# Patient Record
Sex: FEMALE | Race: WHITE | NOT HISPANIC OR LATINO | Employment: OTHER | URBAN - METROPOLITAN AREA
[De-identification: names, ages, dates, MRNs, and addresses within clinical notes are randomized per-mention and may not be internally consistent; named-entity substitution may affect disease eponyms.]

---

## 2017-04-28 DIAGNOSIS — F10.21 ALCOHOL USE DISORDER, SEVERE, IN EARLY REMISSION (H): Primary | ICD-10-CM

## 2017-04-28 DIAGNOSIS — F32.9 MDD (MAJOR DEPRESSIVE DISORDER): ICD-10-CM

## 2017-04-28 LAB
ALBUMIN SERPL-MCNC: 3.9 G/DL (ref 3.4–5)
ALP SERPL-CCNC: 130 U/L (ref 40–150)
ALT SERPL W P-5'-P-CCNC: 27 U/L (ref 0–50)
ANION GAP SERPL CALCULATED.3IONS-SCNC: 6 MMOL/L (ref 3–14)
AST SERPL W P-5'-P-CCNC: 24 U/L (ref 0–45)
BILIRUB SERPL-MCNC: 0.2 MG/DL (ref 0.2–1.3)
BUN SERPL-MCNC: 17 MG/DL (ref 7–30)
CALCIUM SERPL-MCNC: 8.8 MG/DL (ref 8.5–10.1)
CHLORIDE SERPL-SCNC: 102 MMOL/L (ref 94–109)
CO2 SERPL-SCNC: 29 MMOL/L (ref 20–32)
CREAT SERPL-MCNC: 0.78 MG/DL (ref 0.52–1.04)
GFR SERPL CREATININE-BSD FRML MDRD: 76 ML/MIN/1.7M2
GLUCOSE SERPL-MCNC: 98 MG/DL (ref 70–99)
POTASSIUM SERPL-SCNC: 4.1 MMOL/L (ref 3.4–5.3)
PROT SERPL-MCNC: 8 G/DL (ref 6.8–8.8)
SODIUM SERPL-SCNC: 137 MMOL/L (ref 133–144)

## 2017-04-28 PROCEDURE — 80053 COMPREHEN METABOLIC PANEL: CPT

## 2017-04-28 PROCEDURE — 36415 COLL VENOUS BLD VENIPUNCTURE: CPT | Performed by: FAMILY MEDICINE

## 2018-01-08 ENCOUNTER — RECORDS - HEALTHEAST (OUTPATIENT)
Dept: LAB | Facility: CLINIC | Age: 58
End: 2018-01-08

## 2018-01-13 LAB — BACTERIA SPEC CULT: ABNORMAL

## 2018-01-16 ENCOUNTER — RECORDS - HEALTHEAST (OUTPATIENT)
Dept: ADMINISTRATIVE | Facility: OTHER | Age: 58
End: 2018-01-16

## 2018-01-16 ENCOUNTER — AMBULATORY - HEALTHEAST (OUTPATIENT)
Dept: VASCULAR SURGERY | Facility: CLINIC | Age: 58
End: 2018-01-16

## 2018-01-16 ENCOUNTER — RECORDS - HEALTHEAST (OUTPATIENT)
Dept: LAB | Facility: CLINIC | Age: 58
End: 2018-01-16

## 2018-01-16 DIAGNOSIS — I73.9 CLAUDICATION (H): ICD-10-CM

## 2018-01-16 LAB
ALBUMIN SERPL-MCNC: 3.8 G/DL (ref 3.5–5)
ALP SERPL-CCNC: 109 U/L (ref 45–120)
ALT SERPL W P-5'-P-CCNC: 28 U/L (ref 0–45)
ANION GAP SERPL CALCULATED.3IONS-SCNC: 10 MMOL/L (ref 5–18)
AST SERPL W P-5'-P-CCNC: 36 U/L (ref 0–40)
BILIRUB SERPL-MCNC: 0.5 MG/DL (ref 0–1)
BUN SERPL-MCNC: 10 MG/DL (ref 8–22)
CALCIUM SERPL-MCNC: 9.1 MG/DL (ref 8.5–10.5)
CHLORIDE BLD-SCNC: 101 MMOL/L (ref 98–107)
CHOLEST SERPL-MCNC: 139 MG/DL
CO2 SERPL-SCNC: 26 MMOL/L (ref 22–31)
CREAT SERPL-MCNC: 0.75 MG/DL (ref 0.6–1.1)
FASTING STATUS PATIENT QL REPORTED: ABNORMAL
GFR SERPL CREATININE-BSD FRML MDRD: >60 ML/MIN/1.73M2
GLUCOSE BLD-MCNC: 91 MG/DL (ref 70–125)
HDLC SERPL-MCNC: 57 MG/DL
LDLC SERPL CALC-MCNC: 35 MG/DL
MAGNESIUM SERPL-MCNC: 2 MG/DL (ref 1.8–2.6)
POTASSIUM BLD-SCNC: 4.1 MMOL/L (ref 3.5–5)
PROT SERPL-MCNC: 7.5 G/DL (ref 6–8)
SODIUM SERPL-SCNC: 137 MMOL/L (ref 136–145)
TRIGL SERPL-MCNC: 237 MG/DL
TSH SERPL DL<=0.005 MIU/L-ACNC: 1.56 UIU/ML (ref 0.3–5)

## 2018-02-01 ENCOUNTER — AMBULATORY - HEALTHEAST (OUTPATIENT)
Dept: VASCULAR SURGERY | Facility: CLINIC | Age: 58
End: 2018-02-01

## 2018-02-01 DIAGNOSIS — E78.1 HYPERGLYCERIDEMIA: ICD-10-CM

## 2018-02-01 DIAGNOSIS — F31.9 BIPOLAR DISORDER (H): ICD-10-CM

## 2018-02-01 DIAGNOSIS — F10.10 ETOH ABUSE: ICD-10-CM

## 2018-02-01 DIAGNOSIS — I73.9 CLAUDICATION (H): ICD-10-CM

## 2018-02-01 DIAGNOSIS — M79.606 LEG PAIN: ICD-10-CM

## 2018-02-01 DIAGNOSIS — F17.200 SMOKER: ICD-10-CM

## 2018-02-01 DIAGNOSIS — K21.9 GERD (GASTROESOPHAGEAL REFLUX DISEASE): ICD-10-CM

## 2018-02-02 ENCOUNTER — RECORDS - HEALTHEAST (OUTPATIENT)
Dept: ADMINISTRATIVE | Facility: OTHER | Age: 58
End: 2018-02-02

## 2018-02-02 ENCOUNTER — COMMUNICATION - HEALTHEAST (OUTPATIENT)
Dept: TELEHEALTH | Facility: CLINIC | Age: 58
End: 2018-02-02

## 2018-02-02 ENCOUNTER — RECORDS - HEALTHEAST (OUTPATIENT)
Dept: VASCULAR ULTRASOUND | Facility: CLINIC | Age: 58
End: 2018-02-02

## 2018-02-02 ENCOUNTER — OFFICE VISIT - HEALTHEAST (OUTPATIENT)
Dept: VASCULAR SURGERY | Facility: CLINIC | Age: 58
End: 2018-02-02

## 2018-02-02 DIAGNOSIS — I73.9 CLAUDICATION (H): ICD-10-CM

## 2018-02-02 DIAGNOSIS — I73.9 PERIPHERAL VASCULAR DISEASE, UNSPECIFIED (H): ICD-10-CM

## 2018-02-02 DIAGNOSIS — M79.605 PAIN IN BOTH LOWER EXTREMITIES: ICD-10-CM

## 2018-02-02 DIAGNOSIS — M79.604 PAIN IN BOTH LOWER EXTREMITIES: ICD-10-CM

## 2018-02-02 DIAGNOSIS — M79.606 PAIN IN LEG, UNSPECIFIED: ICD-10-CM

## 2018-02-06 ENCOUNTER — COMMUNICATION - HEALTHEAST (OUTPATIENT)
Dept: VASCULAR SURGERY | Facility: CLINIC | Age: 58
End: 2018-02-06

## 2018-03-08 ENCOUNTER — RECORDS - HEALTHEAST (OUTPATIENT)
Dept: LAB | Facility: CLINIC | Age: 58
End: 2018-03-08

## 2018-03-08 LAB — VIT B12 SERPL-MCNC: 633 PG/ML (ref 213–816)

## 2018-08-01 ENCOUNTER — APPOINTMENT (OUTPATIENT)
Dept: GENERAL RADIOLOGY | Facility: CLINIC | Age: 58
DRG: 885 | End: 2018-08-01
Attending: EMERGENCY MEDICINE
Payer: MEDICARE

## 2018-08-01 ENCOUNTER — HOSPITAL ENCOUNTER (OUTPATIENT)
Facility: CLINIC | Age: 58
Setting detail: OBSERVATION
Discharge: PSYCHIATRIC OR SUBSTANCE USE FACILITY | DRG: 885 | End: 2018-08-03
Attending: EMERGENCY MEDICINE | Admitting: EMERGENCY MEDICINE
Payer: MEDICARE

## 2018-08-01 ENCOUNTER — APPOINTMENT (OUTPATIENT)
Dept: CT IMAGING | Facility: CLINIC | Age: 58
DRG: 885 | End: 2018-08-01
Attending: EMERGENCY MEDICINE
Payer: MEDICARE

## 2018-08-01 DIAGNOSIS — R07.9 CHEST PAIN, UNSPECIFIED TYPE: ICD-10-CM

## 2018-08-01 DIAGNOSIS — F43.20 ADJUSTMENT DISORDER, UNSPECIFIED TYPE: ICD-10-CM

## 2018-08-01 DIAGNOSIS — R07.89 ATYPICAL CHEST PAIN: ICD-10-CM

## 2018-08-01 DIAGNOSIS — R45.851 SUICIDAL IDEATION: ICD-10-CM

## 2018-08-01 LAB
ALBUMIN SERPL-MCNC: 3.5 G/DL (ref 3.4–5)
ALP SERPL-CCNC: 96 U/L (ref 40–150)
ALT SERPL W P-5'-P-CCNC: 67 U/L (ref 0–50)
AMPHETAMINES UR QL SCN: NEGATIVE
ANION GAP SERPL CALCULATED.3IONS-SCNC: 10 MMOL/L (ref 3–14)
APTT PPP: 30 SEC (ref 22–37)
AST SERPL W P-5'-P-CCNC: 67 U/L (ref 0–45)
BARBITURATES UR QL: NEGATIVE
BASOPHILS # BLD AUTO: 0.1 10E9/L (ref 0–0.2)
BASOPHILS NFR BLD AUTO: 1.4 %
BENZODIAZ UR QL: NEGATIVE
BILIRUB SERPL-MCNC: 0.8 MG/DL (ref 0.2–1.3)
BUN SERPL-MCNC: 7 MG/DL (ref 7–30)
CALCIUM SERPL-MCNC: 8.5 MG/DL (ref 8.5–10.1)
CANNABINOIDS UR QL SCN: NEGATIVE
CHLORIDE SERPL-SCNC: 104 MMOL/L (ref 94–109)
CO2 SERPL-SCNC: 25 MMOL/L (ref 20–32)
COCAINE UR QL: NEGATIVE
CREAT SERPL-MCNC: 0.76 MG/DL (ref 0.52–1.04)
D DIMER PPP FEU-MCNC: 2.9 UG/ML FEU (ref 0–0.5)
DIFFERENTIAL METHOD BLD: ABNORMAL
EOSINOPHIL # BLD AUTO: 0.1 10E9/L (ref 0–0.7)
EOSINOPHIL NFR BLD AUTO: 1.6 %
ERYTHROCYTE [DISTWIDTH] IN BLOOD BY AUTOMATED COUNT: 12.7 % (ref 10–15)
ETHANOL UR QL SCN: NEGATIVE
GFR SERPL CREATININE-BSD FRML MDRD: 77 ML/MIN/1.7M2
GLUCOSE SERPL-MCNC: 110 MG/DL (ref 70–99)
HCT VFR BLD AUTO: 39.5 % (ref 35–47)
HGB BLD-MCNC: 13.3 G/DL (ref 11.7–15.7)
IMM GRANULOCYTES # BLD: 0 10E9/L (ref 0–0.4)
IMM GRANULOCYTES NFR BLD: 0.2 %
INR PPP: 1.21 (ref 0.86–1.14)
LIPASE SERPL-CCNC: 127 U/L (ref 73–393)
LYMPHOCYTES # BLD AUTO: 1.8 10E9/L (ref 0.8–5.3)
LYMPHOCYTES NFR BLD AUTO: 34.6 %
MCH RBC QN AUTO: 32.1 PG (ref 26.5–33)
MCHC RBC AUTO-ENTMCNC: 33.7 G/DL (ref 31.5–36.5)
MCV RBC AUTO: 95 FL (ref 78–100)
MONOCYTES # BLD AUTO: 0.4 10E9/L (ref 0–1.3)
MONOCYTES NFR BLD AUTO: 7.4 %
NEUTROPHILS # BLD AUTO: 2.8 10E9/L (ref 1.6–8.3)
NEUTROPHILS NFR BLD AUTO: 54.8 %
NRBC # BLD AUTO: 0 10*3/UL
NRBC BLD AUTO-RTO: 0 /100
OPIATES UR QL SCN: NEGATIVE
PLATELET # BLD AUTO: 105 10E9/L (ref 150–450)
POTASSIUM SERPL-SCNC: 4.4 MMOL/L (ref 3.4–5.3)
PROT SERPL-MCNC: 7.9 G/DL (ref 6.8–8.8)
RBC # BLD AUTO: 4.14 10E12/L (ref 3.8–5.2)
SODIUM SERPL-SCNC: 139 MMOL/L (ref 133–144)
TROPONIN I SERPL-MCNC: <0.015 UG/L (ref 0–0.04)
WBC # BLD AUTO: 5.2 10E9/L (ref 4–11)

## 2018-08-01 PROCEDURE — 83690 ASSAY OF LIPASE: CPT | Performed by: EMERGENCY MEDICINE

## 2018-08-01 PROCEDURE — A9270 NON-COVERED ITEM OR SERVICE: HCPCS | Mod: GY | Performed by: EMERGENCY MEDICINE

## 2018-08-01 PROCEDURE — 93010 ELECTROCARDIOGRAM REPORT: CPT | Mod: Z6 | Performed by: EMERGENCY MEDICINE

## 2018-08-01 PROCEDURE — 99220 ZZC INITIAL OBSERVATION CARE,LEVL III: CPT | Mod: Z6 | Performed by: NURSE PRACTITIONER

## 2018-08-01 PROCEDURE — 85025 COMPLETE CBC W/AUTO DIFF WBC: CPT | Performed by: EMERGENCY MEDICINE

## 2018-08-01 PROCEDURE — 84484 ASSAY OF TROPONIN QUANT: CPT | Performed by: EMERGENCY MEDICINE

## 2018-08-01 PROCEDURE — 94640 AIRWAY INHALATION TREATMENT: CPT | Performed by: EMERGENCY MEDICINE

## 2018-08-01 PROCEDURE — 85730 THROMBOPLASTIN TIME PARTIAL: CPT | Performed by: EMERGENCY MEDICINE

## 2018-08-01 PROCEDURE — 25000125 ZZHC RX 250: Performed by: EMERGENCY MEDICINE

## 2018-08-01 PROCEDURE — 25000132 ZZH RX MED GY IP 250 OP 250 PS 637: Mod: GY | Performed by: EMERGENCY MEDICINE

## 2018-08-01 PROCEDURE — 71260 CT THORAX DX C+: CPT

## 2018-08-01 PROCEDURE — 80053 COMPREHEN METABOLIC PANEL: CPT | Performed by: EMERGENCY MEDICINE

## 2018-08-01 PROCEDURE — 99285 EMERGENCY DEPT VISIT HI MDM: CPT | Mod: 25 | Performed by: EMERGENCY MEDICINE

## 2018-08-01 PROCEDURE — 80307 DRUG TEST PRSMV CHEM ANLYZR: CPT | Performed by: EMERGENCY MEDICINE

## 2018-08-01 PROCEDURE — 71046 X-RAY EXAM CHEST 2 VIEWS: CPT

## 2018-08-01 PROCEDURE — 80320 DRUG SCREEN QUANTALCOHOLS: CPT | Performed by: EMERGENCY MEDICINE

## 2018-08-01 PROCEDURE — 25000128 H RX IP 250 OP 636: Performed by: EMERGENCY MEDICINE

## 2018-08-01 PROCEDURE — 85610 PROTHROMBIN TIME: CPT | Performed by: EMERGENCY MEDICINE

## 2018-08-01 PROCEDURE — 85379 FIBRIN DEGRADATION QUANT: CPT | Performed by: EMERGENCY MEDICINE

## 2018-08-01 PROCEDURE — 93005 ELECTROCARDIOGRAM TRACING: CPT | Performed by: EMERGENCY MEDICINE

## 2018-08-01 RX ORDER — ACETAMINOPHEN 500 MG
1000 TABLET ORAL ONCE
Status: COMPLETED | OUTPATIENT
Start: 2018-08-01 | End: 2018-08-01

## 2018-08-01 RX ORDER — IOPAMIDOL 755 MG/ML
100 INJECTION, SOLUTION INTRAVASCULAR ONCE
Status: COMPLETED | OUTPATIENT
Start: 2018-08-01 | End: 2018-08-01

## 2018-08-01 RX ORDER — ZOLPIDEM TARTRATE 5 MG/1
5 TABLET ORAL
Status: DISCONTINUED | OUTPATIENT
Start: 2018-08-01 | End: 2018-08-02

## 2018-08-01 RX ORDER — ASPIRIN 81 MG/1
324 TABLET, CHEWABLE ORAL ONCE
Status: COMPLETED | OUTPATIENT
Start: 2018-08-01 | End: 2018-08-01

## 2018-08-01 RX ORDER — IPRATROPIUM BROMIDE AND ALBUTEROL SULFATE 2.5; .5 MG/3ML; MG/3ML
3 SOLUTION RESPIRATORY (INHALATION) ONCE
Status: COMPLETED | OUTPATIENT
Start: 2018-08-01 | End: 2018-08-01

## 2018-08-01 RX ADMIN — ZOLPIDEM TARTRATE 5 MG: 5 TABLET, FILM COATED ORAL at 22:50

## 2018-08-01 RX ADMIN — IPRATROPIUM BROMIDE AND ALBUTEROL SULFATE 3 ML: .5; 3 SOLUTION RESPIRATORY (INHALATION) at 18:08

## 2018-08-01 RX ADMIN — ACETAMINOPHEN 1000 MG: 500 TABLET ORAL at 22:44

## 2018-08-01 RX ADMIN — IOPAMIDOL 64 ML: 755 INJECTION, SOLUTION INTRAVENOUS at 19:16

## 2018-08-01 RX ADMIN — ASPIRIN 81 MG CHEWABLE TABLET 324 MG: 81 TABLET CHEWABLE at 16:49

## 2018-08-01 RX ADMIN — SODIUM CHLORIDE 84 ML: 9 INJECTION, SOLUTION INTRAVENOUS at 19:16

## 2018-08-01 ASSESSMENT — ENCOUNTER SYMPTOMS
COUGH: 1
PALPITATIONS: 0
FEVER: 0
NAUSEA: 0

## 2018-08-01 NOTE — ED PROVIDER NOTES
History     Chief Complaint   Patient presents with     Suicidal     stress at home: daughter moving back in to house: dealing with these thouughts for a month: anxiety: plan to OD on pills     The history is provided by the patient. No  was used.     Keiko Guo is a 58 year old female who has a history of depression, previous hospitalization for suicide attempt, as well as attempts while teenager (overdose), COPD not on meds, peripheral artery disease in legs w/claudication.  She comes in today with depression, suicidal ideation, and chest pain.  She was seen in ED OSH 1 week ago and was told CP was anxiety.  She has difficulty maintaining train of thought and clearly expressing duration of symptoms but says it was all day today since waking, and has happened 3-4 times previously.  She says is worse with deep breaths, non exertional, R sided behind breast, constant throughout the day today.  She denies hx of clots but has listed result with blood clot in superficial arm vein 2016.  She has no leg swelling, does complain of dyspnea that she cannot characterize.  She has no known history of cardiac disease, has not had stress testing or cardiac catheterization in the past.  Has had cough for 1 week, no fever, no nausea/vomiting, no leg swelling, no immobilization or hormone use.  Took tylenol for pain with minimal relief.  Does not have inhalers at home.  Also comes in today for suicidal ideation.  Has had a few weeks of worsening depression, and today is concerned that she will take pills to harm herself.  Was started on zyprexa in December without improvement.  Is also on wellbutrin.    Patient is former tobacco smoker, currently uses vape, drinks 2 beers daily, denies drug use.    I have reviewed the Medications, Allergies, Past Medical and Surgical History, and Social History in the Epic system.    Review of Systems   Constitutional: Negative for fever.   HENT: Negative for  congestion.    Respiratory: Positive for cough.    Cardiovascular: Positive for chest pain. Negative for palpitations.   Gastrointestinal: Negative for nausea.   Skin: Negative for rash.   Psychiatric/Behavioral: Positive for suicidal ideas.   All other systems reviewed and are negative.      Physical Exam   BP: 129/70  Pulse: 95  Heart Rate: 79  Temp: 96.8  F (36  C)  Resp: 16  Weight: 81.6 kg (180 lb)  SpO2: 95 %      Physical Exam   Constitutional: She is oriented to person, place, and time. She appears well-developed and well-nourished. No distress.   HENT:   Head: Normocephalic and atraumatic.   Eyes: Pupils are equal, round, and reactive to light.   Cardiovascular: Normal rate and intact distal pulses.    Pulmonary/Chest: No respiratory distress. She exhibits no tenderness.   Abdominal: There is no tenderness.   Musculoskeletal: Normal range of motion.   Neurological: She is alert and oriented to person, place, and time.   Skin: Skin is warm and dry.   Psychiatric:   Flat affect, psychomotor slowing       ED Course     ED Course     Procedures             EKG Interpretation:      Interpreted by Shannan Townsend  Time reviewed: 1440   Symptoms at time of EKG: pain   Rhythm: normal sinus   Rate: Normal  Axis: Normal  Ectopy: none  Conduction: normal  ST Segments/ T Waves: T wave inversion V1, V2, V3 and V4  Q Waves: none  Comparison to prior: T wave inversions new    Clinical Impression: new t wave inversions, no evidence acute ischemia                Critical Care time:  none             Labs Ordered and Resulted from Time of ED Arrival Up to the Time of Departure from the ED   CBC WITH PLATELETS DIFFERENTIAL - Abnormal; Notable for the following:        Result Value    Platelet Count 105 (*)     All other components within normal limits   INR - Abnormal; Notable for the following:     INR 1.21 (*)     All other components within normal limits   D DIMER QUANTITATIVE - Abnormal; Notable for the following:     D  Dimer 2.9 (*)     All other components within normal limits   TROPONIN I   COMPREHENSIVE METABOLIC PANEL   LIPASE   PARTIAL THROMBOPLASTIN TIME   PULSE OXIMETRY NURSING   CARDIAC CONTINUOUS MONITORING   PERIPHERAL IV CATHETER   PATIENT CARE ORDER            Assessments & Plan (with Medical Decision Making)   Patient has 2 complaints today, chest pain and depression/SI    Chest pain story is less concerning for cardiac etiology as is not exertional, constant for several hours, and episodic.  However patient is former smoker and has vascular disease, age and overweight increase her risk for cardiac disease.  Heart score 3 without troponin.  Due to duration likely can obtain single troponin.  Will require provocative testing in future.  DDX also includes PE.  Wells score 0, unable to PERC out as patient is >50, dimer above age-adjusted cut-off, will require CTA.  DDX also includes pneumonia, COPD exacerbation.  Patient will require medical clearances prior to admission to psychiatry for active SI with plan/intent.  Patient is high risk given previous attempts.    I have reviewed the nursing notes.    I have reviewed the findings, diagnosis, plan and need for follow up with the patient.    New Prescriptions    No medications on file       Final diagnoses:   None       8/1/2018   [facility], [place], EMERGENCY DEPARTMENT     Shannan Townsend MD  08/01/18 [id number]

## 2018-08-01 NOTE — ED NOTES
I have performed an in person assessment of the patient. Based on this assessment the patient no longer requires a one on one attendant at this point in time.    Shannan Townsend MD  4:20 PM  August 1, 2018           Shannan Townsend MD  08/01/18 2176

## 2018-08-02 ENCOUNTER — APPOINTMENT (OUTPATIENT)
Dept: CARDIOLOGY | Facility: CLINIC | Age: 58
DRG: 885 | End: 2018-08-02
Attending: NURSE PRACTITIONER
Payer: MEDICARE

## 2018-08-02 ENCOUNTER — APPOINTMENT (OUTPATIENT)
Dept: NUCLEAR MEDICINE | Facility: CLINIC | Age: 58
DRG: 885 | End: 2018-08-02
Attending: NURSE PRACTITIONER
Payer: MEDICARE

## 2018-08-02 VITALS
WEIGHT: 183.6 LBS | OXYGEN SATURATION: 95 % | TEMPERATURE: 97.7 F | DIASTOLIC BLOOD PRESSURE: 82 MMHG | SYSTOLIC BLOOD PRESSURE: 124 MMHG | HEART RATE: 87 BPM | HEIGHT: 62 IN | RESPIRATION RATE: 18 BRPM | BODY MASS INDEX: 33.79 KG/M2

## 2018-08-02 LAB
INTERPRETATION ECG - MUSE: NORMAL
TROPONIN I SERPL-MCNC: <0.015 UG/L (ref 0–0.04)
TROPONIN I SERPL-MCNC: <0.015 UG/L (ref 0–0.04)

## 2018-08-02 PROCEDURE — 93018 CV STRESS TEST I&R ONLY: CPT | Performed by: INTERNAL MEDICINE

## 2018-08-02 PROCEDURE — 84484 ASSAY OF TROPONIN QUANT: CPT | Performed by: NURSE PRACTITIONER

## 2018-08-02 PROCEDURE — 99214 OFFICE O/P EST MOD 30 MIN: CPT | Performed by: PSYCHIATRY & NEUROLOGY

## 2018-08-02 PROCEDURE — A9502 TC99M TETROFOSMIN: HCPCS | Performed by: EMERGENCY MEDICINE

## 2018-08-02 PROCEDURE — 93016 CV STRESS TEST SUPVJ ONLY: CPT | Performed by: INTERNAL MEDICINE

## 2018-08-02 PROCEDURE — 25000132 ZZH RX MED GY IP 250 OP 250 PS 637: Mod: GY | Performed by: NURSE PRACTITIONER

## 2018-08-02 PROCEDURE — 25000128 H RX IP 250 OP 636: Performed by: INTERNAL MEDICINE

## 2018-08-02 PROCEDURE — 34300033 ZZH RX 343: Performed by: EMERGENCY MEDICINE

## 2018-08-02 PROCEDURE — 99217 ZZC OBSERVATION CARE DISCHARGE: CPT | Mod: Z6 | Performed by: PHYSICIAN ASSISTANT

## 2018-08-02 PROCEDURE — A9270 NON-COVERED ITEM OR SERVICE: HCPCS | Mod: GY | Performed by: PSYCHIATRY & NEUROLOGY

## 2018-08-02 PROCEDURE — G0378 HOSPITAL OBSERVATION PER HR: HCPCS

## 2018-08-02 PROCEDURE — 25000132 ZZH RX MED GY IP 250 OP 250 PS 637: Mod: GY | Performed by: PSYCHIATRY & NEUROLOGY

## 2018-08-02 PROCEDURE — 78452 HT MUSCLE IMAGE SPECT MULT: CPT

## 2018-08-02 PROCEDURE — 93017 CV STRESS TEST TRACING ONLY: CPT

## 2018-08-02 PROCEDURE — A9270 NON-COVERED ITEM OR SERVICE: HCPCS | Mod: GY | Performed by: NURSE PRACTITIONER

## 2018-08-02 PROCEDURE — 25000125 ZZHC RX 250: Performed by: EMERGENCY MEDICINE

## 2018-08-02 PROCEDURE — 36415 COLL VENOUS BLD VENIPUNCTURE: CPT | Performed by: NURSE PRACTITIONER

## 2018-08-02 RX ORDER — QUETIAPINE FUMARATE 50 MG/1
50 TABLET, FILM COATED ORAL AT BEDTIME
Status: DISCONTINUED | OUTPATIENT
Start: 2018-08-02 | End: 2018-08-03 | Stop reason: HOSPADM

## 2018-08-02 RX ORDER — PANTOPRAZOLE SODIUM 40 MG/1
40 TABLET, DELAYED RELEASE ORAL
Status: DISCONTINUED | OUTPATIENT
Start: 2018-08-02 | End: 2018-08-03 | Stop reason: HOSPADM

## 2018-08-02 RX ORDER — OLANZAPINE 5 MG/1
5 TABLET ORAL AT BEDTIME
Status: DISCONTINUED | OUTPATIENT
Start: 2018-08-02 | End: 2018-08-03 | Stop reason: HOSPADM

## 2018-08-02 RX ORDER — ASPIRIN 81 MG/1
162 TABLET, CHEWABLE ORAL ONCE
Status: COMPLETED | OUTPATIENT
Start: 2018-08-02 | End: 2018-08-02

## 2018-08-02 RX ORDER — AMINOPHYLLINE 25 MG/ML
50-100 INJECTION, SOLUTION INTRAVENOUS
Status: DISCONTINUED | OUTPATIENT
Start: 2018-08-02 | End: 2018-08-02

## 2018-08-02 RX ORDER — IPRATROPIUM BROMIDE AND ALBUTEROL SULFATE 2.5; .5 MG/3ML; MG/3ML
3 SOLUTION RESPIRATORY (INHALATION) ONCE
Status: COMPLETED | OUTPATIENT
Start: 2018-08-02 | End: 2018-08-02

## 2018-08-02 RX ORDER — ACETAMINOPHEN 325 MG/1
650 TABLET ORAL EVERY 4 HOURS PRN
Status: DISCONTINUED | OUTPATIENT
Start: 2018-08-02 | End: 2018-08-03 | Stop reason: HOSPADM

## 2018-08-02 RX ORDER — ASPIRIN 81 MG/1
81 TABLET ORAL DAILY
Status: DISCONTINUED | OUTPATIENT
Start: 2018-08-03 | End: 2018-08-03 | Stop reason: HOSPADM

## 2018-08-02 RX ORDER — HYDROXYZINE HYDROCHLORIDE 25 MG/1
25 TABLET, FILM COATED ORAL AT BEDTIME
Status: DISCONTINUED | OUTPATIENT
Start: 2018-08-02 | End: 2018-08-03 | Stop reason: HOSPADM

## 2018-08-02 RX ORDER — LIDOCAINE 40 MG/G
CREAM TOPICAL
Status: DISCONTINUED | OUTPATIENT
Start: 2018-08-02 | End: 2018-08-03 | Stop reason: HOSPADM

## 2018-08-02 RX ORDER — REGADENOSON 0.08 MG/ML
0.4 INJECTION, SOLUTION INTRAVENOUS ONCE
Status: DISCONTINUED | OUTPATIENT
Start: 2018-08-02 | End: 2018-08-02

## 2018-08-02 RX ORDER — BUPROPION HYDROCHLORIDE 100 MG/1
100 TABLET, EXTENDED RELEASE ORAL AT BEDTIME
Status: ON HOLD | COMMUNITY
End: 2018-08-07

## 2018-08-02 RX ORDER — ALUMINA, MAGNESIA, AND SIMETHICONE 2400; 2400; 240 MG/30ML; MG/30ML; MG/30ML
30 SUSPENSION ORAL EVERY 4 HOURS PRN
Status: DISCONTINUED | OUTPATIENT
Start: 2018-08-02 | End: 2018-08-03 | Stop reason: HOSPADM

## 2018-08-02 RX ORDER — ACETAMINOPHEN 650 MG/1
650 SUPPOSITORY RECTAL EVERY 4 HOURS PRN
Status: DISCONTINUED | OUTPATIENT
Start: 2018-08-02 | End: 2018-08-03 | Stop reason: HOSPADM

## 2018-08-02 RX ORDER — LORAZEPAM 1 MG/1
1 TABLET ORAL 3 TIMES DAILY PRN
Status: DISCONTINUED | OUTPATIENT
Start: 2018-08-02 | End: 2018-08-03 | Stop reason: HOSPADM

## 2018-08-02 RX ORDER — SENNOSIDES 8.6 MG
2600 CAPSULE ORAL 2 TIMES DAILY
COMMUNITY

## 2018-08-02 RX ORDER — ACYCLOVIR 200 MG/1
0-1 CAPSULE ORAL
Status: DISCONTINUED | OUTPATIENT
Start: 2018-08-02 | End: 2018-08-03 | Stop reason: HOSPADM

## 2018-08-02 RX ORDER — NITROGLYCERIN 0.4 MG/1
0.4 TABLET SUBLINGUAL EVERY 5 MIN PRN
Status: DISCONTINUED | OUTPATIENT
Start: 2018-08-02 | End: 2018-08-03 | Stop reason: HOSPADM

## 2018-08-02 RX ORDER — HYDROXYZINE HYDROCHLORIDE 25 MG/1
25 TABLET, FILM COATED ORAL EVERY 6 HOURS
COMMUNITY

## 2018-08-02 RX ORDER — IPRATROPIUM BROMIDE AND ALBUTEROL SULFATE 2.5; .5 MG/3ML; MG/3ML
3 SOLUTION RESPIRATORY (INHALATION) EVERY 4 HOURS PRN
Status: DISCONTINUED | OUTPATIENT
Start: 2018-08-02 | End: 2018-08-03 | Stop reason: HOSPADM

## 2018-08-02 RX ORDER — OLANZAPINE 20 MG/1
20 TABLET ORAL AT BEDTIME
Status: ON HOLD | COMMUNITY
End: 2018-08-07

## 2018-08-02 RX ORDER — HYDROXYZINE HYDROCHLORIDE 25 MG/1
50 TABLET, FILM COATED ORAL AT BEDTIME
Status: DISCONTINUED | OUTPATIENT
Start: 2018-08-02 | End: 2018-08-03 | Stop reason: HOSPADM

## 2018-08-02 RX ORDER — ALBUTEROL SULFATE 90 UG/1
2 AEROSOL, METERED RESPIRATORY (INHALATION) EVERY 5 MIN PRN
Status: DISCONTINUED | OUTPATIENT
Start: 2018-08-02 | End: 2018-08-02

## 2018-08-02 RX ORDER — NALOXONE HYDROCHLORIDE 0.4 MG/ML
.1-.4 INJECTION, SOLUTION INTRAMUSCULAR; INTRAVENOUS; SUBCUTANEOUS
Status: DISCONTINUED | OUTPATIENT
Start: 2018-08-02 | End: 2018-08-03 | Stop reason: HOSPADM

## 2018-08-02 RX ORDER — NICOTINE 21 MG/24HR
1 PATCH, TRANSDERMAL 24 HOURS TRANSDERMAL DAILY
Status: DISCONTINUED | OUTPATIENT
Start: 2018-08-02 | End: 2018-08-03 | Stop reason: HOSPADM

## 2018-08-02 RX ADMIN — QUETIAPINE FUMARATE 50 MG: 50 TABLET ORAL at 22:11

## 2018-08-02 RX ADMIN — TETROFOSMIN 33 MCI.: 1.38 INJECTION, POWDER, LYOPHILIZED, FOR SOLUTION INTRAVENOUS at 13:10

## 2018-08-02 RX ADMIN — HYDROXYZINE HYDROCHLORIDE 25 MG: 25 TABLET ORAL at 04:49

## 2018-08-02 RX ADMIN — TETROFOSMIN 8.6 MCI.: 1.38 INJECTION, POWDER, LYOPHILIZED, FOR SOLUTION INTRAVENOUS at 11:30

## 2018-08-02 RX ADMIN — HYDROXYZINE HYDROCHLORIDE 25 MG: 25 TABLET ORAL at 22:11

## 2018-08-02 RX ADMIN — REGADENOSON 0.4 MG: 0.08 INJECTION, SOLUTION INTRAVENOUS at 13:06

## 2018-08-02 RX ADMIN — OLANZAPINE 5 MG: 5 TABLET, FILM COATED ORAL at 22:11

## 2018-08-02 RX ADMIN — ACETAMINOPHEN 650 MG: 325 TABLET, FILM COATED ORAL at 20:18

## 2018-08-02 RX ADMIN — ASPIRIN 81 MG CHEWABLE TABLET 162 MG: 81 TABLET CHEWABLE at 04:49

## 2018-08-02 RX ADMIN — LORAZEPAM 1 MG: 1 TABLET ORAL at 20:19

## 2018-08-02 RX ADMIN — PANTOPRAZOLE SODIUM 40 MG: 40 TABLET, DELAYED RELEASE ORAL at 08:34

## 2018-08-02 RX ADMIN — IPRATROPIUM BROMIDE AND ALBUTEROL SULFATE 3 ML: .5; 3 SOLUTION RESPIRATORY (INHALATION) at 01:44

## 2018-08-02 NOTE — PLAN OF CARE
Problem: Patient Care Overview  Goal: Plan of Care/Patient Progress Review  List all goals to be met before discharge home:   - Serial troponins and stress test complete. Not met,stress test not completed  - Seen and cleared by consultant if applicable :Not met  - Adequate pain control on oral analgesia :Denied pain  - Vital signs normal or at patient baseline :Vitals are stable  - Safe disposition plan has been identified :Not yet identified.  - Nurse to notify provider when observation goals have been met and patient is ready for discharge

## 2018-08-02 NOTE — CONSULTS
She is currently undergoing a stress EKG; I will be back later today or tomorrow.   Chart was reviewed; hold on OP Wellbutrin. Would order Zyprexa 10 mg HS PRN sleep, agitation   Continue beside attendant until we see her    page me at [phone] as needed

## 2018-08-02 NOTE — PLAN OF CARE
"Problem: Patient Care Overview  Goal: Plan of Care/Patient Progress Review  Outcome: No Change  Outpatient/Observation goals to be met before discharge home:  /88 (BP Location: Left arm)  Pulse 87  Temp 97.4  F (36.3  C) (Axillary)  Resp 18  Ht 1.575 m (5' 2\")  Wt 83.3 kg (183 lb 9.6 oz)  SpO2 97%  BMI 33.58 kg/m2  Serial troponins and stress test complete. - Awaiting Last Troponin  - Seen and cleared by consultant if applicable - Awaiting 2nd Psych Consult in Morning   - Adequate pain control on oral analgesia - Yes  - Vital signs normal or at patient baseline - Yes  - Safe disposition plan has been identified - NA  - Nurse to notify provider when observation goals have been met and patient is ready for discharge.          "

## 2018-08-02 NOTE — PHARMACY-ADMISSION MEDICATION HISTORY
Admission medication history interview status for the 8/1/2018 admission is complete. See Epic admission navigator for allergy information, pharmacy, prior to admission medications and immunization status.     Medication history interview sources:  patient and Christie ([phone])    Changes made to PTA medication list (reason)  Added: Melatonin, acetaminophen ER, hydroxyzine  Deleted: Norco  Changed:   -bupropion no sig --> bupropion SR 100mg at bedtime  -olanzapine no sig --> 20mg at bedtime    Additional medication history information (including reliability of information, actions taken by pharmacist):  -pt was moderate historian, knew all meds but no doses, confirmed with pharmacy  -Pt states hydroxyzine is not working for her and would like to try something new  -Pt requesting albuterol HFA on discharge d/t SOB. Has had in the past.  -Pt unsure if melatonin is 1mg or 2mg tablets, but takes 5 tablets at bedtime.      Prior to Admission medications    Medication Sig Last Dose Taking? Auth Provider   acetaminophen (TYLENOL) 650 MG CR tablet Take 2,600 mg by mouth 2 times daily 7/31/2018 Yes Unknown, Entered By History   buPROPion (WELLBUTRIN SR) 100 MG 12 hr tablet Take 100 mg by mouth At Bedtime 7/31/2018 at HS Yes Unknown, Entered By History   hydrOXYzine (ATARAX) 25 MG tablet Take 25 mg by mouth every 6 hours 7/31/2018 Yes Unknown, Entered By History   MELATONIN PO Take 5 tablets by mouth At Bedtime 7/31/2018 at HS Yes Unknown, Entered By History   OLANZapine (ZYPREXA) 20 MG tablet Take 20 mg by mouth At Bedtime 7/31/2018 at HS Yes Unknown, Entered By History         Medication history completed by: Balwinder Tam (PD4 Student)

## 2018-08-02 NOTE — H&P
ED OBSERVATION HISTORY & PHYSICAL    Admission Date: 8-1-18  Attending Physician: Sylvain Spain MD  Admitting Physician: Dino Morataya MD  NP/PA: Marika Coyne CNP    REASON FOR ADMISSION:   Chief Complaint   Patient presents with     Suicidal     stress at home: daughter moving back in to house: dealing with these thouughts for a month: anxiety: plan to OD on pills         HPI:    Keiko Guo is a 58 year old female with a history of COPD, depression, smoking tobacco, peripheral arterial disease with claudication, suicide attempts, and depression who presented to the ED with suicidal ideation and chest pain. She reports that the chest pain has been going on all day today since she woke up and it is right sided. It is constant and does hurt more to take deep breaths. She denies that the pain is exertional though. She had chest pain last week and reports she was told in ED OSH that it was anxiety. She has not had leg swelling. She denies history of blood clots, although chart review does show superficial arm vein thrombosis 2016. She is not on any anticoagulation. She has no known cardiac history. She has had a cough for a week, no fevers or chills, no nausea or vomiting, no immobilization or hormone use. She does not use inhalers at home. She reports worsening depression for a few weeks and is concerned that she might take pills to harm herself. She is currently on Zyprexa and wellbutrin. She is a former tobacco smoker, uses a vape pen now and drinks 2 beers daily.     In the ED, VSS. D-Dimer 2.9. So completed CTA chest shows emphysematous changes with peripheral lingular nodule that should have followup but no acute processes. EKG with t-wave inversion of V1, V2, V3, V4. Prolonged QT. Troponin x2 negative.    On admission to the observation unit the patient was stable. Denies chest pain or shortness of breath. She is anxious and wants her mints but she is NPO pending cardiac stress testing in the next few  "hours. She reports her ex-, whom she lives with, is in hospital for 4 way coronary bypass \"at some hospital in [place]\" right now.     ROS:  CONSTITUTIONAL: Denies fever, chills, sweats, fatigue, weakness, weight loss, or appetite changes.  SKIN: Denies rash, itching, bruising, new lumps or bumps, ecchymosis, hair changes or nail changes.  EYES: Denies visual changes, blurred vision, double vision, or eye pain  EARS/NOSE/THROAT: Denies hearing loss, tinnitus, sinus pressure/drainage, PND, nasal congestion, runny nose, epistaxis, sore throat/mouth pain, change in taste, ear pain, bleeding gums, or hoarseness.  RESPIRATORY: +Cough Denies dyspnea at rest or with activity, or hemoptysis.  CARDIOVASCULAR:Denies chest pain currently. Denies palpitations, orthopnea, edema or open areas on extremities.  GASTROINTESTINAL:Denies dysphagia, heartburn, nausea, vomiting, abdominal pain, constipation, or diarrhea.  GENITOURINARY: Denies dysuria, frequency, urgency, hesitancy, hematuria, or incontinence  MUSCULOSKELETAL:+toe pain that wakes her from sleep sometimes. Denies muscle/joint pain and weakness  NEUROLOGIC:  Denies headaches, dizziness, numbness or tingling of hands and feet, confusion, memory changes, lightheadedness/dizziness or difficulties with balance.  PSYCHIATRIC: +Suicidal ideas with plan. +Depression.  VASCULAR ACCESS: Denies pain, redness, or discharge.    ROS negative other than the symptoms noted above.    History:    History reviewed. No pertinent past medical history.    No past surgical history on file.    No family history on file.    Social History     Social History     Marital status:      Spouse name: N/A     Number of children: N/A     Years of education: N/A     Occupational History     Not on file.     Social History Main Topics     Smoking status: Not on file     Smokeless tobacco: Not on file     Alcohol use Not on file     Drug use: Not on file     Sexual activity: Not on file "     Other Topics Concern     Not on file     Social History Narrative     No narrative on file         No current facility-administered medications on file prior to encounter.   Current Outpatient Prescriptions on File Prior to Encounter:  HYDROcodone-acetaminophen (NORCO) 5-325 MG per tablet Take 1-2 tablets by mouth every 4 hours as needed for moderate to severe pain   Wellbutrin  Zyprexa  Vistaril  Melatonin        Exam:  Vitals:  B/P: 131/82, T: 96.8, P: 95, R: 18    All vital signs were reviewed.  GENERAL APPEARANCE: A/O x4. NAD.  SKIN: Clean, dry, and intact without visible lesions, rash, jaundice, cyanosis, erythema, ecchymoses to exposed areas.  HEENT: NCAT w/out masses, lesions, or abnormalities. Sclera anicteric, PERRLA, EOMI.  Oral mucosa pink and moist without erythema, exudate, lesions, ulcerations, or thrush. Teeth and gums normal.    NECK: Supple, no masses. No jugular venous distention.   CARDIOVASCULAR: S1, S2 RRR. No murmurs, rubs, or gallops.   RESPIRATORY: Respiratory effort WNL. CTA  bilaterally without crackles/rales/wheeze   ABDOMEN: Active BS in all 4 quadrants. Abdomen soft and non-tender.   MUSCULOSKELETAL: Gait is steady. Strength 5/5 in major muscle groups of bilateral UE and LE.  Extremities normal, no gross deformities noted, non-tender to palpation.   PV: 2+ bilateral radial and pedal pulses. No edema noted.   NEURO: CN II-XII grossly intact. Speech normal. Appropriate throughout interview. Sensation grossly WNL.  PSYCHIATRIC: Scattered ideas, unable to maintain train of thought, anxious.   VASCULAR ACCESS: CDI without erythema or discharge. Non-tender.    Data:    Results for orders placed or performed during the hospital encounter of 08/01/18   XR Chest 2 Views    Narrative    CHEST TWO VIEWS    8/1/2018 5:30 PM     HISTORY: Chest pain.     COMPARISON: 9/7/2016.    FINDINGS: The heart size is normal. The lungs are clear. No  pneumothorax or pleural effusion.      Impression     IMPRESSION: No acute abnormality.    MAYE MARTINEZ MD   CT Chest Pulmonary Embolism w Contrast    Narrative    CT CHEST PULMONARY EMBOLISM WITH CONTRAST8/1/2018 7:19 PM    HISTORY: Rule out pulmonary embolism, elevated D-dimer with  right-sided chest pain. History of upper extremity clot. History of  peripheral vascular disease, COPD.      TECHNIQUE: Axial images from thoracic inlet to diaphragm. 64 mL Isovue  370 IV contrast. Radiation dose for this scan was reduced using  automated exposure control, adjustment of the mA and/or kV according  to patient size, or iterative reconstruction technique.    COMPARISON: 9/7/2016 chest x-ray    FINDINGS:   Chest: No acute pulmonary embolus or thoracic aortic dissection.  Atherosclerotic calcification and plaque in the thoracic aorta.  Multiple mediastinal nodes are not enlarged by size criteria,  borderline right hilar nodes but no convincing adenopathy.     No pleural effusion. Emphysematous changes. 0.3 cm peripheral lingula  nodule image 71 series 5. 12 month followup CT is recommended if the  patient has known risk factors for malignancy such as smoking or  history of malignancy. In the absence of risk factors, no followup for  this tiny nodule is routinely recommended. [ ]     No acute-appearing infiltrate.      Impression    IMPRESSION:   1. No evidence for acute pulmonary embolus or dissection.  2. Innumerable cystic lucencies throughout both lungs with some small  peripheral blebs or bulla. Emphysematous changes.  3. No pleural effusion or acute-appearing infiltrate.    TERESA GARCIA MD   CBC with platelets differential   Result Value Ref Range    WBC 5.2 4.0 - 11.0 10e9/L    RBC Count 4.14 3.8 - 5.2 10e12/L    Hemoglobin 13.3 11.7 - 15.7 g/dL    Hematocrit 39.5 35.0 - 47.0 %    MCV 95 78 - 100 fl    MCH 32.1 26.5 - 33.0 pg    MCHC 33.7 31.5 - 36.5 g/dL    RDW 12.7 10.0 - 15.0 %    Platelet Count 105 (L) 150 - 450 10e9/L    Diff Method Automated Method     %  Neutrophils 54.8 %    % Lymphocytes 34.6 %    % Monocytes 7.4 %    % Eosinophils 1.6 %    % Basophils 1.4 %    % Immature Granulocytes 0.2 %    Nucleated RBCs 0 0 /100    Absolute Neutrophil 2.8 1.6 - 8.3 10e9/L    Absolute Lymphocytes 1.8 0.8 - 5.3 10e9/L    Absolute Monocytes 0.4 0.0 - 1.3 10e9/L    Absolute Eosinophils 0.1 0.0 - 0.7 10e9/L    Absolute Basophils 0.1 0.0 - 0.2 10e9/L    Abs Immature Granulocytes 0.0 0 - 0.4 10e9/L    Absolute Nucleated RBC 0.0    Troponin I   Result Value Ref Range    Troponin I ES <0.015 0.000 - 0.045 ug/L   Comprehensive metabolic panel   Result Value Ref Range    Sodium 139 133 - 144 mmol/L    Potassium 4.4 3.4 - 5.3 mmol/L    Chloride 104 94 - 109 mmol/L    Carbon Dioxide 25 20 - 32 mmol/L    Anion Gap 10 3 - 14 mmol/L    Glucose 110 (H) 70 - 99 mg/dL    Urea Nitrogen 7 7 - 30 mg/dL    Creatinine 0.76 0.52 - 1.04 mg/dL    GFR Estimate 77 >60 mL/min/1.7m2    GFR Estimate If Black >90 >60 mL/min/1.7m2    Calcium 8.5 8.5 - 10.1 mg/dL    Bilirubin Total 0.8 0.2 - 1.3 mg/dL    Albumin 3.5 3.4 - 5.0 g/dL    Protein Total 7.9 6.8 - 8.8 g/dL    Alkaline Phosphatase 96 40 - 150 U/L    ALT 67 (H) 0 - 50 U/L    AST 67 (H) 0 - 45 U/L   Lipase   Result Value Ref Range    Lipase 127 73 - 393 U/L   INR   Result Value Ref Range    INR 1.21 (H) 0.86 - 1.14   Partial thromboplastin time   Result Value Ref Range    PTT 30 22 - 37 sec   D dimer quantitative   Result Value Ref Range    D Dimer 2.9 (H) 0.0 - 0.50 ug/ml FEU   Drug abuse screen 6 urine (chem dep)   Result Value Ref Range    Amphetamine Qual Urine Negative NEG^Negative    Barbiturates Qual Urine Negative NEG^Negative    Benzodiazepine Qual Urine Negative NEG^Negative    Cannabinoids Qual Urine Negative NEG^Negative    Cocaine Qual Urine Negative NEG^Negative    Ethanol Qual Urine Negative NEG^Negative    Opiates Qualitative Urine Negative NEG^Negative   Troponin I   Result Value Ref Range    Troponin I ES <0.015 0.000 - 0.045 ug/L  "  EKG 12 lead   Result Value Ref Range    Interpretation ECG Click View Image link to view waveform and result              EKG Interpretation:    Interpreted by Shannan Townsend  Time reviewed: 1440                      Symptoms at time of EKG: pain   Rhythm: normal sinus   Rate: Normal  Axis: Normal  Ectopy: none  Conduction: normal  ST Segments/ T Waves: T wave inversion V1, V2, V3 and V4  Q Waves: none  Comparison to prior: T wave inversions new     Clinical Impression: new t wave inversions, no evidence acute ischemia      Assessment/Plan:  Keiko Guo is a 58 year old female with a history of COPD, depression, smoking tobacco, peripheral arterial disease with claudication, suicide attempts, and depression who presented to the ED with suicidal ideation and chest pain. Plan for admission to ED Obs unit with sitter while the chest pain is worked up tonight and testing completed in the AM. Psych to see patient and evaluate need for further treatment by their team after ACS protocol for chest pain is completed.      1. Chest Pain-   -Admit to ED Obs Unit  -Telemetry  -Lexiscan in AM  -Troponin x1 more  -NPO  -Chest CT- Follow up imaging in 12m with PCP because she is a smoker.     2.Suicidal ideations-   -Psychiatry to see  -Must have a sitter while on Obs unit tonight    Chronic Medical Problems  ##Depression continue PTA wellbutrin and zyprexa once we know the dose. Pt takes nightly.  ##COPD - duonebs ordered PRN  ##PAD- Hx of claudication.   ##Anxiety- PTA Vistaril, pt states she typically takes at bedtime but it usually \"doesn't work\"      FEN:  -Regular diet as tolerated.  -Monitor BMP and replace electrolytes per protocol    Prophy:  -No VTE prophy as patient is up ad andrea and anticipate short observation stay   -Encourage ambulation as tolerated   -for GI prophy    Consults:   Psychiatry  Pharmacy to help with Med Rec    CODE STATUS:  FULL CODE       DISPOSITION: Anticipate discharge from observation " unit once stress testing is complete tomorrow morning.       GETACHEW Morejon, CNP  Nurse Practitioner   Emergency Department Observation Unit        Patient was seen and evaluated by ER Staff during the OBS Unit stay.  The medical decision making and plan of care was discussed with the OBS Care Team and was supervised by ER Staff.  The documentation above accurately reflects the patient's initial evaluation, care and disposition under my supervision in the OBS Unit.    Sylvain Spain MD, FACEP  [facility] Staff Emergency Physician

## 2018-08-02 NOTE — PROGRESS NOTES
08.01.2018    Consult for DEC assessment from ED provider however after discussion with provider the decision had been made to admit pt medically and obtain a psych consult if needed.    MYRNA Zhang, LICSW

## 2018-08-02 NOTE — ED NOTES
.  [facility], [place]    ED Nurse to Floor Handoff     Keiko Guo is a 58 year old female who speaks English and lives with others,  in a home  They arrived in the ED by car from home    ED Chief Complaint: Suicidal (stress at home: daughter moving back in to house: dealing with these thouughts for a month: anxiety: plan to OD on pills)    ED Dx;   Final diagnoses:   Atypical chest pain - With non specific EKG changes   Suicidal ideation   Adjustment disorder, unspecified type         Needed?: No    Allergies: No Known Allergies.  Past Medical Hx: History reviewed. No pertinent past medical history.   Baseline Mental status: other says she has disability form mental illness and copd  Current Mental Status changes: at basesline    Infection present or suspected this encounter: no  Sepsis suspected: No  Isolation type: No active isolations     Activity level - Baseline/Home:  Independent  Activity Level - Current:   Independent    Bariatric equipment needed?: No    In the ED these meds were given:   Medications   aspirin chewable tablet 324 mg (324 mg Oral Given 8/1/18 1649)   ipratropium - albuterol 0.5 mg/2.5 mg/3 mL (DUONEB) neb solution 3 mL (3 mLs Nebulization Given 8/1/18 1808)   iopamidol (ISOVUE-370) solution 100 mL (64 mLs Intravenous Given 8/1/18 1916)   sodium chloride 0.9 % bag 500mL for CT scan flush use (84 mLs As instructed Given 8/1/18 1916)       Drips running?  No    Home pump  No    Current LDAs  Peripheral IV 09/07/16 Left Upper forearm (Active)   Number of days:693       Peripheral IV 09/07/16 Right Upper forearm (Active)   Number of days:693       Airway - Adult/Peds 7.5 endotracheal tube (Active)   Number of days:693       Urethral Catheter Non-latex 16 fr (Active)   Number of days:693       Labs results:   Labs Ordered and Resulted from Time of ED Arrival Up to the Time of Departure from the ED   CBC WITH PLATELETS DIFFERENTIAL - Abnormal;  Notable for the following:        Result Value    Platelet Count 105 (*)     All other components within normal limits   COMPREHENSIVE METABOLIC PANEL - Abnormal; Notable for the following:     Glucose 110 (*)     ALT 67 (*)     AST 67 (*)     All other components within normal limits   INR - Abnormal; Notable for the following:     INR 1.21 (*)     All other components within normal limits   D DIMER QUANTITATIVE - Abnormal; Notable for the following:     D Dimer 2.9 (*)     All other components within normal limits   TROPONIN I   LIPASE   PARTIAL THROMBOPLASTIN TIME   DRUG ABUSE SCREEN 6 CHEM DEP URINE ([facility])   PULSE OXIMETRY NURSING   CARDIAC CONTINUOUS MONITORING   PERIPHERAL IV CATHETER   PATIENT CARE ORDER       Imaging Studies:   Recent Results (from the past 24 hour(s))   XR Chest 2 Views    Narrative    CHEST TWO VIEWS    8/1/2018 5:30 PM     HISTORY: Chest pain.     COMPARISON: 9/7/2016.    FINDINGS: The heart size is normal. The lungs are clear. No  pneumothorax or pleural effusion.      Impression    IMPRESSION: No acute abnormality.   CT Chest Pulmonary Embolism w Contrast    Narrative    CT CHEST PULMONARY EMBOLISM WITH CONTRAST8/1/2018 7:19 PM    HISTORY: Rule out pulmonary embolism, elevated D-dimer with  right-sided chest pain. History of upper extremity clot. History of  peripheral vascular disease, COPD.      TECHNIQUE: Axial images from thoracic inlet to diaphragm. 64 mL Isovue  370 IV contrast. Radiation dose for this scan was reduced using  automated exposure control, adjustment of the mA and/or kV according  to patient size, or iterative reconstruction technique.    COMPARISON: 9/7/2016 chest x-ray    FINDINGS:   Chest: No acute pulmonary embolus or thoracic aortic dissection.  Atherosclerotic calcification and plaque in the thoracic aorta.  Multiple mediastinal nodes are not enlarged by size criteria,  borderline right hilar nodes but no convincing adenopathy.     No pleural effusion.  Emphysematous changes. 0.3 cm peripheral lingula  nodule image 71 series 5. 12 month followup CT is recommended if the  patient has known risk factors for malignancy such as smoking or  history of malignancy. In the absence of risk factors, no followup for  this tiny nodule is routinely recommended. [ ]     No acute-appearing infiltrate.      Impression    IMPRESSION:   1. No evidence for acute pulmonary embolus or dissection.  2. Innumerable cystic lucencies throughout both lungs with some small  peripheral blebs or bulla. Emphysematous changes.  3. No pleural effusion or acute-appearing infiltrate.       Recent vital signs:   /82  Pulse 95  Temp 96.8  F (36  C)  Resp 18  Wt 81.6 kg (180 lb)  SpO2 100%    Cardiac Rhythm:  NSR with T wave inversions in V1,v2,v3,v4  Pt needs tele? Yes  Skin/wound Issues: None    Code Status: Full Code    Pain control: fair    Nausea control: pt had none    Abnormal labs/tests/findings requiring intervention:   Ddimer elevated    Family present during ED course? No   Family Comments/Social Situation comments:     Tasks needing completion: None    Mylene Ferguson RN  ascom-- [phone] (leaving at 1030pm and another RN will take over)  [phone] [place] [facility]  [phone] [facility] ED

## 2018-08-02 NOTE — PLAN OF CARE
Problem: Patient Care Overview  Goal: Plan of Care/Patient Progress Review  Observation goals:  List all goals to be met before discharge home:   - Serial troponins and stress test complete. Not met,stress test done,result pending  - Seen and cleared by consultant if applicable :Not met,.yet to be seen by psychiatrist  - Adequate pain control on oral analgesia:Denied pain   - Vital signs normal or at patient baseline :Yes  - Safe disposition plan has been identified :Not identified.  - Nurse to notify provider when observation goals have been met and patient is ready for discharge

## 2018-08-02 NOTE — PROGRESS NOTES
Social Work Services Progress Note    Hospital Day: 2  Date of Initial Social Work Evaluation:  Not completed  Collaborated with:  fredy CAZARES, chart review, 6D SREEDHAR Brooks    Data:  Keiko Guo is a 58 year old female who is on observation unit for chest pain and suicidal ideation. SW consulted for psych placement. Per psych will transfer to inpatient psych if bed avail and once she is medically stable.     Intervention:  SW contacted behavioral health intake re inpatient psych bed.  They are aware of patient, will put her on the wait list only once she is medically stable.  There are no beds at this time.     Assessment:  Inpatient psychiatry     Plan:    Anticipated Disposition:  Inpatient psychiatry    Barriers to d/c plan: Must be medically stable before can be wait listed for psych bed, awaiting results from Lexiscan.    Follow Up: please alert  once patient is medically stable so we may consult with behavioral health intake.      Psych placement:   Behavioral health intake (p) [phone]    CHARMAINE Arenas, [facility]  Social Work Services, Emergency Dept [facility]  Pager: [phone] Mon-Sat 9 am - 9 pm, on-call/after hours pager [phone]    19:45 updated with fredy CAZARES that results back and patient medically stable for discharge. SREEDHAR updated behavioral health intake. SW will call in morning for update in their bed status.

## 2018-08-02 NOTE — ED PROVIDER NOTES
Patient care was transferred to me by Dr. Alpesh Townsend (fellow DrStacey from [place]).  Patient is a 58-year-old female with a past medical history of COPD and depression as well as a history of smoking comes into emergency department for evaluation of suicidal ideation.  The patient she has been under a lot of stress at home having problems with her ears with her daughter very stressed out and was seeking help so she came into the ER however.  Patient also noted that over the last month she has been having episodes of intermittent retrosternal chest pressure today's episode started on the right side but does radiate into the middle of the chest.  Patient denies any other symptoms denies any fevers denies any shortness of breath.  Patient was evaluated for the atypical chest pain by Dr. Townsend, first set of cardiac enzymes as well as CT angiogram was done in these are normal however patient's EKG does show nonspecific inverted T waves over leads V1 V2, V3 and V4.  There is no old EKG for comparison.  This point I feel this patient needs a chest pain admitted to rule out chest pain and once that has been addressed he can get a psychiatric consult to evaluate for her depression.  Patient denies any active suicidal ideation, feel safe being transferred to another facility and says that she will not want to hold herself.    Addendum 2113 hrs.  This point I have spoken to ED obvious (Thelma) and she agrees to take patient under ED our office on the [facility] for chest pain rule out.  Since patient is also suicidal patient will need a sitter with her at this point they are trying to arrange for center.  She will be admitted under Dr. Sylvain Morataya.     Dion Morataya MD  08/01/18 1251

## 2018-08-02 NOTE — PROGRESS NOTES
"S:patient admit for atypical chest pain and SI. Patient today with some right sided pleuritic cp. Increasing stress x 2 weeks. No cardiac hx. Patient had elevated d dimer with neg CT chest for PE. Some emphysema changes noted.   O:/60 (BP Location: Right arm)  Pulse 87  Temp 97.9  F (36.6  C) (Axillary)  Resp 18  Ht 1.575 m (5' 2\")  Wt 83.3 kg (183 lb 9.6 oz)  SpO2 97%  BMI 33.58 kg/m2  Patient with sitter. No resp distress. Lung cta cv rrr ab neg. Neuro nonfocal  Results for orders placed or performed during the hospital encounter of 08/01/18   XR Chest 2 Views    Narrative    CHEST TWO VIEWS    8/1/2018 5:30 PM     HISTORY: Chest pain.     COMPARISON: 9/7/2016.    FINDINGS: The heart size is normal. The lungs are clear. No  pneumothorax or pleural effusion.      Impression    IMPRESSION: No acute abnormality.    MAYE MARTINEZ MD   CT Chest Pulmonary Embolism w Contrast    Narrative    CT CHEST PULMONARY EMBOLISM WITH CONTRAST8/1/2018 7:19 PM    HISTORY: Rule out pulmonary embolism, elevated D-dimer with  right-sided chest pain. History of upper extremity clot. History of  peripheral vascular disease, COPD.      TECHNIQUE: Axial images from thoracic inlet to diaphragm. 64 mL Isovue  370 IV contrast. Radiation dose for this scan was reduced using  automated exposure control, adjustment of the mA and/or kV according  to patient size, or iterative reconstruction technique.    COMPARISON: 9/7/2016 chest x-ray    FINDINGS:   Chest: No acute pulmonary embolus or thoracic aortic dissection.  Atherosclerotic calcification and plaque in the thoracic aorta.  Multiple mediastinal nodes are not enlarged by size criteria,  borderline right hilar nodes but no convincing adenopathy.     No pleural effusion. Emphysematous changes. 0.3 cm peripheral lingula  nodule image 71 series 5. 12 month followup CT is recommended if the  patient has known risk factors for malignancy such as smoking or  history of malignancy. In " the absence of risk factors, no followup for  this tiny nodule is routinely recommended. [ ]     No acute-appearing infiltrate.      Impression    IMPRESSION:   1. No evidence for acute pulmonary embolus or dissection.  2. Innumerable cystic lucencies throughout both lungs with some small  peripheral blebs or bulla. Emphysematous changes.  3. No pleural effusion or acute-appearing infiltrate.    TERESA GARCIA MD   CBC with platelets differential   Result Value Ref Range    WBC 5.2 4.0 - 11.0 10e9/L    RBC Count 4.14 3.8 - 5.2 10e12/L    Hemoglobin 13.3 11.7 - 15.7 g/dL    Hematocrit 39.5 35.0 - 47.0 %    MCV 95 78 - 100 fl    MCH 32.1 26.5 - 33.0 pg    MCHC 33.7 31.5 - 36.5 g/dL    RDW 12.7 10.0 - 15.0 %    Platelet Count 105 (L) 150 - 450 10e9/L    Diff Method Automated Method     % Neutrophils 54.8 %    % Lymphocytes 34.6 %    % Monocytes 7.4 %    % Eosinophils 1.6 %    % Basophils 1.4 %    % Immature Granulocytes 0.2 %    Nucleated RBCs 0 0 /100    Absolute Neutrophil 2.8 1.6 - 8.3 10e9/L    Absolute Lymphocytes 1.8 0.8 - 5.3 10e9/L    Absolute Monocytes 0.4 0.0 - 1.3 10e9/L    Absolute Eosinophils 0.1 0.0 - 0.7 10e9/L    Absolute Basophils 0.1 0.0 - 0.2 10e9/L    Abs Immature Granulocytes 0.0 0 - 0.4 10e9/L    Absolute Nucleated RBC 0.0    Troponin I   Result Value Ref Range    Troponin I ES <0.015 0.000 - 0.045 ug/L   Comprehensive metabolic panel   Result Value Ref Range    Sodium 139 133 - 144 mmol/L    Potassium 4.4 3.4 - 5.3 mmol/L    Chloride 104 94 - 109 mmol/L    Carbon Dioxide 25 20 - 32 mmol/L    Anion Gap 10 3 - 14 mmol/L    Glucose 110 (H) 70 - 99 mg/dL    Urea Nitrogen 7 7 - 30 mg/dL    Creatinine 0.76 0.52 - 1.04 mg/dL    GFR Estimate 77 >60 mL/min/1.7m2    GFR Estimate If Black >90 >60 mL/min/1.7m2    Calcium 8.5 8.5 - 10.1 mg/dL    Bilirubin Total 0.8 0.2 - 1.3 mg/dL    Albumin 3.5 3.4 - 5.0 g/dL    Protein Total 7.9 6.8 - 8.8 g/dL    Alkaline Phosphatase 96 40 - 150 U/L    ALT 67 (H) 0 - 50  U/L    AST 67 (H) 0 - 45 U/L   Lipase   Result Value Ref Range    Lipase 127 73 - 393 U/L   INR   Result Value Ref Range    INR 1.21 (H) 0.86 - 1.14   Partial thromboplastin time   Result Value Ref Range    PTT 30 22 - 37 sec   D dimer quantitative   Result Value Ref Range    D Dimer 2.9 (H) 0.0 - 0.50 ug/ml FEU   Drug abuse screen 6 urine (chem dep)   Result Value Ref Range    Amphetamine Qual Urine Negative NEG^Negative    Barbiturates Qual Urine Negative NEG^Negative    Benzodiazepine Qual Urine Negative NEG^Negative    Cannabinoids Qual Urine Negative NEG^Negative    Cocaine Qual Urine Negative NEG^Negative    Ethanol Qual Urine Negative NEG^Negative    Opiates Qualitative Urine Negative NEG^Negative   Troponin I   Result Value Ref Range    Troponin I ES <0.015 0.000 - 0.045 ug/L   Troponin I   Result Value Ref Range    Troponin I ES <0.015 0.000 - 0.045 ug/L   EKG 12 lead   Result Value Ref Range    Interpretation ECG Click View Image link to view waveform and result        A:atypical chest pain     Suicidal ideations with anxiety and stress  P:Lexiscan today.     Psych consult also.

## 2018-08-02 NOTE — CONSULTS
Patient seen and chart reviewed. Note dictated (initial)   I have ordered Zyprexa 5 mg HS; with plan to taper off  Seroquel 50 mg HS; will up-titrate from there to 200-300 mg   OK to continue hydroxyzine and Ativan in hospital   Don't re-start Wellbutrin (was taking as OP)  Re-consult psychiatry tomorrow if she is medically stable; will transfer to IP psychiatry if we can get her a bed

## 2018-08-02 NOTE — ED NOTES
Observation Brochure and Video    Patient informed of observation status based on provider's order.  Observation brochure was given and the video watched. Patient/Family stated understanding. Questions answered.  Jazmin Cheng

## 2018-08-02 NOTE — PLAN OF CARE
Problem: Patient Care Overview  Goal: Plan of Care/Patient Progress Review  - Serial troponins and stress test complete: No  - Seen and cleared by consultant if applicable: No   - Adequate pain control on oral analgesia: Yes   - Vital signs normal or at patient baseline: Yes   - Safe disposition plan has been identified: No

## 2018-08-03 ENCOUNTER — HOSPITAL ENCOUNTER (INPATIENT)
Facility: CLINIC | Age: 58
LOS: 4 days | Discharge: HOME | DRG: 885 | End: 2018-08-07
Attending: PSYCHIATRY & NEUROLOGY | Admitting: PSYCHIATRY & NEUROLOGY
Payer: MEDICARE

## 2018-08-03 DIAGNOSIS — J44.9 CHRONIC OBSTRUCTIVE PULMONARY DISEASE, UNSPECIFIED COPD TYPE (H): Primary | ICD-10-CM

## 2018-08-03 DIAGNOSIS — F31.9 BIPOLAR 1 DISORDER (H): ICD-10-CM

## 2018-08-03 PROBLEM — R45.851 SUICIDAL IDEATION: Status: ACTIVE | Noted: 2018-08-03

## 2018-08-03 PROCEDURE — 25000132 ZZH RX MED GY IP 250 OP 250 PS 637: Mod: GY | Performed by: STUDENT IN AN ORGANIZED HEALTH CARE EDUCATION/TRAINING PROGRAM

## 2018-08-03 PROCEDURE — A9270 NON-COVERED ITEM OR SERVICE: HCPCS | Mod: GY | Performed by: STUDENT IN AN ORGANIZED HEALTH CARE EDUCATION/TRAINING PROGRAM

## 2018-08-03 PROCEDURE — 99223 1ST HOSP IP/OBS HIGH 75: CPT | Mod: AI | Performed by: PSYCHIATRY & NEUROLOGY

## 2018-08-03 PROCEDURE — 12400007 ZZH R&B MH INTERMEDIATE UMMC

## 2018-08-03 RX ORDER — QUETIAPINE FUMARATE 50 MG/1
50 TABLET, FILM COATED ORAL AT BEDTIME
Status: DISCONTINUED | OUTPATIENT
Start: 2018-08-03 | End: 2018-08-03

## 2018-08-03 RX ORDER — OLANZAPINE 5 MG/1
5 TABLET ORAL AT BEDTIME
Status: COMPLETED | OUTPATIENT
Start: 2018-08-03 | End: 2018-08-06

## 2018-08-03 RX ORDER — QUETIAPINE FUMARATE 100 MG/1
100 TABLET, FILM COATED ORAL AT BEDTIME
Status: COMPLETED | OUTPATIENT
Start: 2018-08-03 | End: 2018-08-04

## 2018-08-03 RX ORDER — ALBUTEROL SULFATE 90 UG/1
2 AEROSOL, METERED RESPIRATORY (INHALATION) 4 TIMES DAILY PRN
Status: DISCONTINUED | OUTPATIENT
Start: 2018-08-03 | End: 2018-08-07 | Stop reason: HOSPADM

## 2018-08-03 RX ORDER — HYDROXYZINE HYDROCHLORIDE 25 MG/1
25 TABLET, FILM COATED ORAL EVERY 4 HOURS PRN
Status: DISCONTINUED | OUTPATIENT
Start: 2018-08-03 | End: 2018-08-07 | Stop reason: HOSPADM

## 2018-08-03 RX ORDER — QUETIAPINE FUMARATE 25 MG/1
25 TABLET, FILM COATED ORAL 2 TIMES DAILY
Status: DISCONTINUED | OUTPATIENT
Start: 2018-08-03 | End: 2018-08-07 | Stop reason: HOSPADM

## 2018-08-03 RX ORDER — POLYETHYLENE GLYCOL 3350 17 G
2-4 POWDER IN PACKET (EA) ORAL
Status: DISCONTINUED | OUTPATIENT
Start: 2018-08-03 | End: 2018-08-07 | Stop reason: HOSPADM

## 2018-08-03 RX ORDER — ACETAMINOPHEN 325 MG/1
650 TABLET ORAL EVERY 4 HOURS PRN
Status: DISCONTINUED | OUTPATIENT
Start: 2018-08-03 | End: 2018-08-07 | Stop reason: HOSPADM

## 2018-08-03 RX ORDER — QUETIAPINE FUMARATE 200 MG/1
200 TABLET, FILM COATED ORAL AT BEDTIME
Status: DISCONTINUED | OUTPATIENT
Start: 2018-08-07 | End: 2018-08-07 | Stop reason: HOSPADM

## 2018-08-03 RX ORDER — TRAZODONE HYDROCHLORIDE 50 MG/1
50 TABLET, FILM COATED ORAL
Status: DISCONTINUED | OUTPATIENT
Start: 2018-08-03 | End: 2018-08-07 | Stop reason: HOSPADM

## 2018-08-03 RX ADMIN — QUETIAPINE FUMARATE 25 MG: 25 TABLET ORAL at 20:30

## 2018-08-03 RX ADMIN — ACETAMINOPHEN 650 MG: 325 TABLET, FILM COATED ORAL at 20:35

## 2018-08-03 RX ADMIN — OLANZAPINE 5 MG: 5 TABLET, FILM COATED ORAL at 20:30

## 2018-08-03 RX ADMIN — Medication 5 MG: at 20:29

## 2018-08-03 RX ADMIN — NICOTINE 1 PATCH: 7 PATCH TRANSDERMAL at 10:37

## 2018-08-03 RX ADMIN — QUETIAPINE FUMARATE 100 MG: 100 TABLET ORAL at 20:30

## 2018-08-03 RX ADMIN — HYDROXYZINE HYDROCHLORIDE 25 MG: 25 TABLET, FILM COATED ORAL at 11:54

## 2018-08-03 ASSESSMENT — ACTIVITIES OF DAILY LIVING (ADL)
LAUNDRY: WITH SUPERVISION
ORAL_HYGIENE: INDEPENDENT
HYGIENE/GROOMING: INDEPENDENT
DRESS: SCRUBS (BEHAVIORAL HEALTH)
GROOMING: INDEPENDENT
ORAL_HYGIENE: INDEPENDENT
DRESS: SCRUBS (BEHAVIORAL HEALTH)

## 2018-08-03 NOTE — DISCHARGE SUMMARY
ED Observation Discharge Summary    Keiko Guo   MRN# [id number]  Age: 58 year old   YOB: 1960            Date of Admission:  8/1/2018    Date of Discharge:  8/2/2018  Admitting Physician:  Dion Morataya MD  Discharge Physician: Dr. Hamzah Oviedo  NP/PA: Briseida Hayes PA-C      DISCHARGE DIAGNOSIS:   Suicidal Ideation  Noncardiac chest pain    INTERVAL HISTORY:  Keiko was admitted for suicidal ideation and chest pain. Cardiac evaluation, including NM lexiscan was normal and no findings to suggest ACS. Psychiatry was consulted and adjusted her medications, then accepted her as a transfer from the ED observation unit to the inpatient behavioral health service. Spoke with Keiko about this and she is of sound mind, agreeable and willing to the transfer.      PROCEDURES AND IMAGING:   Results for orders placed or performed during the hospital encounter of 08/01/18 (from the past 24 hour(s))   Drug abuse screen 6 urine (chem dep)   Result Value Ref Range    Amphetamine Qual Urine Negative NEG^Negative    Barbiturates Qual Urine Negative NEG^Negative    Benzodiazepine Qual Urine Negative NEG^Negative    Cannabinoids Qual Urine Negative NEG^Negative    Cocaine Qual Urine Negative NEG^Negative    Ethanol Qual Urine Negative NEG^Negative    Opiates Qualitative Urine Negative NEG^Negative   Troponin I   Result Value Ref Range    Troponin I ES <0.015 0.000 - 0.045 ug/L   Psychiatry IP Consult: Suicidal ideations. Plan to clear patient medically first thing in AM today.; Consultant may enter orders: Yes; Patient to be seen: Routine; Call back #: [phone]    Andrea Lazaro MD     8/2/2018  4:24 PM  Patient seen and chart reviewed. Note dictated (initial)   I have ordered Zyprexa 5 mg HS; with plan to taper off  Seroquel 50 mg HS; will up-titrate from there to 200-300 mg   OK to continue hydroxyzine and Ativan in hospital   Don't re-start Wellbutrin (was taking as OP)  Re-consult  psychiatry tomorrow if she is medically stable; will   transfer to  psychiatry if we can get her a bed      Troponin I   Result Value Ref Range    Troponin I ES <0.015 0.000 - 0.045 ug/L   NM Lexiscan stress test (nuc card)    Narrative    Examination:   NM MPI WITH LEXISCAN   Code:   [id number]   Date:  8/2/2018 2:15 PM     Indication:  chest pain;      Previous Study: No previous Myocardial Perfusion studies for  comparison.    Additional Information:  58-year-old female who presents with atypical  chest pain-nonexertional, episodic, and prolonged duration. EKG  demonstrates T wave inversion in V1, V2, V3, and V4.    Protocol:    Rest and stress myocardial perfusion imaging was performed using 8.6  and 33 mCi of Tc-99m tetrofosmin. Pharmacological stress was performed  with 0.4  mg of Lexiscan.     Findings:  1. Overall quality of the study: Excellent.   2. Left ventricular cavity is normal on the rest and stress studies.  3. SPECT images demonstrate  normal perfusion.  4. Left ventricular ejection fraction is 84%. Left ventricular  end-diastolic volume is 62 mL. End-systolic volume is 10 mL.  5. Baseline EKG  findings reported separately in EPIC.      Impression    Impression:  1. Normal myocardial SPECT study with a summed stress score of 0.     I have personally reviewed the examination and initial interpretation  and I agree with the findings.    BJ RIVAS MD     DISCHARGE MEDICATIONS:   Current Discharge Medication List      CONTINUE these medications which have NOT CHANGED    Details   acetaminophen (TYLENOL) 650 MG CR tablet Take 2,600 mg by mouth 2 times daily      buPROPion (WELLBUTRIN SR) 100 MG 12 hr tablet Take 100 mg by mouth At Bedtime      hydrOXYzine (ATARAX) 25 MG tablet Take 25 mg by mouth every 6 hours      MELATONIN PO Take 5 tablets by mouth At Bedtime      OLANZapine (ZYPREXA) 20 MG tablet Take 20 mg by mouth At Bedtime             CONSULTATIONS:   Consultation during this admission  "received from:  Psychiatry    BRIEF HISTORY OF PRESENT ILLNESS:   (Adopted from admission H&P).  Keiko Guo is a 58 year old female with a history of COPD, depression, smoking tobacco, peripheral arterial disease with claudication, suicide attempts, and depression who presented to the ED with suicidal ideation and chest pain. She reports that the chest pain has been going on all day today since she woke up and it is right sided. It is constant and does hurt more to take deep breaths. She denies that the pain is exertional though. She had chest pain last week and reports she was told in ED OSH that it was anxiety. She has not had leg swelling. She denies history of blood clots, although chart review does show superficial arm vein thrombosis 2016. She is not on any anticoagulation. She has no known cardiac history. She has had a cough for a week, no fevers or chills, no nausea or vomiting, no immobilization or hormone use. She does not use inhalers at home. She reports worsening depression for a few weeks and is concerned that she might take pills to harm herself. She is currently on Zyprexa and wellbutrin. She is a former tobacco smoker, uses a vape pen now and drinks 2 beers daily.      In the ED, VSS. D-Dimer 2.9. So completed CTA chest shows emphysematous changes with peripheral lingular nodule that should have followup but no acute processes. EKG with t-wave inversion of V1, V2, V3, V4. Prolonged QT. Troponin x2 negative.     On admission to the observation unit the patient was stable. Denies chest pain or shortness of breath. She is anxious and wants her mints but she is NPO pending cardiac stress testing in the next few hours. She reports her ex-, whom she lives with, is in hospital for 4 way coronary bypass \"at some hospital in [place]\" right now.        ED OBSERVATION COURSE:  Keiko was admitted for suicidal ideation and chest pain. Cardiac evaluation, including NM lexiscan was normal and no " findings to suggest ACS. Psychiatry was consulted and adjusted her medications, then accepted her as a transfer from the ED observation unit to the inpatient behavioral health service. Spoke with Keiko about this and she is of sound mind, agreeable and willing to the transfer.    DISCHARGE DISPOSITION:   Transferred to [facility]. The patient was discharged in a stable condition.     DISCHARGE INSTRUCTIONS AND FOLLOW-UP:  No discharge procedures on file.   Attestation:   I have reviewed today's vital signs, notes, medications, labs and imaging.    Briseida Hayes PA-C  Emergency Department Observation Unit

## 2018-08-03 NOTE — PROGRESS NOTES
"58 year old female voluntarily admitted from [facility] on [facility].  Patient reports that she has been feeling more suicidal over the last several weeks in part due to the stress related to her daughter Telma moving back home.  Patient endorses SI with thoughts to OD but reports that she will not act on those thoughts in the hospital.   Patient reports that she has been hospitalized 4-5 times in the past, the last in December 2017 in [facility].  She has attempted suicide she reports 5 or 6 times all by overdose, the most recent occurring in September 2016 when the patient reports she was on life support following the attempt.  Patient denies HI and AH.  States that she has a distant history of SIB - back in her teens.  Currently lives at home with her ex- and adult daughter.  Patient reports drinking 1-2 beers a day, moderate tobacco use and denies any other chemical use.       The patient came in with a large bag of mints which she reports she is \"addicted\" to.  A patient care order was written to allow patient to have the mints in her room.  "

## 2018-08-03 NOTE — PROGRESS NOTES
Received at call from Behavioral Health intake on the [facility] and they can accept Keiko this evening. She is here on a voluntary status, not a 72 hour hold. Discussed with her than her lexiscan was normal. She seems to be of sound mind and is agreeable to signing herself into an inpatient behavioral health facility. We will transfer care to inpatient psychiatry at this time.    Briseida Hayes PA-C  [phone]

## 2018-08-03 NOTE — PROGRESS NOTES
Report given to Vanessa at  [facility] [address] [facility]. [facility] here. IV removed. Pt transferred.

## 2018-08-03 NOTE — PLAN OF CARE
"Problem: Patient Care Overview  Goal: Plan of Care/Patient Progress Review  Outcome: No Change  Outpatient/Observation goals to be met before discharge home:    /82 (BP Location: Right arm)  Pulse 87  Temp 97.7  F (36.5  C) (Oral)  Resp 18  Ht 1.575 m (5' 2\")  Wt 83.3 kg (183 lb 9.6 oz)  SpO2 95%  BMI 33.58 kg/m2    PRIOR TO DISCHARGE     Comments: List all goals to be met before discharge home:   - Serial troponins and stress test complete. - Yes  - Seen and cleared by consultant if applicable - Yes  - Adequate pain control on oral analgesia - Yes  - Vital signs normal or at patient baseline - Yes  - Safe disposition plan has been identified - Awaiting Room at [facility]   - Nurse to notify provider when observation goals have been met and patient is ready for discharge.     Denies Chest Pain      "

## 2018-08-03 NOTE — PROGRESS NOTES
Pt has not attended scheduled occupational therapy sessions. Encourage attendance and participation.  Pt will be given self-assessment form, and OT staff will explain the purpose of including them in their treatment plan and offer options for meeting their needs.

## 2018-08-03 NOTE — PROGRESS NOTES
"PATIENT BELONGINGS INVENTORY       08/03/18 0144   Patient Belongings   Patient Belongings money (see comment);bracelet;cell phone/electronics;clothing;keys;plastic bag;purse;shoes   Disposition of Belongings Locker;Sent to security per site process   Belongings Search Yes   Clothing Search Yes   Second Staff Olive SUE & Maureen CAPPS   General Info Comment patient searched and belonging searched     One blue Trump T- Shirt  One brown margarito  One brown underpants  One blue jeans short  One black sandals shoe  One set of keys  One used pack of \"THIS\" Cigarettes  One black bag  One Samsung Cell Phone with white lather case with black   [place] ID  Medicare Insurance cards  ITEMS SENT TO SECURITY( SECURITY ENVELOP #[id number] MRN [id number])  [employer] master card-----[id number]  Visa Debit card----------[id number]  EBT card-----------------[id number]  One bracelet  One small scissors  One electronic cigarette device  Cash-$12.06c  A               Admission:  I am responsible for any personal items that are not sent to the safe or pharmacy.  [facility] is not responsible for loss, theft or damage of any property in my possession.    Signature:  _________________________________ Date: _______  Time: _____                                              Staff Signature:  ____________________________ Date: ________  Time: _____      2nd Staff person, if patient is unable/unwilling to sign:    Signature: ________________________________ Date: ________  Time: _____     Discharge:  [facility] has returned all of my personal belongings:    Signature: _________________________________ Date: ________  Time: _____                                          Staff Signature:  ____________________________ Date: ________  Time: _____         "

## 2018-08-03 NOTE — IP AVS SNAPSHOT
MRN:[id number]                      After Visit Summary   8/3/2018    Keiko Guo    MRN: [id number]           Thank you!     Thank you for choosing [facility] for your care. Our goal is always to provide you with excellent care.        Patient Information     Date Of Birth          1960        About your hospital stay     You were admitted on:  August 3, 2018 You last received care in the:   [facility]    You were discharged on:  August 7, 2018       Who to Call     For medical emergencies, please call 911.  For non-urgent questions about your medical care, please call your primary care provider or clinic, [phone]          Attending Provider     Provider Specialty    Jimmie Nails MD Psychiatry       Primary Care Provider Office Phone # Fax #    Lauren Holder PA-C [phone]      Additional Services     Medication Therapy Management Referral       MTM referral reason            antipsychotic medications: 2 or more prescribed     This service is designed to help you get the most from your medications.  A specially trained pharmacist will work closely with you and your doctors  to solve any problems related to your medications and to help you get the   best results from taking them.      The Medication Therapy Management staff will call you to schedule an appointment.                  Further instructions from your care team       Behavioral Discharge Planning and Instructions    Summary:   You were admitted to [facility] on 8/2/18 with worsening depression, suicidal ideation  under the care of Dr. Nails.  You met with Dr. Nails and his team daily for ongoing psychiatric assessment and medication management.  You had opportunities to participate in therapeutic groups on the unit.     At this time you report your mood has stabilized and you report you are not having thoughts or intent to harm yourself or others. You will be discharged home and will resume care  with your outpatient providers.    Main Diagnosis:   Bipolar Affective Disorder       Major Treatments, Procedures and Findings:   Medications were  managed throughout your stay. An internal medicine consult was completed during your stay. You had the opportunity to participate in treatment programming while on the unit including occupational therapy, mental health support and education and spiritual services.     Symptoms to Report:   Please report if you are experiencing increased aggression and/or confusion, problematic loss of sleep, worsening mood, or thoughts of suicide to your treatment team or notify your primary provider.   IF THE SYMPTOMS YOU ARE EXPERIENCING ARE A MEDICAL EMERGENCY, CALL 911 IMMEDIATELY    Lifestyle Adjustment:   1. Adjust your lifestyle to get enough sleep, relaxation, exercise and good nutrition.  Continue to develop healthy coping skills to decrease stress and promote a healthy and sober lifestyle.  2. Abstain from all substances of abuse.  3. Take medications as prescribed.  Please work with your doctor to discuss any concerns you have with your medications or side effects you may be experiencing.  4. Follow up with appointments as scheduled.        Psychiatry Follow-up:   Appointment:  Psychiatry: Gali Lim NP: Tuesday Augsut 14 at 10:30am  [facility]: [place] [address] [phone]  The [facility] will send a copy of the discharge summary via Fax to: [phone]    Appointment(s):  Therapist: Rosemarie Matthews:  Monday, August 13 at 2:00pm + Monday, August 27 at 2:00pm  [facility]: [address], [place] [address] [phone]  The [facility] will send a copy of the discharge summary via Fax to [phone]    Appointment:   Primary Physician: Lauren Holder MD:  Tuesday August 14 at 9:00am  [facility] [address] [facility] [address]  [phone]    Please bring a copy of your discharge summary/paperwork, your  insurance information and any medications with you to this appointment  [facility] will send a copy of the discharge summary via Fax: [phone]      [facility] /:  Ginna Connell [phone]  The [facility] will send a copy of the discharge summary via fax to [phone]      Resources:   Crisis Intervention: 888.373.8113 or 797-827-6985 (TTY: 374.632.2660).  Call anytime for help.  National [place] on Mental Illness (www.[place].cachorro.org): 739.363.6401 or 654-366-6950.  Suicide Awareness Voices of Education (SAVE) (www.save.org): 298-103-CIUH (0832)  National Suicide Prevention Line (www.mentalhealthmn.org): 622-021-NCDD (8084)  Mental Health Consumer/Survivor Network of [place] (www.mhcsn.net): 846.301.5427 or 788-107-9036  Mental Health Association of [place] (www.mentalhealth.org): 296.394.9414 or 184-310-6349    General Medication Instructions:   See your medication sheet(s) for instructions.   Take all medicines as directed.  Make no changes unless your doctor suggests them.   Go to all your doctor visits.  Be sure to have all your required lab tests. This way, your medicines can be refilled on time.  Do not use any drugs not prescribed by your doctor.    The treatment team has appreciated the opportunity to work with you.  We wish you the best in the future.    If you have any questions or concerns our unit number is [phone].               Pending Results     No orders found from 8/1/2018 to 8/4/2018.            Statement of Approval     Ordered          08/07/18 1036  I have reviewed and agree with all the recommendations and orders detailed in this document.  EFFECTIVE NOW     Approved and electronically signed by:  Kermit Chin MD             Admission Information     Date & Time Provider Department Dept. Phone    8/3/2018 Jimmie Nails MD [facility] [phone]      Your Vitals Were     Blood Pressure Pulse Temperature Respirations Height Weight    126/55 100 97.6  F (36.4  C)  "(Oral) 16 1.575 m (5' 2\") 82.6 kg (182 lb)    Pulse Oximetry BMI (Body Mass Index)                96% 33.29 kg/m2          [facility] Information     [facility] lets you send messages to your doctor, view your test results, renew your prescriptions, schedule appointments and more. To sign up, go to [email or web address] . Click on \"Log in\" on the left side of the screen, which will take you to the Welcome page. Then click on \"Sign up Now\" on the right side of the page.     You will be asked to enter the access code listed below, as well as some personal information. Please follow the directions to create your username and password.     Your access code is: [email or web address]  Expires: 2018  9:43 AM     Your access code will  in 90 days. If you need help or a new code, please call your [facility] clinic or [phone].        Care EveryWhere ID     This is your Care EveryWhere ID. This could be used by other organizations to access your [facility] medical records  [id number]        Equal Access to Services     [facility] Center[facility] : Jacinto Alarcon, cora boucher, jessie guy, ruben capps . So [facility] [phone].    ATENCIÓN: Si habla español, tiene a lizama disposición servicios gratuitos de asistencia lingüística. Tracey al [phone].    We comply with applicable federal civil rights laws and [place] laws. We do not discriminate on the basis of race, color, national origin, age, disability, sex, sexual orientation, or gender identity.               Review of your medicines      START taking        Dose / Directions    albuterol 108 (90 Base) MCG/ACT Inhaler   Commonly known as:  PROAIR HFA/PROVENTIL HFA/VENTOLIN HFA   Used for:  Chronic obstructive pulmonary disease, unspecified COPD type (H)        Dose:  2 puff   Inhale 2 puffs into the lungs 4 times daily as needed for other (dyspnea)   Quantity:  6.7 g   Refills:  0       * QUEtiapine 25 MG tablet   Commonly " known as:  SEROquel   Used for:  Bipolar 1 disorder (H)        Dose:  25 mg   Take 1 tablet (25 mg) by mouth 2 times daily   Quantity:  60 tablet   Refills:  0       * QUEtiapine 50 MG tablet   Commonly known as:  SEROquel   Used for:  Bipolar 1 disorder (H)        Dose:  250 mg   Take 5 tablets (250 mg) by mouth At Bedtime . Take 4 tablets (200mg) 08/07/18 and 08/08/18, then increase dose to 5 tablets (250mg) daily   Quantity:  150 tablet   Refills:  0       * Notice:  This list has 2 medication(s) that are the same as other medications prescribed for you. Read the directions carefully, and ask your doctor or other care provider to review them with you.      CONTINUE these medicines which have NOT CHANGED        Dose / Directions    acetaminophen 650 MG CR tablet   Commonly known as:  TYLENOL        Dose:  2600 mg   Take 2,600 mg by mouth 2 times daily   Refills:  0       hydrOXYzine 25 MG tablet   Commonly known as:  ATARAX   Indication:  Feeling Anxious        Dose:  25 mg   Take 25 mg by mouth every 6 hours   Refills:  0       MELATONIN PO        Dose:  5 tablet   Take 5 tablets by mouth At Bedtime   Refills:  0         STOP taking     buPROPion 100 MG 12 hr tablet   Commonly known as:  WELLBUTRIN SR           OLANZapine 20 MG tablet   Commonly known as:  zyPREXA                Where to get your medicines      These medications were sent to [facility] [place], [place] - [address]  [address] [address]     Phone:  [phone]     albuterol 108 (90 Base) MCG/ACT Inhaler    QUEtiapine 25 MG tablet    QUEtiapine 50 MG tablet                Protect others around you: Learn how to safely use, store and throw away your medicines at www.disposemymeds.org.             Medication List: This is a list of all your medications and when to take them. Check marks below indicate your daily home schedule. Keep this list as a reference.      Medications           Morning Afternoon  Evening Bedtime As Needed    acetaminophen 650 MG CR tablet   Commonly known as:  TYLENOL   Take 2,600 mg by mouth 2 times daily                                albuterol 108 (90 Base) MCG/ACT Inhaler   Commonly known as:  PROAIR HFA/PROVENTIL HFA/VENTOLIN HFA   Inhale 2 puffs into the lungs 4 times daily as needed for other (dyspnea)                                hydrOXYzine 25 MG tablet   Commonly known as:  ATARAX   Take 25 mg by mouth every 6 hours   Last time this was given:  25 mg on 8/7/2018  2:09 PM                                MELATONIN PO   Take 5 tablets by mouth At Bedtime   Last time this was given:  5 mg on 8/6/2018  8:28 PM                                * QUEtiapine 25 MG tablet   Commonly known as:  SEROquel   Take 1 tablet (25 mg) by mouth 2 times daily   Last time this was given:  25 mg on 8/7/2018  8:50 AM                                * QUEtiapine 50 MG tablet   Commonly known as:  SEROquel   Take 5 tablets (250 mg) by mouth At Bedtime . Take 4 tablets (200mg) 08/07/18 and 08/08/18, then increase dose to 5 tablets (250mg) daily   Last time this was given:  25 mg on 8/7/2018  8:50 AM                                * Notice:  This list has 2 medication(s) that are the same as other medications prescribed for you. Read the directions carefully, and ask your doctor or other care provider to review them with you.

## 2018-08-03 NOTE — PROGRESS NOTES
Pt spent the entire time in bed sleeping and out for meals. She has had minimal interaction with other peers. She appears isolative and withdrawn to room. She states to writer that if she will be home would have SI thoughts due to her daughter who's at home. She reports having anxiety and being depressed.     08/03/18 1419   Behavioral Health   Hallucinations denies / not responding to hallucinations   Thinking distractable   Orientation time: oriented;date: oriented;place: oriented;person: oriented   Memory baseline memory   Insight poor   Judgement impaired   Eye Contact at examiner   Affect sad;blunted, flat   Mood depressed;anxious   Physical Appearance/Attire disheveled   Hygiene well groomed   Suicidality other (see comments)  (Denies)   1. Wish to be Dead No   2. Non-Specific Active Suicidal Thoughts  No   Activity isolative;withdrawn   Speech clear;coherent   Medication Sensitivity no stated side effects   Psychomotor / Gait balanced;steady   Psycho Education   Type of Intervention 1:1 intervention   Response participates, initiates socially appropriate   Hours 0.5   Activities of Daily Living   Hygiene/Grooming independent   Oral Hygiene independent   Dress scrubs (behavioral health)   Laundry with supervision   Room Organization independent   Activity   Activity Assistance Provided independent   Behavioral Health Interventions   Depression maintain safety precautions;maintain safe secure environment;assist patient in developing safety plan;assist patient in following safety plan;provide emotional support;assist with developing and utilizing healthy coping strategies;build upon strengths   Social and Therapeutic Interventions (Depression) encourage socialization with peers;encourage participation in therapeutic groups and milieu activities
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
## 2018-08-03 NOTE — IP AVS SNAPSHOT
[address] [address]    Phone:  [phone]                                       After Visit Summary   8/3/2018    Keiko Guo    MRN: [id number]           After Visit Summary Signature Page     I have received my discharge instructions, and my questions have been answered. I have discussed any challenges I see with this plan with the nurse or doctor.    ..........................................................................................................................................  Patient/Patient Representative Signature      ..........................................................................................................................................  Patient Representative Print Name and Relationship to Patient    ..................................................               ................................................  Date                                            Time    ..........................................................................................................................................  Reviewed by Signature/Title    ...................................................              ..............................................  Date                                                            Time

## 2018-08-03 NOTE — PLAN OF CARE
Problem: Patient Care Overview  Goal: Team Discussion  Team Plan:   BEHAVIORAL TEAM DISCUSSION    Participants:   Dr. Nails, Dr. Sommer Lamar, Yanely Marin MA.LP, med students, pharm student    Continued Stay Criteria/Rationale:   Worsening depression, SI    Medical/Physical:   COPD    Precautions:   Behavioral Orders   Procedures     Code 1 - Restrict to Unit     Routine Programming     As clinically indicated     Status 15     Every 15 minutes.     Suicide precautions     Patients on Suicide Precautions should have a Combination Diet ordered that includes a Diet selection(s) AND a Behavioral Tray selection for Safe Tray - with utensils, or Safe Tray - NO utensils       Withdrawal precautions     Plan:   Patient will have ongoing psychiatric assessment.  Medications will be reviewed and adjusted per MD as indicated. Outpatient providers will be contacted for care coordination.  Hospital staff will provide a safe environment and a therapeutic milieu. Patient will be encouraged to participate in unit groups and activities.   . CTC will continue to assess needs and  ensure appropriate follow up care is in place.       Rationale for change in precautions or plan:   No change in plan/precautions
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
## 2018-08-03 NOTE — PROGRESS NOTES
"    ----------------------------------------------------------------------------------------------------------  [facility], [place]   Psychiatric Progress Note  Hospital Day #0     Interim History:   The patient's care was discussed with the treatment team and chart notes were reviewed.    Sleep: 4.5 hours  Scheduled Medications: Taking meds as prescribed  PRN: Tylenol, Ativan  Staff Report: Patient arrived in middle of the night, all reports indicate she is cooperative and not hostile.       Patient Interview: Patient was interviewed in Station 20 conference room. Patient explains that the last few months have been very stressful for her, feeling increased anxiety. She also endorses many depressive symptoms since her hospitalization back in December 2017. She explains that a lot of this anxiety/depression stems from the fact her daughter recently moved in with her and her ex-. Daughter has mental health issues and has hoarding tendencies, and is blocking 2 rooms in the house with material and spending lots of the patient's money. Patient is tearful when discussing issues with her daugher.  Patient denies any current SI, and explains that they often go away when she is away from her daughter. She is also often stressed out by the news, specifically [employer], and calls herself a \"news junkie\". She is currently transitioning from Zyprexa to Seroquel, as she experienced weight gain and depression while on Zyprexa. She reports that Seroquel (300 mg) has worked well in the past and experienced no side effects. When asked about her panic disorder, it appears to be triggered primarily by domestic issues with her daughter. She is not worried about having a panic attack here on the unit. Patient has had no issues with unit or people on unit thus far.       The risks, benefits, alternatives and side effects of any medication changes have been discussed and are understood by the " "patient and other caregivers.    Review of systems:     ROS was negative unless noted above.          Allergies:   No Known Allergies         Psychiatric Examination:   /67  Pulse 81  Temp 97.5  F (36.4  C) (Oral)  Resp 16  Ht 1.575 m (5' 2\")  Wt 81.6 kg (180 lb)  SpO2 96%  BMI 32.92 kg/m2  Weight is 180 lbs 0 oz  Body mass index is 32.92 kg/(m^2).    Appearance:  awake, alert, dressed in hospital scrubs, slightly unkempt and disheveled   Attitude:  cooperative  Eye Contact:  good  Mood:  sad   Affect:  mood congruent; blunted, sad affect, tearful  Speech:  clear, coherent  Psychomotor Behavior:  no evidence of tardive dyskinesia, dystonia, or tics  Thought Process:  logical, linear and goal oriented  Associations:  no loose associations  Thought Content:  no evidence of suicidal ideation or homicidal ideation, no auditory hallucinations present and no visual hallucinations present  Insight:  good  Judgment:  intact  Oriented to:  time, person, and place  Attention Span and Concentration:  intact  Recent and Remote Memory:  intact  Language: speaks fluent english  Fund of Knowledge: appropriate  Gait and Station: Normal         Labs:     No results found for this or any previous visit (from the past 24 hour(s)).        Assessment    Diagnostic Impression: Keiko is a 58 year old female with history of Bipolar I disorder, EtOH use disorder, anxiety, panic disorder and PTSD who was admitted on 8/3/18 for suicidal ideation. At time of admission, patient endorsed low mood, anhedonia, hopelessness, fatigue, and SI with plan via overdose of pills. Patient's last psych hospitalization was in December 2017 for juliana. Psychosocial stressors include her daughter moving in recently and cluttering house with hoarding behavior, as well as the arguments that this behavior leads to. She is also constantly stressed out by events on the news. Patient's history and symptoms are consistent with Bipolar I Disorder, with " current depressive episode and without psychotic features. Patient denied SI once she was admitted to unit 20, but she does have a extensive history of suicide attempt (5-6x) via pill overdose. Given patient's history of suicide attempts, the severity of her symptoms on admission, and the fact she is currently transitioning her meds, she should be hospitalized until her mood and meds are more stable.      Hospital course: Keiko Guo was admitted to station 20 as a voluntary patient on 8/3/18. Patient was originally at [facility] for a cardiac work up following chest pain. During this work up patient endorsed suicidal ideations and psychiatry consult was requested. After interview with patient in ED, recommendation was made to admit patient due to symptoms of depressive Bipolar I Disorder and SI.  Patient was admitted to station 20 as a voluntary patient under care of Dr. Nails. Dr. Baird had recommended tapering Olanzapine and titrating seroquel to 300mg, this plan was continued on the unit. Seroquel 25 BID was scheduled for chronic anxiety and panic prophylaxis.     Discontinued Medications (& Rationale):    Medical course  Chest pain experienced on 8/1 was most likely related to a panic attack. Medical work up was negative for ACS and PE. No other medical problems noted upon admission. PTA meds for COPD were continued.     Plan   Principal Diagnosis:   # Bipolar I Disorder, depressive episode, without psychotic features    Secondary psychiatric diagnoses of concern this admission:   # PTSD  # Alcohol use disorder  # Anxiety with panic attacks    Psychotropic Medications:  Modify:  - Increase Seroquel to 100 mg daily; increase dose by 50 mg every other day, goal 300mg  - Additional scheduled 25 mg Seroquel dose BID for anxiety    Continue:  Olanzapine 5 mg until 8/6, then discontinue    Patient will be treated in therapeutic milieu with appropriate individual and group therapies as  described.      Medical diagnoses to be addressed this admission:    # Chest Pain  - Most likely related to panic attacks; patient does not have constant worry about next attack.   -ACS and PE ruled out, continuing ASA 81    Data: See labs  Consults: N/A    Legal Status: Voluntary    Safety Assessment:   Behavioral Orders   Procedures     Code 1 - Restrict to Unit     Routine Programming     As clinically indicated     Status 15     Every 15 minutes.     Suicide precautions     Patients on Suicide Precautions should have a Combination Diet ordered that includes a Diet selection(s) AND a Behavioral Tray selection for Safe Tray - with utensils, or Safe Tray - NO utensils       Withdrawal precautions       Disposition: Pending clinical stabilization, medication optimization and development of an appropriate discharge plan.    IBrigido MS3, saw and examined the patient in the presence of Dr. Hernandez.  August 3, 2018    I was present with the medical student who participated in the service and in the  documentation of the note. I have verified the history and personally performed the  exam and medical decision making. I agree with the assessment and plan of  care as documented in the note.    Kermit Hernandez  August 3, 2018  [phone]      Attestation:  I have reviewed the resident admit note and confirmed by my exam today the HPI, past psych, PMH, ROS, meds, allergies, family and social histories.  I have also reviewed all available labs and vital signs.  I, Jimmie Nails, saw and evaluated the patient with the resident physician.  I agree with the findings and plan of care as documented in the resident note.  I have reviewed all labs and vital signs.

## 2018-08-03 NOTE — PLAN OF CARE
Problem: Patient Care Overview  Goal: Individualization & Mutuality  Patient's goals:  Feel better/safe    Plan for admission:  1. Stabilization of mood disorder symptoms  2. Absence of SI- safe with self  3. Medication management per MD's  4. Coordination of care with outpatient providers, family  5. Psychiatric follow up care in place

## 2018-08-04 LAB
CHOLEST SERPL-MCNC: 144 MG/DL
HCT VFR BLD AUTO: 39.3 % (ref 35–47)
HDLC SERPL-MCNC: 53 MG/DL
LDLC SERPL CALC-MCNC: 61 MG/DL
NONHDLC SERPL-MCNC: 91 MG/DL
TRIGL SERPL-MCNC: 148 MG/DL
TSH SERPL DL<=0.005 MIU/L-ACNC: 2.16 MU/L (ref 0.4–4)
VIT B12 SERPL-MCNC: 1326 PG/ML (ref 193–986)

## 2018-08-04 PROCEDURE — 12400007 ZZH R&B MH INTERMEDIATE UMMC

## 2018-08-04 PROCEDURE — 25000132 ZZH RX MED GY IP 250 OP 250 PS 637: Mod: GY | Performed by: STUDENT IN AN ORGANIZED HEALTH CARE EDUCATION/TRAINING PROGRAM

## 2018-08-04 PROCEDURE — A9270 NON-COVERED ITEM OR SERVICE: HCPCS | Mod: GY | Performed by: STUDENT IN AN ORGANIZED HEALTH CARE EDUCATION/TRAINING PROGRAM

## 2018-08-04 PROCEDURE — 84443 ASSAY THYROID STIM HORMONE: CPT | Performed by: PSYCHIATRY & NEUROLOGY

## 2018-08-04 PROCEDURE — 82607 VITAMIN B-12: CPT | Performed by: PSYCHIATRY & NEUROLOGY

## 2018-08-04 PROCEDURE — 85014 HEMATOCRIT: CPT | Performed by: PSYCHIATRY & NEUROLOGY

## 2018-08-04 PROCEDURE — 80061 LIPID PANEL: CPT | Performed by: PSYCHIATRY & NEUROLOGY

## 2018-08-04 PROCEDURE — 36415 COLL VENOUS BLD VENIPUNCTURE: CPT | Performed by: PSYCHIATRY & NEUROLOGY

## 2018-08-04 PROCEDURE — 82306 VITAMIN D 25 HYDROXY: CPT | Performed by: PSYCHIATRY & NEUROLOGY

## 2018-08-04 RX ADMIN — ACETAMINOPHEN 650 MG: 325 TABLET, FILM COATED ORAL at 09:38

## 2018-08-04 RX ADMIN — QUETIAPINE FUMARATE 25 MG: 25 TABLET ORAL at 21:18

## 2018-08-04 RX ADMIN — Medication 5 MG: at 21:18

## 2018-08-04 RX ADMIN — QUETIAPINE FUMARATE 100 MG: 100 TABLET ORAL at 21:17

## 2018-08-04 RX ADMIN — QUETIAPINE FUMARATE 25 MG: 25 TABLET ORAL at 09:35

## 2018-08-04 RX ADMIN — NICOTINE 1 PATCH: 7 PATCH TRANSDERMAL at 09:35

## 2018-08-04 RX ADMIN — OLANZAPINE 5 MG: 5 TABLET, FILM COATED ORAL at 21:18

## 2018-08-04 RX ADMIN — ACETAMINOPHEN 650 MG: 325 TABLET, FILM COATED ORAL at 16:53

## 2018-08-04 RX ADMIN — HYDROXYZINE HYDROCHLORIDE 25 MG: 25 TABLET, FILM COATED ORAL at 09:40

## 2018-08-04 ASSESSMENT — ACTIVITIES OF DAILY LIVING (ADL)
DRESS: INDEPENDENT
HYGIENE/GROOMING: INDEPENDENT
ORAL_HYGIENE: INDEPENDENT
ORAL_HYGIENE: INDEPENDENT
LAUNDRY: WITH SUPERVISION
DRESS: INDEPENDENT
GROOMING: INDEPENDENT
LAUNDRY: WITH SUPERVISION

## 2018-08-04 NOTE — PROGRESS NOTES
08/04/18 1508   Behavioral Health   Hallucinations denies / not responding to hallucinations   Thinking poor concentration   Orientation person: oriented;place: oriented;date: oriented;time: oriented   Memory baseline memory   Insight poor   Judgement impaired   Eye Contact at examiner   Affect sad;blunted, flat   Mood mood is calm   Physical Appearance/Attire attire appropriate to age and situation   Hygiene well groomed   Suicidality other (see comments)  (Denied)   1. Wish to be Dead No   2. Non-Specific Active Suicidal Thoughts  No   Self Injury other (see comment)  (Denied)   Elopement (None observed)   Activity isolative;withdrawn   Speech clear;coherent   Medication Sensitivity no stated side effects   Psychomotor / Gait balanced;steady   Psycho Education   Type of Intervention 1:1 intervention   Response participates, initiates socially appropriate   Hours 0.5   Treatment Detail Check-In   Activities of Daily Living   Hygiene/Grooming independent   Oral Hygiene independent   Dress independent   Laundry with supervision   Room Organization independent   Behavioral Health Interventions   Depression build upon strengths;establish therapeutic relationship;assist with developing and utilizing healthy coping strategies;provide emotional support;maintain safe secure environment   Social and Therapeutic Interventions (Depression) encourage participation in therapeutic groups and milieu activities;encourage socialization with peers     Pt was resting/napping in her room for the majority of the shift. Pt only came out of her room for meals. Pt expressed feeling sad and depression about the situation at her house. However, she stated that she is feeling less anxious and stressed out about it. Pt denied having any thoughts of SI and SIB.

## 2018-08-05 PROCEDURE — A9270 NON-COVERED ITEM OR SERVICE: HCPCS | Mod: GY | Performed by: STUDENT IN AN ORGANIZED HEALTH CARE EDUCATION/TRAINING PROGRAM

## 2018-08-05 PROCEDURE — 25000132 ZZH RX MED GY IP 250 OP 250 PS 637: Mod: GY | Performed by: STUDENT IN AN ORGANIZED HEALTH CARE EDUCATION/TRAINING PROGRAM

## 2018-08-05 PROCEDURE — 12400007 ZZH R&B MH INTERMEDIATE UMMC

## 2018-08-05 RX ADMIN — QUETIAPINE FUMARATE 150 MG: 100 TABLET ORAL at 20:45

## 2018-08-05 RX ADMIN — QUETIAPINE FUMARATE 25 MG: 25 TABLET ORAL at 20:45

## 2018-08-05 RX ADMIN — QUETIAPINE FUMARATE 25 MG: 25 TABLET ORAL at 09:11

## 2018-08-05 RX ADMIN — Medication 5 MG: at 20:45

## 2018-08-05 RX ADMIN — NICOTINE 1 PATCH: 7 PATCH TRANSDERMAL at 09:11

## 2018-08-05 RX ADMIN — ACETAMINOPHEN 650 MG: 325 TABLET, FILM COATED ORAL at 09:12

## 2018-08-05 RX ADMIN — OLANZAPINE 5 MG: 5 TABLET, FILM COATED ORAL at 20:45

## 2018-08-05 RX ADMIN — HYDROXYZINE HYDROCHLORIDE 25 MG: 25 TABLET, FILM COATED ORAL at 13:26

## 2018-08-05 ASSESSMENT — ACTIVITIES OF DAILY LIVING (ADL)
DRESS: INDEPENDENT
HYGIENE/GROOMING: INDEPENDENT
LAUNDRY: WITH SUPERVISION
LAUNDRY: WITH SUPERVISION
GROOMING: INDEPENDENT
ORAL_HYGIENE: INDEPENDENT
ORAL_HYGIENE: INDEPENDENT
DRESS: INDEPENDENT

## 2018-08-05 NOTE — PLAN OF CARE
Problem: Depressive Symptoms  Goal: Depressive Symptoms  Signs and symptoms of listed problems will be absent or manageable.   Outcome: No Change  Pt was isolative and withdrawn this evening staying in her room most of the time. Presents with a blunted flat affect. Pt states she feels depressed '9'. Pt denies SI/SIB. Denies hallucinations. Pt states she is thinking about the situation at home and that's what is making her sad. Pt did not attend group activities this shift. Pt came out for dinner.

## 2018-08-05 NOTE — PROGRESS NOTES
08/05/18 1419   Behavioral Health   Hallucinations denies / not responding to hallucinations   Thinking intact   Orientation person: oriented;place: oriented;date: oriented;time: oriented   Memory baseline memory   Insight poor   Judgement impaired   Eye Contact at examiner   Affect blunted, flat   Mood mood is calm   Physical Appearance/Attire attire appropriate to age and situation   Hygiene well groomed   Suicidality other (see comments)  (Denied)   1. Wish to be Dead No   2. Non-Specific Active Suicidal Thoughts  No   Self Injury other (see comment)  (Denied)   Elopement (None observed)   Activity isolative;withdrawn   Speech clear;coherent   Medication Sensitivity no stated side effects   Psychomotor / Gait balanced;steady   Psycho Education   Type of Intervention 1:1 intervention   Response participates, initiates socially appropriate   Hours 0.5   Treatment Detail Check-In   Activities of Daily Living   Hygiene/Grooming independent   Oral Hygiene independent   Dress independent   Laundry with supervision   Room Organization independent   Behavioral Health Interventions   Depression assist with developing and utilizing healthy coping strategies;establish therapeutic relationship;provide emotional support;build upon strengths;maintain safe secure environment   Social and Therapeutic Interventions (Depression) encourage participation in therapeutic groups and milieu activities;encourage socialization with peers     Pt was isolative in her room for the majority of the shift. Pt only came out to the milieu for meals. Pt stayed in the milieu for a little bit after breakfast and read the newspaper. Similar to yesterday, pt expressed feeling sad and depressed, but feels less anxious and stressed. Pt stated that her goal for the day is to get back home and figure out a solution for her house situation. Pt denied thoughts of SI and SIB.

## 2018-08-06 LAB — DEPRECATED CALCIDIOL+CALCIFEROL SERPL-MC: 16 UG/L (ref 20–75)

## 2018-08-06 PROCEDURE — 25000132 ZZH RX MED GY IP 250 OP 250 PS 637: Mod: GY | Performed by: STUDENT IN AN ORGANIZED HEALTH CARE EDUCATION/TRAINING PROGRAM

## 2018-08-06 PROCEDURE — 12400007 ZZH R&B MH INTERMEDIATE UMMC

## 2018-08-06 PROCEDURE — 99232 SBSQ HOSP IP/OBS MODERATE 35: CPT | Mod: GC | Performed by: PSYCHIATRY & NEUROLOGY

## 2018-08-06 PROCEDURE — A9270 NON-COVERED ITEM OR SERVICE: HCPCS | Mod: GY | Performed by: STUDENT IN AN ORGANIZED HEALTH CARE EDUCATION/TRAINING PROGRAM

## 2018-08-06 RX ORDER — CALCIUM CARBONATE 500 MG/1
500-1000 TABLET, CHEWABLE ORAL 2 TIMES DAILY PRN
Status: DISCONTINUED | OUTPATIENT
Start: 2018-08-06 | End: 2018-08-07 | Stop reason: HOSPADM

## 2018-08-06 RX ORDER — CALCIUM CARBONATE 500 MG/1
500 TABLET, CHEWABLE ORAL DAILY PRN
Status: DISCONTINUED | OUTPATIENT
Start: 2018-08-06 | End: 2018-08-06

## 2018-08-06 RX ADMIN — HYDROXYZINE HYDROCHLORIDE 25 MG: 25 TABLET, FILM COATED ORAL at 18:10

## 2018-08-06 RX ADMIN — HYDROXYZINE HYDROCHLORIDE 25 MG: 25 TABLET, FILM COATED ORAL at 09:11

## 2018-08-06 RX ADMIN — QUETIAPINE FUMARATE 150 MG: 100 TABLET ORAL at 20:30

## 2018-08-06 RX ADMIN — ACETAMINOPHEN 650 MG: 325 TABLET, FILM COATED ORAL at 09:11

## 2018-08-06 RX ADMIN — OLANZAPINE 5 MG: 5 TABLET, FILM COATED ORAL at 20:29

## 2018-08-06 RX ADMIN — QUETIAPINE FUMARATE 25 MG: 25 TABLET ORAL at 09:11

## 2018-08-06 RX ADMIN — Medication 5 MG: at 20:28

## 2018-08-06 RX ADMIN — NICOTINE 1 PATCH: 7 PATCH TRANSDERMAL at 09:11

## 2018-08-06 RX ADMIN — QUETIAPINE FUMARATE 25 MG: 25 TABLET ORAL at 20:30

## 2018-08-06 ASSESSMENT — ACTIVITIES OF DAILY LIVING (ADL)
GROOMING: INDEPENDENT
DRESS: INDEPENDENT
ORAL_HYGIENE: INDEPENDENT

## 2018-08-06 NOTE — PROGRESS NOTES
08/05/18 2300   Behavioral Health   Hallucinations denies / not responding to hallucinations   Thinking intact   Orientation person: oriented;place: oriented   Memory baseline memory   Insight poor   Judgement impaired   Eye Contact at examiner   Affect full range affect   Mood mood is calm   Physical Appearance/Attire neat   Hygiene well groomed   Suicidality other (see comments)  (denies )   1. Wish to be Dead No   2. Non-Specific Active Suicidal Thoughts  No   Self Injury other (see comment)  (denies )   Elopement (none reported or observed )   Activity other (see comment)  (visible in the milieu and socialized with others )   Speech clear;coherent   Activities of Daily Living   Hygiene/Grooming independent   Oral Hygiene independent   Dress independent   Laundry with supervision   Room Organization independent       Pt denied SI and SIB.  Pt seems calm, visible in the milieu, ate supper and socialized with others.

## 2018-08-06 NOTE — PROGRESS NOTES
08/06/18 1404   Behavioral Health   Hallucinations denies / not responding to hallucinations   Thinking poor concentration   Orientation person: oriented;place: oriented;date: oriented   Memory baseline memory   Insight poor   Judgement impaired   Eye Contact at examiner   Affect blunted, flat   Mood mood is calm   Physical Appearance/Attire attire appropriate to age and situation   Hygiene well groomed   Suicidality other (see comments)  (denies)   1. Wish to be Dead No   2. Non-Specific Active Suicidal Thoughts  No   Self Injury other (see comment)  (denies)   Elopement (nothing to report)   Activity isolative;withdrawn   Speech clear;coherent   Medication Sensitivity no observed side effects   Psychomotor / Gait balanced;steady   Psycho Education   Type of Intervention 1:1 intervention   Response participates, initiates socially appropriate   Hours 0.5   Treatment Detail (Check In)   Activities of Daily Living   Hygiene/Grooming independent   Oral Hygiene independent   Dress independent   Room Organization independent   Behavioral Health Interventions   Depression provide emotional support;assist with developing and utilizing healthy coping strategies   Social and Therapeutic Interventions (Depression) encourage participation in therapeutic groups and milieu activities   Patient was in her room most of the shift sleeping.  The patient only came out for both meals and community meeting.  The patient did not attend any OT groups.  The patient had a goal of talking with her daughter about their shared living situation.  This goal was not met at the time of writing this note, but the patient plans to have that phone clal with her later in the day.  The patient reported that she feel ready to leave the hospital and thinks she will be discharging tomorrow.  The patient reports she is moving back to her ex-husbands where her adult daughter also lives. The patient reports this is the topic she needs to talk with her  about and needs to set boundaries with her.  The patient denies SI and SiB.

## 2018-08-06 NOTE — PROGRESS NOTES
"    ----------------------------------------------------------------------------------------------------------  [facility], [place]   Psychiatric Progress Note  Hospital Day #3     Interim History:   The patient's care was discussed with the treatment team and chart notes were reviewed.    Sleep: 6-7  Hours over weekend  Scheduled Medications: Taking meds as prescribed  PRN: Tylenol, Hydroxyzine  Staff Report: Patient isolated and withdrawn over weekend, not interacting with others; still feeling sad/depressed about situation at home.        Patient Interview: Patient was interviewed in Station 20 conference room. Patient states she is feeling better, and feels much less anxious than she did last week. Patient discussed plans for after release from unit: she will be going home and feels able to improve the living situation with her daughter. Patient believes she  can help her daughter with her hoarding problem, and is motivated to help the daughter clean out the rooms she has cluttered at the patient's house. Denies SI when thinking about going back home. Patient believes the medications have helped her with her mood. Patient is feeling better overall and requested to stay overnight to make sure she feels ready to go home.    The risks, benefits, alternatives and side effects of any medication changes have been discussed and are understood by the patient and other caregivers.    Review of systems:     ROS was negative unless noted above.          Allergies:   No Known Allergies         Psychiatric Examination:   /66  Pulse 96  Temp 96.4  F (35.8  C) (Oral)  Resp 16  Ht 1.575 m (5' 2\")  Wt 82.6 kg (182 lb)  SpO2 96%  BMI 33.29 kg/m2  Weight is 182 lbs 0 oz  Body mass index is 33.29 kg/(m^2).    Appearance:  awake, alert, appeared as age stated and slightly unkempt  Attitude:  cooperative  Eye Contact:  fair  Mood:  States mood is \"better, less anxious\"  Affect:  appropriate " and in normal range and mood congruent  Speech:  clear, coherent and normal prosody  Psychomotor Behavior:  no evidence of tardive dyskinesia, dystonia, or tics  Thought Process:  logical, linear and goal oriented  Associations:  no loose associations  Thought Content:  no evidence of suicidal ideation or homicidal ideation and no evidence of psychotic thought  Insight: fair  Judgment:  intact  Oriented to:  time, person, and place  Attention Span and Concentration:  intact  Recent and Remote Memory:  intact  Language: speaks fluent english  Fund of Knowledge: appropriate  Gait and Station: Normal         Labs:     No results found for this or any previous visit (from the past 24 hour(s)).        Assessment    Diagnostic Impression: Keiko is a 58 year old female with history of Bipolar I disorder, EtOH use disorder, anxiety, panic disorder and PTSD who was admitted on 8/3/18 for suicidal ideation. At time of admission, patient endorsed low mood, anhedonia, hopelessness, fatigue, and SI with plan via overdose of pills. Patient's last psych hospitalization was in December 2017 for juliana. Psychosocial stressors include her daughter moving in recently and cluttering house with hoarding behavior, and politics. Patient's history and symptoms are consistent with Bipolar I Disorder, with current depressive episode and without psychotic features. Patient denied SI once she was admitted to unit 20, but she does have a extensive history of suicide attempt (5-6x) via pill overdose. Given patient's history of suicide attempts, the severity of her symptoms on admission, and the fact she is currently transitioning her meds, she should be hospitalized until her mood and meds are more stable.      Hospital course: Keiko Guo was admitted to station 20 as a voluntary patient on 8/3/18. Patient was originally at [facility] for a cardiac work up following chest pain. During this work up patient endorsed suicidal ideations  and psychiatry consult was requested. After interview with patient in ED, recommendation was made to admit patient due to symptoms of depressive Bipolar I Disorder and SI.  Patient was admitted to station 20 as a voluntary patient under care of Dr. Nails. Dr. Baird had recommended tapering Olanzapine and titrating seroquel to 300mg, this plan was continued on the unit. Seroquel 25 BID was scheduled for chronic anxiety and panic prophylaxis. Olanzapine was discontinued on 8/6.     Discontinued Medications (& Rationale):  Olanzapine 5 mg discontinued as part of plan to switch to Seroquel  -Ineffective, side effects    Medical course  Chest pain experienced on 8/1 was most likely related to a panic attack. Medical work up was negative for ACS and PE. No other medical problems noted upon admission. PTA meds for COPD were continued.     Plan   Principal Diagnosis:   # Bipolar I Disorder, depressive episode, without psychotic features    Secondary psychiatric diagnoses of concern this admission:   # PTSD  # Alcohol use disorder  # Anxiety with panic attacks    Psychotropic Medications:  Modify:  - Discontinue Olanzapine 5 mg    Continue:  - Additional scheduled 25 mg Seroquel dose BID for anxiety  - Continue Seroquel at 150 mg now, increase to 200 mg on 8/7    Patient will be treated in therapeutic milieu with appropriate individual and group therapies as described.      Medical diagnoses to be addressed this admission:    # Chest Pain  - Most likely related to panic attacks; patient does not have constant worry about next attack.   -ACS and PE ruled out, continuing ASA 81    Data: See labs  Consults: N/A    Legal Status: Voluntary    Safety Assessment:   Behavioral Orders   Procedures     Code 1 - Restrict to Unit     Routine Programming     As clinically indicated     Status 15     Every 15 minutes.     Suicide precautions     Patients on Suicide Precautions should have a Combination Diet ordered that includes a Diet  selection(s) AND a Behavioral Tray selection for Safe Tray - with utensils, or Safe Tray - NO utensils       Withdrawal precautions       Disposition: Pending clinical stabilization, medication optimization and development of an appropriate discharge plan.    Brigido CHRISTIANSON MS3, saw and examined the patient in the presence of Dr. Hernandez.  August 6, 2018    I was present with the medical student who participated in the service and in the  documentation of the note. I have verified the history and personally performed the  exam and medical decision making. I agree with the assessment and plan of  care as documented in the note.    Kermit Hernandez  August 6, 2018  [phone]    Attestation:  Jimmie CHRISTIANSON, saw and evaluated the patient with the resident physician.  I agree with the findings and plan of care as documented in the resident note.  I have reviewed all labs and vital signs.

## 2018-08-07 VITALS
TEMPERATURE: 97.6 F | OXYGEN SATURATION: 96 % | WEIGHT: 182 LBS | RESPIRATION RATE: 16 BRPM | SYSTOLIC BLOOD PRESSURE: 126 MMHG | BODY MASS INDEX: 33.49 KG/M2 | HEIGHT: 62 IN | DIASTOLIC BLOOD PRESSURE: 55 MMHG | HEART RATE: 100 BPM

## 2018-08-07 PROCEDURE — 25000132 ZZH RX MED GY IP 250 OP 250 PS 637: Mod: GY | Performed by: STUDENT IN AN ORGANIZED HEALTH CARE EDUCATION/TRAINING PROGRAM

## 2018-08-07 PROCEDURE — A9270 NON-COVERED ITEM OR SERVICE: HCPCS | Mod: GY | Performed by: STUDENT IN AN ORGANIZED HEALTH CARE EDUCATION/TRAINING PROGRAM

## 2018-08-07 PROCEDURE — 99238 HOSP IP/OBS DSCHRG MGMT 30/<: CPT | Mod: GC | Performed by: PSYCHIATRY & NEUROLOGY

## 2018-08-07 RX ORDER — QUETIAPINE FUMARATE 50 MG/1
250 TABLET, FILM COATED ORAL AT BEDTIME
Qty: 150 TABLET | Refills: 0 | Status: SHIPPED | OUTPATIENT
Start: 2018-08-07

## 2018-08-07 RX ORDER — ALBUTEROL SULFATE 90 UG/1
2 AEROSOL, METERED RESPIRATORY (INHALATION) 4 TIMES DAILY PRN
Qty: 6.7 G | Refills: 0 | Status: SHIPPED | OUTPATIENT
Start: 2018-08-07

## 2018-08-07 RX ORDER — QUETIAPINE FUMARATE 25 MG/1
25 TABLET, FILM COATED ORAL 2 TIMES DAILY
Qty: 60 TABLET | Refills: 0 | Status: SHIPPED | OUTPATIENT
Start: 2018-08-07

## 2018-08-07 RX ADMIN — HYDROXYZINE HYDROCHLORIDE 25 MG: 25 TABLET, FILM COATED ORAL at 14:09

## 2018-08-07 RX ADMIN — NICOTINE 1 PATCH: 7 PATCH TRANSDERMAL at 08:52

## 2018-08-07 RX ADMIN — ACETAMINOPHEN 650 MG: 325 TABLET, FILM COATED ORAL at 08:50

## 2018-08-07 RX ADMIN — QUETIAPINE FUMARATE 25 MG: 25 TABLET ORAL at 08:50

## 2018-08-07 ASSESSMENT — ACTIVITIES OF DAILY LIVING (ADL)
HYGIENE/GROOMING: INDEPENDENT
DRESS: INDEPENDENT
ORAL_HYGIENE: INDEPENDENT
LAUNDRY: WITH SUPERVISION

## 2018-08-07 NOTE — PROGRESS NOTES
"Pt was out on the fringes of the milieu. She reported feeling anxious 7/10 and requested vistaril 25 mg prn. She denies SI but continues to endorse depression feelings. She has a a flat affect. She was guarded when checking with staff and answered questions with \"yes\", \"no\" answers.  "

## 2018-08-07 NOTE — DISCHARGE INSTRUCTIONS
Behavioral Discharge Planning and Instructions    Summary:   You were admitted to [facility] on 8/2/18 with worsening depression, suicidal ideation  under the care of Dr. Nails.  You met with Dr. Nails and his team daily for ongoing psychiatric assessment and medication management.  You had opportunities to participate in therapeutic groups on the unit.     At this time you report your mood has stabilized and you report you are not having thoughts or intent to harm yourself or others. You will be discharged home and will resume care with your outpatient providers.    Main Diagnosis:   Bipolar Affective Disorder       Major Treatments, Procedures and Findings:   Medications were  managed throughout your stay. An internal medicine consult was completed during your stay. You had the opportunity to participate in treatment programming while on the unit including occupational therapy, mental health support and education and spiritual services.     Symptoms to Report:   Please report if you are experiencing increased aggression and/or confusion, problematic loss of sleep, worsening mood, or thoughts of suicide to your treatment team or notify your primary provider.   IF THE SYMPTOMS YOU ARE EXPERIENCING ARE A MEDICAL EMERGENCY, CALL 911 IMMEDIATELY    Lifestyle Adjustment:   1. Adjust your lifestyle to get enough sleep, relaxation, exercise and good nutrition.  Continue to develop healthy coping skills to decrease stress and promote a healthy and sober lifestyle.  2. Abstain from all substances of abuse.  3. Take medications as prescribed.  Please work with your doctor to discuss any concerns you have with your medications or side effects you may be experiencing.  4. Follow up with appointments as scheduled.        Psychiatry Follow-up:   Appointment:  Psychiatry: Gali Lim NP: Tuesday Augsut 14 at 10:30am  [facility]: [address], [place], [place] [id number] [phone]  [facility] will send a copy of  the discharge summary via Fax to: [phone]    Appointment(s):  Therapist: Rosemarie Matthews:  Monday, August 13 at 2:00pm + Monday, August 27 at 2:00pm  [facility]: [address], [place] [address]    [phone]  [facility] will send a copy of the discharge summary via Fax to [phone]    Appointment:   Primary Physician: Lauren Holder MD:  Tuesday August 14 at 9:00am  [facility] [place] [address] [facility] [address]  [phone]    Please bring a copy of your discharge summary/paperwork, your insurance information and any medications with you to this appointment  [facility] will send a copy of the discharge summary via Fax: [phone]      [facility] /:  Ginna Connell [phone]  [facility] will send a copy of the discharge summary via fax to [phone]      Resources:   Crisis Intervention: 130.465.5858 or 951-939-4147 (TTY: 944.141.4723).  Call anytime for help.  [place] on Mental Illness (www.[place].org): 121.632.7620 or 253-640-6294.  Suicide Awareness Voices of Education (SAVE) (www.save.org): 174-856-DHYF (9083)  National Suicide Prevention Line (www.mentalhealthmn.org): 946-991-VOZK (2782)  Mental Health Consumer/Survivor Network of [place] (www.mhcsn.net): 598.780.7044 or 371-323-3592  Mental Health Association of [place] (www.mentalhealth.org): 978.469.9002 or 213-707-0229    General Medication Instructions:   See your medication sheet(s) for instructions.   Take all medicines as directed.  Make no changes unless your doctor suggests them.   Go to all your doctor visits.  Be sure to have all your required lab tests. This way, your medicines can be refilled on time.  Do not use any drugs not prescribed by your doctor.    The treatment team has appreciated the opportunity to work with you.  We wish you the best in the future.    If you have any questions or concerns our unit number is [phone].

## 2018-08-07 NOTE — PROGRESS NOTES
Pt was discharged home at this time. Pt was escorted down by staff to her daughter who came to pick her. Pt took with her discharge medications with her and all her belongings. Pt denied SI/SIB at time of discharge.

## 2018-08-07 NOTE — PROGRESS NOTES
Reviewed discharge paperwork with pt.  Pt denies SI.  Pt pleasant and cooperative.  Pt states her daughter is coming at 4pm for discharge.
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
## 2018-08-07 NOTE — PROGRESS NOTES
Re: dispo    Writer met with patient to sign Medicare rights form and PRAKASH's for outpatient providers.  She reports that her daughter will pick her up today at 4pm.  AVS is complete.  PRAKASH's and Medicare form placed in patient's physical chart    Patient requests that a copy of the discharge summary be sent to each of the following:    Psychiatry Follow-up:   Appointment:  Psychiatry: Gali Lim, NP:    [facility]: [address], [place], [place] [id number] [phone]  [facility] will send a copy of the discharge summary via Fax to: [phone]    Appointment(s):  Therapist: Rosemarie Matthews:    [facility]: [facility] [address], [place] [address] [phone]  [facility] will send a copy of the discharge summary via Fax to [phone]    Appointment:   [facility] [address] [facility] [address]  [phone]    [facility] will send a copy of the discharge summary via Fax: [phone]      [facility] /:  Ginna Connell [phone]  [facility] will send a copy of the discharge summary via fax to [phone]

## 2018-08-10 ENCOUNTER — ALLIED HEALTH/NURSE VISIT (OUTPATIENT)
Dept: PHARMACY | Facility: CLINIC | Age: 58
End: 2018-08-10
Attending: PSYCHIATRY & NEUROLOGY
Payer: COMMERCIAL

## 2018-08-10 DIAGNOSIS — F31.9 BIPOLAR DISORDER (H): Primary | ICD-10-CM

## 2018-08-10 PROCEDURE — 99607 MTMS BY PHARM ADDL 15 MIN: CPT | Performed by: PHARMACIST

## 2018-08-10 PROCEDURE — 99605 MTMS BY PHARM NP 15 MIN: CPT | Performed by: PHARMACIST

## 2018-08-10 NOTE — PROGRESS NOTES
SUBJECTIVE/OBJECTIVE:                Keiko Guo is a 58 year old female called for a transitions of care visit.  She was discharged from [facility] on 8/7/18 for depression with SI. Pt carries a dx of bipolar 1 disorder.     Chief Complaint: ELSA medication review.    Allergies/ADRs: Reviewed in Epic  Tobacco: did not discuss  Alcohol: h/o alcohol use disorder  PMH: Reviewed in Epic    Medication Adherence/Access:  Patient takes medications directly from bottles.  Patient takes medications 3 time(s) per day.  Per patient, misses medication 0 times per week.     Bipolar I Disorder: Patient was admitted to [facility] on 8/1/2018 with chest pain and SI.  Cardiac workup was normal and it was determined that chest pain was likely due to anxiety.  During hospitalization patient's PTA Zyprexa and Wellbutrin were discontinued and Seroquel was initiated.  Current medication regimen includes: Seroquel 25 mg twice daily and 250 mg nightly, hydroxyzine 25 mg every 6 hours as needed, melatonin 5 mg nightly.  Keiko reports she is happy with this change.  She had taken Seroquel in the past and found it more helpful with fewer side effects compared to Zyprexa.  She states while on Zyprexa she experienced side effects of lack of motivation, fatigue and sedation, and weight gain.  She reports side effects of stomach upset and shakiness with Wellbutrin.  Wellbutrin was started about 2 weeks prior to admission. She denies current side effects with Seroquel.    Currently patient feels like her symptoms have somewhat improved with Seroquel. She denies juliana or psychosis. Still has some ongoing psychosis and depression.  She does note that her home life continues to be stressful with her adult daughter's mental illness and hoarding.  Patient denies feeling unsafe at home.  She has an established psychiatrist, therapist with whom she meets twice a month, and .  Her next appointment with her psychiatrist at 8/14/2018.  Her social  worker came out to visit patient and daughter last week.      Patient reports she was discharged with 100 mg Seroquel tablets and 25 mg Seroquel tablets.  She was given a pill cutter and advised to take 2.5 100 mg Seroquel tablets nightly.  She has attempted to do this but finds the Seroquel tablets crumbly and difficult to cut.      ASSESSMENT:                 Current medications were reviewed today.      Medication Adherence: good, no issues identified    Bipolar I Disorder: Improving.  Reviewed medication list and side effects with patient.  Discussed option of taking two 100 mg Seroquel tablets in addition to two 25 mg Seroquel tablets for a total nightly dose of 250 mg.  Patient will take this in addition to 25 mg twice daily.  This will allow patient to continue taking recommended dose without needing to cut tablets.  Discussed that dosing in this way will likely result in patient running out of Seroquel 25 mg tablets early.  Patient expressed understanding and will request new Seroquel prescriptions at her next psychiatry appointment on 8/14/2018.    PLAN:                  Pt to take two 100mg and two 25mg Seroquel tablets at bedtime in addition to 25mg BID for ease of dosing. Pt to request refill from psychiatrist at next appt.       I spent 20 minutes with this patient today. A copy of the visit note was provided to the patient's psychiatry provider.    Will follow up as needed.    The patient declined a summary of these recommendations as an after visit summary.    Veronica Hand, PharmD, BCPP  Medication Therapy Management Pharmacist  [facility]  [phone]

## 2018-08-10 NOTE — MR AVS SNAPSHOT
After Visit Summary   8/10/2018    Keiko Guo    MRN: [id number]           Patient Information     Date Of Birth          1960        Visit Information        Provider Department      8/10/2018 1:30 PM Veronica Hand, [facility]        Today's Diagnoses     Bipolar disorder (H)    -  1      Care Instructions    Recommendations from today's MTM visit:                                                    MTM (medication therapy management) is a service provided by a clinical pharmacist designed to help you get the most of out of your medicines.     Pt to take two 100mg and two 25mg Seroquel tablets at bedtime in addition to 25mg BID for ease of dosing. Pt to request refill from psychiatrist at next appt.         To schedule another MTM appointment, please call the clinic directly or you may call the MTM scheduling line at [phone] or toll-free at [phone].     My Clinical Pharmacist's contact information:                                                      It was a pleasure seeing you today!  Please feel free to contact me with any questions or concerns you have.      Veronica Hand, PharmD, BCPP  Medication Therapy Management Pharmacist  [facility]  [phone]      You may receive a survey about the MTM services you received.  I would appreciate your feedback to help me serve you better in the future. Please fill it out and return it when you can. Your comments will be anonymous.                Follow-ups after your visit        Who to contact     If you have questions or need follow up information about today's clinic visit or your schedule please contact [facility] directly at [phone].  Normal or non-critical lab and imaging results will be communicated to you by MyChart, letter or phone within 4 business days after the clinic has received the results. If you  "do not hear from us within 7 days, please contact the clinic through [employer] or phone. If you have a critical or abnormal lab result, we will notify you by phone as soon as possible.  Submit refill requests through [employer] or call your pharmacy and they will forward the refill request to us. Please allow 3 business days for your refill to be completed.          Additional Information About Your Visit        [employer] [employer] Information     [employer] lets you send messages to your doctor, view your test results, renew your prescriptions, schedule appointments and more. To sign up, go to [email or web address] . Click on \"Log in\" on the left side of the screen, which will take you to the Welcome page. Then click on \"Sign up Now\" on the right side of the page.     You will be asked to enter the access code listed below, as well as some personal information. Please follow the directions to create your username and password.     Your access code is: [email or web address]  Expires: 2018  9:43 AM     Your access code will  in 90 days. If you need help or a new code, please call your [place] clinic or [phone].        Care EveryWhere ID     This is your Care EveryWhere ID. This could be used by other organizations to access your [place] medical records  [id number]         Blood Pressure from Last 3 Encounters:   18 126/55   18 124/82   16 144/79    Weight from Last 3 Encounters:   18 182 lb (82.6 kg)   18 183 lb 9.6 oz (83.3 kg)              Today, you had the following     No orders found for display       Primary Care Provider Office Phone # Fax #    Lauren Holder PA-C [phone]       [address] [address]        Equal Access to Services     ISAMAR BLANTON : Jacinto Alarcon, wapilo boucher, qaybta kaalbella guy, ruben solomon. So [facility] [phone].    ATENCIÓN: Si lucian matthew lizama " disposición servicios gratuitos de asistencia lingüística. Tracey garcia [phone].    We comply with applicable federal civil rights laws and [place] laws. We do not discriminate on the basis of race, color, national origin, age, disability, sex, sexual orientation, or gender identity.            Thank you!     Thank you for choosing [facility]  for your care. Our goal is always to provide you with excellent care. Hearing back from our patients is one way we can continue to improve our services. Please take a few minutes to complete the written survey that you may receive in the mail after your visit with us. Thank you!             Your Updated Medication List - Protect others around you: Learn how to safely use, store and throw away your medicines at www.disposemymeds.org.          This list is accurate as of 8/10/18  3:10 PM.  Always use your most recent med list.                   Brand Name Dispense Instructions for use Diagnosis    acetaminophen 650 MG CR tablet    TYLENOL     Take 2,600 mg by mouth 2 times daily        albuterol 108 (90 Base) MCG/ACT inhaler    PROAIR HFA/PROVENTIL HFA/VENTOLIN HFA    6.7 g    Inhale 2 puffs into the lungs 4 times daily as needed for other (dyspnea)    Chronic obstructive pulmonary disease, unspecified COPD type (H)       hydrOXYzine 25 MG tablet    ATARAX     Take 25 mg by mouth every 6 hours        MELATONIN PO      Take 5 tablets by mouth At Bedtime        * QUEtiapine 25 MG tablet    SEROquel    60 tablet    Take 1 tablet (25 mg) by mouth 2 times daily    Bipolar 1 disorder (H)       * QUEtiapine 50 MG tablet    SEROquel    150 tablet    Take 5 tablets (250 mg) by mouth At Bedtime . Take 4 tablets (200mg) 08/07/18 and 08/08/18, then increase dose to 5 tablets (250mg) daily    Bipolar 1 disorder (H)       * Notice:  This list has 2 medication(s) that are the same as other medications prescribed for you. Read the directions carefully, and  ask your doctor or other care provider to review them with you.

## 2018-08-10 NOTE — PATIENT INSTRUCTIONS
Recommendations from today's MTM visit:                                                    MTM (medication therapy management) is a service provided by a clinical pharmacist designed to help you get the most of out of your medicines.     Pt to take two 100mg and two 25mg Seroquel tablets at bedtime in addition to 25mg BID for ease of dosing. Pt to request refill from psychiatrist at next appt.         To schedule another MTM appointment, please call the clinic directly or you may call the MTM scheduling line at [phone] or toll-free at [phone].     My Clinical Pharmacist's contact information:                                                      It was a pleasure seeing you today!  Please feel free to contact me with any questions or concerns you have.      Veronica Hand, Traci, BCPP  Medication Therapy Management Pharmacist  [facility]  [phone]      You may receive a survey about the MTM services you received.  I would appreciate your feedback to help me serve you better in the future. Please fill it out and return it when you can. Your comments will be anonymous.

## 2018-08-28 ENCOUNTER — HOSPITAL ENCOUNTER (EMERGENCY)
Facility: CLINIC | Age: 58
Discharge: HOME | End: 2018-08-28
Attending: FAMILY MEDICINE | Admitting: FAMILY MEDICINE
Payer: MEDICARE

## 2018-08-28 VITALS
WEIGHT: 182 LBS | TEMPERATURE: 97.8 F | BODY MASS INDEX: 33.29 KG/M2 | HEART RATE: 93 BPM | SYSTOLIC BLOOD PRESSURE: 138 MMHG | DIASTOLIC BLOOD PRESSURE: 80 MMHG | OXYGEN SATURATION: 98 % | RESPIRATION RATE: 18 BRPM

## 2018-08-28 DIAGNOSIS — F43.25 ADJUSTMENT DISORDER WITH MIXED DISTURBANCE OF EMOTIONS AND CONDUCT: ICD-10-CM

## 2018-08-28 PROCEDURE — 99283 EMERGENCY DEPT VISIT LOW MDM: CPT | Mod: Z6 | Performed by: FAMILY MEDICINE

## 2018-08-28 PROCEDURE — 90791 PSYCH DIAGNOSTIC EVALUATION: CPT

## 2018-08-28 PROCEDURE — 99285 EMERGENCY DEPT VISIT HI MDM: CPT | Mod: 25 | Performed by: FAMILY MEDICINE

## 2018-08-28 NOTE — ED AVS SNAPSHOT
[facility], Emergency Department    [address]    [place] [address]    Phone:  [phone]    Fax:  [phone]                                       Keiko Guo   MRN: [id number]    Department:  [facility], Emergency Department   Date of Visit:  8/28/2018           After Visit Summary Signature Page     I have received my discharge instructions, and my questions have been answered. I have discussed any challenges I see with this plan with the nurse or doctor.    ..........................................................................................................................................  Patient/Patient Representative Signature      ..........................................................................................................................................  Patient Representative Print Name and Relationship to Patient    ..................................................               ................................................  Date                                            Time    ..........................................................................................................................................  Reviewed by Signature/Title    ...................................................              ..............................................  Date                                                            Time          22EPIC Rev 08/18

## 2018-08-28 NOTE — ED AVS SNAPSHOT
[facility], Emergency Department    [address] [address]    Phone:  [phone]    Fax:  [phone]                                       Keiko Guo   MRN: [id number]    Department:  [facility], Emergency Department   Date of Visit:  8/28/2018           Patient Information     Date Of Birth          1960        Your diagnoses for this visit were:     Adjustment disorder with mixed disturbance of emotions and conduct        You were seen by Sergio Shah MD.      Follow-up Information     Follow up with Lauren Holder PA-C.    Specialty:  Physician Assistant    Why:  As needed    Contact information:    [facility]  [address] [address] [phone]          Discharge Instructions       Discharge to home with safety plan in place following up with your outpatient providers as discussed.    24 Hour Appointment Hotline       To make an appointment at any [facility], call [phone] ([phone]). If you don't have a family doctor or clinic, we will help you find one. [place] clinics are conveniently located to serve the needs of you and your family.             Review of your medicines      Our records show that you are taking the medicines listed below. If these are incorrect, please call your family doctor or clinic.        Dose / Directions Last dose taken    acetaminophen 650 MG CR tablet   Commonly known as:  TYLENOL   Dose:  2600 mg        Take 2,600 mg by mouth 2 times daily   Refills:  0        albuterol 108 (90 Base) MCG/ACT inhaler   Commonly known as:  PROAIR HFA/PROVENTIL HFA/VENTOLIN HFA   Dose:  2 puff   Quantity:  6.7 g        Inhale 2 puffs into the lungs 4 times daily as needed for other (dyspnea)   Refills:  0        hydrOXYzine 25 MG tablet   Commonly known as:  ATARAX   Dose:  25 mg   Indication:  Feeling Anxious        Take 25 mg by mouth every 6 hours   Refills:  0        MELATONIN PO   Dose:  5  "tablet        Take 5 tablets by mouth At Bedtime   Refills:  0        * QUEtiapine 25 MG tablet   Commonly known as:  SEROquel   Dose:  25 mg   Quantity:  60 tablet        Take 1 tablet (25 mg) by mouth 2 times daily   Refills:  0        * QUEtiapine 50 MG tablet   Commonly known as:  SEROquel   Dose:  250 mg   Quantity:  150 tablet        Take 5 tablets (250 mg) by mouth At Bedtime . Take 4 tablets (200mg) 08/07/18 and 08/08/18, then increase dose to 5 tablets (250mg) daily   Refills:  0        * Notice:  This list has 2 medication(s) that are the same as other medications prescribed for you. Read the directions carefully, and ask your doctor or other care provider to review them with you.            Orders Needing Specimen Collection     None      Pending Results     No orders found from 8/26/2018 to 8/29/2018.            Pending Culture Results     No orders found from 8/26/2018 to 8/29/2018.            Pending Results Instructions     If you had any lab results that were not finalized at the time of your Discharge, you can call the ED Lab Result RN at [phone]. You will be contacted by this team for any positive Lab results or changes in treatment. The nurses are available 7 days a week from 10A to 6:30P.  You can leave a message 24 hours per day and they will return your call.        Thank you for choosing [facility]       Thank you for choosing [facility] for your care. Our goal is always to provide you with excellent care. Hearing back from our patients is one way we can continue to improve our services. Please take a few minutes to complete the written survey that you may receive in the mail after you visit with us. Thank you!        [employer] [facility] Information     [employer] lets you send messages to your doctor, view your test results, renew your prescriptions, schedule appointments and more. To sign up, go to [email or web address] . Click on \"Log in\" on the left side of the screen, which will take you to the " "Welcome page. Then click on \"Sign up Now\" on the right side of the page.     You will be asked to enter the access code listed below, as well as some personal information. Please follow the directions to create your username and password.     Your access code is: [email or web address]  Expires: 2018  9:43 AM     Your access code will  in 90 days. If you need help or a new code, please call your [place] clinic or [phone].        Care EveryWhere ID     This is your Care EveryWhere ID. This could be used by other organizations to access your [place] medical records  [id number]        Equal Access to Services     ROBERTO CARLOS [facility] : Jacinto Alarcon, cora boucher, jessie guy, ruben solomon. So [facility] [phone].    ATENCIÓN: Si habla español, tiene a lizama disposición servicios gratuitos de asistencia lingüística. Llame al [phone].    We comply with applicable federal civil rights laws and [place] laws. We do not discriminate on the basis of race, color, national origin, age, disability, sex, sexual orientation, or gender identity.            After Visit Summary       This is your record. Keep this with you and show to your community pharmacist(s) and doctor(s) at your next visit.                  "

## 2018-08-29 NOTE — ED NOTES
Bed: ED12  Expected date:   Expected time:   Means of arrival:   Comments:  For possible pt from Js Wellington RN  08/28/18 3795

## 2018-08-29 NOTE — DISCHARGE INSTRUCTIONS
Discharge to home with safety plan in place following up with your outpatient providers as discussed.

## 2018-08-29 NOTE — ED PROVIDER NOTES
"  History     Chief Complaint   Patient presents with     Suicidal     Pt states, \"I said it, I am not, I am fine. I am stressed, I was here two weeks ago and now my daughter is here and they are sending her home\".     HPI  Keiko Guo is a 58-year-old female with a history of bipolar I disorder who presents to the Emergency Department due to suicidal ideation. Patient brought daughter into the ED tonight to be evaluated for bipolar disorder. When her daughter was about to be discharged, she stated twice \"if you discharge my daughter, I will go home and kill myself.\" Here, she reports that she said this out of frustration but would not actually act on it. Patient has a history of a significant overdose in 2016 that resulted in her being admitted for a month. She was recently admitted for a week and was discharged two weeks ago.      I have reviewed the Medications, Allergies, Past Medical and Surgical History, and Social History in the Epic system.    No current facility-administered medications for this encounter.      Current Outpatient Prescriptions   Medication     acetaminophen (TYLENOL) 650 MG CR tablet     albuterol (PROAIR HFA/PROVENTIL HFA/VENTOLIN HFA) 108 (90 Base) MCG/ACT Inhaler     hydrOXYzine (ATARAX) 25 MG tablet     MELATONIN PO     QUEtiapine (SEROQUEL) 25 MG tablet     QUEtiapine (SEROQUEL) 50 MG tablet     Past Medical History:   Diagnosis Date     Bipolar I disorder (H)      COPD (chronic obstructive pulmonary disease) (H)      PAD (peripheral artery disease) (H)        History reviewed. No pertinent surgical history.    Family History   Problem Relation Age of Onset     Bipolar Disorder Daughter        Social History   Substance Use Topics     Smoking status: Current Every Day Smoker     Smokeless tobacco: Current User      Comment: Vap     Alcohol use No     No Known Allergies    Review of Systems   Psychiatric/Behavioral: Positive for suicidal ideas.   All other systems reviewed and " are negative.      Physical Exam   BP: 146/74  Pulse: 93  Heart Rate: 84  Temp: 98.2  F (36.8  C)  Resp: 18  Weight: 82.6 kg (182 lb)  SpO2: 99 %      Physical Exam   Constitutional: She is oriented to person, place, and time. No distress.   HENT:   Head: Atraumatic.   Mouth/Throat: Oropharynx is clear and moist. No oropharyngeal exudate.   Eyes: Pupils are equal, round, and reactive to light. No scleral icterus.   Cardiovascular: Normal heart sounds and intact distal pulses.    Pulmonary/Chest: Breath sounds normal. No respiratory distress.   Abdominal: Soft. Bowel sounds are normal. There is no tenderness.   Musculoskeletal: She exhibits no edema or tenderness.   Neurological: She is alert and oriented to person, place, and time. No cranial nerve deficit. She exhibits normal muscle tone. Coordination normal.   Skin: Skin is warm. No rash noted. She is not diaphoretic.   Psychiatric: Her mood appears anxious. She exhibits a depressed mood. She expresses no suicidal ideation.       ED Course     ED Course     Procedures      Patient was seen and evaluated by the  please refer to their documentation in the note section of the epic chart dated 8/28/2018      Critical Care time:  none         Assessments & Plan (with Medical Decision Making)     I have reviewed the nursing notes.    I have reviewed the findings, diagnosis, plan and need for follow up with the patient.    Patient with adjustment disorder at this time having a difficult time managing the behaviors of her daughter patient made statements of suicidal ideation but denies it and states that this was made of a sense of anger she is able to contract for safety and will follow up with her outpatient providers.    Final diagnoses:   Adjustment disorder with mixed disturbance of emotions and conduct   Sully CHRISTIANSON, am serving as a trained medical scribe to document services personally performed by Sergio Shah MD, based on the provider's  statements to me.   I, Sergio Shah MD, was physically present and have reviewed and verified the accuracy of this note documented by Sully Eldridge.    8/28/2018   [facility], [place], EMERGENCY DEPARTMENT     Sergio Shah MD  08/30/18 0243

## 2018-10-16 ENCOUNTER — RECORDS - HEALTHEAST (OUTPATIENT)
Dept: LAB | Facility: CLINIC | Age: 58
End: 2018-10-16

## 2018-10-16 LAB
ALBUMIN SERPL-MCNC: 3.5 G/DL (ref 3.5–5)
ALP SERPL-CCNC: 125 U/L (ref 45–120)
ALT SERPL W P-5'-P-CCNC: 27 U/L (ref 0–45)
AST SERPL W P-5'-P-CCNC: 32 U/L (ref 0–40)
BILIRUB DIRECT SERPL-MCNC: 0.2 MG/DL
BILIRUB SERPL-MCNC: 0.3 MG/DL (ref 0–1)
GGT SERPL-CCNC: 614 U/L (ref 0–50)
PROT SERPL-MCNC: 7.3 G/DL (ref 6–8)

## 2019-07-12 ENCOUNTER — SURGERY - HEALTHEAST (OUTPATIENT)
Dept: GASTROENTEROLOGY | Facility: HOSPITAL | Age: 59
End: 2019-07-12

## 2019-07-12 ASSESSMENT — MIFFLIN-ST. JEOR: SCORE: 1345.25

## 2020-01-01 ENCOUNTER — RECORDS - HEALTHEAST (OUTPATIENT)
Dept: ADMINISTRATIVE | Facility: OTHER | Age: 60
End: 2020-01-01

## 2020-01-01 ENCOUNTER — RECORDS - HEALTHEAST (OUTPATIENT)
Dept: LAB | Facility: CLINIC | Age: 60
End: 2020-01-01

## 2020-01-01 ENCOUNTER — HOME CARE/HOSPICE - HEALTHEAST (OUTPATIENT)
Dept: HOSPICE | Facility: HOSPICE | Age: 60
End: 2020-01-01

## 2020-01-01 ENCOUNTER — DOCUMENTATION ONLY (OUTPATIENT)
Dept: OTHER | Facility: CLINIC | Age: 60
End: 2020-01-01

## 2020-01-01 ENCOUNTER — AMBULATORY - HEALTHEAST (OUTPATIENT)
Dept: OTHER | Facility: CLINIC | Age: 60
End: 2020-01-01

## 2020-01-01 ENCOUNTER — COMMUNICATION - HEALTHEAST (OUTPATIENT)
Dept: FAMILY MEDICINE | Facility: CLINIC | Age: 60
End: 2020-01-01

## 2020-01-01 ENCOUNTER — RECORDS - HEALTHEAST (OUTPATIENT)
Dept: VASCULAR ULTRASOUND | Facility: CLINIC | Age: 60
End: 2020-01-01

## 2020-01-01 ENCOUNTER — HOSPITAL ENCOUNTER (OUTPATIENT)
Dept: MRI IMAGING | Facility: CLINIC | Age: 60
Discharge: HOME | End: 2020-07-23

## 2020-01-01 ENCOUNTER — COMMUNICATION - HEALTHEAST (OUTPATIENT)
Dept: PULMONOLOGY | Facility: OTHER | Age: 60
End: 2020-01-01

## 2020-01-01 ENCOUNTER — AMBULATORY - HEALTHEAST (OUTPATIENT)
Dept: VASCULAR SURGERY | Facility: CLINIC | Age: 60
End: 2020-01-01

## 2020-01-01 ENCOUNTER — COMMUNICATION - HEALTHEAST (OUTPATIENT)
Dept: SCHEDULING | Facility: CLINIC | Age: 60
End: 2020-01-01

## 2020-01-01 ENCOUNTER — OFFICE VISIT - HEALTHEAST (OUTPATIENT)
Dept: VASCULAR SURGERY | Facility: CLINIC | Age: 60
End: 2020-01-01

## 2020-01-01 ENCOUNTER — AMBULATORY - HEALTHEAST (OUTPATIENT)
Dept: PULMONOLOGY | Facility: OTHER | Age: 60
End: 2020-01-01

## 2020-01-01 ENCOUNTER — HOSPITAL ENCOUNTER (INPATIENT)
Dept: MEDSURG UNIT | Facility: CLINIC | Age: 60
End: 2020-08-28
Attending: FAMILY MEDICINE | Admitting: INTERNAL MEDICINE
Payer: MEDICARE

## 2020-01-01 DIAGNOSIS — R10.11 ABDOMINAL PAIN, RIGHT UPPER QUADRANT: ICD-10-CM

## 2020-01-01 DIAGNOSIS — R17 ELEVATED BILIRUBIN: ICD-10-CM

## 2020-01-01 DIAGNOSIS — M79.604 PAIN IN BOTH LOWER EXTREMITIES: ICD-10-CM

## 2020-01-01 DIAGNOSIS — R06.02 SOB (SHORTNESS OF BREATH): ICD-10-CM

## 2020-01-01 DIAGNOSIS — M79.604 PAIN IN RIGHT LEG: ICD-10-CM

## 2020-01-01 DIAGNOSIS — J18.9 CAP (COMMUNITY ACQUIRED PNEUMONIA): ICD-10-CM

## 2020-01-01 DIAGNOSIS — I70.213 ATHEROSCLEROSIS OF NATIVE ARTERIES OF EXTREMITIES WITH INTERMITTENT CLAUDICATION, BILATERAL LEGS (H): ICD-10-CM

## 2020-01-01 DIAGNOSIS — L25.8 INCONTINENCE ASSOCIATED DERMATITIS: ICD-10-CM

## 2020-01-01 DIAGNOSIS — M79.605 PAIN IN BOTH LOWER EXTREMITIES: ICD-10-CM

## 2020-01-01 DIAGNOSIS — E51.2 WERNICKE ENCEPHALOPATHY SYNDROME: ICD-10-CM

## 2020-01-01 DIAGNOSIS — M48.062 PSEUDOCLAUDICATION SYNDROME: ICD-10-CM

## 2020-01-01 DIAGNOSIS — I73.9 CLAUDICATION (H): ICD-10-CM

## 2020-01-01 DIAGNOSIS — R32 INCONTINENCE ASSOCIATED DERMATITIS: ICD-10-CM

## 2020-01-01 DIAGNOSIS — T14.8XXA OPEN WOUND: ICD-10-CM

## 2020-01-01 DIAGNOSIS — M79.605 PAIN IN LEFT LEG: ICD-10-CM

## 2020-01-01 DIAGNOSIS — R00.0 TACHYCARDIA: ICD-10-CM

## 2020-01-01 DIAGNOSIS — R27.0 ATAXIA: ICD-10-CM

## 2020-01-01 LAB
25(OH)D3 SERPL-MCNC: 33.2 NG/ML (ref 30–80)
25(OH)D3 SERPL-MCNC: 7.3 NG/ML (ref 30–80)
A1AT SERPL-MCNC: 166 MG/DL (ref 90–200)
ABO/RH(D): NORMAL
AEROBIC BLOOD CULTURE BOTTLE: NO GROWTH
ALBUMIN SERPL-MCNC: 1.4 G/DL (ref 3.5–5)
ALBUMIN SERPL-MCNC: 1.5 G/DL (ref 3.5–5)
ALBUMIN SERPL-MCNC: 1.6 G/DL (ref 3.5–5)
ALBUMIN SERPL-MCNC: 1.6 G/DL (ref 3.5–5)
ALBUMIN SERPL-MCNC: 1.7 G/DL (ref 3.5–5)
ALBUMIN SERPL-MCNC: 1.7 G/DL (ref 3.5–5)
ALBUMIN SERPL-MCNC: 1.8 G/DL (ref 3.5–5)
ALBUMIN SERPL-MCNC: 1.9 G/DL (ref 3.5–5)
ALBUMIN SERPL-MCNC: 2 G/DL (ref 3.5–5)
ALBUMIN SERPL-MCNC: 2.2 G/DL (ref 3.5–5)
ALBUMIN SERPL-MCNC: 2.4 G/DL (ref 3.5–5)
ALBUMIN SERPL-MCNC: 2.7 G/DL (ref 3.5–5)
ALBUMIN SERPL-MCNC: 2.7 G/DL (ref 3.5–5)
ALBUMIN UR-MCNC: ABNORMAL MG/DL
ALBUMIN UR-MCNC: NEGATIVE MG/DL
ALP SERPL-CCNC: 169 U/L (ref 45–120)
ALP SERPL-CCNC: 177 U/L (ref 45–120)
ALP SERPL-CCNC: 180 U/L (ref 45–120)
ALP SERPL-CCNC: 182 U/L (ref 45–120)
ALP SERPL-CCNC: 184 U/L (ref 45–120)
ALP SERPL-CCNC: 185 U/L (ref 45–120)
ALP SERPL-CCNC: 186 U/L (ref 45–120)
ALP SERPL-CCNC: 187 U/L (ref 45–120)
ALP SERPL-CCNC: 188 U/L (ref 45–120)
ALP SERPL-CCNC: 189 U/L (ref 45–120)
ALP SERPL-CCNC: 190 U/L (ref 45–120)
ALP SERPL-CCNC: 199 U/L (ref 45–120)
ALP SERPL-CCNC: 216 U/L (ref 45–120)
ALP SERPL-CCNC: 233 U/L (ref 45–120)
ALP SERPL-CCNC: 242 U/L (ref 45–120)
ALP SERPL-CCNC: 252 U/L (ref 45–120)
ALP SERPL-CCNC: 254 U/L (ref 45–120)
ALP SERPL-CCNC: 274 U/L (ref 45–120)
ALP SERPL-CCNC: 274 U/L (ref 45–120)
ALP SERPL-CCNC: 275 U/L (ref 45–120)
ALP SERPL-CCNC: 292 U/L (ref 45–120)
ALP SERPL-CCNC: 308 U/L (ref 45–120)
ALP SERPL-CCNC: 336 U/L (ref 45–120)
ALP SERPL-CCNC: 340 U/L (ref 45–120)
ALT SERPL W P-5'-P-CCNC: 105 U/L (ref 0–45)
ALT SERPL W P-5'-P-CCNC: 109 U/L (ref 0–45)
ALT SERPL W P-5'-P-CCNC: 120 U/L (ref 0–45)
ALT SERPL W P-5'-P-CCNC: 122 U/L (ref 0–45)
ALT SERPL W P-5'-P-CCNC: 127 U/L (ref 0–45)
ALT SERPL W P-5'-P-CCNC: 127 U/L (ref 0–45)
ALT SERPL W P-5'-P-CCNC: 135 U/L (ref 0–45)
ALT SERPL W P-5'-P-CCNC: 139 U/L (ref 0–45)
ALT SERPL W P-5'-P-CCNC: 145 U/L (ref 0–45)
ALT SERPL W P-5'-P-CCNC: 16 U/L (ref 0–45)
ALT SERPL W P-5'-P-CCNC: 17 U/L (ref 0–45)
ALT SERPL W P-5'-P-CCNC: 21 U/L (ref 0–45)
ALT SERPL W P-5'-P-CCNC: 21 U/L (ref 0–45)
ALT SERPL W P-5'-P-CCNC: 22 U/L (ref 0–45)
ALT SERPL W P-5'-P-CCNC: 25 U/L (ref 0–45)
ALT SERPL W P-5'-P-CCNC: 30 U/L (ref 0–45)
ALT SERPL W P-5'-P-CCNC: 33 U/L (ref 0–45)
ALT SERPL W P-5'-P-CCNC: 39 U/L (ref 0–45)
ALT SERPL W P-5'-P-CCNC: 43 U/L (ref 0–45)
ALT SERPL W P-5'-P-CCNC: 57 U/L (ref 0–45)
ALT SERPL W P-5'-P-CCNC: 72 U/L (ref 0–45)
ALT SERPL W P-5'-P-CCNC: 72 U/L (ref 0–45)
ALT SERPL W P-5'-P-CCNC: 77 U/L (ref 0–45)
ALT SERPL W P-5'-P-CCNC: 79 U/L (ref 0–45)
ALT SERPL W P-5'-P-CCNC: 81 U/L (ref 0–45)
AMMONIA PLAS-SCNC: 40 UMOL/L (ref 11–35)
AMMONIA PLAS-SCNC: 42 UMOL/L (ref 11–35)
ANA SER QL: 0.6 U
ANAEROBIC BLOOD CULTURE BOTTLE: NO GROWTH
ANION GAP SERPL CALCULATED.3IONS-SCNC: 10 MMOL/L (ref 5–18)
ANION GAP SERPL CALCULATED.3IONS-SCNC: 11 MMOL/L (ref 5–18)
ANION GAP SERPL CALCULATED.3IONS-SCNC: 11 MMOL/L (ref 5–18)
ANION GAP SERPL CALCULATED.3IONS-SCNC: 12 MMOL/L (ref 5–18)
ANION GAP SERPL CALCULATED.3IONS-SCNC: 13 MMOL/L (ref 5–18)
ANION GAP SERPL CALCULATED.3IONS-SCNC: 14 MMOL/L (ref 5–18)
ANION GAP SERPL CALCULATED.3IONS-SCNC: 16 MMOL/L (ref 5–18)
ANION GAP SERPL CALCULATED.3IONS-SCNC: 5 MMOL/L (ref 5–18)
ANION GAP SERPL CALCULATED.3IONS-SCNC: 6 MMOL/L (ref 5–18)
ANION GAP SERPL CALCULATED.3IONS-SCNC: 7 MMOL/L (ref 5–18)
ANION GAP SERPL CALCULATED.3IONS-SCNC: 7 MMOL/L (ref 5–18)
ANION GAP SERPL CALCULATED.3IONS-SCNC: 8 MMOL/L (ref 5–18)
ANION GAP SERPL CALCULATED.3IONS-SCNC: 8 MMOL/L (ref 5–18)
ANION GAP SERPL CALCULATED.3IONS-SCNC: 9 MMOL/L (ref 5–18)
ANTIBODY SCREEN: NEGATIVE
AORTIC ROOT: 3.1 CM
APPEARANCE UR: CLEAR
APPEARANCE UR: CLEAR
ASCENDING AORTA: 3 CM
AST SERPL W P-5'-P-CCNC: 116 U/L (ref 0–40)
AST SERPL W P-5'-P-CCNC: 122 U/L (ref 0–40)
AST SERPL W P-5'-P-CCNC: 127 U/L (ref 0–40)
AST SERPL W P-5'-P-CCNC: 127 U/L (ref 0–40)
AST SERPL W P-5'-P-CCNC: 128 U/L (ref 0–40)
AST SERPL W P-5'-P-CCNC: 137 U/L (ref 0–40)
AST SERPL W P-5'-P-CCNC: 38 U/L (ref 0–40)
AST SERPL W P-5'-P-CCNC: 43 U/L (ref 0–40)
AST SERPL W P-5'-P-CCNC: 48 U/L (ref 0–40)
AST SERPL W P-5'-P-CCNC: 49 U/L (ref 0–40)
AST SERPL W P-5'-P-CCNC: 51 U/L (ref 0–40)
AST SERPL W P-5'-P-CCNC: 52 U/L (ref 0–40)
AST SERPL W P-5'-P-CCNC: 55 U/L (ref 0–40)
AST SERPL W P-5'-P-CCNC: 55 U/L (ref 0–40)
AST SERPL W P-5'-P-CCNC: 57 U/L (ref 0–40)
AST SERPL W P-5'-P-CCNC: 59 U/L (ref 0–40)
AST SERPL W P-5'-P-CCNC: 75 U/L (ref 0–40)
AST SERPL W P-5'-P-CCNC: 80 U/L (ref 0–40)
AST SERPL W P-5'-P-CCNC: 80 U/L (ref 0–40)
AST SERPL W P-5'-P-CCNC: 83 U/L (ref 0–40)
AST SERPL W P-5'-P-CCNC: 90 U/L (ref 0–40)
AST SERPL W P-5'-P-CCNC: 91 U/L (ref 0–40)
AST SERPL W P-5'-P-CCNC: 92 U/L (ref 0–40)
AST SERPL W P-5'-P-CCNC: 92 U/L (ref 0–40)
AST SERPL W P-5'-P-CCNC: 93 U/L (ref 0–40)
AST SERPL W P-5'-P-CCNC: 99 U/L (ref 0–40)
ATRIAL RATE - MUSE: 102 BPM
ATRIAL RATE - MUSE: 102 BPM
ATRIAL RATE - MUSE: 117 BPM
ATRIAL RATE - MUSE: 99 BPM
B DERMAT AB SER QL ID: NORMAL
BACTERIA #/AREA URNS HPF: ABNORMAL HPF
BASE EXCESS BLDA CALC-SCNC: ABNORMAL MMOL/L
BASE EXCESS BLDV CALC-SCNC: -3.6 MMOL/L
BASE EXCESS BLDV CALC-SCNC: 0.7 MMOL/L
BASE EXCESS BLDV CALC-SCNC: 27.5 MMOL/L
BASOPHILS # BLD AUTO: 0 THOU/UL (ref 0–0.2)
BASOPHILS # BLD AUTO: 0.1 THOU/UL (ref 0–0.2)
BASOPHILS NFR BLD AUTO: 0 % (ref 0–2)
BASOPHILS NFR BLD AUTO: 1 % (ref 0–2)
BASOPHILS NFR BLD AUTO: 2 % (ref 0–2)
BILIRUB DIRECT SERPL-MCNC: 2.3 MG/DL
BILIRUB DIRECT SERPL-MCNC: 3.4 MG/DL
BILIRUB DIRECT SERPL-MCNC: 3.5 MG/DL
BILIRUB SERPL-MCNC: 1.1 MG/DL (ref 0–1)
BILIRUB SERPL-MCNC: 1.2 MG/DL (ref 0–1)
BILIRUB SERPL-MCNC: 2.1 MG/DL (ref 0–1)
BILIRUB SERPL-MCNC: 3 MG/DL (ref 0–1)
BILIRUB SERPL-MCNC: 3.3 MG/DL (ref 0–1)
BILIRUB SERPL-MCNC: 3.5 MG/DL (ref 0–1)
BILIRUB SERPL-MCNC: 3.6 MG/DL (ref 0–1)
BILIRUB SERPL-MCNC: 3.6 MG/DL (ref 0–1)
BILIRUB SERPL-MCNC: 3.7 MG/DL (ref 0–1)
BILIRUB SERPL-MCNC: 4.5 MG/DL (ref 0–1)
BILIRUB SERPL-MCNC: 4.8 MG/DL (ref 0–1)
BILIRUB SERPL-MCNC: 5.3 MG/DL (ref 0–1)
BILIRUB SERPL-MCNC: 5.5 MG/DL (ref 0–1)
BILIRUB SERPL-MCNC: 5.6 MG/DL (ref 0–1)
BILIRUB SERPL-MCNC: 6 MG/DL (ref 0–1)
BILIRUB SERPL-MCNC: 6 MG/DL (ref 0–1)
BILIRUB SERPL-MCNC: 6.1 MG/DL (ref 0–1)
BILIRUB SERPL-MCNC: 6.3 MG/DL (ref 0–1)
BILIRUB SERPL-MCNC: 6.3 MG/DL (ref 0–1)
BILIRUB SERPL-MCNC: 6.5 MG/DL (ref 0–1)
BILIRUB SERPL-MCNC: 6.5 MG/DL (ref 0–1)
BILIRUB SERPL-MCNC: 6.6 MG/DL (ref 0–1)
BILIRUB SERPL-MCNC: 6.7 MG/DL (ref 0–1)
BILIRUB UR QL STRIP: ABNORMAL
BILIRUB UR QL STRIP: NEGATIVE
BLD PROD TYP BPU: NORMAL
BLOOD TYPE: 6200
BNP SERPL-MCNC: 147 PG/ML (ref 0–98)
BNP SERPL-MCNC: 87 PG/ML (ref 0–98)
BSA FOR ECHO PROCEDURE: 1.78 M2
BSA FOR ECHO PROCEDURE: 1.78 M2
BUN SERPL-MCNC: 10 MG/DL (ref 8–22)
BUN SERPL-MCNC: 12 MG/DL (ref 8–22)
BUN SERPL-MCNC: 14 MG/DL (ref 8–22)
BUN SERPL-MCNC: 15 MG/DL (ref 8–22)
BUN SERPL-MCNC: 16 MG/DL (ref 8–22)
BUN SERPL-MCNC: 17 MG/DL (ref 8–22)
BUN SERPL-MCNC: 18 MG/DL (ref 8–22)
BUN SERPL-MCNC: 18 MG/DL (ref 8–22)
BUN SERPL-MCNC: 19 MG/DL (ref 8–22)
BUN SERPL-MCNC: 19 MG/DL (ref 8–22)
BUN SERPL-MCNC: 21 MG/DL (ref 8–22)
BUN SERPL-MCNC: 21 MG/DL (ref 8–22)
BUN SERPL-MCNC: 23 MG/DL (ref 8–22)
BUN SERPL-MCNC: 23 MG/DL (ref 8–22)
BUN SERPL-MCNC: 24 MG/DL (ref 8–22)
BUN SERPL-MCNC: 25 MG/DL (ref 8–22)
BUN SERPL-MCNC: 25 MG/DL (ref 8–22)
BUN SERPL-MCNC: 30 MG/DL (ref 8–22)
BUN SERPL-MCNC: 31 MG/DL (ref 8–22)
BUN SERPL-MCNC: 35 MG/DL (ref 8–22)
BUN SERPL-MCNC: 37 MG/DL (ref 8–22)
BUN SERPL-MCNC: 7 MG/DL (ref 8–22)
BUN SERPL-MCNC: 8 MG/DL (ref 8–22)
BUN SERPL-MCNC: 8 MG/DL (ref 8–22)
BUN SERPL-MCNC: 9 MG/DL (ref 8–22)
C DIFF TOX B STL QL: NEGATIVE
CALCIUM SERPL-MCNC: 7.1 MG/DL (ref 8.5–10.5)
CALCIUM SERPL-MCNC: 7.3 MG/DL (ref 8.5–10.5)
CALCIUM SERPL-MCNC: 7.3 MG/DL (ref 8.5–10.5)
CALCIUM SERPL-MCNC: 7.4 MG/DL (ref 8.5–10.5)
CALCIUM SERPL-MCNC: 7.5 MG/DL (ref 8.5–10.5)
CALCIUM SERPL-MCNC: 7.6 MG/DL (ref 8.5–10.5)
CALCIUM SERPL-MCNC: 7.7 MG/DL (ref 8.5–10.5)
CALCIUM SERPL-MCNC: 7.7 MG/DL (ref 8.5–10.5)
CALCIUM SERPL-MCNC: 7.8 MG/DL (ref 8.5–10.5)
CALCIUM SERPL-MCNC: 7.9 MG/DL (ref 8.5–10.5)
CALCIUM SERPL-MCNC: 8 MG/DL (ref 8.5–10.5)
CALCIUM SERPL-MCNC: 8.2 MG/DL (ref 8.5–10.5)
CALCIUM SERPL-MCNC: 8.3 MG/DL (ref 8.5–10.5)
CALCIUM SERPL-MCNC: 8.4 MG/DL (ref 8.5–10.5)
CALCIUM SERPL-MCNC: 8.4 MG/DL (ref 8.5–10.5)
CALCIUM SERPL-MCNC: 8.6 MG/DL (ref 8.5–10.5)
CALCIUM SERPL-MCNC: 8.6 MG/DL (ref 8.5–10.5)
CALCIUM SERPL-MCNC: 8.7 MG/DL (ref 8.5–10.5)
CALCIUM, IONIZED MEASURED: 1.05 MMOL/L (ref 1.11–1.3)
CALCIUM, IONIZED MEASURED: 1.1 MMOL/L (ref 1.11–1.3)
CERULOPLASMIN SERPL-MCNC: 33 MG/DL (ref 20–60)
CHLORIDE BLD-SCNC: 100 MMOL/L (ref 98–107)
CHLORIDE BLD-SCNC: 101 MMOL/L (ref 98–107)
CHLORIDE BLD-SCNC: 102 MMOL/L (ref 98–107)
CHLORIDE BLD-SCNC: 104 MMOL/L (ref 98–107)
CHLORIDE BLD-SCNC: 107 MMOL/L (ref 98–107)
CHLORIDE BLD-SCNC: 108 MMOL/L (ref 98–107)
CHLORIDE BLD-SCNC: 108 MMOL/L (ref 98–107)
CHLORIDE BLD-SCNC: 110 MMOL/L (ref 98–107)
CHLORIDE BLD-SCNC: 89 MMOL/L (ref 98–107)
CHLORIDE BLD-SCNC: 95 MMOL/L (ref 98–107)
CHLORIDE BLD-SCNC: 96 MMOL/L (ref 98–107)
CHLORIDE BLD-SCNC: 97 MMOL/L (ref 98–107)
CHLORIDE BLD-SCNC: 97 MMOL/L (ref 98–107)
CHLORIDE BLD-SCNC: 98 MMOL/L (ref 98–107)
CHLORIDE BLD-SCNC: 98 MMOL/L (ref 98–107)
CHLORIDE BLD-SCNC: 99 MMOL/L (ref 98–107)
CK SERPL-CCNC: 64 U/L (ref 30–190)
CO2 SERPL-SCNC: 14 MMOL/L (ref 22–31)
CO2 SERPL-SCNC: 19 MMOL/L (ref 22–31)
CO2 SERPL-SCNC: 20 MMOL/L (ref 22–31)
CO2 SERPL-SCNC: 21 MMOL/L (ref 22–31)
CO2 SERPL-SCNC: 21 MMOL/L (ref 22–31)
CO2 SERPL-SCNC: 22 MMOL/L (ref 22–31)
CO2 SERPL-SCNC: 23 MMOL/L (ref 22–31)
CO2 SERPL-SCNC: 24 MMOL/L (ref 22–31)
CO2 SERPL-SCNC: 25 MMOL/L (ref 22–31)
CO2 SERPL-SCNC: 25 MMOL/L (ref 22–31)
CO2 SERPL-SCNC: 28 MMOL/L (ref 22–31)
CO2 SERPL-SCNC: 29 MMOL/L (ref 22–31)
CO2 SERPL-SCNC: 30 MMOL/L (ref 22–31)
CO2 SERPL-SCNC: 31 MMOL/L (ref 22–31)
CO2 SERPL-SCNC: 37 MMOL/L (ref 22–31)
CO2 SERPL-SCNC: 37 MMOL/L (ref 22–31)
CO2 SERPL-SCNC: 38 MMOL/L (ref 22–31)
CO2 SERPL-SCNC: 41 MMOL/L (ref 22–31)
CO2 SERPL-SCNC: 42 MMOL/L (ref 22–31)
CO2 SERPL-SCNC: 43 MMOL/L (ref 22–31)
CO2 SERPL-SCNC: 44 MMOL/L (ref 22–31)
CO2 SERPL-SCNC: 47 MMOL/L (ref 22–31)
CODING SYSTEM: NORMAL
COHGB MFR BLD: 93.3 % (ref 96–97)
COLOR UR AUTO: ABNORMAL
COLOR UR AUTO: ABNORMAL
COMPONENT (HISTORICAL CONVERSION): NORMAL
COVID-19 ANTIBODY IGG: NEGATIVE
CREAT SERPL-MCNC: 0.52 MG/DL (ref 0.6–1.1)
CREAT SERPL-MCNC: 0.6 MG/DL (ref 0.6–1.1)
CREAT SERPL-MCNC: 0.62 MG/DL (ref 0.6–1.1)
CREAT SERPL-MCNC: 0.63 MG/DL (ref 0.6–1.1)
CREAT SERPL-MCNC: 0.65 MG/DL (ref 0.6–1.1)
CREAT SERPL-MCNC: 0.66 MG/DL (ref 0.6–1.1)
CREAT SERPL-MCNC: 0.66 MG/DL (ref 0.6–1.1)
CREAT SERPL-MCNC: 0.7 MG/DL (ref 0.6–1.1)
CREAT SERPL-MCNC: 0.71 MG/DL (ref 0.6–1.1)
CREAT SERPL-MCNC: 0.73 MG/DL (ref 0.6–1.1)
CREAT SERPL-MCNC: 0.74 MG/DL (ref 0.6–1.1)
CREAT SERPL-MCNC: 0.75 MG/DL (ref 0.6–1.1)
CREAT SERPL-MCNC: 0.76 MG/DL (ref 0.6–1.1)
CREAT SERPL-MCNC: 0.77 MG/DL (ref 0.6–1.1)
CREAT SERPL-MCNC: 0.77 MG/DL (ref 0.6–1.1)
CREAT SERPL-MCNC: 0.78 MG/DL (ref 0.6–1.1)
CREAT SERPL-MCNC: 0.78 MG/DL (ref 0.6–1.1)
CREAT SERPL-MCNC: 0.79 MG/DL (ref 0.6–1.1)
CREAT SERPL-MCNC: 0.79 MG/DL (ref 0.6–1.1)
CREAT SERPL-MCNC: 0.82 MG/DL (ref 0.6–1.1)
CREAT SERPL-MCNC: 0.82 MG/DL (ref 0.6–1.1)
CREAT SERPL-MCNC: 0.83 MG/DL (ref 0.6–1.1)
CREAT SERPL-MCNC: 0.83 MG/DL (ref 0.6–1.1)
CREAT SERPL-MCNC: 0.84 MG/DL (ref 0.6–1.1)
CREAT SERPL-MCNC: 0.88 MG/DL (ref 0.6–1.1)
CV BLOOD PRESSURE: ABNORMAL MMHG
CV BLOOD PRESSURE: NORMAL MMHG
CV ECHO HEIGHT: 62 IN
CV ECHO HEIGHT: 62 IN
CV ECHO WEIGHT: 164 LBS
CV ECHO WEIGHT: 177 LBS
DIASTOLIC BLOOD PRESSURE - MUSE: 58 MMHG
DIASTOLIC BLOOD PRESSURE - MUSE: NORMAL
DOP CALC LVOT AREA: 2.83 CM2
DOP CALC LVOT DIAMETER: 1.9 CM
DOP CALC MV VTI: 23.1 CM
EJECTION FRACTION: 73 % (ref 55–75)
EOSINOPHIL # BLD AUTO: 0 THOU/UL (ref 0–0.4)
EOSINOPHIL # BLD AUTO: 0.1 THOU/UL (ref 0–0.4)
EOSINOPHIL # BLD AUTO: 0.2 THOU/UL (ref 0–0.4)
EOSINOPHIL # BLD AUTO: 0.3 THOU/UL (ref 0–0.4)
EOSINOPHIL # BLD AUTO: 0.4 THOU/UL (ref 0–0.4)
EOSINOPHIL # BLD AUTO: 0.5 THOU/UL (ref 0–0.4)
EOSINOPHIL COUNT (ABSOLUTE): 0.6 THOU/UL (ref 0–0.4)
EOSINOPHIL NFR BLD AUTO: 0 % (ref 0–6)
EOSINOPHIL NFR BLD AUTO: 1 % (ref 0–6)
EOSINOPHIL NFR BLD AUTO: 1 % (ref 0–6)
EOSINOPHIL NFR BLD AUTO: 2 % (ref 0–6)
EOSINOPHIL NFR BLD AUTO: 3 % (ref 0–6)
EOSINOPHIL NFR BLD AUTO: 3 % (ref 0–6)
EOSINOPHIL NFR BLD AUTO: 4 % (ref 0–6)
EOSINOPHIL NFR BLD AUTO: 4 % (ref 0–6)
EOSINOPHIL NFR BLD AUTO: 5 % (ref 0–6)
ERYTHROCYTE [DISTWIDTH] IN BLOOD BY AUTOMATED COUNT: 17.7 % (ref 11–14.5)
ERYTHROCYTE [DISTWIDTH] IN BLOOD BY AUTOMATED COUNT: 17.9 % (ref 11–14.5)
ERYTHROCYTE [DISTWIDTH] IN BLOOD BY AUTOMATED COUNT: 17.9 % (ref 11–14.5)
ERYTHROCYTE [DISTWIDTH] IN BLOOD BY AUTOMATED COUNT: 18 % (ref 11–14.5)
ERYTHROCYTE [DISTWIDTH] IN BLOOD BY AUTOMATED COUNT: 18.2 % (ref 11–14.5)
ERYTHROCYTE [DISTWIDTH] IN BLOOD BY AUTOMATED COUNT: 18.3 % (ref 11–14.5)
ERYTHROCYTE [DISTWIDTH] IN BLOOD BY AUTOMATED COUNT: 18.4 % (ref 11–14.5)
ERYTHROCYTE [DISTWIDTH] IN BLOOD BY AUTOMATED COUNT: 18.5 % (ref 11–14.5)
ERYTHROCYTE [DISTWIDTH] IN BLOOD BY AUTOMATED COUNT: 18.6 % (ref 11–14.5)
ERYTHROCYTE [DISTWIDTH] IN BLOOD BY AUTOMATED COUNT: 18.6 % (ref 11–14.5)
ERYTHROCYTE [DISTWIDTH] IN BLOOD BY AUTOMATED COUNT: 18.7 % (ref 11–14.5)
ERYTHROCYTE [DISTWIDTH] IN BLOOD BY AUTOMATED COUNT: 18.8 % (ref 11–14.5)
ERYTHROCYTE [DISTWIDTH] IN BLOOD BY AUTOMATED COUNT: 18.8 % (ref 11–14.5)
ERYTHROCYTE [DISTWIDTH] IN BLOOD BY AUTOMATED COUNT: 19.1 % (ref 11–14.5)
ERYTHROCYTE [DISTWIDTH] IN BLOOD BY AUTOMATED COUNT: 19.2 % (ref 11–14.5)
ERYTHROCYTE [DISTWIDTH] IN BLOOD BY AUTOMATED COUNT: 19.7 % (ref 11–14.5)
ERYTHROCYTE [DISTWIDTH] IN BLOOD BY AUTOMATED COUNT: 19.9 % (ref 11–14.5)
ERYTHROCYTE [DISTWIDTH] IN BLOOD BY AUTOMATED COUNT: 28.4 % (ref 11–14.5)
FERRITIN SERPL-MCNC: 2893 NG/ML (ref 10–130)
FLOW: 60 LPM
FRACTIONAL SHORTENING: 10 % (ref 28–44)
GALACTOMANNAN AG SERPL QL IA: NEGATIVE
GALACTOMANNAN AG SPEC IA-ACNC: 0.09
GFR SERPL CREATININE-BSD FRML MDRD: >60 ML/MIN/1.73M2
GLUCOSE BLD-MCNC: 101 MG/DL (ref 70–125)
GLUCOSE BLD-MCNC: 103 MG/DL (ref 70–125)
GLUCOSE BLD-MCNC: 121 MG/DL (ref 70–125)
GLUCOSE BLD-MCNC: 133 MG/DL (ref 70–125)
GLUCOSE BLD-MCNC: 136 MG/DL (ref 70–125)
GLUCOSE BLD-MCNC: 137 MG/DL (ref 70–125)
GLUCOSE BLD-MCNC: 137 MG/DL (ref 70–125)
GLUCOSE BLD-MCNC: 138 MG/DL (ref 70–125)
GLUCOSE BLD-MCNC: 138 MG/DL (ref 70–125)
GLUCOSE BLD-MCNC: 140 MG/DL (ref 70–125)
GLUCOSE BLD-MCNC: 141 MG/DL (ref 70–125)
GLUCOSE BLD-MCNC: 145 MG/DL (ref 70–125)
GLUCOSE BLD-MCNC: 154 MG/DL (ref 70–125)
GLUCOSE BLD-MCNC: 157 MG/DL (ref 70–125)
GLUCOSE BLD-MCNC: 157 MG/DL (ref 70–125)
GLUCOSE BLD-MCNC: 159 MG/DL (ref 70–125)
GLUCOSE BLD-MCNC: 161 MG/DL (ref 70–125)
GLUCOSE BLD-MCNC: 162 MG/DL (ref 70–125)
GLUCOSE BLD-MCNC: 177 MG/DL (ref 70–125)
GLUCOSE BLD-MCNC: 179 MG/DL (ref 70–125)
GLUCOSE BLD-MCNC: 193 MG/DL (ref 70–125)
GLUCOSE BLD-MCNC: 202 MG/DL (ref 70–125)
GLUCOSE BLD-MCNC: 210 MG/DL (ref 70–125)
GLUCOSE BLD-MCNC: 224 MG/DL (ref 70–125)
GLUCOSE BLD-MCNC: 262 MG/DL (ref 70–125)
GLUCOSE BLD-MCNC: 91 MG/DL (ref 70–125)
GLUCOSE BLD-MCNC: 99 MG/DL (ref 70–125)
GLUCOSE UR STRIP-MCNC: NEGATIVE MG/DL
GLUCOSE UR STRIP-MCNC: NEGATIVE MG/DL
HAV IGM SERPL QL IA: NEGATIVE
HBV CORE IGM SERPL QL IA: POSITIVE
HBV SURFACE AG SERPL QL IA: NEGATIVE
HCO3, ARTERIAL CALC - HISTORICAL: 52.9 MMOL/L (ref 23–29)
HCO3, VENOUS, CALC - HISTORICAL: 20.3 MMOL/L (ref 24–30)
HCO3, VENOUS, CALC - HISTORICAL: 24.5 MMOL/L (ref 24–30)
HCO3, VENOUS, CALC - HISTORICAL: 48.2 MMOL/L (ref 24–30)
HCT VFR BLD AUTO: 25.3 % (ref 35–47)
HCT VFR BLD AUTO: 25.6 % (ref 35–47)
HCT VFR BLD AUTO: 26.2 % (ref 35–47)
HCT VFR BLD AUTO: 26.2 % (ref 35–47)
HCT VFR BLD AUTO: 26.4 % (ref 35–47)
HCT VFR BLD AUTO: 28 % (ref 35–47)
HCT VFR BLD AUTO: 28.5 % (ref 35–47)
HCT VFR BLD AUTO: 30.1 % (ref 35–47)
HCT VFR BLD AUTO: 30.4 % (ref 35–47)
HCT VFR BLD AUTO: 30.8 % (ref 35–47)
HCT VFR BLD AUTO: 31 % (ref 35–47)
HCT VFR BLD AUTO: 31.1 % (ref 35–47)
HCT VFR BLD AUTO: 31.4 % (ref 35–47)
HCT VFR BLD AUTO: 32.1 % (ref 35–47)
HCT VFR BLD AUTO: 32.3 % (ref 35–47)
HCT VFR BLD AUTO: 33.1 % (ref 35–47)
HCT VFR BLD AUTO: 35.5 % (ref 35–47)
HCT VFR BLD AUTO: 35.8 % (ref 35–47)
HCT VFR BLD AUTO: 36.5 % (ref 35–47)
HCT VFR BLD AUTO: 36.6 % (ref 35–47)
HCT VFR BLD AUTO: 36.9 % (ref 35–47)
HCT VFR BLD AUTO: 37.4 % (ref 35–47)
HCV AB SERPL QL IA: NEGATIVE
HGB BLD-MCNC: 10.2 G/DL (ref 12–16)
HGB BLD-MCNC: 10.4 G/DL (ref 12–16)
HGB BLD-MCNC: 10.5 G/DL (ref 12–16)
HGB BLD-MCNC: 10.6 G/DL (ref 12–16)
HGB BLD-MCNC: 10.6 G/DL (ref 12–16)
HGB BLD-MCNC: 11 G/DL (ref 12–16)
HGB BLD-MCNC: 11 G/DL (ref 12–16)
HGB BLD-MCNC: 11.7 G/DL (ref 12–16)
HGB BLD-MCNC: 11.9 G/DL (ref 12–16)
HGB BLD-MCNC: 11.9 G/DL (ref 12–16)
HGB BLD-MCNC: 12 G/DL (ref 12–16)
HGB BLD-MCNC: 12.2 G/DL (ref 12–16)
HGB BLD-MCNC: 12.7 G/DL (ref 12–16)
HGB BLD-MCNC: 8.9 G/DL (ref 12–16)
HGB BLD-MCNC: 8.9 G/DL (ref 12–16)
HGB BLD-MCNC: 9 G/DL (ref 12–16)
HGB BLD-MCNC: 9 G/DL (ref 12–16)
HGB BLD-MCNC: 9.1 G/DL (ref 12–16)
HGB BLD-MCNC: 9.6 G/DL (ref 12–16)
HGB BLD-MCNC: 9.7 G/DL (ref 12–16)
HGB BLD-MCNC: 9.9 G/DL (ref 12–16)
HGB UR QL STRIP: NEGATIVE
HGB UR QL STRIP: NEGATIVE
IMM GRANULOCYTES # BLD: 0.1 THOU/UL
IMM GRANULOCYTES # BLD: 0.1 THOU/UL
IMM GRANULOCYTES NFR BLD: 1 %
IMM GRANULOCYTES NFR BLD: 1 %
IMMATURE GRANULOCYTE % - MAN (DIFF): 0 %
IMMATURE GRANULOCYTE ABSOLUTE - MAN (DIFF): 0 THOU/UL
INR PPP: 1.59 (ref 0.9–1.1)
INR PPP: 1.66 (ref 0.9–1.1)
INR PPP: 1.73 (ref 0.9–1.1)
INR PPP: 2.51 (ref 0.9–1.1)
INTERPRETATION ECG - MUSE: NORMAL
INTERVENTRICULAR SEPTUM IN END DIASTOLE: 1.01 CM (ref 0.6–0.9)
ION CA PH 7.4: 1.05 MMOL/L (ref 1.11–1.3)
ION CA PH 7.4: 1.08 MMOL/L (ref 1.11–1.3)
IRON SATN MFR SERPL: 104 % (ref 20–50)
IRON SERPL-MCNC: 128 UG/DL (ref 42–175)
ISSUE DATE AND TIME: NORMAL
IVS/PW RATIO: 1
KETONES UR STRIP-MCNC: NEGATIVE MG/DL
KETONES UR STRIP-MCNC: NEGATIVE MG/DL
LACTATE SERPL-SCNC: 1.6 MMOL/L (ref 0.7–2)
LACTATE SERPL-SCNC: 10.5 MMOL/L (ref 0.7–2)
LACTATE SERPL-SCNC: 2.2 MMOL/L (ref 0.7–2)
LACTATE SERPL-SCNC: 2.3 MMOL/L (ref 0.7–2)
LACTATE SERPL-SCNC: 2.4 MMOL/L (ref 0.7–2)
LACTATE SERPL-SCNC: 2.6 MMOL/L (ref 0.7–2)
LACTATE SERPL-SCNC: 2.7 MMOL/L (ref 0.7–2)
LACTATE SERPL-SCNC: 2.8 MMOL/L (ref 0.7–2)
LACTATE SERPL-SCNC: 3.3 MMOL/L (ref 0.7–2)
LACTATE SERPL-SCNC: 3.4 MMOL/L (ref 0.7–2)
LACTATE SERPL-SCNC: 3.5 MMOL/L (ref 0.7–2)
LACTATE SERPL-SCNC: 4.1 MMOL/L (ref 0.7–2)
LACTATE SERPL-SCNC: 4.8 MMOL/L (ref 0.7–2)
LACTATE SERPL-SCNC: 5.5 MMOL/L (ref 0.7–2)
LACTATE SERPL-SCNC: 5.6 MMOL/L (ref 0.7–2)
LACTATE SERPL-SCNC: 8.3 MMOL/L (ref 0.7–2)
LACTATE SERPL-SCNC: 9 MMOL/L (ref 0.7–2)
LACTATE SERPL-SCNC: NORMAL MMOL/L
LEFT ATRIUM SIZE: 3.7 CM
LEFT ATRIUM TO AORTIC ROOT RATIO: 1.19 NO UNITS
LEFT VENTRICLE DIASTOLIC VOLUME INDEX: 94.4 CM3/M2 (ref 29–61)
LEFT VENTRICLE DIASTOLIC VOLUME: 168 CM3 (ref 46–106)
LEFT VENTRICLE HEART RATE: 103 BPM
LEFT VENTRICLE HEART RATE: 95 BPM
LEFT VENTRICLE MASS INDEX: 59.3 G/M2
LEFT VENTRICLE SYSTOLIC VOLUME INDEX: 25.2 CM3/M2 (ref 8–24)
LEFT VENTRICLE SYSTOLIC VOLUME: 44.8 CM3 (ref 14–42)
LEFT VENTRICULAR INTERNAL DIMENSION IN DIASTOLE: 3.5 CM (ref 3.8–5.2)
LEFT VENTRICULAR INTERNAL DIMENSION IN SYSTOLE: 3.15 CM (ref 2.2–3.5)
LEFT VENTRICULAR MASS: 105.6 G
LEFT VENTRICULAR POSTERIOR WALL IN END DIASTOLE: 1.02 CM (ref 0.6–0.9)
LEUKOCYTE ESTERASE UR QL STRIP: NEGATIVE
LEUKOCYTE ESTERASE UR QL STRIP: NEGATIVE
LIPASE SERPL-CCNC: 38 U/L (ref 0–52)
LYMPHOCYTES # BLD AUTO: 0.6 THOU/UL (ref 0.8–4.4)
LYMPHOCYTES # BLD AUTO: 0.8 THOU/UL (ref 0.8–4.4)
LYMPHOCYTES # BLD AUTO: 0.8 THOU/UL (ref 0.8–4.4)
LYMPHOCYTES # BLD AUTO: 0.9 THOU/UL (ref 0.8–4.4)
LYMPHOCYTES # BLD AUTO: 1 THOU/UL (ref 0.8–4.4)
LYMPHOCYTES # BLD AUTO: 1.1 THOU/UL (ref 0.8–4.4)
LYMPHOCYTES # BLD AUTO: 1.2 THOU/UL (ref 0.8–4.4)
LYMPHOCYTES # BLD AUTO: 1.4 THOU/UL (ref 0.8–4.4)
LYMPHOCYTES # BLD AUTO: 1.4 THOU/UL (ref 0.8–4.4)
LYMPHOCYTES # BLD AUTO: 1.6 THOU/UL (ref 0.8–4.4)
LYMPHOCYTES # BLD AUTO: 1.7 THOU/UL (ref 0.8–4.4)
LYMPHOCYTES # BLD AUTO: 2 THOU/UL (ref 0.8–4.4)
LYMPHOCYTES # BLD AUTO: 2 THOU/UL (ref 0.8–4.4)
LYMPHOCYTES # BLD AUTO: 2.1 THOU/UL (ref 0.8–4.4)
LYMPHOCYTES # BLD AUTO: 2.1 THOU/UL (ref 0.8–4.4)
LYMPHOCYTES # BLD AUTO: 2.3 THOU/UL (ref 0.8–4.4)
LYMPHOCYTES # BLD AUTO: 2.9 THOU/UL (ref 0.8–4.4)
LYMPHOCYTES NFR BLD AUTO: 11 % (ref 20–40)
LYMPHOCYTES NFR BLD AUTO: 12 % (ref 20–40)
LYMPHOCYTES NFR BLD AUTO: 13 % (ref 20–40)
LYMPHOCYTES NFR BLD AUTO: 14 % (ref 20–40)
LYMPHOCYTES NFR BLD AUTO: 14 % (ref 20–40)
LYMPHOCYTES NFR BLD AUTO: 15 % (ref 20–40)
LYMPHOCYTES NFR BLD AUTO: 16 % (ref 20–40)
LYMPHOCYTES NFR BLD AUTO: 18 % (ref 20–40)
LYMPHOCYTES NFR BLD AUTO: 19 % (ref 20–40)
LYMPHOCYTES NFR BLD AUTO: 19 % (ref 20–40)
LYMPHOCYTES NFR BLD AUTO: 22 % (ref 20–40)
LYMPHOCYTES NFR BLD AUTO: 22 % (ref 20–40)
LYMPHOCYTES NFR BLD AUTO: 4 % (ref 20–40)
LYMPHOCYTES NFR BLD AUTO: 40 % (ref 20–40)
LYMPHOCYTES NFR BLD AUTO: 8 % (ref 20–40)
LYMPHOCYTES NFR BLD AUTO: 9 % (ref 20–40)
LYMPHOCYTES NFR BLD AUTO: 9 % (ref 20–40)
MAGNESIUM SERPL-MCNC: 1.6 MG/DL (ref 1.8–2.6)
MAGNESIUM SERPL-MCNC: 1.6 MG/DL (ref 1.8–2.6)
MAGNESIUM SERPL-MCNC: 1.7 MG/DL (ref 1.8–2.6)
MAGNESIUM SERPL-MCNC: 1.9 MG/DL (ref 1.8–2.6)
MAGNESIUM SERPL-MCNC: 2 MG/DL (ref 1.8–2.6)
MAGNESIUM SERPL-MCNC: 2.1 MG/DL (ref 1.8–2.6)
MAGNESIUM SERPL-MCNC: 2.2 MG/DL (ref 1.8–2.6)
MAGNESIUM SERPL-MCNC: 2.2 MG/DL (ref 1.8–2.6)
MAGNESIUM SERPL-MCNC: 2.3 MG/DL (ref 1.8–2.6)
MAGNESIUM SERPL-MCNC: 2.4 MG/DL (ref 1.8–2.6)
MAGNESIUM SERPL-MCNC: 2.6 MG/DL (ref 1.8–2.6)
MAGNESIUM SERPL-MCNC: 2.9 MG/DL (ref 1.8–2.6)
MAGNESIUM SERPL-MCNC: NORMAL MG/DL
MCH RBC QN AUTO: 26.9 PG (ref 27–34)
MCH RBC QN AUTO: 36.1 PG (ref 27–34)
MCH RBC QN AUTO: 36.5 PG (ref 27–34)
MCH RBC QN AUTO: 36.7 PG (ref 27–34)
MCH RBC QN AUTO: 36.8 PG (ref 27–34)
MCH RBC QN AUTO: 36.9 PG (ref 27–34)
MCH RBC QN AUTO: 36.9 PG (ref 27–34)
MCH RBC QN AUTO: 37 PG (ref 27–34)
MCH RBC QN AUTO: 37 PG (ref 27–34)
MCH RBC QN AUTO: 37.1 PG (ref 27–34)
MCH RBC QN AUTO: 37.2 PG (ref 27–34)
MCH RBC QN AUTO: 37.2 PG (ref 27–34)
MCH RBC QN AUTO: 37.3 PG (ref 27–34)
MCH RBC QN AUTO: 37.5 PG (ref 27–34)
MCH RBC QN AUTO: 37.8 PG (ref 27–34)
MCHC RBC AUTO-ENTMCNC: 32.4 G/DL (ref 32–36)
MCHC RBC AUTO-ENTMCNC: 32.5 G/DL (ref 32–36)
MCHC RBC AUTO-ENTMCNC: 32.5 G/DL (ref 32–36)
MCHC RBC AUTO-ENTMCNC: 32.6 G/DL (ref 32–36)
MCHC RBC AUTO-ENTMCNC: 32.7 G/DL (ref 32–36)
MCHC RBC AUTO-ENTMCNC: 32.9 G/DL (ref 32–36)
MCHC RBC AUTO-ENTMCNC: 32.9 G/DL (ref 32–36)
MCHC RBC AUTO-ENTMCNC: 33.2 G/DL (ref 32–36)
MCHC RBC AUTO-ENTMCNC: 33.5 G/DL (ref 32–36)
MCHC RBC AUTO-ENTMCNC: 34 G/DL (ref 32–36)
MCHC RBC AUTO-ENTMCNC: 34 G/DL (ref 32–36)
MCHC RBC AUTO-ENTMCNC: 34.1 G/DL (ref 32–36)
MCHC RBC AUTO-ENTMCNC: 34.2 G/DL (ref 32–36)
MCHC RBC AUTO-ENTMCNC: 34.2 G/DL (ref 32–36)
MCHC RBC AUTO-ENTMCNC: 34.3 G/DL (ref 32–36)
MCHC RBC AUTO-ENTMCNC: 34.4 G/DL (ref 32–36)
MCHC RBC AUTO-ENTMCNC: 34.7 G/DL (ref 32–36)
MCHC RBC AUTO-ENTMCNC: 35.2 G/DL (ref 32–36)
MCHC RBC AUTO-ENTMCNC: 35.2 G/DL (ref 32–36)
MCV RBC AUTO: 105 FL (ref 80–100)
MCV RBC AUTO: 106 FL (ref 80–100)
MCV RBC AUTO: 107 FL (ref 80–100)
MCV RBC AUTO: 107 FL (ref 80–100)
MCV RBC AUTO: 108 FL (ref 80–100)
MCV RBC AUTO: 109 FL (ref 80–100)
MCV RBC AUTO: 109 FL (ref 80–100)
MCV RBC AUTO: 110 FL (ref 80–100)
MCV RBC AUTO: 111 FL (ref 80–100)
MCV RBC AUTO: 111 FL (ref 80–100)
MCV RBC AUTO: 112 FL (ref 80–100)
MCV RBC AUTO: 112 FL (ref 80–100)
MCV RBC AUTO: 113 FL (ref 80–100)
MCV RBC AUTO: 113 FL (ref 80–100)
MCV RBC AUTO: 114 FL (ref 80–100)
MCV RBC AUTO: 115 FL (ref 80–100)
MCV RBC AUTO: 83 FL (ref 80–100)
MITRAL VALVE MEAN INFLOW VELOCITY: 88.2 CM/S
MITRAL VALVE PEAK VELOCITY: 116 CM/S
MONOCYTES # BLD AUTO: 0.3 THOU/UL (ref 0–0.9)
MONOCYTES # BLD AUTO: 0.4 THOU/UL (ref 0–0.9)
MONOCYTES # BLD AUTO: 0.5 THOU/UL (ref 0–0.9)
MONOCYTES # BLD AUTO: 0.6 THOU/UL (ref 0–0.9)
MONOCYTES # BLD AUTO: 1 THOU/UL (ref 0–0.9)
MONOCYTES # BLD AUTO: 1.1 THOU/UL (ref 0–0.9)
MONOCYTES # BLD AUTO: 1.2 THOU/UL (ref 0–0.9)
MONOCYTES # BLD AUTO: 1.3 THOU/UL (ref 0–0.9)
MONOCYTES # BLD AUTO: 1.4 THOU/UL (ref 0–0.9)
MONOCYTES # BLD AUTO: 1.4 THOU/UL (ref 0–0.9)
MONOCYTES # BLD AUTO: 1.6 THOU/UL (ref 0–0.9)
MONOCYTES # BLD AUTO: 1.7 THOU/UL (ref 0–0.9)
MONOCYTES # BLD AUTO: 2 THOU/UL (ref 0–0.9)
MONOCYTES # BLD AUTO: 2.1 THOU/UL (ref 0–0.9)
MONOCYTES # BLD AUTO: 3.4 THOU/UL (ref 0–0.9)
MONOCYTES NFR BLD AUTO: 10 % (ref 2–10)
MONOCYTES NFR BLD AUTO: 10 % (ref 2–10)
MONOCYTES NFR BLD AUTO: 11 % (ref 2–10)
MONOCYTES NFR BLD AUTO: 11 % (ref 2–10)
MONOCYTES NFR BLD AUTO: 12 % (ref 2–10)
MONOCYTES NFR BLD AUTO: 12 % (ref 2–10)
MONOCYTES NFR BLD AUTO: 13 % (ref 2–10)
MONOCYTES NFR BLD AUTO: 15 % (ref 2–10)
MONOCYTES NFR BLD AUTO: 6 % (ref 2–10)
MONOCYTES NFR BLD AUTO: 7 % (ref 2–10)
MONOCYTES NFR BLD AUTO: 7 % (ref 2–10)
MONOCYTES NFR BLD AUTO: 9 % (ref 2–10)
MONOCYTES NFR BLD AUTO: 9 % (ref 2–10)
MUCOUS THREADS #/AREA URNS LPF: ABNORMAL LPF
MV MEAN GRADIENT: 3 MMHG
MV PEAK GRADIENT: 5.4 MMHG
NEUTROPHILS # BLD AUTO: 1.6 THOU/UL (ref 2–7.7)
NEUTROPHILS # BLD AUTO: 10.9 THOU/UL (ref 2–7.7)
NEUTROPHILS # BLD AUTO: 15.7 THOU/UL (ref 2–7.7)
NEUTROPHILS # BLD AUTO: 17.1 THOU/UL (ref 2–7.7)
NEUTROPHILS # BLD AUTO: 4.3 THOU/UL (ref 2–7.7)
NEUTROPHILS # BLD AUTO: 5.3 THOU/UL (ref 2–7.7)
NEUTROPHILS # BLD AUTO: 5.3 THOU/UL (ref 2–7.7)
NEUTROPHILS # BLD AUTO: 5.4 THOU/UL (ref 2–7.7)
NEUTROPHILS # BLD AUTO: 5.5 THOU/UL (ref 2–7.7)
NEUTROPHILS # BLD AUTO: 5.7 THOU/UL (ref 2–7.7)
NEUTROPHILS # BLD AUTO: 5.9 THOU/UL (ref 2–7.7)
NEUTROPHILS # BLD AUTO: 6.6 THOU/UL (ref 2–7.7)
NEUTROPHILS # BLD AUTO: 6.9 THOU/UL (ref 2–7.7)
NEUTROPHILS # BLD AUTO: 7.7 THOU/UL (ref 2–7.7)
NEUTROPHILS # BLD AUTO: 7.9 THOU/UL (ref 2–7.7)
NEUTROPHILS # BLD AUTO: 8 THOU/UL (ref 2–7.7)
NEUTROPHILS # BLD AUTO: 8.2 THOU/UL (ref 2–7.7)
NEUTROPHILS # BLD AUTO: 9.7 THOU/UL (ref 2–7.7)
NEUTROPHILS NFR BLD AUTO: 48 % (ref 50–70)
NEUTROPHILS NFR BLD AUTO: 61 % (ref 50–70)
NEUTROPHILS NFR BLD AUTO: 61 % (ref 50–70)
NEUTROPHILS NFR BLD AUTO: 66 % (ref 50–70)
NEUTROPHILS NFR BLD AUTO: 68 % (ref 50–70)
NEUTROPHILS NFR BLD AUTO: 72 % (ref 50–70)
NEUTROPHILS NFR BLD AUTO: 73 % (ref 50–70)
NEUTROPHILS NFR BLD AUTO: 76 % (ref 50–70)
NEUTROPHILS NFR BLD AUTO: 76 % (ref 50–70)
NEUTROPHILS NFR BLD AUTO: 79 % (ref 50–70)
NEUTROPHILS NFR BLD AUTO: 80 % (ref 50–70)
NEUTROPHILS NFR BLD AUTO: 82 % (ref 50–70)
NEUTROPHILS NFR BLD AUTO: 84 % (ref 50–70)
NEUTROPHILS NFR BLD AUTO: 84 % (ref 50–70)
NITRATE UR QL: NEGATIVE
NITRATE UR QL: NEGATIVE
NUC REST DIASTOLIC VOLUME INDEX: 2622.4 LBS
NUC REST DIASTOLIC VOLUME INDEX: 2828.8 LBS
NUC REST SYSTOLIC VOLUME INDEX: 62 IN
NUC REST SYSTOLIC VOLUME INDEX: 62 IN
OXYHEMOGLOBIN (VBG) - HISTORICAL: 21.5 % (ref 70–75)
OXYHEMOGLOBIN (VBG) - HISTORICAL: 54.8 % (ref 70–75)
OXYHEMOGLOBIN (VBG) - HISTORICAL: 74.1 % (ref 70–75)
OXYHEMOGLOBIN - HISTORICAL: 91.2 % (ref 96–97)
OXYHGB MFR BLDV: 22.1 % (ref 70–75)
OXYHGB MFR BLDV: 56 % (ref 70–75)
OXYHGB MFR BLDV: 75.8 % (ref 70–75)
P AXIS - MUSE: 29 DEGREES
P AXIS - MUSE: 47 DEGREES
P AXIS - MUSE: 55 DEGREES
P AXIS - MUSE: 66 DEGREES
PCO2 BLD: 67 MM HG (ref 35–45)
PCO2 BLDV: 36 MM HG (ref 35–50)
PCO2 BLDV: 42 MM HG (ref 35–50)
PCO2 BLDV: 61 MM HG (ref 35–50)
PH BLD: 7.52 [PH] (ref 7.37–7.44)
PH BLDV: 7.34 [PH] (ref 7.35–7.45)
PH BLDV: 7.44 [PH] (ref 7.35–7.45)
PH BLDV: 7.52 [PH] (ref 7.35–7.45)
PH UR STRIP: 6 [PH] (ref 4.5–8)
PH UR STRIP: 6 [PH] (ref 4.5–8)
PH: 7.37 (ref 7.35–7.45)
PH: 7.4 (ref 7.35–7.45)
PHOSPHATE SERPL-MCNC: 1.5 MG/DL (ref 2.5–4.5)
PHOSPHATE SERPL-MCNC: 1.7 MG/DL (ref 2.5–4.5)
PHOSPHATE SERPL-MCNC: 3 MG/DL (ref 2.5–4.5)
PHOSPHATE SERPL-MCNC: 3.1 MG/DL (ref 2.5–4.5)
PHOSPHATE SERPL-MCNC: 3.4 MG/DL (ref 2.5–4.5)
PHOSPHATE SERPL-MCNC: 3.8 MG/DL (ref 2.5–4.5)
PHOSPHATE SERPL-MCNC: 4.9 MG/DL (ref 2.5–4.5)
PHOSPHATE SERPL-MCNC: NORMAL MG/DL
PLAT MORPH BLD: ABNORMAL
PLATELET # BLD AUTO: 100 THOU/UL (ref 140–440)
PLATELET # BLD AUTO: 15 THOU/UL (ref 140–440)
PLATELET # BLD AUTO: 16 THOU/UL (ref 140–440)
PLATELET # BLD AUTO: 18 THOU/UL (ref 140–440)
PLATELET # BLD AUTO: 23 THOU/UL (ref 140–440)
PLATELET # BLD AUTO: 29 THOU/UL (ref 140–440)
PLATELET # BLD AUTO: 30 THOU/UL (ref 140–440)
PLATELET # BLD AUTO: 33 THOU/UL (ref 140–440)
PLATELET # BLD AUTO: 35 THOU/UL (ref 140–440)
PLATELET # BLD AUTO: 40 THOU/UL (ref 140–440)
PLATELET # BLD AUTO: 50 THOU/UL (ref 140–440)
PLATELET # BLD AUTO: 50 THOU/UL (ref 140–440)
PLATELET # BLD AUTO: 51 THOU/UL (ref 140–440)
PLATELET # BLD AUTO: 52 THOU/UL (ref 140–440)
PLATELET # BLD AUTO: 53 THOU/UL (ref 140–440)
PLATELET # BLD AUTO: 57 THOU/UL (ref 140–440)
PLATELET # BLD AUTO: 64 THOU/UL (ref 140–440)
PLATELET # BLD AUTO: 64 THOU/UL (ref 140–440)
PLATELET # BLD AUTO: 69 THOU/UL (ref 140–440)
PLATELET # BLD AUTO: 76 THOU/UL (ref 140–440)
PLATELET # BLD AUTO: 85 THOU/UL (ref 140–440)
PLATELET # BLD AUTO: 9 THOU/UL (ref 140–440)
PMV BLD AUTO: 10.2 FL (ref 8.5–12.5)
PMV BLD AUTO: 10.3 FL (ref 8.5–12.5)
PMV BLD AUTO: 10.4 FL (ref 8.5–12.5)
PMV BLD AUTO: 10.5 FL (ref 8.5–12.5)
PMV BLD AUTO: 11 FL (ref 8.5–12.5)
PMV BLD AUTO: 11 FL (ref 8.5–12.5)
PMV BLD AUTO: 11.1 FL (ref 8.5–12.5)
PMV BLD AUTO: 11.5 FL (ref 8.5–12.5)
PMV BLD AUTO: 11.6 FL (ref 8.5–12.5)
PMV BLD AUTO: 12.1 FL (ref 8.5–12.5)
PMV BLD AUTO: 13.1 FL (ref 8.5–12.5)
PMV BLD AUTO: 13.2 FL (ref 8.5–12.5)
PMV BLD AUTO: 13.5 FL (ref 8.5–12.5)
PMV BLD AUTO: 8.9 FL (ref 8.5–12.5)
PMV BLD AUTO: 9.4 FL (ref 8.5–12.5)
PMV BLD AUTO: 9.6 FL (ref 8.5–12.5)
PMV BLD AUTO: 9.9 FL (ref 8.5–12.5)
PMV BLD AUTO: ABNORMAL FL
PO2 BLD: 66 MM HG (ref 80–90)
PO2 BLDV: 19 MM HG (ref 25–47)
PO2 BLDV: 30 MM HG (ref 25–47)
PO2 BLDV: 42 MM HG (ref 25–47)
POTASSIUM BLD-SCNC: 2.7 MMOL/L (ref 3.5–5)
POTASSIUM BLD-SCNC: 2.9 MMOL/L (ref 3.5–5)
POTASSIUM BLD-SCNC: 3 MMOL/L (ref 3.5–5)
POTASSIUM BLD-SCNC: 3.1 MMOL/L (ref 3.5–5)
POTASSIUM BLD-SCNC: 3.2 MMOL/L (ref 3.5–5)
POTASSIUM BLD-SCNC: 3.2 MMOL/L (ref 3.5–5)
POTASSIUM BLD-SCNC: 3.4 MMOL/L (ref 3.5–5)
POTASSIUM BLD-SCNC: 3.5 MMOL/L (ref 3.5–5)
POTASSIUM BLD-SCNC: 3.5 MMOL/L (ref 3.5–5)
POTASSIUM BLD-SCNC: 3.6 MMOL/L (ref 3.5–5)
POTASSIUM BLD-SCNC: 3.6 MMOL/L (ref 3.5–5)
POTASSIUM BLD-SCNC: 3.7 MMOL/L (ref 3.5–5)
POTASSIUM BLD-SCNC: 3.8 MMOL/L (ref 3.5–5)
POTASSIUM BLD-SCNC: 3.9 MMOL/L (ref 3.5–5)
POTASSIUM BLD-SCNC: 3.9 MMOL/L (ref 3.5–5)
POTASSIUM BLD-SCNC: 4 MMOL/L (ref 3.5–5)
POTASSIUM BLD-SCNC: 4.1 MMOL/L (ref 3.5–5)
POTASSIUM BLD-SCNC: 4.2 MMOL/L (ref 3.5–5)
POTASSIUM BLD-SCNC: 4.3 MMOL/L (ref 3.5–5)
POTASSIUM BLD-SCNC: 4.4 MMOL/L (ref 3.5–5)
POTASSIUM BLD-SCNC: 4.4 MMOL/L (ref 3.5–5)
POTASSIUM BLD-SCNC: 4.5 MMOL/L (ref 3.5–5)
POTASSIUM BLD-SCNC: 5.3 MMOL/L (ref 3.5–5)
PR INTERVAL - MUSE: 136 MS
PR INTERVAL - MUSE: 138 MS
PR INTERVAL - MUSE: 140 MS
PR INTERVAL - MUSE: 160 MS
PREALB SERPL-MCNC: 6.7 MG/DL (ref 19–38)
PROCALCITONIN SERPL-MCNC: 0.35 NG/ML (ref 0–0.49)
PROCALCITONIN SERPL-MCNC: 2.84 NG/ML (ref 0–0.49)
PROT SERPL-MCNC: 5.2 G/DL (ref 6–8)
PROT SERPL-MCNC: 5.2 G/DL (ref 6–8)
PROT SERPL-MCNC: 5.4 G/DL (ref 6–8)
PROT SERPL-MCNC: 5.5 G/DL (ref 6–8)
PROT SERPL-MCNC: 5.6 G/DL (ref 6–8)
PROT SERPL-MCNC: 5.7 G/DL (ref 6–8)
PROT SERPL-MCNC: 5.8 G/DL (ref 6–8)
PROT SERPL-MCNC: 6.2 G/DL (ref 6–8)
PROT SERPL-MCNC: 6.2 G/DL (ref 6–8)
PROT SERPL-MCNC: 6.3 G/DL (ref 6–8)
PROT SERPL-MCNC: 6.4 G/DL (ref 6–8)
PROT SERPL-MCNC: 6.6 G/DL (ref 6–8)
PROT SERPL-MCNC: 6.6 G/DL (ref 6–8)
PROT SERPL-MCNC: 7 G/DL (ref 6–8)
PROT SERPL-MCNC: 7.1 G/DL (ref 6–8)
PROT SERPL-MCNC: 7.1 G/DL (ref 6–8)
PROT SERPL-MCNC: 7.2 G/DL (ref 6–8)
PROT SERPL-MCNC: 7.2 G/DL (ref 6–8)
PROT SERPL-MCNC: 7.4 G/DL (ref 6–8)
PROT SERPL-MCNC: 7.9 G/DL (ref 6–8)
QRS DURATION - MUSE: 78 MS
QRS DURATION - MUSE: 78 MS
QRS DURATION - MUSE: 80 MS
QRS DURATION - MUSE: 80 MS
QT - MUSE: 344 MS
QT - MUSE: 382 MS
QT - MUSE: 392 MS
QT - MUSE: 414 MS
QTC - MUSE: 479 MS
QTC - MUSE: 497 MS
QTC - MUSE: 503 MS
QTC - MUSE: 539 MS
R AXIS - MUSE: 54 DEGREES
R AXIS - MUSE: 55 DEGREES
R AXIS - MUSE: 61 DEGREES
R AXIS - MUSE: 74 DEGREES
RBC # BLD AUTO: 2.4 MILL/UL (ref 3.8–5.4)
RBC # BLD AUTO: 2.42 MILL/UL (ref 3.8–5.4)
RBC # BLD AUTO: 2.42 MILL/UL (ref 3.8–5.4)
RBC # BLD AUTO: 2.44 MILL/UL (ref 3.8–5.4)
RBC # BLD AUTO: 2.44 MILL/UL (ref 3.8–5.4)
RBC # BLD AUTO: 2.54 MILL/UL (ref 3.8–5.4)
RBC # BLD AUTO: 2.62 MILL/UL (ref 3.8–5.4)
RBC # BLD AUTO: 2.7 MILL/UL (ref 3.8–5.4)
RBC # BLD AUTO: 2.72 MILL/UL (ref 3.8–5.4)
RBC # BLD AUTO: 2.77 MILL/UL (ref 3.8–5.4)
RBC # BLD AUTO: 2.85 MILL/UL (ref 3.8–5.4)
RBC # BLD AUTO: 2.86 MILL/UL (ref 3.8–5.4)
RBC # BLD AUTO: 2.87 MILL/UL (ref 3.8–5.4)
RBC # BLD AUTO: 2.93 MILL/UL (ref 3.8–5.4)
RBC # BLD AUTO: 2.95 MILL/UL (ref 3.8–5.4)
RBC # BLD AUTO: 3.19 MILL/UL (ref 3.8–5.4)
RBC # BLD AUTO: 3.22 MILL/UL (ref 3.8–5.4)
RBC # BLD AUTO: 3.22 MILL/UL (ref 3.8–5.4)
RBC # BLD AUTO: 3.24 MILL/UL (ref 3.8–5.4)
RBC # BLD AUTO: 3.31 MILL/UL (ref 3.8–5.4)
RBC # BLD AUTO: 3.42 MILL/UL (ref 3.8–5.4)
RBC # BLD AUTO: 3.87 MILL/UL (ref 3.8–5.4)
RBC #/AREA URNS AUTO: ABNORMAL HPF
RIBOTYPE 027/NAP1/BI: NORMAL
SARS-COV-2 PCR COMMENT: NORMAL
SARS-COV-2 RNA SPEC QL NAA+PROBE: NEGATIVE
SARS-COV-2 VIRUS SPECIMEN SOURCE: NORMAL
SMA IGG SER-ACNC: 39 UNITS (ref 0–19)
SMOOTH MUSCLE IGG TITR SER: ABNORMAL {TITER}
SODIUM SERPL-SCNC: 130 MMOL/L (ref 136–145)
SODIUM SERPL-SCNC: 134 MMOL/L (ref 136–145)
SODIUM SERPL-SCNC: 135 MMOL/L (ref 136–145)
SODIUM SERPL-SCNC: 136 MMOL/L (ref 136–145)
SODIUM SERPL-SCNC: 137 MMOL/L (ref 136–145)
SODIUM SERPL-SCNC: 137 MMOL/L (ref 136–145)
SODIUM SERPL-SCNC: 138 MMOL/L (ref 136–145)
SODIUM SERPL-SCNC: 139 MMOL/L (ref 136–145)
SODIUM SERPL-SCNC: 140 MMOL/L (ref 136–145)
SODIUM SERPL-SCNC: 142 MMOL/L (ref 136–145)
SODIUM SERPL-SCNC: 142 MMOL/L (ref 136–145)
SODIUM SERPL-SCNC: 143 MMOL/L (ref 136–145)
SODIUM SERPL-SCNC: 144 MMOL/L (ref 136–145)
SODIUM SERPL-SCNC: 146 MMOL/L (ref 136–145)
SODIUM SERPL-SCNC: 147 MMOL/L (ref 136–145)
SODIUM SERPL-SCNC: 147 MMOL/L (ref 136–145)
SODIUM SERPL-SCNC: 149 MMOL/L (ref 136–145)
SODIUM SERPL-SCNC: 150 MMOL/L (ref 136–145)
SODIUM SERPL-SCNC: 151 MMOL/L (ref 136–145)
SODIUM SERPL-SCNC: 152 MMOL/L (ref 136–145)
SODIUM SERPL-SCNC: 152 MMOL/L (ref 136–145)
SODIUM SERPL-SCNC: 153 MMOL/L (ref 136–145)
SP GR UR STRIP: 1 (ref 1–1.03)
SP GR UR STRIP: 1.01 (ref 1–1.03)
SQUAMOUS #/AREA URNS AUTO: ABNORMAL LPF
STATUS (HISTORICAL CONVERSION): NORMAL
SYSTOLIC BLOOD PRESSURE - MUSE: 113 MMHG
SYSTOLIC BLOOD PRESSURE - MUSE: NORMAL
T AXIS - MUSE: 11 DEGREES
T AXIS - MUSE: 31 DEGREES
T AXIS - MUSE: 35 DEGREES
T AXIS - MUSE: 6 DEGREES
T4 FREE SERPL-MCNC: 0.7 NG/DL (ref 0.7–1.8)
TEMPERATURE: 37 DEGREES C
TIBC SERPL-MCNC: 123 UG/DL (ref 313–563)
TOTAL NEUTROPHILS-ABS(DIFF): 8.6 THOU/UL (ref 2–7.7)
TOTAL NEUTROPHILS-REL(DIFF): 77 % (ref 50–70)
TRANSFERRIN SERPL-MCNC: 98 MG/DL (ref 212–360)
TRIGL SERPL-MCNC: 175 MG/DL
TROPONIN I SERPL-MCNC: 0.07 NG/ML (ref 0–0.29)
TROPONIN I SERPL-MCNC: <0.01 NG/ML (ref 0–0.29)
TSH SERPL DL<=0.005 MIU/L-ACNC: 1.55 UIU/ML (ref 0.3–5)
UNIT ABO/RH (HISTORICAL CONVERSION): NORMAL
UNIT NUMBER: NORMAL
UROBILINOGEN UR STRIP-ACNC: ABNORMAL
UROBILINOGEN UR STRIP-ACNC: ABNORMAL
VENTILATION MODE: ABNORMAL
VENTRICULAR RATE- MUSE: 102 BPM
VENTRICULAR RATE- MUSE: 102 BPM
VENTRICULAR RATE- MUSE: 117 BPM
VENTRICULAR RATE- MUSE: 99 BPM
VIT B1 PYROPHOSHATE BLD-SCNC: 103 NMOL/L (ref 70–180)
VIT B1 PYROPHOSHATE BLD-SCNC: 199 NMOL/L (ref 70–180)
VIT B1 PYROPHOSHATE BLD-SCNC: 54 NMOL/L (ref 70–180)
VIT B12 SERPL-MCNC: 1068 PG/ML (ref 213–816)
VIT B12 SERPL-MCNC: 1471 PG/ML (ref 213–816)
VIT B12 SERPL-MCNC: >2000 PG/ML (ref 213–816)
WBC #/AREA URNS AUTO: ABNORMAL HPF
WBC: 10.1 THOU/UL (ref 4–11)
WBC: 11.2 THOU/UL (ref 4–11)
WBC: 11.7 THOU/UL (ref 4–11)
WBC: 12 THOU/UL (ref 4–11)
WBC: 12.3 THOU/UL (ref 4–11)
WBC: 12.8 THOU/UL (ref 4–11)
WBC: 13.1 THOU/UL (ref 4–11)
WBC: 14.7 THOU/UL (ref 4–11)
WBC: 15.2 THOU/UL (ref 4–11)
WBC: 18.8 THOU/UL (ref 4–11)
WBC: 22.5 THOU/UL (ref 4–11)
WBC: 3.4 THOU/UL (ref 4–11)
WBC: 6.3 THOU/UL (ref 4–11)
WBC: 6.6 THOU/UL (ref 4–11)
WBC: 6.7 THOU/UL (ref 4–11)
WBC: 6.8 THOU/UL (ref 4–11)
WBC: 6.9 THOU/UL (ref 4–11)
WBC: 6.9 THOU/UL (ref 4–11)
WBC: 8.3 THOU/UL (ref 4–11)
WBC: 9 THOU/UL (ref 4–11)
WBC: 9.4 THOU/UL (ref 4–11)
WBC: 9.4 THOU/UL (ref 4–11)

## 2020-01-01 ASSESSMENT — MIFFLIN-ST. JEOR
SCORE: 1277.1
SCORE: 1338.79
SCORE: 1467.15
SCORE: 1338.33
SCORE: 1234.01
SCORE: 1232.64
SCORE: 1251.24
SCORE: 1295.24
SCORE: 1247.61
SCORE: 1251.7
SCORE: 1285.72
SCORE: 1238.09
SCORE: 1229.47
SCORE: 1267.12
SCORE: 1310.21
SCORE: 1212.23
SCORE: 1235.37

## 2020-08-29 ENCOUNTER — HOME CARE/HOSPICE - HEALTHEAST (OUTPATIENT)
Dept: HOSPICE | Facility: HOSPICE | Age: 60
End: 2020-08-29

## 2021-05-25 ENCOUNTER — RECORDS - HEALTHEAST (OUTPATIENT)
Dept: ADMINISTRATIVE | Facility: CLINIC | Age: 61
End: 2021-05-25

## 2021-06-02 ENCOUNTER — RECORDS - HEALTHEAST (OUTPATIENT)
Dept: ADMINISTRATIVE | Facility: CLINIC | Age: 61
End: 2021-06-02

## 2021-06-03 ENCOUNTER — RECORDS - HEALTHEAST (OUTPATIENT)
Dept: ADMINISTRATIVE | Facility: CLINIC | Age: 61
End: 2021-06-03

## 2021-06-03 VITALS — WEIGHT: 184.53 LBS | BODY MASS INDEX: 33.96 KG/M2 | HEIGHT: 62 IN

## 2021-06-04 VITALS
BODY MASS INDEX: 33.68 KG/M2 | WEIGHT: 183 LBS | HEIGHT: 62 IN | HEART RATE: 64 BPM | RESPIRATION RATE: 18 BRPM | SYSTOLIC BLOOD PRESSURE: 124 MMHG | TEMPERATURE: 98.3 F | DIASTOLIC BLOOD PRESSURE: 70 MMHG

## 2021-06-04 VITALS — WEIGHT: 211.4 LBS | HEIGHT: 62 IN | BODY MASS INDEX: 38.9 KG/M2

## 2021-06-06 NOTE — PROGRESS NOTES
Claudication, low back pain, BL leg pain with exertion, weakness, cold LE, decreased DP pulses, smoker. Marlena CRUMP referring. Saw Dr. Barrientos in 2018.

## 2021-06-06 NOTE — PROGRESS NOTES
VASCULAR SURGERY CLINIC CONSULTATION    VASCULAR SURGEON: Fady Soriano MD    LOCATION:  [facility]    Keiko Guo   Medical Record #:  [id number]  YOB: 1960  Age:  59 y.o.     Date of Service: 2020    PRIMARY CARE PROVIDER: Provider, No Primary Care    Reason for visit: Bilateral lower extremity pain    IMPRESSION: Bilateral lower extremity pain, not related to peripheral arterial disease.    RECOMMENDATION/RISKS: Her onset of pain, nature of pain, location of pain, physical examination and noninvasive arterial studies do not suggest that her symptoms are due to peripheral arterial disease.  Regrettably, nothing for vascular surgery to offer.    HPI:  Keiko Guo is a 59 y.o. female who was seen today in consultation for bilateral lower extremity pain.  I went into quite a bit of detail with her regarding the nature of the pain.  She tells me that the pain has been going on for quite a number of years and it is very difficult for her to walk.  Pain starts in the lower back then goes around the hips and then into the thighs.  He specifically does not describe classic arterial claudication.  Pain starts within a few feet of walking and activities of daily living are quite hampered by it.    REVIEW OF SYSTEMS:    A 12 point ROS was reviewed and is negative except for shortness of breath, hip pain, thigh pain and lower back pain.     PMH:    Past Medical History:   Diagnosis Date     COPD (chronic obstructive pulmonary disease) (H)      PAD (peripheral artery disease) (H)      Sleep apnea         Past Surgical History:   Procedure Laterality Date     APPENDECTOMY      rupture with sepsis      SECTION       osteomyelitis       MI ESOPHAGOGASTRODUODENOSCOPY TRANSORAL DIAGNOSTIC N/A 2019    Procedure: ESOPHAGOGASTRODUODENOSCOPY (EGD) with biopsies;  Surgeon: Mario Beatty MD;  Location: [facility];  Service: Gastroenterology  "      ALLERGIES:  Patient has no known allergies.    MEDS:    Current Outpatient Medications:      acetaminophen (TYLENOL) 650 MG CR tablet, Take 650 mg by mouth every 8 (eight) hours as needed for pain., Disp: , Rfl:      albuterol (PROAIR HFA;PROVENTIL HFA;VENTOLIN HFA) 90 mcg/actuation inhaler, Inhale 2 puffs 4 (four) times a day as needed for shortness of breath., Disp: , Rfl:      hydrOXYzine HCl (ATARAX) 25 MG tablet, Take 25-50 mg by mouth every 6 (six) hours as needed for anxiety., Disp: , Rfl:      hydroxyzine HCL (ATARAX) 50 MG tablet, TK 1 T PO QID PRN, Disp: , Rfl:      magnesium oxide (MAG-OX) 400 mg (241.3 mg magnesium) tablet, Take 1 tablet by mouth daily., Disp: , Rfl:      polyethylene glycol (MIRALAX) 17 gram packet, Take 17 g by mouth daily. In the afternoon around 4 pm, Disp: , Rfl:      QUEtiapine (SEROQUEL) 25 MG tablet, Take 25 mg by mouth every morning. Different dose at bedtime   , Disp: , Rfl:      QUEtiapine (SEROQUEL) 25 MG tablet, Take 25 mg by mouth 2 (two) times a day as needed., Disp: , Rfl:      QUEtiapine (SEROQUEL) 300 MG tablet, Take 300 mg by mouth at bedtime. Different dose in the morning, Disp: , Rfl:      SPIRIVA RESPIMAT 1.25 mcg/actuation Mist, INL 2 PFS PO QD, Disp: , Rfl:     SOCIAL HABITS:    Social History     Tobacco Use   Smoking Status Current Every Day Smoker     Packs/day: 0.75     Types: Cigarettes   Smokeless Tobacco Never Used       Social History     Substance and Sexual Activity   Alcohol Use Yes    Comment: minimal        Social History     Substance and Sexual Activity   Drug Use No       FAMILY HISTORY:    Family History   Problem Relation Age of Onset     Depression Mother      Hypertension Mother      Diabetes Father      Hypertension Father      Schizophrenia Brother            PE:  /70   Pulse 64   Temp 98.3  F (36.8  C)   Resp 18   Ht 5' 2\" (1.575 m)   Wt 183 lb (83 kg)   BMI 33.47 kg/m    Wt Readings from Last 1 Encounters:   02/28/20 183 " "lb (83 kg)     Body mass index is 33.47 kg/m .    EXAM:  GENERAL: This is a well-developed 59 y.o. female who appears her stated age, morbid truncal obesity.   EYES: Grossly normal.  MOUTH: Buccal mucosa normal   CARDIAC:  NS1 S2, No Murmur  CHEST/LUNG:  Clear lung fields bilaterally   GASTROINTESINAL (ABDOMEN): Soft, non-tender, B/S present, no pulsatile mass  MUSCULOSKELETAL: Grossly normal.  Tenderness along both greater trochanters of the femur and iliotibial tract.  HEME/LYMPH: No lymphedema  NEUROLOGIC: Focally intact, Alert and oriented x 3.   PSYCH: appropriate affect  INTEGUMENT: No open lesions or ulcers  Pulse Exam:   Carotid: No Bruit    Radial: Left +1   Right  \"+1    Femoral: Left +3   Right  +3  Right dorsalis pedis and posterior tibial art strong biphasic.  Left dorsalis pedis and posterior tibial are strong biphasic.      DIAGNOSTIC STUDIES:     Images:  Us Arterial Pressures Legs Bilateral    Result Date: 2/28/2020  RESTING ANKLE-BRACHIAL INDICES (VARGHESE'S) AND SEGMENTAL PRESSURES Indication: Bilateral Leg pain/Known spinal stenosis Previous: 02/02/18 History: Current Smoker, PAD and Rest Pain  SEGMENTAL ARTERIAL PRESSURE FINDINGS:         Right:  mmHg  Index    Brachial: 137   High Thigh: 109 0.80 Low Thigh: 83 0.61            Calf: 93 0.68 Ankle-(PT): 69 0.50 Ankle-(DP): 144 1.05          Digit: 49 0.36              Left: mmHg Index    Brachial: 134  High Thigh: 159 1.16 Low Thigh: 149 1.09            Calf: 132 0.96 Ankle (PT): 131 0.96 Ankle (DP): 197 1.44           Digit: 84 0.61 Resting ankle-brachial index of 1.05 on the right. Toe Pressures of 49 mmHg and TBI of 0.36. Resting ankle-brachial index of 1.44 on the left. Toe Pressures of 84 mmHg and TBI of 0.61. WAVEFORMS: The right dorsalis pedis and posterior tibial arteries show monophasic waveforms. The left dorsalis pedis and posterior tibial arteries show monophasic waveforms. Comments: Exercise was attempted and aborted quickly due to " patient unable to walk on treadmill at the slowest setting possible.     1. RIGHT LOWER EXTREMITY: Right ankle-brachial index is 1.05 which is normal.  Right great toe pressure is 49 mmHg.  Good wound healing potential.  2. LEFT LOWER EXTREMITY: Left ankle-brachial index is 1.44.  This is normal.  Left great toe pressure is 84 mmHg.  Good wound healing potential. 3.  Patient could not walk on the treadmill due to lower extremity pain which is located in the hips. APPLE PRATER M.D., FACS, [facility]       I personally reviewed the images and my interpretation is as noted above.    LABS:      Sodium   Date Value Ref Range Status   02/03/2020 136 136 - 145 mmol/L Final   07/13/2019 136 136 - 145 mmol/L Final   07/12/2019 136 136 - 145 mmol/L Final     Potassium   Date Value Ref Range Status   02/03/2020 4.0 3.5 - 5.0 mmol/L Final   07/13/2019 4.0 3.5 - 5.0 mmol/L Final   07/12/2019 3.6 3.5 - 5.0 mmol/L Final     Chloride   Date Value Ref Range Status   02/03/2020 102 98 - 107 mmol/L Final   07/13/2019 107 98 - 107 mmol/L Final   07/12/2019 104 98 - 107 mmol/L Final     BUN   Date Value Ref Range Status   02/03/2020 8 8 - 22 mg/dL Final   07/13/2019 7 (L) 8 - 22 mg/dL Final   07/12/2019 8 8 - 22 mg/dL Final     Creatinine   Date Value Ref Range Status   02/03/2020 0.77 0.60 - 1.10 mg/dL Final   07/13/2019 0.70 0.60 - 1.10 mg/dL Final   07/12/2019 0.74 0.60 - 1.10 mg/dL Final     Hemoglobin   Date Value Ref Range Status   02/03/2020 10.4 (L) 12.0 - 16.0 g/dL Final   07/12/2019 11.8 (L) 12.0 - 16.0 g/dL Final   07/11/2019 12.8 12.0 - 16.0 g/dL Final     Platelets   Date Value Ref Range Status   02/03/2020 100 (L) 140 - 440 thou/uL Final   07/12/2019 111 (L) 140 - 440 thou/uL Final   07/11/2019 113 (L) 140 - 440 thou/uL Final     BNP   Date Value Ref Range Status   01/15/2011 198 (H) <77 pg/mL Final     Comment:                    Likely compensated CHF     INR   Date Value Ref Range Status   07/11/2019 1.20 (H) 0.90 -  1.10 Final   10/05/2018 1.37 (H) 0.90 - 1.10 Final   10/05/2018 1.34 (H) 0.90 - 1.10 Final       Total time spent 45 minutes face to face with patient with more than 50% time spent in counseling and coordination of care.    Fady Soriano MD  VASCULAR SURGERY

## 2021-06-10 NOTE — PLAN OF CARE
Problem: Pain  Goal: Patient's pain/discomfort is manageable  Outcome: Progressing  Problem: Potential for Compromised Skin Integrity  Goal: Skin integrity is maintained or improved  Outcome: Progressing   Repositioning q2 hours, denies pain at this time. Mepilex to coccyx CDI. Speciality bed low air loss mattress in use.      Problem: Daily Care  Goal: Daily care needs are met  Outcome: Progressing  Needs help feeding, pureed with honey thick liquids   Childress patent and draining dark genesis to brown cloudy urine   Disorientated to time; situation, frequent rounding     Problem: Potential for Falls  Goal: Patient will remain free of falls  Outcome: Progressing   PT/OT/SLP working with pt.  Assist x2 with transfers and walker    Problem: Discharge Barriers  Goal: Patient's discharge needs are met  Outcome: Progressing  IV antibiotics: Meropenem  Potassium protocol, check in AM   Continues on 7L oximyzer, cont pulse ox remains low to mid 90s  Hida scan completed, see imaging  Palliative consult today, see progress note   1 Unit platelets administered, no reaction observed, recheck in AM

## 2021-06-10 NOTE — PROGRESS NOTES
INFECTIOUS DISEASE FOLLOW UP NOTE    Date: 2020    ASSESSMENT:  1. Leukocytosis, elevated lactate, abdominal pain -- suspect ischemic colitis. Initial CT also showed mild colitis. Ischemic colitis generally treated with supportive care IVF plus antibiotics. WBC better today, less tenderness on exam. C diff negative. Antibiotics can calm down infection, but there is not an easy solution for underlying condition (ischemia).   2. Severe COPD  3. Positive Hep B core Ab suggestive of recent infection. Hep B s Ag negative indicating lack of active disease.   4. Alcohol use, cirrhosis  5. Thrombocytopenia.   6. Completed treatment for possible pneumonia.   7. COVID-19 adequately ruled out by testing.     PLAN:  Meropenem could be continued for up to a week.   Agree with hospice, and if she transitions to hospice would stop antibiotics  I will monitor peripherally -- please call if questions.     Efe García MD  [facility]  [phone] clinic  [employer] paging    ______________________________________________________________________    SUBJECTIVE / INTERVAL HISTORY: Sitting up with PT drinking supplement when I was by. She does not provide much history. Denies abdominal pain. Had loose stool by report. No fever.     ROS: deconditioned. All other systems negative except as listed above.        OBJECTIVE:  Vitals:    20 1430   BP: 115/59   Pulse: (!) 114   Resp: 20   Temp: 97.9  F (36.6  C)   SpO2: 96%     FiO2 (%): 35 %    Vital Signs  Temp: 97.9  F (36.6  C)  Temp Source: Oral  Heart Rate: (!) 114  Heart Rate Source: Machine/Monitor  Resp: 20  BP: 115/59  MAP (mmHg) (Calculated): 78  BP Location: Right forearm  BP Method: Machine/Monitor  Patient Position: Lying    Temp (24hrs), Av.7  F (36.5  C), Min:96.9  F (36.1  C), Max:98.2  F (36.8  C)      GENERAL: up with PT in bed, appears weak.   EYES: icterus  HEAD, EARS, NOSE, MOUTH, AND THROAT: edentulous  RESPIRATORY: Clear  anterior.  CARDIOVASCULAR: Regular rate and rhythm, normal S1 and S2, no murmurs, rubs, or gallops.  ABDOMEN: Soft, less tender.  Normal bowel sounds.  MUSCULOSKELETAL: No synovitis.  LYMPHATIC: No cervical, supraclavicular, axillary, or inguinal lymphadenopathy.  SKIN/HAIR/NAILS: jaundice. Some bruising on forehead.   NEUROLOGIC: Grossly intact. Generally weak.  PICC R UE        Antibiotics:  Meropenem 8/18-    cipro 8/17-18  Flagyl 8/17-18  Pip/tazo 8/3-9  Azithromycin 8/3-7  Ctx 8/3  Vancomycin 8/3    Pertinent labs:    Results from last 7 days   Lab Units 08/19/20  0630 08/18/20  0602 08/17/20  0620   LN-WHITE BLOOD CELL COUNT thou/uL 15.2* 22.5* 18.8*   LN-HEMOGLOBIN g/dL 11.0* 11.7* 12.7   LN-HEMATOCRIT % 32.3* 35.8 36.9   LN-PLATELET COUNT thou/uL 15* 29* 35*        Results from last 7 days   Lab Units 08/19/20  0630 08/18/20  0602 08/17/20  1754 08/17/20  0620   LN-SODIUM mmol/L 137 138  --  138   LN-POTASSIUM mmol/L 3.9 4.3  4.3 3.5 3.4*   LN-CHLORIDE mmol/L 107 104  --  98   LN-CO2 mmol/L 24 28  --  29   LN-BLOOD UREA NITROGEN mg/dL 16 16  --  19   LN-CREATININE mg/dL 0.74 0.82  --  0.88   LN-CALCIUM mg/dL 7.4* 7.7*  --  8.4*      No results found for: CRP   No results found for: SEDRATE    Lab Results   Component Value Date    ALT 81 (H) 08/19/2020    AST 43 (H) 08/19/2020    ALKPHOS 199 (H) 08/19/2020    BILITOT 5.6 (H) 08/19/2020         MICROBIOLOGY DATA:  8/17 blood no growth to date   8/4 blood negative     Hep B core IgM positive    RADIOLOGY:  Xr Chest 1 View Portable    Result Date: 8/10/2020  EXAM: XR CHEST 1 VIEW PORTABLE LOCATION: [facility] DATE/TIME: 8/10/2020 10:45 AM INDICATION: persistent hypoxia COMPARISON: CT chest 8/3/2020     Stable diffuse reticulation and groundglass opacification likely in part part related to chronic fibrotic and emphysematous changes seen on comparison CT. Cannot exclude superimposed edema or atypical pneumonia. No focal consolidation or pleural effusion.  Stable heart size.    Ct Head Without Contrast    Result Date: 8/3/2020  EXAM: CT HEAD WO CONTRAST LOCATION: [facility] DATE/TIME: 8/3/2020 5:04 PM INDICATION: Head trauma, abnormal mental status (Age 19-64y) Fall, bruising to face COMPARISON: Head CT 12/20/2017 and MRI brain 07/23/2017. TECHNIQUE: Routine without IV contrast. Multiplanar reformats. Dose reduction techniques were used. FINDINGS: No evidence of acute intracranial hemorrhage or mass effect. 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 Brain attenuation morphology otherwise normal. The ventricles and sulci are normal for age. Normal gray-white matter differentiation. The basilar cisterns are patent. The globes are unremarkable. The partially imaged paranasal sinuses demonstrate air-fluid level within the right maxillary sinus which could represent acute sinusitis in the appropriate setting. The partially imaged paranasal sinuses are otherwise unremarkable. The mastoid air cells and middle ear cavities are unremarkable. The visualized skull base and calvarium are unremarkable. The     1.  No evidence of acute intracranial hemorrhage or mass effect. 2.  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 3.  Air-fluid level within the right maxillary sinus which could represent acute sinusitis in the appropriate setting.    Ct Chest Without Contrast    Result Date: 8/11/2020  EXAM: CT CHEST WO CONTRAST LOCATION: [facility] DATE/TIME: 8/11/2020 8:11 PM INDICATION: Respiratory illness, acute (Age > 40y) Shortness of breath persistent hypoxemia despite max diuresis. re-eval opacities COMPARISON: CT chest 08/03/2020, 07/12/2019 TECHNIQUE: CT chest without IV  contrast. Multiplanar reformats were obtained. Dose reduction techniques were used. CONTRAST: None. FINDINGS: LUNGS AND PLEURA: Mild to moderate tracheal secretions. Moderate to severe emphysema with associated interlobular septal thickening. Mosaic lung attenuation. No focal consolidation or pleural effusion. No mass or definite suspicious nodule. MEDIASTINUM/AXILLAE: Upper normal heart size. No pericardial effusion. Moderate to severe coronary artery calcifications. UPPER ABDOMEN: Stable soft tissue density nodules adjacent to the left adrenal gland. MUSCULOSKELETAL: Spinal degenerative changes.     1.  Moderate to severe emphysema. 2.  Interlobular septal thickening and groundglass pulmonary opacities likely reflecting pulmonary edema, increased since the CT from 08/03/2020. No pneumonic consolidation or pleural effusion. 3.  Mild to moderate tracheal secretions.     Mr Brain With Without Contrast    Result Date: 7/23/2020  EXAM: MR BRAIN W WO CONTRAST LOCATION: [facility] DATE/TIME: 7/23/2020 5:51 PM INDICATION: WORSENING ATAXIA-MENTAL STATUS COMPARISON: MRI head 10/08/2016 from [facility]. CT head 12/20/2017 from [facility]. CONTRAST: Gadavist 8 TECHNIQUE: Routine multiplanar multisequence head MRI without and with intravenous contrast. FINDINGS: INTRACRANIAL CONTENTS: No acute or subacute infarct. Again seen is a shunting type moderate-sized arteriovenous malformation in the parasagittal right parietal region superiorly and posteriorly. It measures at least 3.5 cm AP x 2 cm transverse x 4 cm craniocaudad. It has a very similar appearance to the MRI 10/08/2016 from [facility]. No associated new hemorrhage, edema, or mass effect. Scattered nonspecific T2/FLAIR hyperintensities within the cerebral white matter most consistent with mild chronic microvascular ischemic change. Mild generalized cerebral atrophy. No hydrocephalus. Normal position of the cerebellar tonsils. No pathologic  contrast enhancement of the brain parenchyma or meninges allowing for some vascular enhancement with the right parietal AVM. SELLA: No abnormality accounting for technique. OSSEOUS STRUCTURES/SOFT TISSUES: Normal marrow signal. The major intracranial vascular flow voids are maintained. ORBITS: No abnormality accounting for technique. SINUSES/MASTOIDS: No paranasal sinus mucosal disease. No middle ear or mastoid effusion.     1.  Again seen is a shunting type moderate-sized arteriovenous malformation in the parasagittal right parietal area superiorly and posteriorly measuring approximately 3.5 cm x 2 cm x 4 cm. It is not changed significantly when compared to MRI head 10/08/2016 from [facility]. No new hemorrhage, edema or mass effect. Follow-up conventional angiography could be considered. 2.  No evidence for new infarct, hemorrhage elsewhere, hydrocephalus or intracranial mass. 3.  Mild age-related changes.     Xr Swallow Study W Speech    Result Date: 8/13/2020  EXAM: XR SWALLOW STUDY W SPEECH OR OT LOCATION: [facility] DATE/TIME: 8/13/2020 2:47 PM INDICATION: Difficulty swallowing. COMPARISON: None. TECHNIQUE: Routine. FINDINGS: FLUOROSCOPIC TIME: 2.7 minutes NUMBER OF IMAGES: 0 Swallow study with Speech Pathology using multiple barium thicknesses. Mendez aspiration with nectar consistency that is silent. Thin liquids not tried. With honey consistency and pudding there is premature spill to the vallecula but no penetration or aspiration and otherwise normal swallowing reflex.     1.  High risk for aspiration with nectar consistency and likely thin. 2.  Patient should build tolerate honey and pudding consistency.     Cta Chest Pe Run    Result Date: 8/3/2020  EXAM: CTA CHEST PE RUN LOCATION: [facility] DATE/TIME: 8/3/2020 5:01 PM INDICATION: PE suspected, high pretest prob SOB, tachycardia COMPARISON: CTA chest, abdomen and pelvis 07/12/2019 TECHNIQUE: CT chest pulmonary angiogram during  arterial phase injection of IV contrast. Multiplanar reformats and MIP reconstructions were performed. Dose reduction techniques were used. CONTRAST: Iohexol (Omni) 100 mL FINDINGS: ANGIOGRAM CHEST: The right and left main pulmonary arteries and the segmental vessels are all patent without evidence for emboli. Suboptimal opacification involving the subsegmental vessels to both lower lobes. Thoracic aorta is negative for dissection. No CT evidence of right heart strain. LUNGS AND PLEURA: Advanced centrilobular emphysema. Extensive airspace and groundglass opacities throughout both upper lobes, with additional interstitial opacities throughout the mid and lower lung fields, all of which are new since the previous study. Subsegmental atelectasis in the bases. No effusions. MEDIASTINUM/AXILLAE: Numerous borderline enlarged mediastinal and hilar nodes with minimal change. UPPER ABDOMEN: Advanced atherosclerotic disease. Splenomegaly. Trace ascites adjacent to the liver. Collateral vessels along the lesser curvature the stomach MUSCULOSKELETAL: Hypertrophic changes thoracic spine.     1.  No evidence for central pulmonary emboli. The peripheral subsegmental vessels to both lower lobes are suboptimally opacified. 2.  Advanced centrilobular emphysema. 3.  Extensive groundglass and airspace infiltrates within both upper lobes concerning for pneumonia, including Covid 19. There are additional diffuse interstitial densities both lungs likely representing edema.     Us Venous Legs Bilateral    Result Date: 8/11/2020  EXAM: US VENOUS LEGS BILATERAL LOCATION: [facility] DATE/TIME: 8/11/2020 12:31 PM INDICATION: hypoxia, eval for DVT COMPARISON: None. TECHNIQUE: Venous Duplex ultrasound of bilateral lower extremities with and without compression, augmentation and duplex. Color flow and spectral Doppler with waveform analysis performed. FINDINGS: Exam includes the common femoral, femoral, popliteal veins as well as  segmentally visualized deep calf veins and greater saphenous vein. RIGHT: No deep vein thrombosis. No superficial thrombophlebitis. No popliteal cyst. LEFT: No deep vein thrombosis. No superficial thrombophlebitis. No popliteal cyst.     1.  No deep venous thrombosis in the bilateral lower extremities.    Ct Abdomen Pelvis Without Oral With Iv Contrast    Result Date: 8/3/2020  EXAM: CT ABDOMEN PELVIS WO ORAL W IV CONTRAST [facility] DATE/TIME: 8/3/2020 5:03 PM INDICATION: RLQ abdominal pain, appendicitis suspected (Age > 14y) Right sided abdominal pain COMPARISON: None. TECHNIQUE: CT scan of the abdomen and pelvis was performed following injection of IV contrast. Multiplanar reformats were obtained. Dose reduction techniques were used. CONTRAST: Iohexol (Omni) 100 mL FINDINGS: LOWER CHEST: Diffuse interstitial opacities throughout the visualized lower lung fields. HEPATOBILIARY: Trace ascites adjacent to the liver. Moderate distention of the gallbladder. PANCREAS: Normal. SPLEEN: Borderline splenomegaly is unchanged measuring 13 cm with trace adjacent fluid. ADRENAL GLANDS: Normal. KIDNEYS/BLADDER: Normal. BOWEL: The appendix is identified within the mid right pelvis and is of normal caliber containing a tiny amount of air. There is some minimal edema adjacent to the appendix and cecum. Slight wall thickening involving the cecum and ascending colon with a small amount of adjacent ascites. No evidence for bowel obstruction. LYMPH NODES: Normal. VASCULATURE: Advanced atherosclerotic disease abdominal aorta and iliac arteries with high-grade stenosis involving the proximal right and left common iliac arteries. Prominent collateral vessels along the lesser curvature of the stomach are more distended compared to the prior study and could be secondary to portal venous hypertension. PELVIC ORGANS: Normal. MUSCULOSKELETAL: Small fat-containing ventral hernia.     1.  Appendix is of normal caliber. There  is a small amount of ascites adjacent to the base of the cecum and ascending colon with slight wall thickening which could represent an acute colitis. No evidence for bowel obstruction. 2.  Trace ascites adjacent to the liver and spleen. 3.  Advanced atherosclerotic disease. 4.  Prominent collateral vessels/possible gastric varices lesser curvature of the stomach. 5.  Interstitial opacities throughout the visualized lower lung fields. Please refer to CTA chest report for further discussion.    Us Abdomen Limited    Result Date: 8/13/2020  EXAM: US ABDOMEN LIMITED LOCATION: [facility] DATE/TIME: 8/13/2020 8:02 AM INDICATION: worsening LFT's right upper quad pain, eval for galbladder infection, or stones in the ducts COMPARISON: None. TECHNIQUE: Limited abdominal ultrasound. FINDINGS: GALLBLADDER: Normal. No gallstones, wall thickening, or pericholecystic fluid. Negative sonographic Ludwig's sign. BILE DUCTS: No biliary dilatation. The common duct measures 4 mm. LIVER: Coarsened hepatic parenchymal echotexture. Appearance suggestive of underlying hepatic fibrosis. No focal mass. RIGHT KIDNEY: No hydronephrosis. PANCREAS: The visualized portions are normal. No ascites.     1.  Coarsened hepatic parenchymal echotexture concerning for underlying hepatic fibrosis. 2.  No other significant findings.    Us Abdomen Limited    Result Date: 8/4/2020  EXAM: US ABDOMEN LIMITED LOCATION: [facility] DATE/TIME: 8/4/2020 5:59 PM INDICATION: Hyperbilirubinemia, sepsis, evaluate for cholecystitis, CBD dilation COMPARISON: CT 8/3/2020 . TECHNIQUE: Limited abdominal ultrasound. FINDINGS: GALLBLADDER: Isoechoic intraluminal tissue related to the anterior wall of the gallbladder measuring approximately 2.5 x 2.0 x 1.0 cm has no internal Doppler flow. No significant gallbladder wall thickening with gallbladder wall thickness approximately 3  mm. Gallbladder otherwise negative. Negative sonographic Ludwig's sign. BILE DUCTS:  "No intrahepatic biliary dilatation. The common duct measures 6 mm, upper normal. LIVER: Coarsened hepatic parenchymal echotexture and slight hepatic contour irregularity. No focal mass. RIGHT KIDNEY: No hydronephrosis. PANCREAS: The visualized portions are normal. Trace ascites.     1.  Isoechoic intraluminal tissue related to the anterior wall of the gallbladder measuring approximately 2.5 x 2.0 x 1.0 cm indeterminate for adherent sludge material versus polyp but favor sludge material given lack of internal Doppler flow. 2.  No gallbladder wall thickening. 3.  No intrahepatic biliary dilatation. The common duct measures 6 mm, upper normal. 4.  Coarsened hepatic parenchymal echotexture and slight hepatic contour irregularity suggestive of underlying hepatic fibrosis/cirrhosis. 5.  Trace ascites.    Us Venous Arms Bilateral    Result Date: 8/11/2020  EXAM: US VENOUS ARMS BILATERAL LOCATION: [facility] DATE/TIME: 8/11/2020 12:31 PM INDICATION: hypoxia, eval for DVT COMPARISON: None. TECHNIQUE: Venous Duplex ultrasound of both upper extremities with (when possible) and without compression, augmentation, and duplex. Color flow and spectral Doppler with waveform analysis performed. FINDINGS: Ultrasound includes evaluation of the internal jugular veins, innominate veins, subclavian veins, axillary veins, and brachial veins. The superficial cephalic and basilic veins were also evaluated where seen. RIGHT: No deep venous thrombosis. No superficial thrombophlebitis. LEFT: No deep venous thrombosis. No superficial thrombophlebitis.     1.  No deep venous thrombosis in the bilateral upper extremities.    Poc Us 3cg Picc Placement Guidance    Result Date: 8/4/2020  Exam was performed as guidance for PICC line insertion.  Click \"PACS images\" hyperlink below to view any stored images.  For specific procedure details, view procedure note authored by PICC/Vascular Access Nurse.    Ct Chest Abdomen Pelvis Without Oral With " Iv Contrast    Result Date: 8/17/2020  EXAM: CT CHEST ABDOMEN PELVIS WO ORAL W IV CONTRAST LOCATION: [facility] DATE/TIME: 8/17/2020 12:22 PM INDICATION: Sepsis of uncertain etiology. COMPARISON: Abdominal ultrasound 8/13/2020, CT chest 11/20/2020 and CT abdomen pelvis 8/3/2020 TECHNIQUE: CT scan of the chest, abdomen, and pelvis was performed following injection of IV contrast. Multiplanar reformats were obtained. Dose reduction techniques were used. CONTRAST: 100 mL Omnipaque 350 FINDINGS: LUNGS AND PLEURA: Extensive upper lung predominant emphysema. Subsegmental atelectasis within the right lower lobe. Small amount of endobronchial airway secretions in the lower lobes, greater on the right. Tiny focus of consolidation within the medial left lower lobe image 114. No Pleural effusion. MEDIASTINUM/AXILLAE: Right upper extremity PICC. No mass/adenopathy nor pericardial effusion. Moderate coronary artery calcifications. HEPATOBILIARY: Cirrhotic appearing liver without focal mass. Recanalized paraumbilical vein. Gallbladder wall thickening could be secondary to chronic liver disease. No bile duct dilatation. PANCREAS: No significant mass, duct dilatation, or inflammatory change. SPLEEN: Stable borderline splenomegaly measuring 12.8 cm. Splenic vein is patent. ADRENAL GLANDS: No significant nodules. KIDNEYS/BLADDER: No significant mass, stone, or hydronephrosis. BOWEL: New right-sided colitis extending from the cecum to the proximal transverse colon could be infectious or ischemic. No pneumatosis. New mild ascites extending from right upper quadrant to the pelvis. LYMPH NODES: No lymphadenopathy. VASCULATURE: Prominent calcified esophageal varices. Extensive aortoiliac atherosclerosis. PELVIC ORGANS: Normal-appearing uterus and ovaries. MUSCULOSKELETAL: No suspicious osseous lesions.     1.  Left-sided colitis extending from cecum to proximal transverse colon. Differential considerations would include both  infectious and ischemic etiologies. New mild ascites in the right abdomen with no evidence of pneumatosis, perforation nor abscess. 2.  Extensive emphysema. Bibasilar subsegmental atelectasis with no focal pneumonia nor pleural effusion. 3.  Cirrhotic appearing liver with gastroesophageal varices and stable borderline splenomegaly.

## 2021-06-10 NOTE — PROGRESS NOTES
Clinical Nutrition Therapy Follow Up Note    Current Nutrition Prescription:   Diet: Per SLP eval: Pureed diet  & Spoon feed Honey thick liquids  Diet Supplements: Send magic cup supplement BID  IV dextrose or Fluids:IV infusion, Custom Builder, Last Rate: 50 mL/hr at 08/14/20 0926    Current Nutrition Intake:  The patient's current meal intake is good at 75%     Anthropometrics:  Admit weight: 160 to 169 lbs  Weight: 163 lb (73.9 kg)     GI Status/Output:   GI symptoms include: WDL per nurse  Bowel Sounds hypoactive per nurse  Last BM: 8/14  Incontinent stool documented per nurse  I<O (-) 5,200 mls since admit     Skin/Wound:  Jonny score 13  Pressure ulcer/Decubitus ulcer was noted.~ Stage 2 Sacral Ulcer     Medications:  Medications reviewed.    Labs:  Results for MAISHA SUAZO (MRN [id number])    Ref. Range 8/14/2020 05:47   Sodium Latest Ref Range: 136 - 145 mmol/L 143   Phosphorus Latest Ref Range: 2.5 - 4.5 mg/dL 1.5 (L)   Glucose Latest Ref Range: 70 - 125 mg/dL 136 (H)     Malnutrition: Patient meets diagnostic criteria for moderate protein calorie malnutrition in the context of chronic illness as evidenced by  unintentional weight loss and poor nutrient intake     Nutrition Risk Level: high risk       Nutrition dx:   Malnutrition r/t chronic as evidenced by wt loss, reduced po .    Difficulty swallowing r/t dysphagia AEB VFSS ~ Patient requires dysphagia diet      Goal:   Meet estimated nutrition needs Progressing  Correct hyponatremia MET     Updated Goals:    Patient will tolerate oral diet (per SLP recommendation)  without s&s of Aspiraion    Consume > 75% at meals    Bowel function WDL    Maintain weight while hospitalized    Intervention / Monitoring:    Provide pureed diet with honey thick liquids     Magic cup supplement BID    Honor food preferences    Provide snacks on demand within diet prescription    Follow PO intake, weight, labs

## 2021-06-10 NOTE — PLAN OF CARE
Spoke with Keiko Hollis's daughter. She has a desire to have her mom pass away at home. She is coming this afternoon to talk to Dr. Brower between 3 pm and 4 pm. I notified Hospice nurse Mar . Total time on phone 20 min.

## 2021-06-10 NOTE — PLAN OF CARE
Problem: Risk for ineffective breathing pattern  Goal: Maintain effective breathing pattern with respiratory rate within normal range, lungs clear; be free of cyanosis and other signs/symptoms of hypoxia  Outcome: Progressing     Problem: Impaired Gas Exchange  Goal: Demonstrate improved ventilation and adequate oxygenation of tissues as evidenced by absence of respiratory distress  Outcome: Progressing     Problem: Knowlegde Deficit  Goal: Demonstrate technique for CPAP/BiPAP  Outcome: Progressing   Attempted HFNC at 0400 but pt did not tolerate per RN and was placed back on Bipap

## 2021-06-10 NOTE — PROGRESS NOTES
Brief Update    As noted earlier in my full progress note from this morning, this patient Keiko is new to me today. I met her this morning for the first time. During early rounds I had received a page from Palliative Care informing me that the patient's daughter Telma would be at the hospital at 3:00 PM today and my presence was requested. Thus, I planned to speak with her then in-person.     I made sure to make myself available as able for this request; I was available to meet with Telma right at 3:00 PM onwards. I went up to the patient's bedside. Keiko remains sound asleep comfortably, vitals stable. She still remains on 60 L of HFNC. I waited until 3:40 PM and then let the RN know that I needed to see the remainder of my other patients and consults under my care. Telma was not present and did not call in to us during this time from 3:00 PM - 3:45 PM. Will hopefully have a chance to meet with her in-person and possibly have a Family Care Conference tomorrow with Telma and also with Palliative Care present if able.        Dalton Greenberg MD  Internal Medicine  Hospitalist  [facility]  Phone: #[phone]

## 2021-06-10 NOTE — PROGRESS NOTES
"Physical Therapy     08/12/20 1015   Visit Specifics   Eval Type Initial eval   Inital PT Consult 08/12/20   Bed/Tabs/Pad Alarm Applied Yes  (bed alarm on)   General   Onset date 08/03/20   Additional Pertinent History ETOH, COPD, Bipolar   Chart Reviewed Yes   PT/OT Patient/Caregiver Stated Goals none stated   Family/Caregiver Present No   Treatment Time   Theraputic Activity 15   Precautions   Weight Bearing Status WBAT   Home Living   Additional Comments pt unable to answer questions about home, just responds \"I don't know\"   Prior Status   Lives With Daughter  (and ex-spouse)   Comments infor obtained from  note   Cognition   Overall Cognitive Status Impaired   Arousal/Alertness Lethargic   Following Commands Follows one step commands;With repetition;With increased time;Inconsistently   Behaviors During Session Cooperative;Resistive;Impulsive   RLE Assessment   RLE Assessment   (generalized weakness)   LLE Assessment   LLE Assessment   (generalized weakness)   Sensory   Sensory Impairment Light touch   Light Touch No apparent deficits   Bed Mobility   Bed Mobility Supine Scoot;Rolling   Rolling Max assist to right;Max assist to left   Supine to Sit Max assist;Assist of 2   Sit to Supine Mod assist;With rail   Supine Scoot Dependent   Bed Mobility Comments cueing for advancing LEs and UE placement, pt demonstrates difficulty with following directions   Transfer    Sit To Stand Mod assist;Max assist;Assist of 2;Verbal cues;Tactile cues   Stand To Sit Max assist;Assist of 2;Verbal cues   Transfer Comments cueing for hand placement, significant encouragement to stand   Neuro Re-Ed   Neuro Re-Ed Static Standing Balance;Static Sitting Balance   Balance   Sitting Balance Min assist;Mod assist   Standing Balance Max assist;Mod assist;Assist x2   Static Sitting Balance   Level of Assistance Min A;Mod A;Assist of 1;Assist of 2   Location Edge of bed   Time 3 min   Cues Verbal;Manual   Patient Response  cueing for " posture and UE placement, cueing for LE support, fatigues quickly, encouragement to continue sitting   Static Standing Balance   Assistive Device Walker   Level of assistance Mod A;Max A;Assist of 2   Cues Verbal   Duration <1 min   Activity Performed Normal base of support   Patient Response pt fatigues quickly and returns to sit, able to place UEs on FWW, O2 dropped to 84-86% on 4 L - RN informed, pt increased to 6L per RN and O2: 91%   Plan   Treatment/Interventions Functional transfer training;Gait/stair training;Home exercise program;Strengthening/ROM   PT Frequency Daily   Assessment   Prognosis Good   Problem List Decreased mobility;Decreased strength   Barriers to Discharge Decreased caregiver support;Inaccessible home environment   Recommendation   PT Discharge Recommendation TCU   PT Equipment Recommendation Mechanical lift   Treatment Suggestions for Next Session sitting and standing balance, transfers and gait as able   PT Care Plan REVIEWED DAILY Yes, goals remain appropriate
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
## 2021-06-10 NOTE — PLAN OF CARE
Problem: Safety  Goal: Patient will be injury free during hospitalization  Outcome: Progressing     Problem: Impaired Gas Exchange  Goal: Demonstrate improved ventilation and adequate oxygenation of tissues as evidenced by absence of respiratory distress  Outcome: Progressing     WILFRID PickettT

## 2021-06-10 NOTE — PLAN OF CARE
"  Problem: Psychosocial Needs  Goal: Demonstrates ability to cope with hospitalization/illness  Outcome: Not Progressing   Pt is disoriented to time, place, and situation. Very garbled speech, disorganized thoughts. Pt is calm and cooperative but cognitive status has not improved.     Problem: Discharge Barriers  Goal: Patient's discharge needs are met  Outcome: Not Progressing   Pt remains on O2 NC, weaned from 4L to 1.5L. Congested cough, no sputum noted. Pt remains on IV fluids @ 50mL/hr per ID; pt needs to stay on fluids until she able to eat and drink adequately. Pt eating small bites of meals and drinking when prompted, not adequate enough to d/c fluids at this time. Awaiting family's decision regarding palliative/hospice care. Will continue to reposition pt every 2 hours to maintain skin integrity.       *PICC line red lumen occluded, please order all labs as \"lab draw\"   "

## 2021-06-10 NOTE — CONSULTS
S: Hospice  B: Spoke to dtr Telma for 1.5 hr this afternoon. Dtr is struggling with understanding how Pt has gotten so ill and wants to continue aggressive measures to see if Pt will pull out of this. Dtr spoke about how in 2011 Pt had severe sepsis, pneumonia and a burst appendix. She was dx with COPD at that time. She was very ill and survived that. In 2016 she attempted suicide. In 2017 she moved in with her ex-. Dtr Telma told all about Pt's health history. Telma is following results in My Chart and questions a comment about the appendix in a recent ABD CT. She asks about the status of Pt's gallbladder. She wonders why daily CXR or blood gases are not being done. She would be open to blood transfusions and short term TPN/lipids. She feels if more care was given maybe Pt would pull thru again. We discussed hospice and comfort care. Dtr seems overwhelmed and tearful. Dtr was redirected to continue conversations. She agrees Pt would not be able to return home. She would be interested in discharge to the [facility] with the [facility] if that were possible. I reminded her that going to [facility] would mean Pt would be on hospice and she understands. Overall, at this time she is not ready to decide on comfort care focus. Telma states she has severe ADHD and is not medicated at this time. She is an only child and is also failing college. She has a lot of stress. Update given to Dr Tavares via phone call. Dtr will need more info and more support.  A: Pt did not participate in this discussion  R: Hospice to continue to f/u. Update given to staff RN.

## 2021-06-10 NOTE — PROGRESS NOTES
Clinical Nutrition Therapy Follow Up Note      Current Nutrition Prescription:   Diet: NDD1, 2g Na, Honey thick liquids by spoon  Diet Supplements: Magic cup two times a day   IV dextrose or Fluids:sodium chloride 0.9%, Last Rate: 100 mL/hr (08/19/20 0225)        Current Nutrition Intake:  Intake is variable, documented as 20%, 60%, 100% but seems to be improving     Anthropometrics:  Admission weight:160#  Weight: 159 lb (72.1 kg)    GI Status/Output:   GI symptoms include: rounded, incontinent of stool, hypoactive bs     Skin/Wound:  Sacrum stage 2 wound     Medications:  Medications reviewed.    Labs:  Labs reviewed; Alb-1.4, Gluc-210    Malnutrition: Noted previously    Nutrition Risk Level: high risk    Nutrition dx:    Malnutrition r/t chronic as evidenced by wt loss, reduced po .    Difficulty swallowing r/t dysphagia AEB VFSS ~ Patient requires dysphagia diet     Goal Status:  Goal:   Meet estimated nutrition needs Progressing    Patient will tolerate oral diet (per SLP recommendation)  without s&s of Aspiraion-met    Consume > 75% at meals-progressing    Bowel function WDL-not met     Maintain weight while hospitalized-met    (new) Glycemic control wnl     Intervention:  Hospice following, pt is not yet signed on.   Continue with supplement   May need to add DM restrictions as well if blood sugars remain high. Although this will limit her food choices more     Monitoring/Evaluation:  Will monitor labs, po, plan

## 2021-06-10 NOTE — PROGRESS NOTES
Infectious Disease Progress Note    Assessment/Plan  Impression: Pneumonia and respiratory failure.     No pathogen identified, concern for COVID-19 although now with three negative PCR    Hospice/palliative involved.      Recommendations:   S/p azithromycin  Day 7 of empiric pip-tazo. Has been afebrile, no leukocytosis. Stop the pip-tazo and monitor.       Principal Problem:    COPD with acute exacerbation (H)  Active Problems:    Bipolar disorder (H)    Tobacco abuse    Acute respiratory failure with hypoxemia (H)    Goals of care, counseling/discussion    Abnormal chest CT    Acute pulmonary edema (H)    DNR (do not resuscitate)    Pneumonitis      Kym Moy MD  [phone]      Subjective  Drowsy. No events. Family met with hospice, not ready at this time.     Objective    Vital signs in last 24 hours  Temp:  [98.1  F (36.7  C)-98.8  F (37.1  C)] 98.5  F (36.9  C)  Heart Rate:  [82-89] 85  Resp:  [16-24] 20  BP: (101-161)/(57-82) 140/65  FiO2 (%):  [45 %-64 %] 45 %  Weight:   163 lb 14.4 oz (74.3 kg)    Intake/Output last 3 shifts  I/O last 3 completed shifts:  In: 1320.8 [I.V.:870.8; IV Piggyback:450]  Out: 1950 [Urine:1950]  Intake/Output this shift:  I/O this shift:  In: 155 [I.V.:155]  Out: -     Review of Systems   Pertinent items are noted in HPI., otherwise negative.    Physical Exam  General appearance: sleepy appears older than stated age, and mildly obese  Head: Normocephalic, without obvious abnormality, Some bruises around the right eye.  No worse  Eyes: Extraocular muscles intact  Lungs: hard to auscultate.  Few rhonchi.  Extremities: extremities normal, atraumatic, no cyanosis or edema  Skin: Bruises and discoloration noted.  Neurologic: Grossly normal    Pertinent Labs   Lab Results: personally reviewed.     Results from last 7 days   Lab Units 08/10/20  0444 08/09/20  0417 08/08/20  0447   LN-WHITE BLOOD CELL COUNT thou/uL 6.9 6.6 6.7   LN-HEMOGLOBIN g/dL 10.2* 9.9* 9.0*    LN-HEMATOCRIT % 31.4* 30.1* 26.4*   LN-PLATELET COUNT thou/uL 50* 53* 52*        Results from last 7 days   Lab Units 08/10/20  0444 08/10/20  0000 08/09/20  1414 08/09/20  0417   LN-SODIUM mmol/L 150* 152*  --  151*   LN-POTASSIUM mmol/L 4.0 4.1 4.1 2.9*   LN-CHLORIDE mmol/L 100 99  --  95*   LN-CO2 mmol/L 41* 42*  --  42*   LN-BLOOD UREA NITROGEN mg/dL 25* 23*  --  25*   LN-CREATININE mg/dL 0.66 0.71  --  0.73   LN-CALCIUM mg/dL 7.8* 8.0*  --  7.7*       Procedure  Component  Value  Units  Date/Time    Blood Culture from PERIPHERAL SITE (2nd One) [[id number]]   Collected: 08/04/20 1041    Order Status: Sent  Specimen: Blood from Vein, Peripheral  Updated: 08/04/20 1052      Results for MAISHA SUAZO (MRN [id number]) as of 8/7/2020 13:25   Ref. Range 8/3/2020 15:48 8/3/2020 22:07 8/6/2020 14:40   COVID-19 VIRUS SPECIMEN SOURCE Unknown Nares Nasopharyngeal Nasopharyngeal   SARS-CoV-2 Virus Specimen Source Unknown Nares Nasopharyngeal Nasopharyngeal   SARS-CoV-2 PCR Result Unknown NEGATIVE NEGATIVE NEGATIVE   SARS-COV-2 PCR COMMENT Unknown Testing was performed using the Xpert Xpress SARS-CoV-2 Assay on the [employer] Testing was performed using the Xpert Xpress SARS-CoV-2 Assay on the [employer] Testing was performed using the Xpert Xpress SARS-CoV-2 Assay on the [employer]       Pertinent Radiology   Radiology Results: Personally reviewed image/s and Personally reviewed impression/s    Xr Chest 1 View Portable    Result Date: 8/10/2020  EXAM: XR CHEST 1 VIEW PORTABLE LOCATION: [facility] DATE/TIME: 8/10/2020 10:45 AM INDICATION: persistent hypoxia COMPARISON: CT chest 8/3/2020     Stable diffuse reticulation and groundglass opacification likely in part part related to chronic fibrotic and emphysematous changes seen on comparison CT. Cannot exclude superimposed edema or atypical pneumonia. No focal consolidation or pleural effusion. Stable heart size.

## 2021-06-10 NOTE — PROGRESS NOTES
"[facility] Palliative Progress Note    History of Present Illness:  60 y.o. female with past medical history of tobacco use, alcohol use disorder, bipolar disorder, previous suicide attempt with overdose, GÉNESIS, PAD, worsening dementia and imbalance (possible Warnicke encephalopathy), and radiographic emphysema (presumed COPD) who was admitted on 8/3/2020 for abdominal pain, lactic acidosis, hyperbilirubinemia, acute respiratory failure with hypoxia, and acute encephalopathy. Hospital course was notable for severe hypoxia requiring up to 60 L HF NC and 55% FiO2, guarded prognosis, and ongoing encephalopathy.  Hypoxic respiratory failure likely secondary to combination of severe emphysema, volume overload/pulmonary edema, possible COPD exacerbation, and/or CAP VS aspiration.  She was treated with antibiotics, diuresis.     Her course has been complicated by agitation, confusion. Psychiatry consulted to assist. She is also medically complex with liver failure, possible hepatic encephalopathy, acute respiratory failure. +Sepsis and colitis.  ID and psychiatry following.        Chart review of last 24 hours:  Pt restless per chart review    Palliative Care Encounter:   Did reach out to daughter/surrogate Telma to introduce self via phone.  Multiple other colleagues have been following along with her mom's care so wanting to introduce self as most recent team member.  Colleague Melia our clinical  continues to follow along for psychosocial support.  She states that she remains hopeful that her mom will pill through this hospitalization however later on states that she would be \"surprised if she survives this hospitalization.\"  She verbalized feeling overwhelmed stating that her paternal grandfather is now hospitalized at [facility].  She states that her dad however is able to help with some of those responsibilities.  She states that she has been coming to the hospital and has seen her mom and has had " "multiple conversations with the medical team and is under the impression by the medical team that her mom is \"going to die.\"  She denies any support stating that she is dealing with a lot of this on her own.    We will follow-up and see patient tomorrow and follow-up with a conversation with a meeting regarding goals of care.      Hailey Williamson, CNP  Palliative Care Team  Office #: [phone]    Total Time >25 mins, over 50% spent in counseling and coordination of care including review of notes, labs and imaging in EPIC, discussion of care plan with surrogate regarding goals of care. Coordinated with nursing and primary team.               "

## 2021-06-10 NOTE — PLAN OF CARE
Problem: Impaired Gas Exchange  Goal: Demonstrate improved ventilation and adequate oxygenation of tissues as evidenced by absence of respiratory distress  Outcome: Progressing     Problem: Inadequate Gas Exchange  Goal: Patient will achieve/maintain normal respiratory rate/effort  Outcome: Progressing     Yazmin Deng, LRT

## 2021-06-10 NOTE — PROGRESS NOTES
Pt RCAT reassessed, Pt remains on HFNC 50L 35%, BS diminished, Pt was taken to CT with no complications. RT following.       08/11/20 1082   Reason for Assessment (RCAT)   RCAT Assesment Re-eval   Assessment Reason  COPD   $ RCAT Eval Time 15 min.  Yes   Vitals (RCAT)   Heart Rate 99   Resp 22   SpO2 (!) 87 %   Chart Assessment (RCAT)   Pulmonary Status  3   Surgical Status  0   Chest Xray  4   Patient Assessment (RCAT)    Respiratory Pattern  1   Mental Status  1   Breath Sounds  2   Cough Effectiveness  2   Level of Activity  2   O2 Required SpO2 >= 92%  2   Chart + Pt. Assessment Total Points  17   RCAT Acuity Score 17 (Acuity 4)   Clinical Indications (RCAT)   Aerosol Hygiene RCAT protocol   RCAT Order Placed  Yes

## 2021-06-10 NOTE — CONSULTS
Patient chart review, patient not yet seen.  Admitted for shortness of breath and severe COPD exacerbation, desaturating with coughing and requiring high flow oxygen.  Patient is currently DNR/DNI.  Did have some abdominal pain on presentation to the emergency department yesterday.  Laboratory work-up concerning for possible biliary obstruction given elevated alkaline phosphatase and total bilirubin.  CT obtained yesterday shows distention of the gallbladder with question of some gallbladder wall thickening.    I spoke both with the patient's bedside nurse as well as with Dr. Spear; neither think this patient would survive or do well with general anesthetic at this point in time.  If sepsis is secondary to cholecystitis or choledocholithiasis is of concern, a percutaneous cholecystostomy tube would likely be her best bet.  Both laparoscopic cholecystectomy or ERCP would require general anesthetic which she is not likely to extubate from.  Formal note will follow.    Ranjit Armas MD  [phone]  [facility] Surgery

## 2021-06-10 NOTE — PROGRESS NOTES
08/03/20 2205   Respiratory Assessment   Assessment Type Pre-treatment   Respiratory Pattern Grunting   Chest Assessment Chest expansion symmetrical   Bilateral Breath Sounds Clear   Localized Breath Sounds   R Lower Posterior Diminished   L Lower Posterior Diminished   Respiratory Treatments    Medications Albuterol;Atrovent   $ Delivery Device - Aerosol Initial Tx  HHN   Pre-Treatment Pulse 104   Pre-Treatment Respirations 24   Pre-Treatment Sp02 94   Breath Sounds Pre-Treatment Right Clear   Breath Sounds Pre-Treatment Left Clear   Post-Treatment Pulse 110   Post-Treatment Respirations 24   Post-Treatment Sp02 94   Breath Sounds Post-Treatment Right Clear   Breath Sounds Post-Treatment Left Clear   Position Semi Stanton's   Treatment Tolerance Tolerated fairly well

## 2021-06-10 NOTE — PLAN OF CARE
Problem: Discharge Barriers  Goal: Patient's discharge needs are met  Outcome: Progressing   Goal: Patient s discharge needs are met.    Outcome: Care Progression reviewed with Hospitalist, Care Manager, & Charge RN.    Discharge Disposition: Discussed and plan to discharge to: Home vs possible hospice home pending progression    Planned Discharge Date: in a few days    Problem: Barriers to discharge include: Palliative, Hospice, 02, Laxis, COPD, Edema    Transportation (Needs/Ride) Time: TBD    Care Management Narrative: Cm to continue to follow the Pt on next steps. Provider to follow up with family to help determine health progression. Pending Palliative and Hospice following.     Carlos Montana, [facility]

## 2021-06-10 NOTE — PROGRESS NOTES
When evaluated this patient given the rising lactate, white count, decreased platelets.  She says she feels better than she was yesterday in the evening, significantly better.    A.m. rounder to arrive in less than half an hour.  Will sign out the overnight lab changes.    Mekhi Lynn MD, MPH  [facility]  Office # [phone]

## 2021-06-10 NOTE — PLAN OF CARE
Problem: Inadequate Gas Exchange  Goal: Patient will achieve/maintain normal respiratory rate/effort  Outcome: Progressing     Problem: Daily Care  Goal: Daily care needs are met  Outcome: Progressing   Patient repositioned throughout day to help promote skin integrity. Purewick in place - incontinent. Adequate urine output. Nonverbal. Drowsy. Changed to po steroids. Unable to swallow water without coughing, but successful with crushing meds and putting with applesauce. Had several runs of v tach earlier today - placed on electrolyte protocols. Sodium trending down after last check. Patient appears to be alert and oriented to self only. Doesn't make eye contact. Daughter did not visit today. Alarms utilized to promote patient safety. Call light placed within reach and purposeful rounding performed. Will continue to monitor. Leatha Yousif RN

## 2021-06-10 NOTE — PROGRESS NOTES
Pulmonary Progress Note  8/13/2020      Admit Date: 8/3/2020  ICU Day: 10   CODE: No CPR- Do NOT Intubate    Chief Complaint: hypoxia      Problem List/Hospital Course:   Principal Problem:    COPD with acute exacerbation (H)  Active Problems:    Bipolar disorder (H)    Tobacco abuse    Acute respiratory failure with hypoxemia (H)    Goals of care, counseling/discussion    Abnormal chest CT    Acute pulmonary edema (H)    DNR (do not resuscitate)    Pneumonitis    Aspiration pneumonia due to gastric secretions (H)    Hypernatremia          Interim Events:     Occasional dry cough, denies shortness of breath     Assessment/Plan by System:   60 year old female with a history of longstanding and active tobacco dependence, likely excessive alcohol use, bipolar disorder with psychotic features and prior overdose suicide attempt, mild obesity, untreated GÉNESIS, peripheral arterial disease, recent worsening dementia and imbalance with presumed diagnosis of Wernicke encephalopathy, extensive radiographic emphysema with presumed COPD (No PFTs on record), admitted with abdominal pain, lactic acidosis, hyperbilirubinemia, dyspnea, acute respiratory failure with hypoxia, abnormal chest CT, and acute encephalopathy. Clinically appears very tenuous with ongoing severe respiratory failure, delirium and encephalopathy. Ongoing hypoxemic resp failure likely due to underlying severe emphysema, vol overload/pulmonary edema and COPD exacerbation +/- aspirtation vs. Community acquired or atypical pneumonia.     COVID-19 negative x3  Bubble study negative for shunt  UE & LE vasc doppler U/S negative for DVTs     Recommendations:  - now weaned to 2 lpm NC O2, titrate for goal O2 sat 88-92%, avoid hyperoxia. May requiring long-term O2 post-discharge  - video swallow today per SLP recs now that patient more alert/interactive and oxygen rate remains decreased  - defer Na management to nephroliogy, caution against fluid overload  - PT/OT  -  "completed abx course, d/c'd by ID  - 14mg nicotine patch started 8/12, plan for 7 days then taper to 7mg.  on smoking cessation & offer pharmacologic assistance and follow-up after discharge  - cont PPI two times a day, keep HOB elevated (now & after discharge) for possible nocturnal reflux aspiration component  - decreased prednisone to 30 mg po daily, plan for gradual taper    Pulmonary service to follow peripherally for now, please re-contact if new respiratory questions or issues arise    Medications:       dextrose 5% 50 mL/hr (08/13/20 0200)       enoxaparin ANTICOAGULANT  40 mg Subcutaneous DAILY     ipratropium-albuteroL  3 mL Nebulization QID - RT     nicotine  1 patch Transdermal DAILY     omeprazole  20 mg Oral BID     potassium chloride  10 mEq Intravenous Q1H     potassium phosphate ivpb  0.24 mmol/kg Intravenous Once     predniSONE  30 mg Oral Daily with brkfst     [START ON 8/16/2020] predniSONE  20 mg Oral Daily with brkfst    Followed by     [START ON 8/19/2020] predniSONE  15 mg Oral Daily with brkfst    Followed by     [START ON 8/22/2020] predniSONE  10 mg Oral Daily with brkfst    Followed by     [START ON 8/25/2020] predniSONE  5 mg Oral Daily with brkfst     sodium chloride  10-30 mL Intravenous Q8H FIXED TIMES     thiamine  100 mg Oral DAILY         Exam/Data:   Vitals  /54 (Patient Position: Lying)   Pulse 93   Temp 97.9  F (36.6  C) (Oral)   Resp 24   Ht 5' 2\" (1.575 m)   Wt 163 lb (73.9 kg)   SpO2 93%   BMI 29.81 kg/m       I/O last 3 completed shifts:  In: 756.7 [I.V.:456.7; IV Piggyback:300]  Out: 0   Weight change: 2 lb 11.2 oz (1.225 kg)  Wt Readings from Last 3 Encounters:   08/13/20 163 lb (73.9 kg)   02/28/20 183 lb (83 kg)   07/12/19 184 lb 8.4 oz (83.7 kg)          EXAM:  GEN: patient A&O, NAD. More interactive & conversant today  HEENT: PERRL, EOMS, OP patent, trachea midline  PULM: unlabored respirations, moving air adequately  CV: RRR, S1, S2, no murmurs, " rubs, gallops, bilateral symmetric pulse  ABD: soft nontender, non distended, normal active bowel sound, no HSM  EXT : warm well perfused, no cyanosis, clubbing, no edema  NEURO: grossly intact without focal deficit  SKIN: no obvious rash, lesion    DATA  All laboratory and radiology has been personally reviewed by myself today.  Results from last 7 days   Lab Units 08/13/20  0514   LN-WHITE BLOOD CELL COUNT thou/uL 12.0*   LN-HEMOGLOBIN g/dL 11.9*   LN-HEMATOCRIT % 36.6   LN-PLATELET COUNT thou/uL 51*     Results from last 7 days   Lab Units 08/13/20  0514 08/12/20 2013 08/12/20  1325 08/12/20  0456  08/11/20  0429   LN-SODIUM mmol/L 150*  150* 150* 150* 153*   < > 152*   LN-POTASSIUM mmol/L 3.1*  3.2*  --  4.1 3.0*  --  4.0   LN-CHLORIDE mmol/L 97*  97*  --   --  98  --  89*   LN-CO2 mmol/L 44*  43*  --   --  42*  --  47*   LN-BLOOD UREA NITROGEN mg/dL 31*  30*  --   --  37*  --  35*   LN-CREATININE mg/dL 0.82  0.79  --   --  0.77  --  0.78   LN-CALCIUM mg/dL 8.6  8.6  --   --  8.2*  --  8.7   LN-PROTEIN TOTAL g/dL 7.0  7.1  --   --  7.2  --  7.9   LN-BILIRUBIN TOTAL mg/dL 6.5*  6.5*  --   --  6.3*  --  6.6*   LN-ALKALINE PHOSPHATASE U/L 189*  186*  --   --  187*  --  185*   LN-ALT (SGPT) U/L 135*  --   --  122*  --  109*   LN-AST (SGOT) U/L 127*  127*  --   --  122*  --  128*    < > = values in this interval not displayed.         Kar Perkins MD  Pulmonary Medicine

## 2021-06-10 NOTE — PROGRESS NOTES
Pulmonary Progress Note  8/11/2020      Admit Date: 8/3/2020  Hospital Day: 8   CODE: No CPR- Do NOT Intubate    Reason for Consult: hypoxia        Assessment/Plan:   60 year old female with a history of longstanding and active tobacco dependence, likely excessive alcohol use, bipolar disorder with psychotic features and prior overdose suicide attempt, mild obesity, untreated GÉNESIS, peripheral arterial disease, recent worsening dementia and imbalance with presumed diagnosis of Wernicke encephalopathy, extensive radiographic emphysema with presumed COPD (No PFTs on record), admitted with abdominal pain, lactic acidosis, hyperbilirubinemia, dyspnea, acute respiratory failure with hypoxia, abnormal chest CT, and acute encephalopathy. Clinically appears very tenuous with ongoing severe respiratory failure, delirium and encephalopathy. Ongoing hypoxemic resp failure likely due to underlying severe emphysema, vol overload/pulmonary edema and COPD exacerbation +/- aspirtation vs. Community acquired or atypical pneumonia.  COVID-19 negative x3     Recommendations:  - will check bubble study for shunt  - check Covid Ab to eval for recent infection w/possible persistent effects  - repeat chest CT  - completed abx course, d/c'd by ID  - check serum fungal markers, RT to attempt induced sputum for other atypical infection   - UE & LE voasc doppler U/S to eval for DVTs  - titrate FiO2 for goal O2 sat 88-92%, avoid hyperoxia  - cont weaning HHFNC as tolerated  - diuretics d/c'd as patient now appears euvolemic, has increasing alkalosis  - cont PPI two times a day for possible aspiration component  - cont prednisone 40 mg po daily, if tolerated after 3 days could taper to prednisone 30mg two times a day for 3 days, then 40mg daily for 3 days, then stop.     I called and updated the patient's daughter Telma. I emphasized the overall lack of significant progress and poor prognosis given her underlying lung disease. She strongly insisted  "on further workup however, claiming that her mother was doing well prior to this hospitalization    Interim Events:     Still on HHFNC, FiO2 weaned to 45%. Diuresed net neg 1.4L/24h. AM ABG shows increasing metabolic alkalosis       Medications:       enoxaparin ANTICOAGULANT  40 mg Subcutaneous DAILY     ipratropium-albuteroL  3 mL Nebulization Q6H - RT     nicotine  1 patch Transdermal DAILY     omeprazole  20 mg Oral BID     predniSONE  40 mg Oral Daily with brkfst     QUEtiapine  300 mg Oral QHS     sodium chloride  3 mL Nebulization Once     sodium chloride  10-30 mL Intravenous Q8H FIXED TIMES     thiamine (vitamin B1) IVPB  100 mg Intravenous DAILY         Exam/Data:   Vitals  /56 (Patient Position: Semi-hopper)   Pulse (!) 104   Temp (!) 96.3  F (35.7  C) (Axillary)   Resp 24   Ht 5' 2\" (1.575 m)   Wt 163 lb 14.4 oz (74.3 kg)   SpO2 (!) 89%   BMI 29.98 kg/m       I/O last 3 completed shifts:  In: 687.5 [I.V.:622.5; IV Piggyback:65]  Out: 2125 [Urine:2125]  Weight change:   Wt Readings from Last 3 Encounters:   08/10/20 163 lb 14.4 oz (74.3 kg)   02/28/20 183 lb (83 kg)   07/12/19 184 lb 8.4 oz (83.7 kg)     FiO2 (%):  [40 %-57 %] 42 %    EXAM:  /56 (Patient Position: Semi-hopper)   Pulse (!) 104   Temp (!) 96.3  F (35.7  C) (Axillary)   Resp 24   Ht 5' 2\" (1.575 m)   Wt 163 lb 14.4 oz (74.3 kg)   SpO2 (!) 89%   BMI 29.98 kg/m    General - Well nourished  Ears/Mouth - OP pink moist, no thrush  Neck - no cervical lymphadenopathy  Lungs - Clear to ausculation bilaterally   CVS - regular rhythm with no murmurs, rubs or gallups  Abdomen - soft, NT, ND, NABS  Ext - no cyanosis, clubbing or edema  Skin - no rash  Psychology - alert and oriented, answers appropriate        DATA    IMAGING:   Ct Head Without Contrast    Result Date: 8/3/2020  EXAM: CT HEAD WO CONTRAST LOCATION: [facility] DATE/TIME: 8/3/2020 5:04 PM INDICATION: Head trauma, abnormal mental status (Age 19-64y) Fall, " bruising to face COMPARISON: Head CT 12/20/2017 and MRI brain 07/23/2017. TECHNIQUE: Routine without IV contrast. Multiplanar reformats. Dose reduction techniques were used. FINDINGS: No evidence of acute intracranial hemorrhage or mass effect. 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 Brain attenuation morphology otherwise normal. The ventricles and sulci are normal for age. Normal gray-white matter differentiation. The basilar cisterns are patent. The globes are unremarkable. The partially imaged paranasal sinuses demonstrate air-fluid level within the right maxillary sinus which could represent acute sinusitis in the appropriate setting. The partially imaged paranasal sinuses are otherwise unremarkable. The mastoid air cells and middle ear cavities are unremarkable. The visualized skull base and calvarium are unremarkable. The     1.  No evidence of acute intracranial hemorrhage or mass effect. 2.  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 3.  Air-fluid level within the right maxillary sinus which could represent acute sinusitis in the appropriate setting.    Cta Chest Pe Run    Result Date: 8/3/2020  EXAM: CTA CHEST PE RUN LOCATION: [facility] DATE/TIME: 8/3/2020 5:01 PM INDICATION: PE suspected, high pretest prob SOB, tachycardia COMPARISON: CTA chest, abdomen and pelvis 07/12/2019 TECHNIQUE: CT chest pulmonary angiogram during arterial phase injection of IV contrast. Multiplanar reformats and MIP reconstructions were performed. Dose reduction techniques were used. CONTRAST: Iohexol (Omni) 100 mL FINDINGS: ANGIOGRAM CHEST: The right and left main pulmonary arteries and the segmental vessels are all patent  without evidence for emboli. Suboptimal opacification involving the subsegmental vessels to both lower lobes. Thoracic aorta is negative for dissection. No CT evidence of right heart strain. LUNGS AND PLEURA: Advanced centrilobular emphysema. Extensive airspace and groundglass opacities throughout both upper lobes, with additional interstitial opacities throughout the mid and lower lung fields, all of which are new since the previous study. Subsegmental atelectasis in the bases. No effusions. MEDIASTINUM/AXILLAE: Numerous borderline enlarged mediastinal and hilar nodes with minimal change. UPPER ABDOMEN: Advanced atherosclerotic disease. Splenomegaly. Trace ascites adjacent to the liver. Collateral vessels along the lesser curvature the stomach MUSCULOSKELETAL: Hypertrophic changes thoracic spine.     1.  No evidence for central pulmonary emboli. The peripheral subsegmental vessels to both lower lobes are suboptimally opacified. 2.  Advanced centrilobular emphysema. 3.  Extensive groundglass and airspace infiltrates within both upper lobes concerning for pneumonia, including Covid 19. There are additional diffuse interstitial densities both lungs likely representing edema.     Ct Abdomen Pelvis Without Oral With Iv Contrast    Result Date: 8/3/2020  EXAM: CT ABDOMEN PELVIS WO ORAL W IV CONTRAST LOCATION: [facility] DATE/TIME: 8/3/2020 5:03 PM INDICATION: RLQ abdominal pain, appendicitis suspected (Age > 14y) Right sided abdominal pain COMPARISON: None. TECHNIQUE: CT scan of the abdomen and pelvis was performed following injection of IV contrast. Multiplanar reformats were obtained. Dose reduction techniques were used. CONTRAST: Iohexol (Omni) 100 mL FINDINGS: LOWER CHEST: Diffuse interstitial opacities throughout the visualized lower lung fields. HEPATOBILIARY: Trace ascites adjacent to the liver. Moderate distention of the gallbladder. PANCREAS: Normal. SPLEEN: Borderline splenomegaly is unchanged measuring  13 cm with trace adjacent fluid. ADRENAL GLANDS: Normal. KIDNEYS/BLADDER: Normal. BOWEL: The appendix is identified within the mid right pelvis and is of normal caliber containing a tiny amount of air. There is some minimal edema adjacent to the appendix and cecum. Slight wall thickening involving the cecum and ascending colon with a small amount of adjacent ascites. No evidence for bowel obstruction. LYMPH NODES: Normal. VASCULATURE: Advanced atherosclerotic disease abdominal aorta and iliac arteries with high-grade stenosis involving the proximal right and left common iliac arteries. Prominent collateral vessels along the lesser curvature of the stomach are more distended compared to the prior study and could be secondary to portal venous hypertension. PELVIC ORGANS: Normal. MUSCULOSKELETAL: Small fat-containing ventral hernia.     1.  Appendix is of normal caliber. There is a small amount of ascites adjacent to the base of the cecum and ascending colon with slight wall thickening which could represent an acute colitis. No evidence for bowel obstruction. 2.  Trace ascites adjacent to the liver and spleen. 3.  Advanced atherosclerotic disease. 4.  Prominent collateral vessels/possible gastric varices lesser curvature of the stomach. 5.  Interstitial opacities throughout the visualized lower lung fields. Please refer to CTA chest report for further discussion.    Us Abdomen Limited    Result Date: 8/4/2020  EXAM: US ABDOMEN LIMITED LOCATION: [facility] DATE/TIME: 8/4/2020 5:59 PM INDICATION: Hyperbilirubinemia, sepsis, evaluate for cholecystitis, CBD dilation COMPARISON: CT 8/3/2020 . TECHNIQUE: Limited abdominal ultrasound. FINDINGS: GALLBLADDER: Isoechoic intraluminal tissue related to the anterior wall of the gallbladder measuring approximately 2.5 x 2.0 x 1.0 cm has no internal Doppler flow. No significant gallbladder wall thickening with gallbladder wall thickness approximately 3  mm. Gallbladder  "otherwise negative. Negative sonographic Ludwig's sign. BILE DUCTS: No intrahepatic biliary dilatation. The common duct measures 6 mm, upper normal. LIVER: Coarsened hepatic parenchymal echotexture and slight hepatic contour irregularity. No focal mass. RIGHT KIDNEY: No hydronephrosis. PANCREAS: The visualized portions are normal. Trace ascites.     1.  Isoechoic intraluminal tissue related to the anterior wall of the gallbladder measuring approximately 2.5 x 2.0 x 1.0 cm indeterminate for adherent sludge material versus polyp but favor sludge material given lack of internal Doppler flow. 2.  No gallbladder wall thickening. 3.  No intrahepatic biliary dilatation. The common duct measures 6 mm, upper normal. 4.  Coarsened hepatic parenchymal echotexture and slight hepatic contour irregularity suggestive of underlying hepatic fibrosis/cirrhosis. 5.  Trace ascites.    Poc Us 3cg Picc Placement Guidance    Result Date: 8/4/2020  Exam was performed as guidance for PICC line insertion.  Click \"PACS images\" hyperlink below to view any stored images.  For specific procedure details, view procedure note authored by PICC/Vascular Access Nurse.         Kar Perkins MD   Pulmonary Progress Note      "

## 2021-06-10 NOTE — PLAN OF CARE
Problem: Pain  Goal: Patient's pain/discomfort is manageable  Outcome: Progressing  Mild pain with perineal care.  Otherwise denies pain.    Problem: Potential for Compromised Skin Integrity  Goal: Skin integrity is maintained or improved  Outcome: Not Progressing    Fungal rash in groin and perineum.  WOC following and placed new orders.  Childress in place to help with breakdown.    Problem: Potential for Falls  Goal: Patient will remain free of falls  Outcome: Progressing   Alarms in place.  Assist x2 with walker.  PT and OT working with patient. Patient sat in chair majority of the shift.    Problem: Discharge Barriers  Goal: Patient's discharge needs are met  Outcome: Not Progressing   7L O2 with oxymyzer.  O2 sats drop to upper 70s with activity while wearing oxygen.  Takes over 10 minutes for patient to get to the upper 80s.  Patient very thirsty today.  40mg lasix given IV today.  Waiting for decision from daughter to determine plan of care.  PICC line in place.  Monitoring labs.  BM today

## 2021-06-10 NOTE — PROGRESS NOTES
"/57   Pulse 92   Temp 97.6  F (36.4  C) (Oral)   Resp 24   Ht 5' 2\" (1.575 m)   Wt 169 lb 8 oz (76.9 kg)   SpO2 91%   BMI 31.00 kg/m      Pt remains on HFNC 50 lpm @ 50% fio2 when last checked. BS has been diminished throughout pre and post nebs, has had a strong loose non productive cough. Pt received x3 duonebs by me during my shift. RT will continue to follow.     Pt was placed on bipap 12/5, r16 and 40% fio2. Pt is tolerating it well.   "

## 2021-06-10 NOTE — PROGRESS NOTES
[facility] MEDICINE PROGRESS NOTE      Identification/Summary: Keiko Guo is a 60 y.o. female with a past medical history of tobacco use, alcohol use disorder, bipolar disorder, previous suicide attempt with overdose, GÉNESIS, PAD, worsening dementia and imbalance (possible Warnicke encephalopathy), and radiographic emphysema (presumed COPD) who was admitted on 8/3/2020 for abdominal pain, lactic acidosis, hyperbilirubinemia, acute respiratory failure with hypoxia, and acute encephalopathy. Hospital course was notable for severe hypoxia requiring up to 60 L HF NC and 55% FiO2, guarded prognosis, and ongoing encephalopathy.  Hypoxic respiratory failure likely secondary to combination of severe emphysema, volume overload/pulmonary edema, possible COPD exacerbation, and/or CAP VS aspiration.  She was treated with antibiotics, diuresis.    Her course has been complicated but agitation, confusion. Psychiatry consulted to assist. She is also medically complex with liver failure, possible hepatic encephalopathy, acute respiratory failure. Today she appears septic with rising WBC, tachycardia, elevated lactic acid and guarding to palpation in the RUQ.     Assessment and Plan:  Acute Hypoxic Respiratory Failure, multifactorial  Severe Emphysema with COPD Acute Exacerbation  Aspiration PNA vs CAP, SARS-CoV-2 negative-completed course of antibiotics  Pulmonary Edema, Volume Overload  She is clinically improved from a respiratory standpoint at this time  Now requiring oxygen at 2 L/min via nasal cannula  Currently on prednisone taper  Seems like events leading to presentation were: Abdominal pain or back pain, taking opiates at home leading to somnolence, respiratory acidosis, possible aspiration  At baseline her health is very poor, she has a history of alcohol abuse  Her pulmonary status may be at baseline at this time    RUQ pain  More severe today, guarding to palpation  Not a good surgical candidate  Has had  multiple evals by GI, surgery, multiple RUQ ultrasound  Now tachy with elevated WBC, elevated lactic acid  Will check STAT CT    Alcohol abuse/dependence  Wernicke encephalopathy  Acute metabolic encephalopathy, questionable hepatic encephalopathy  Bipolar disorder versus anxiety/depression at baseline-on Seroquel at home  Alert now but still very confused  Has asterixis  Continue thiamine supplement  Trying low-dose lactulose to see if this improves some of her confusion  Psychiatry consulted, appreciate their recommendations  Per note scheduled Depakote 3 times daily, Keppra at bedtime, melatonin at bedtime   Will try to clarify orders with psychiatry  Clinically very consistent with Wernicke's  I think that Hospice at a facility would be appropriate    Abdominal pain  Acutely elevated liver enzymes, acute on chronic thrombocytopenia  Recent Hep B infection  Jaundice, hepatic fibrosis  Likely acute liver failure in setting of alcoholic cirrhosis  Appreciate GI recs    Moderate to severe malnutrition  Now able to tolerate oral intake, less alert today  Albumin very low at 1.7  Appreciate dietitian recommendations    Hypernatremia, resolved  Oral intake improved, fluids stopped    Advanced Care Planning  She is DNR and is a hospice candidate if pt/family are interested at dc  Not comfort care at this time   Appreciate hospice and palliative care help with goals/options at dc    Prolonged QTC   on last check  - avoid QT prolonging meds as able, replace mag and potassium prn    Diet: Diet Dysphagia I (Pureed), 2 Gm Na; Honey thick liquids LIQUIDS BY SPOON  Dietary nutrition supplements Magic cup; BID with meals  DVT Prophylaxis:  VTE Prophylaxis contraindicated due to thrombocytopenia  Code Status: No CPR- Do NOT Intubate  Disposition: Stay today as medications are being adjusted.  Needs to be off of one-to-one, able to tolerate oral intake or be comfort care/hospice to discharge.    Interval  History/Subjective:  Pt is lethargic, not answering questions today.     Physical Exam/Objective:  Vitals I/O   Temp:  [97.1  F (36.2  C)-98.8  F (37.1  C)] 98.1  F (36.7  C)  Heart Rate:  [] 120  Resp:  [16-20] 20  BP: (108-127)/(53-77) 108/53  SpO2:  [92 %-94 %] 92 %  I/O last 3 completed shifts:  In: 490 [P.O.:490]  Out: 300 [Urine:300]   159 lb (72.1 kg)  Body mass index is 29.08 kg/m .    Physical Exam:    Gen: lethargic, lying in bed, seems comfortable unless palpating abdomen  ENT: notable scleral icterus  Pulm: slightly labored breathing at rest, lung sounds are diminished  CV: regular, tachy, no edema  Derm: bruising of the face, jaundiced  Psych: drowsy    Medications:   Personally Reviewed.    lactulose  30 g Oral TID     levETIRAcetam  250 mg Oral QHS     magnesium oxide  400 mg Oral TID     melatonin  3 mg Oral QHS     nicotine  1 patch Transdermal DAILY     omeprazole  20 mg Oral BID     potassium chloride ER  30 mEq Oral Q4H     predniSONE  20 mg Oral Daily with brkfst    Followed by     [START ON 8/19/2020] predniSONE  15 mg Oral Daily with brkfst    Followed by     [START ON 8/22/2020] predniSONE  10 mg Oral Daily with brkfst    Followed by     [START ON 8/25/2020] predniSONE  5 mg Oral Daily with brkfst     sodium chloride  10-30 mL Intravenous Q8H FIXED TIMES     thiamine  100 mg Oral DAILY           Liang Moore DO

## 2021-06-10 NOTE — PLAN OF CARE
Goal: Patient s discharge needs are met.  Outcome: Care Progression reviewed with Hospitalist, Care Manager.  Discharge Disposition: Discussed and plan to discharge to: home or facility with hospice?  Planned Discharge Date: TBD  Problem: Barriers to discharge include: increased O2 needs, hospice consult, medical progression  Transportation needs/Ride Time:  TBD    CM spoke with [facility] liaison who voiced she will be in to meet with patient & her daughter today. She shared patient would need to have decreased 02 needs for transport & discharge.     MISHA Bills,   Care Manager  8/27/2020   10:46 AM     RUSSELL updated by hospice social worker that patient's daughter will be unable to care for patient at home & agreement for referral to be made to [facility]. Referral sent on this date.   MISHA Bills  [facility]  8/27/2020  3:03 PM

## 2021-06-10 NOTE — PLAN OF CARE
Problem: Pain  Goal: Patient's pain/discomfort is manageable  Outcome: Progressing   Patient appears comfortable using nonverbal scales.    Problem: Potential for Falls  Goal: Patient will remain free of falls  Outcome: Progressing   Patient only alert to self.  Bed alarm in place.  3 side rails up.  Unsteady gait and doesn't watch where she is walking.  Would walk into a wall if staff not with her for ambulation.    Problem: Discharge Barriers  Goal: Patient's discharge needs are met  Outcome: Not Progressing   DC planning in progress.  Palliative and [facility] spoke with daughter.  Plan to repeat an abdominal ultrasound.  Patient pulled out PICC line.  Missed AM dose of IV antibiotics.  Childress in place.  PICC to be replaced.  Team paged.  NPO for ultrasound.

## 2021-06-10 NOTE — PROGRESS NOTES
Infectious Disease Progress Note    Assessment/Plan  Impression: Pneumonia and respiratory failure.     No pathogen identified, concern for COVID-19 although now with three negative PCR and negative IgG    Hospice/palliative involved.      Recommendations:   S/p azithromycin  S/p pip-tazo  Agree with fungal evaluation      Principal Problem:    COPD with acute exacerbation (H)  Active Problems:    Bipolar disorder (H)    Tobacco abuse    Acute respiratory failure with hypoxemia (H)    Goals of care, counseling/discussion    Abnormal chest CT    Acute pulmonary edema (H)    DNR (do not resuscitate)    Pneumonitis    Aspiration pneumonia due to gastric secretions (H)    Hypernatremia      Kym Moy MD  [phone]      Subjective  More alert. On supplemental O2.     Objective    Vital signs in last 24 hours  Temp:  [97  F (36.1  C)-98.9  F (37.2  C)] 97  F (36.1  C)  Heart Rate:  [] 87  Resp:  [13-24] 13  BP: (111-134)/(56-71) 111/56  FiO2 (%):  [35 %-50 %] 35 %  Weight:   160 lb 4.8 oz (72.7 kg)    Intake/Output last 3 shifts  I/O last 3 completed shifts:  In: 1847.5 [I.V.:1792.5; IV Piggyback:55]  Out: 300 [Urine:300]  Intake/Output this shift:  No intake/output data recorded.    Review of Systems   Pertinent items are noted in HPI., otherwise negative.    Physical Exam  General appearance: sleepy appears older than stated age, and mildly obese. More alert today.   Head: Normocephalic, without obvious abnormality, Some bruises around the right eye.   Eyes: Extraocular muscles intact  Lungs: crackles bilaterally  Extremities: extremities normal, atraumatic, no cyanosis or edema  Skin: Bruises and discoloration noted.  Neurologic: Grossly normal    Pertinent Labs   Lab Results: personally reviewed.     Results from last 7 days   Lab Units 08/12/20  0456 08/11/20  0429 08/10/20  0444   LN-WHITE BLOOD CELL COUNT thou/uL 9.4 12.8* 6.9   LN-HEMOGLOBIN g/dL 12.0 12.2 10.2*   LN-HEMATOCRIT % 36.5 37.4  31.4*   LN-PLATELET COUNT thou/uL 50* 76* 50*        Results from last 7 days   Lab Units 08/12/20  1325 08/12/20  0456 08/11/20 2023 08/11/20  0429  08/10/20  0444   LN-SODIUM mmol/L 150* 153* 150*   < > 152*   < > 150*   LN-POTASSIUM mmol/L 4.1 3.0*  --   --  4.0  --  4.0   LN-CHLORIDE mmol/L  --  98  --   --  89*  --  100   LN-CO2 mmol/L  --  42*  --   --  47*  --  41*   LN-BLOOD UREA NITROGEN mg/dL  --  37*  --   --  35*  --  25*   LN-CREATININE mg/dL  --  0.77  --   --  0.78  --  0.66   LN-CALCIUM mg/dL  --  8.2*  --   --  8.7  --  7.8*    < > = values in this interval not displayed.       Procedure  Component  Value  Units  Date/Time    Blood Culture from PERIPHERAL SITE (2nd One) [[id number]]   Collected: 08/04/20 1041    Order Status: Sent  Specimen: Blood from Vein, Peripheral  Updated: 08/04/20 1052      Results for MAISHA SUAZO (MRN [id number]) as of 8/7/2020 13:25   Ref. Range 8/3/2020 15:48 8/3/2020 22:07 8/6/2020 14:40   COVID-19 VIRUS SPECIMEN SOURCE Unknown Nares Nasopharyngeal Nasopharyngeal   SARS-CoV-2 Virus Specimen Source Unknown Nares Nasopharyngeal Nasopharyngeal   SARS-CoV-2 PCR Result Unknown NEGATIVE NEGATIVE NEGATIVE   SARS-COV-2 PCR COMMENT Unknown Testing was performed using the Xpert Xpress SARS-CoV-2 Assay on the eXpressoid Testing was performed using the Xpert Xpress SARS-CoV-2 Assay on the eXpressoid Testing was performed using the Xpert Xpress SARS-CoV-2 Assay on the eXpressoid       Pertinent Radiology   Radiology Results: Personally reviewed image/s and Personally reviewed impression/s    Ct Chest Without Contrast    Result Date: 8/11/2020  EXAM: CT CHEST WO CONTRAST LOCATION: [facility] DATE/TIME: 8/11/2020 8:11 PM INDICATION: Respiratory illness, acute (Age > 40y) Shortness of breath persistent hypoxemia despite max diuresis. re-eval opacities COMPARISON: CT chest 08/03/2020, 07/12/2019 TECHNIQUE: CT chest without IV contrast. Multiplanar reformats were obtained. Dose  reduction techniques were used. CONTRAST: None. FINDINGS: LUNGS AND PLEURA: Mild to moderate tracheal secretions. Moderate to severe emphysema with associated interlobular septal thickening. Mosaic lung attenuation. No focal consolidation or pleural effusion. No mass or definite suspicious nodule. MEDIASTINUM/AXILLAE: Upper normal heart size. No pericardial effusion. Moderate to severe coronary artery calcifications. UPPER ABDOMEN: Stable soft tissue density nodules adjacent to the left adrenal gland. MUSCULOSKELETAL: Spinal degenerative changes.     1.  Moderate to severe emphysema. 2.  Interlobular septal thickening and groundglass pulmonary opacities likely reflecting pulmonary edema, increased since the CT from 08/03/2020. No pneumonic consolidation or pleural effusion. 3.  Mild to moderate tracheal secretions.
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
## 2021-06-10 NOTE — PLAN OF CARE
Attempted treatment. Nursing deferred as patient in process of transferring rooms. Reports no concerns with current diet.

## 2021-06-10 NOTE — PLAN OF CARE
Problem: Pain  Goal: Patient's pain/discomfort is manageable  Outcome: Progressing   Pt denied all pain this shift.       Problem: Cognitive Impairment or Disorientation  Goal: Patient will maintain or return to normal baseline cognitive function  Outcome: Not Progressing   Pt was alert to self and place but not to situation or time. Pt was very confused this shift and not making a lot of sense sometimes.       Problem: Inadequate Gas Exchange  Goal: Patient will achieve/maintain normal respiratory rate/effort  Outcome: Progressing   Pt on 2L O2 via NC sating 95%, but pt sats will drop when she takes it off. Lung sounds are diminished and course. Pt has shortness of breath with exertion. Continue with IV abx, prednisone. Tele - ST with PACs. Pt had a 6 beat run of Vtach at 0130 - pt asmptomatic, vitals stable. MD notified. Labs checked. WBC is trending down. MD stated no need to run the sepsis protocol or order another lactic lab draw.       Problem: Daily Care  Goal: Daily care needs are met  Outcome: Progressing   Pt had several large loose incontinent stools this shift. Brief in place, barrier cream applied. Pt turns and repositions herself, when staff attempts to turn her then she just turns back. Open reddness on coccyx.   Pt bladder scanned for 475 at start of shift. Pt stated she didn't need to void. Childress placed with very little immediate output. Total output this shift was 200mls with genesis output.

## 2021-06-10 NOTE — PLAN OF CARE
Problem: Discharge Barriers  Goal: Patient's discharge needs are met  Outcome: Progressing   Goal: Patient s discharge needs are met.    Outcome: Care Progression reviewed with Hospitalist, Care Manager, & Charge RN.    Discharge Disposition: Discussed and plan to discharge to: TBD    Planned Discharge Date: In a few days    Problem: Barriers to discharge include: 02 Needs, medically complexed, provider to speak with dtr    Transportation (Needs/Ride) Time: TBD    Care Management Narrative: CM called dtr in AM. Dtr states that she is the Pt's Health Care agent. CM requested this information from Dtr who stated that she would bring a copy to the hospital. Dtr states that she follows along in [facility] for Pt's pending tests. Dtr appeared to be overwhelmed on the phone. CM asked about the visit that was planned for yesterday. Dtr stated that she was not able to visit and was getting tabs for her car as they were difficult to get. Cm had asked the dtr if she would prefer to have a phone mtg with the provider, but dtr declined stating that she would be at the hospital around 2PM. Dtr states that she works in health care settings and understands from both perspectives and would like to discuss Pt's care with the provider. Dtr did express a few concerns feel that Pt's care has not been managed well and would like to speak to the provider. Dtr had also wondered about a possible transfer for the pt. CM offered Active listening with the dtr and had asked the dtr if she would like to have the Pt Advocate speak with her and at this time the dtr declines. Dtr gave CM verbal consent to speak with Ana María the Mental Health worker.     CM called and spoke with Ana María and stated that the dtr has given consent to speak about the Pt. Ana María states that she has worked with the Pt for a while. Ana María states that she is working from home and hasn't seen the Pt since March, due to the pandemic. Ana María states that the last time she spoke with  the pt was around 2.5 weeks to 3 weeks ago. Ana María states that the Pt lives with her exspouse and dtr. Pt had a goal of walking from across the home and talking care of the  with breaks. Reports that pt was living mostly in the living room and kitchen area. Ana María states that she has encouraged the Pt get a CADI wavier, but pt has refused do to having a spenddown. Ana María states that the Pt's only has one child and possibly a sister. Exspouse is also going on a fishing trip. Ana María states that the Pt used to take drink shots before bed to help with sleep.     Carlos Montana, [facility]

## 2021-06-10 NOTE — PROGRESS NOTES
Wound Ostomy  WOC Assessment         Allergies:  No Known Allergies    Diagnosis:   Patient Active Problem List    Diagnosis Date Noted     Hypocalcemia 08/20/2020     Colitis 08/18/2020     Severe sepsis with acute organ dysfunction (H) 08/18/2020     Lactic acidosis 08/18/2020     Metabolic encephalopathy      Cognitive and behavioral changes      Sleep difficulties      Hepatic fibrosis (H) 08/13/2020     Aspiration pneumonia due to gastric secretions (H) 08/10/2020     Hypernatremia 08/10/2020     Bipolar affective disorder, currently manic, severe, with psychotic features (H) 08/09/2020     Chronic pain 08/09/2020     DNR (do not resuscitate) 08/09/2020     Pneumonitis 08/09/2020     Acute pulmonary edema (H)      Abnormal chest CT      COPD with acute exacerbation (H) 08/03/2020     Fungal esophagitis 07/13/2019     Adjustment disorder with mixed anxiety and depressed mood      Hypotension 07/12/2019     Swallowing painful 07/12/2019     Lactic acid acidosis 07/12/2019     QT prolongation 07/12/2019     Odynophagia 07/11/2019     Bipolar I disorder, most recent episode depressed, severe without psychotic features (H) 10/08/2018     Goals of care, counseling/discussion 10/08/2018     Overdose 10/07/2018     Suicide attempt (H)      Relationship problem between caregiver and child      History of posttraumatic stress disorder (PTSD)      Alcoholic intoxication with complication (H) 10/05/2018     Intentional drug overdose, initial encounter (H) 10/05/2018     Acute respiratory failure with hypoxemia (H) 10/05/2018     Acidemia      Metabolic acidosis      Bipolar disorder (H) 02/01/2018     Alcohol abuse 02/01/2018     Hyperglyceridemia 02/01/2018     Tobacco abuse 02/01/2018     GERD (gastroesophageal reflux disease) 02/01/2018     Alcohol use disorder, severe, dependence (H) 12/21/2017     Alcohol withdrawal syndrome (H) 09/22/2016     Pulmonary emphysema (H) 03/05/2015     Depression 03/05/2015     Neurosis,  "posttraumatic 03/05/2015       Height:  5' 2\" (1.575 m)    Weight:   211 lb 6.4 oz (95.9 kg)    Labs:  Recent Labs     08/24/20  0826  08/25/20  0615   HGB 10.6*   < > 9.6*   ALBUMIN 1.5*  --   --     < > = values in this interval not displayed.       Jonny:  Jonny Scale Score: 14    Specialty Bed:  Specialty Bed: [facility]    Wound culture obtained: No    Edema:  No    Anatomic Site/Laterality: Groin, perianal area, coccyx    Reason for ongoing care:   Wound assessment and plan of care    Encounter Type:  Initial Wound Type:   Denudement:  Foreign body present? No    Tissue Damage:   Breakdown of skin    Related trauma: Breakdown appears to be primarily related to incontinence.  May have a pressure component also.      Assessment:    Erythema and satellite lesions extending from groin folds to perianal area to coccyx.  Coccyx has a 0.5x0.5cm agranular area within area of erythema.    Tunneling/Undermining: No    Wound Bed: See above    Exudate: No    Periwound Skin: Erythema    Treatment Plan: Antifungal: Miconazole powder to groin folds, Critic Aid AF to perianal area and coccyx           Nursing care provided was coordinated care with RN.    Discussed plan of care with nurse and patient.    Outcomes and treatment recommendations are to promote skin integrity and promote wound healing.    Actions taken by WOC RN: 5 minutes of education.    Planned Follow Up: Later this week or Early next week.    Plan for next visit: Reassess wound(s) and Reassess skin integrity      "

## 2021-06-10 NOTE — PROGRESS NOTES
[facility] Medicine PROGRESS NOTE      Identification/Summary: Keiko Guo is a 60 y.o. female with a past medical history of COPD, PAD, alcohol abuse, cigarette smoking, GÉNESIS, dementia presented to ER with shortness of breath on 8/3/2020. She was tachycardic on admission, had elevated lactic acid. CT chest with severe emphysema and ground glass opacity. She started on IV antibiotic. Resuscitated with IVF. She was complaining of abdominal pain and CT abdomen showed distended gall bladder, US showed gallbladder sludge and no wall thickening. She is now on BiPAP/high flow oxygen, delirious. palliative care consulted to clarify the goal of care given the CT finding of severe emphysema and presumed diagnosis of Wernicke encephalopathy       Assessment and Plan:      COPD exacerbation  Acute hypoxic respiratory failure   PNA (aspiration?)  Sepsis,    CT chest showed advanced emphysema and extensive ground glass and airspace infiltrate in upper lung concerning for PNA and diffuse interstitial densities likely edema in mid to lower lungs.  On Solumedrol IV  Lasix 40 mg IV BID  Duoneb scheduled and prn   On Zosyn and azithromycin     On Covid isolation precaution, had two negative Covid tests.    Elevated liver enzymes  RUQ tenderness, cholecystitis?  AST and Alk phos elevated but its been a chronic issue   US finding not very supportive for the diagnosis of cholecystitis   Continue with Zosyn for now and monitor her symptoms   She is not a surgery candidate, surgery consulted and they signed off       Alcohol abuse/dependence   On CIWA protocol  Thiamine and folic acid    Acute encephalopathy  History of unstable gate  Wernicke encephalopathy?  On thiamine and folic acid      Fall  She has bruise around her right eye and right knee from a recent fall  CT head negative      Thrombocytopenia  Likely 2/2 ETOH  Monitor Plt count         DVT prophylaxis  Plt is 64, avoid heparin   SCD     Bipolar  disorder  Continue home medications     Diet: NPO for now until swallow evaluation   DVT Prophylaxis: SCD  Code Status: No CPR- Do NOT Intubate    Anticipated possible discharge in few days     Interval History/Subjective:  On high flow currently, she was on BiPAP overnight        Physical Exam/Objective:  Vitals I/O   Temp:  [97.4  F (36.3  C)-98.2  F (36.8  C)] 97.4  F (36.3  C)  Heart Rate:  [] 98  Resp:  [22-30] 26  BP: (103-137)/(54-82) 137/61  SpO2:  [85 %-95 %] 93 %  FiO2 (%):  [35 %-60 %] 35 %  I/O last 3 completed shifts:  In: 630 [P.O.:120; I.V.:60; IV Piggyback:450]  Out: 200 [Urine:200]   183 lb 1.6 oz (83.1 kg)  Body mass index is 33.49 kg/m .      GENERAL:  Somnolent, on high flow oxygen   HEAD:  Normocephalic, without obvious abnormality, there is a resolving bruise around the right eyes    EYES:     NECK: Supple, symmetrical, trachea midline   LUNGS:      HEART:     ABDOMEN:      EXTREMITIES: no cyanosis or edema    SKIN: Dry to touch, no exanthems in the visualized areas   NEURO: moves all four extremities freely, non-focal       Medications:   Personally Reviewed.    azithromycin  500 mg Intravenous Q24H     furosemide  40 mg Intravenous BID - diuretic     ipratropium-albuteroL  3 mL Nebulization Q6H - RT     methylPREDNISolone sodium succinate  40 mg Intravenous Q8H     multivitamin therapeutic  1 tablet Oral DAILY     nicotine  1 patch Transdermal DAILY     omeprazole  20 mg Oral QAM (0630)     piperacillin-tazobactam  3.375 g Intravenous Q8H     QUEtiapine  300 mg Oral QHS     sodium chloride  10-30 mL Intravenous Q8H FIXED TIMES     thiamine (vitamin B1) IVPB  100 mg Intravenous DAILY       Labs:  Lab Results   Component Value Date    WBC 6.8 08/06/2020    HGB 9.1 (L) 08/06/2020    HCT 26.2 (L) 08/06/2020     (H) 08/06/2020    PLT 64 (L) 08/06/2020     Lab Results   Component Value Date    CREATININE 0.63 08/06/2020    BUN 21 08/06/2020     08/06/2020    K 4.1 08/06/2020      08/06/2020    CO2 25 08/06/2020        Imaging:  Ct Head Without Contrast  Result Date: 8/3/2020  1.  No evidence of acute intracranial hemorrhage or mass effect. 2.  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 3.  Air-fluid level within the right maxillary sinus which could represent acute sinusitis in the appropriate setting.        Result Date: 8/3/2020  1.  No evidence for central pulmonary emboli. The peripheral subsegmental vessels to both lower lobes are suboptimally opacified. 2.  Advanced centrilobular emphysema. 3.  Extensive groundglass and airspace infiltrates within both upper lobes concerning for pneumonia, including Covid 19. There are additional diffuse interstitial densities both lungs likely representing edema.     Ct Abdomen Pelvis Without Oral With Iv Contrast  Result Date: 8/3/2020  1.  Appendix is of normal caliber. There is a small amount of ascites adjacent to the base of the cecum and ascending colon with slight wall thickening which could represent an acute colitis. No evidence for bowel obstruction. 2.  Trace ascites adjacent to the liver and spleen. 3.  Advanced atherosclerotic disease. 4.  Prominent collateral vessels/possible gastric varices lesser curvature of the stomach. 5.  Interstitial opacities throughout the visualized lower lung fields. Please refer to CTA chest report for further discussion.      Ella Van Ness campusmikey  [facility] Center[facility]  [facility]

## 2021-06-10 NOTE — PROGRESS NOTES
[facility] Palliative Progress Note    History of Present Illness:  60 y.o. female with past medical history of tobacco use, alcohol use disorder, bipolar disorder, previous suicide attempt with overdose, GÉNESIS, PAD, worsening dementia and imbalance (possible Warnicke encephalopathy), and radiographic emphysema (presumed COPD) who was admitted on 8/3/2020 for abdominal pain, lactic acidosis, hyperbilirubinemia, acute respiratory failure with hypoxia, and acute encephalopathy. Hospital course was notable for severe hypoxia requiring up to 60 L HF NC and 55% FiO2, guarded prognosis, and ongoing encephalopathy.  Hypoxic respiratory failure likely secondary to combination of severe emphysema, volume overload/pulmonary edema, possible COPD exacerbation, and/or CAP VS aspiration.  She was treated with antibiotics, diuresis.     Her course has been complicated by agitation, confusion. Psychiatry consulted to assist. She is also medically complex with liver failure, possible hepatic encephalopathy, acute respiratory failure. +Sepsis and colitis.  ID and psychiatry following.        Chart review of last 24 hours:  Worsening respiratory failure, needing 7L of oxymizer. Ethics consult ordered by hospitalist, pending.     Chief Complaint:   Sleeping    Health Care Directives/Code Status:  No HCD. DNR/DNI.     Review of Systems:  Unable due to AMS      PPS:   40%- 1. Mainly in bed; 2. Unable to do most activity, extensive disease; 3. Mainly assistance; 4. Normal or reduced; 5. Full or drowsy +/- confusion    Physical Examination:  General: sleeping, appears ill  RESPIRATORY: oxymizer O2,   non-labored breathing  SKIN: Exposed skin intact, dusky skin  NEURO: somnolent  PSYCH: calm currently, however sleeping    Pertinent Labs:  I have personally reviewed all pertinent labs.  CXR pending for today       Assessment & Plan:    Palliative Care Encounter:   Dtr Telma has historically wanted to treat reversible things, last care  conference was last week on 8/20. Ethics to review due to patient verbalizing not wanting aggressive cares, although dtr Telma wanting more aggressive cares stating mom is not decisional given her encephalopathy. Will await Ethics review.     Symptom Management:  AMS- multifactoral secondary to hepatic, Warnicke's encephalopathy, and sepsis. Treatment per primary team.     Restlessness with agitation- psychiatry following, appreciate their recs.     Respiratory failure-worsening. Plan for CXR today per RN.       Hailey Williamson CNP  Palliative Care Team  Office #: [phone]    Total Time >15 mins, over 50% spent in counseling and coordination of care including review of notes, labs and imaging in Epic. Reviewed case with RN and primary MD.

## 2021-06-10 NOTE — H&P
Admission H & P  Keiko Guo, 1960, [id number]    [facility] Pr[facility]  Provider, No Primary Care, None    Assessment and Plan:    Keiko Guo is a 60 y.o. old female with history of COPD, AL, PAD, alcohol abuse presented to ER with shortness of breath.    Community acquired PNA  COPD exacerbation  Sepsis,    No leukocytosis, tachycardic, elevated lactic acid  CT chest showed advanced emphysema and extensive ground glass and airspace infiltrate concerning for PNA and diffuse interstitial densities likely edema.  Ceftriaxone and azithromycin   Solumedrol IV 60 mg daily  Duoneb scheduled and prn   She was tested negative for COVID but given CT finding suspicious is still high for COVID, continue with isolation.   Avoid further IVF with CT finding of edema, check lactic acid     Alcohol abuse/dependence   [facility] protocol   Thiamine and folic acid    Fall  She has bruise around her right eye and right knee  CT head negative        DVT prophylaxis  Plt is 85, avoid heparin   SCD    Bipolar disorder  Continue home medications     Chief Complaint: SOB    HPI:     Keiko Guo is a 60 y.o. old female with history of COPD, AL, PAD, alcohol abuse presented to ER with shortness of breath. Patient is very poor historian, she did not provide me any history except that she fell yesterday, she was moaning and coughing. According to ER physician's note, she was feeling shortness of breath and called EMS today. No more history available. Denies any fever, nausea, vomiting, diarrhea. She drinks alcohol daily.    In ER, she was afebrile, tachycardic, saturating well on room air, hypotensive. WBC with no leukocytosis. Lactic acid elevated at 8.3. CT chest with no PE showed advanced emphysema and extensive ground glass and airspace infiltrate concerning for PNA. She was resuscitated with IVF and received vancomycin, Zosyn and azithromycin and admitted to the floor    She was complaining of abdominal pain  in ER and had CT abdomen showed likely colitis but she did not have any abdominal pain in the floor and denies any diarrhea, nausea, vomiting     Medical History  Active Ambulatory (Non-Hospital) Problems    Diagnosis     Fungal esophagitis     Adjustment disorder with mixed anxiety and depressed mood     Hypotension     Swallowing painful     Lactic acid acidosis     QT prolongation     Odynophagia     Bipolar I disorder, most recent episode depressed, severe without psychotic features (H)     Neuroleptic consent was signed on October 8, 2018     Overdose     Suicide attempt (H)     Relationship problem between caregiver and child     History of posttraumatic stress disorder (PTSD)     Alcoholic intoxication with complication (H)     Intentional drug overdose, initial encounter (H)     Acute respiratory failure with hypoxia (H)     Acidemia     Metabolic acidosis     Bipolar disorder (H)     Alcohol abuse     Hyperglyceridemia     Smoker     GERD (gastroesophageal reflux disease)     Pulmonary emphysema (H)     Depression     Neurosis, posttraumatic     Past Medical History:   Diagnosis Date     COPD (chronic obstructive pulmonary disease) (H)      PAD (peripheral artery disease) (H)      Sleep apnea      Patient Active Problem List    Diagnosis Date Noted     COPD with acute exacerbation (H) 08/03/2020     Fungal esophagitis 07/13/2019     Adjustment disorder with mixed anxiety and depressed mood      Hypotension 07/12/2019     Swallowing painful 07/12/2019     Lactic acid acidosis 07/12/2019     QT prolongation 07/12/2019     Odynophagia 07/11/2019     Bipolar I disorder, most recent episode depressed, severe without psychotic features (H) 10/08/2018     Neuroleptic consent was signed on October 8, 2018 10/08/2018     Overdose 10/07/2018     Suicide attempt (H)      Relationship problem between caregiver and child      History of posttraumatic stress disorder (PTSD)      Alcoholic intoxication with complication  (H) 10/05/2018     Intentional drug overdose, initial encounter (H) 10/05/2018     Acute respiratory failure with hypoxia (H) 10/05/2018     Acidemia      Metabolic acidosis      Bipolar disorder (H) 2018     Alcohol abuse 2018     Hyperglyceridemia 2018     Smoker 2018     GERD (gastroesophageal reflux disease) 2018     Pulmonary emphysema (H) 2015     Depression 2015     Neurosis, posttraumatic 2015        Surgical History  She  has a past surgical history that includes Appendectomy (); osteomyelitis ();  section; and pr esophagogastroduodenoscopy transoral diagnostic (N/A, 2019).     Past Surgical History:   Procedure Laterality Date     APPENDECTOMY      rupture with sepsis      SECTION       osteomyelitis       TN ESOPHAGOGASTRODUODENOSCOPY TRANSORAL DIAGNOSTIC N/A 2019    Procedure: ESOPHAGOGASTRODUODENOSCOPY (EGD) with biopsies;  Surgeon: Mario Beatty MD;  Location: [facility];  Service: Gastroenterology       Allergies  No Known Allergies    Prior to Admission Medications   Medications Prior to Admission   Medication Sig Dispense Refill Last Dose     acetaminophen (TYLENOL) 650 MG CR tablet Take 650 mg by mouth every 8 (eight) hours as needed for pain.   8/3/2020 at 0900     albuterol (PROAIR HFA;PROVENTIL HFA;VENTOLIN HFA) 90 mcg/actuation inhaler Inhale 2 puffs 4 (four) times a day as needed for shortness of breath.   8/3/2020 at not with     melatonin 5 mg Tab tablet Take 15 mg by mouth at bedtime as needed.   2020     nicotine (NICODERM CQ) 21 mg/24 hr Place 1 patch on the skin daily as needed for smoking cessation.   More than a month at Unknown time     nystatin (MYCOSTATIN) 100,000 unit/mL suspension Take 500,000 Units by mouth 4 (four) times a day as needed (thrush).    More than a month at Unknown time     omeprazole (PRILOSEC) 20 MG capsule Take 20 mg by mouth daily as needed.   Past Week at  "Unknown time     oxyCODONE (ROXICODONE) 5 MG immediate release tablet Take 5 mg by mouth every 6 (six) hours as needed for pain.   8/3/2020 at 0900     polyethylene glycol (MIRALAX) 17 gram packet Take 17 g by mouth daily as needed.    Past Week at Unknown time     QUEtiapine (SEROQUEL) 25 MG tablet Take 25 mg by mouth 3 (three) times a day as needed.    Past Week at Unknown time     QUEtiapine (SEROQUEL) 300 MG tablet Take 300 mg by mouth at bedtime as needed.    7/31/2020     SPIRIVA RESPIMAT 1.25 mcg/actuation Mist Inhale 2 Inhalation daily.    8/3/2020 at not with     thiamine 100 MG tablet Take 50 mg by mouth daily.   8/1/2020       Social History  Reviewed, and she  reports that she has been smoking cigarettes. She has been smoking about 0.75 packs per day. She has never used smokeless tobacco. She reports current alcohol use. She reports that she does not use drugs.  Social History     Tobacco Use     Smoking status: Current Every Day Smoker     Packs/day: 0.75     Types: Cigarettes     Smokeless tobacco: Never Used   Substance Use Topics     Alcohol use: Yes     Comment: minimal        Family History  Reviewed, and family history includes Depression in her mother; Diabetes in her father; Hypertension in her father and mother; Schizophrenia in her brother.    Review Of Systems  As per admission HPI, all others reviewed and negative.     Physical Exam:  /58   Pulse (!) 116   Temp 98.2  F (36.8  C) (Oral)   Resp 20   Ht 5' 2\" (1.575 m)   Wt 160 lb (72.6 kg)   SpO2 90%   BMI 29.26 kg/m    General Appearance:  Awake, coughing constantly, on NC   Head:  Normocephalic, without obvious abnormality   Eyes:  PERRL,  Remarkable for bruise aroind hr right eyse   Neck: Supple,  no JVD   Lungs:      Heart:     Abdomen:   Soft, non-tender, bowel sounds present,  no guarding, rigidity    Extremities:  no edema, no joint swelling   Skin: Skin color, texture, turgor normal, no rashes or lesions   Neurologic: " Alert and oriented X 3, Moves all 4 extremities     Lab Results:  Lab Results   Component Value Date    WBC 6.3 08/03/2020    HGB 10.5 (L) 08/03/2020    HCT 30.8 (L) 08/03/2020     (H) 08/03/2020    PLT 85 (L) 08/03/2020     Lab Results   Component Value Date    CREATININE 0.76 08/03/2020    BUN 12 08/03/2020     (L) 08/03/2020    K 3.6 08/03/2020     08/03/2020    CO2 20 (L) 08/03/2020         EKG:  EKG: sinus tachycardia     Imaging:   Ct Head Without Contrast  Result Date: 8/3/2020  1.  No evidence of acute intracranial hemorrhage or mass effect. 2.  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 3.  Air-fluid level within the right maxillary sinus which could represent acute sinusitis in the appropriate setting.        Cta Chest Pe Run    Result Date: 8/3/2020  EXAM: CTA CHEST PE RUN LOCATION: [facility] DATE/TIME: 8/3/2020 5:01 PM INDICATION: PE suspected, high pretest prob SOB, tachycardia COMPARISON: CTA chest, abdomen and pelvis 07/12/2019 TECHNIQUE: CT chest pulmonary angiogram during arterial phase injection of IV contrast. Multiplanar reformats and MIP reconstructions were performed. Dose reduction techniques were used. CONTRAST: Iohexol (Omni) 100 mL FINDINGS: ANGIOGRAM CHEST: The right and left main pulmonary arteries and the segmental vessels are all patent without evidence for emboli. Suboptimal opacification involving the subsegmental vessels to both lower lobes. Thoracic aorta is negative for dissection. No CT evidence of right heart strain. LUNGS AND PLEURA: Advanced centrilobular emphysema. Extensive airspace and groundglass opacities throughout both upper lobes, with additional interstitial opacities throughout the mid and lower lung fields, all of which are new since the previous study. Subsegmental atelectasis in the bases.  No effusions. MEDIASTINUM/AXILLAE: Numerous borderline enlarged mediastinal and hilar nodes with minimal change. UPPER ABDOMEN: Advanced atherosclerotic disease. Splenomegaly. Trace ascites adjacent to the liver. Collateral vessels along the lesser curvature the stomach MUSCULOSKELETAL: Hypertrophic changes thoracic spine.     1.  No evidence for central pulmonary emboli. The peripheral subsegmental vessels to both lower lobes are suboptimally opacified. 2.  Advanced centrilobular emphysema. 3.  Extensive groundglass and airspace infiltrates within both upper lobes concerning for pneumonia, including Covid 19. There are additional diffuse interstitial densities both lungs likely representing edema.     Ct Abdomen Pelvis Without Oral With Iv Contrast    Result Date: 8/3/2020  EXAM: CT ABDOMEN PELVIS WO ORAL W IV CONTRAST LOCATION: [facility] DATE/TIME: 8/3/2020 5:03 PM INDICATION: RLQ abdominal pain, appendicitis suspected (Age > 14y) Right sided abdominal pain COMPARISON: None. TECHNIQUE: CT scan of the abdomen and pelvis was performed following injection of IV contrast. Multiplanar reformats were obtained. Dose reduction techniques were used. CONTRAST: Iohexol (Omni) 100 mL FINDINGS: LOWER CHEST: Diffuse interstitial opacities throughout the visualized lower lung fields. HEPATOBILIARY: Trace ascites adjacent to the liver. Moderate distention of the gallbladder. PANCREAS: Normal. SPLEEN: Borderline splenomegaly is unchanged measuring 13 cm with trace adjacent fluid. ADRENAL GLANDS: Normal. KIDNEYS/BLADDER: Normal. BOWEL: The appendix is identified within the mid right pelvis and is of normal caliber containing a tiny amount of air. There is some minimal edema adjacent to the appendix and cecum. Slight wall thickening involving the cecum and ascending colon with a small amount of adjacent ascites. No evidence for bowel obstruction. LYMPH NODES: Normal. VASCULATURE: Advanced atherosclerotic disease abdominal  aorta and iliac arteries with high-grade stenosis involving the proximal right and left common iliac arteries. Prominent collateral vessels along the lesser curvature of the stomach are more distended compared to the prior study and could be secondary to portal venous hypertension. PELVIC ORGANS: Normal. MUSCULOSKELETAL: Small fat-containing ventral hernia.     1.  Appendix is of normal caliber. There is a small amount of ascites adjacent to the base of the cecum and ascending colon with slight wall thickening which could represent an acute colitis. No evidence for bowel obstruction. 2.  Trace ascites adjacent to the liver and spleen. 3.  Advanced atherosclerotic disease. 4.  Prominent collateral vessels/possible gastric varices lesser curvature of the stomach. 5.  Interstitial opacities throughout the visualized lower lung fields. Please refer to CTA chest report for further discussion.          Ella Bond M.D  [facility]  Internal Medicine    8/3/2020  8:13 PM

## 2021-06-10 NOTE — PROGRESS NOTES
"[facility]  Palliative Care Social Work Note:    Spoke with dtr Telma. Introduced self and role on Palliative Care team. Explord her understanding of her mom's condition. She shared that she knows her mom is critically ill and has \"end stage Emphysema.\" She wants the medical team to still \"try what they can\" and wants to make sure her mom has good pain management. She is grateful some scans were ordered today as she appreciates getting additional information. She is also clear that her mom would not want CPR or intubation. She confirmed she has her mom's HCD dated 2018 that she will bring to hospital. She was at [facility] yesterday so was unable to visit.    She is open to hospice care at time of discharge if appropriate. She is interested in [facility] if an option. She hopes that her mom can get the care she needs in the hospital to optimize whatever amount of time her mom has left. She is hopeful she can \"pull through this\" and have more time. Telma acknowledged that she \"isn't ready\" for her mom to die, although has been through this several times with her attempted suicides.     Facilitated life review and spent time allowing Telma to share her feelings and experiences. Acknowledged all she is doing to advocate for her mom and encouraged self care. Provided active/empathetic listening, validation of feelings and emotional support.     Confirmed she has PC office number. PC Sw remains available to provide psychosocial support to dtr.     Updated GETACHEW Starr and Palliative team.     Melia Dennis, [facility]  Palliative Care   Office # [phone]           "

## 2021-06-10 NOTE — PLAN OF CARE
"  Problem: Psychosocial Needs  Goal: Demonstrates ability to cope with hospitalization/illness  Outcome: Progressing   Pt very upset stating \"why wont you guys let me do anything\" \"all I want is to drink water\". Explained to Pt that speech was coming today to evaluate her swallowing but until then we needed to continue with thickened liquids and no straws. Pt was spoon fed thickened full glass of water and juice each time RN was called into room.      Problem: Discharge Barriers  Goal: Patient's discharge needs are met  Outcome: Progressing   Pt HIDA scan was negative.   Pt still being diuresed with 40mg Lasix two times a day.   Ethics following and discharge disposition still undecided.     Problem: Potential for Compromised Skin Integrity  Goal: Skin integrity is maintained or improved  Outcome: Progressing   Pt was repositioned every 2hrs. Pt bottom and groin are very red and excoriated. Powder applied.      Problem: Impaired Gas Exchange  Goal: Demonstrate improved ventilation and adequate oxygenation of tissues as evidenced by absence of respiratory distress  Outcome: Progressing  Problem: Abnormal Respirations  Goal: Patient will maintain/improve baseline respiratory rate/effort  Outcome: Progressing  Pt was requiring 9L with the oximyzer to maintain 85%. Pt also breathing very shallow.   "

## 2021-06-10 NOTE — PROGRESS NOTES
INFECTIOUS DISEASE FOLLOW UP NOTE    Date: 8/21/2020    ASSESSMENT:  1. Leukocytosis, elevated lactate, abdominal pain -- suspect ischemic colitis, could also have acalculous cholecystitis. Initial CT also showed mild colitis. Ischemic colitis generally treated with supportive care IVF plus antibiotics. WBC better today, and tenderness on exam has resolved. C diff negative. Antibiotics can calm down infection, but there is not an easy solution for underlying condition (ischemia). Potentially there is reversible condition if she has acalculous cholecystitis, which would be treated with antibiotics and percutaneous drain placement.   2. Severe COPD  3. Positive Hep B core Ab suggestive of recent infection. Hep B s Ag negative indicating lack of active disease.   4. Alcohol use, cirrhosis  5. Thrombocytopenia.   6. Completed treatment for possible pneumonia.   7. COVID-19 adequately ruled out by testing.   8. Goals of care: she states that she does not want further testing or procedures, however she has encephalopathy. Her daughter who is POA wants to pursue evaluation for reversible causes of symptoms and is not ready to have her in hospice.     PLAN:  Meropenem for now  HIDA planned for today and if this shows cholecystitis the next step would be placement of percutaneous cholecystostomy tube. However, goals of care are not clear. Ethics consult may help guide next steps. Discussed with Dr. Greenberg and her nurse.   Please call over the weekend if questions.     Efe García MD  [facility]  [phone] [facility] p[facility]    ______________________________________________________________________    SUBJECTIVE / INTERVAL HISTORY: Lying in bed, O2 monitor keeps showing sats in the 70s intermittently but then comes up as high as 100%, with her breathing comfortably on NC O2 throughout. She denies pain. I discussed testing planned for today and she states she doesn't want any more testing.  When I told her that testing could lead to placement of abdominal drain she states she doesn't want that. She states she wants to leave hospital to live with her father in [place], who one minute she says is 66 years old, but then next states that he is .     ROS: deconditioned. All other systems negative except as listed above.        OBJECTIVE:  Vitals:    20 1132   BP: 101/53   Pulse: (!) 107   Resp: 18   Temp: 97.6  F (36.4  C)   SpO2: 95%     FiO2 (%): 35 %    Vital Signs  Temp: 97.6  F (36.4  C)  Temp Source: Oral  Heart Rate: (!) 107  Heart Rate Source: Machine/Monitor  Resp: 18  BP: 101/53  MAP (mmHg) (Calculated): 69  BP Location: Right arm  BP Method: Machine/Monitor  Patient Position: Lying    Temp (24hrs), Av.5  F (36.4  C), Min:97.3  F (36.3  C), Max:97.8  F (36.6  C)      GENERAL: chronically ill. Oriented to [employer], knows it is , cannot come up with the month.   EYES: icterus  HEAD, EARS, NOSE, MOUTH, AND THROAT: edentulous  RESPIRATORY: Clear anterior.  CARDIOVASCULAR: Regular rate and rhythm, normal S1 and S2, no murmurs, rubs, or gallops.  ABDOMEN: Soft, no tenderness today.  Normal bowel sounds.  MUSCULOSKELETAL: No synovitis.  LYMPHATIC: No cervical, supraclavicular, axillary, or inguinal lymphadenopathy.  SKIN/HAIR/NAILS: jaundice. Some bruising on forehead.   NEUROLOGIC: Grossly intact. Generally weak.  PICC R UE        Antibiotics:  Meropenem -    cipro   Flagyl   Pip/tazo 8/3-9  Azithromycin 8/3-7  Ctx 8/3  Vancomycin 8/3    Pertinent labs:    Results from last 7 days   Lab Units 20  0253 20  0630 20  0602   LN-WHITE BLOOD CELL COUNT thou/uL 12.3* 15.2* 22.5*   LN-HEMOGLOBIN g/dL 11.9* 11.0* 11.7*   LN-HEMATOCRIT % 35.5 32.3* 35.8   LN-PLATELET COUNT thou/uL 18* 15* 29*        Results from last 7 days   Lab Units 20  0614 20  0253 20  0630 20  0602   LN-SODIUM mmol/L  --  137 137 138   LN-POTASSIUM mmol/L  3.4* 3.7 3.9 4.3  4.3   LN-CHLORIDE mmol/L  --  108* 107 104   LN-CO2 mmol/L  --  22 24 28   LN-BLOOD UREA NITROGEN mg/dL  --  16 16 16   LN-CREATININE mg/dL  --  0.70 0.74 0.82   LN-CALCIUM mg/dL  --  7.5* 7.4* 7.7*      No results found for: CRP   No results found for: SEDRATE    Lab Results   Component Value Date    ALT 77 (H) 08/20/2020    AST 52 (H) 08/20/2020    ALKPHOS 252 (H) 08/20/2020    BILITOT 6.1 (H) 08/20/2020         MICROBIOLOGY DATA:  8/17 blood no growth to date   8/4 blood negative     Hep B core IgM positive    RADIOLOGY:  Xr Chest 1 View Portable    Result Date: 8/10/2020  EXAM: XR CHEST 1 VIEW PORTABLE LOCATION: [facility] DATE/TIME: 8/10/2020 10:45 AM INDICATION: persistent hypoxia COMPARISON: CT chest 8/3/2020     Stable diffuse reticulation and groundglass opacification likely in part part related to chronic fibrotic and emphysematous changes seen on comparison CT. Cannot exclude superimposed edema or atypical pneumonia. No focal consolidation or pleural effusion. Stable heart size.    Ct Head Without Contrast    Result Date: 8/3/2020  EXAM: CT HEAD WO CONTRAST LOCATION: [facility] DATE/TIME: 8/3/2020 5:04 PM INDICATION: Head trauma, abnormal mental status (Age 19-64y) Fall, bruising to face COMPARISON: Head CT 12/20/2017 and MRI brain 07/23/2017. TECHNIQUE: Routine without IV contrast. Multiplanar reformats. Dose reduction techniques were used. FINDINGS: No evidence of acute intracranial hemorrhage or mass effect. 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 Brain attenuation morphology otherwise normal. The ventricles and sulci are normal for age. Normal gray-white matter differentiation. The basilar cisterns are patent. The globes are unremarkable. The partially imaged paranasal sinuses demonstrate air-fluid level within the  right maxillary sinus which could represent acute sinusitis in the appropriate setting. The partially imaged paranasal sinuses are otherwise unremarkable. The mastoid air cells and middle ear cavities are unremarkable. The visualized skull base and calvarium are unremarkable. The     1.  No evidence of acute intracranial hemorrhage or mass effect. 2.  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 3.  Air-fluid level within the right maxillary sinus which could represent acute sinusitis in the appropriate setting.    Ct Chest Without Contrast    Result Date: 8/11/2020  EXAM: CT CHEST WO CONTRAST LOCATION: [facility] DATE/TIME: 8/11/2020 8:11 PM INDICATION: Respiratory illness, acute (Age > 40y) Shortness of breath persistent hypoxemia despite max diuresis. re-eval opacities COMPARISON: CT chest 08/03/2020, 07/12/2019 TECHNIQUE: CT chest without IV contrast. Multiplanar reformats were obtained. Dose reduction techniques were used. CONTRAST: None. FINDINGS: LUNGS AND PLEURA: Mild to moderate tracheal secretions. Moderate to severe emphysema with associated interlobular septal thickening. Mosaic lung attenuation. No focal consolidation or pleural effusion. No mass or definite suspicious nodule. MEDIASTINUM/AXILLAE: Upper normal heart size. No pericardial effusion. Moderate to severe coronary artery calcifications. UPPER ABDOMEN: Stable soft tissue density nodules adjacent to the left adrenal gland. MUSCULOSKELETAL: Spinal degenerative changes.     1.  Moderate to severe emphysema. 2.  Interlobular septal thickening and groundglass pulmonary opacities likely reflecting pulmonary edema, increased since the CT from 08/03/2020. No pneumonic consolidation or pleural effusion. 3.  Mild to moderate tracheal secretions.     Mr Brain With Without Contrast    Result Date:  7/23/2020  EXAM: MR BRAIN W WO CONTRAST LOCATION: [facility] DATE/TIME: 7/23/2020 5:51 PM INDICATION: WORSENING ATAXIA-MENTAL STATUS COMPARISON: MRI head 10/08/2016 from [facility]. CT head 12/20/2017 from [facility]. CONTRAST: Gadavist 8 TECHNIQUE: Routine multiplanar multisequence head MRI without and with intravenous contrast. FINDINGS: INTRACRANIAL CONTENTS: No acute or subacute infarct. Again seen is a shunting type moderate-sized arteriovenous malformation in the parasagittal right parietal region superiorly and posteriorly. It measures at least 3.5 cm AP x 2 cm transverse x 4 cm craniocaudad. It has a very similar appearance to the MRI 10/08/2016 from [facility]. No associated new hemorrhage, edema, or mass effect. Scattered nonspecific T2/FLAIR hyperintensities within the cerebral white matter most consistent with mild chronic microvascular ischemic change. Mild generalized cerebral atrophy. No hydrocephalus. Normal position of the cerebellar tonsils. No pathologic contrast enhancement of the brain parenchyma or meninges allowing for some vascular enhancement with the right parietal AVM. SELLA: No abnormality accounting for technique. OSSEOUS STRUCTURES/SOFT TISSUES: Normal marrow signal. The major intracranial vascular flow voids are maintained. ORBITS: No abnormality accounting for technique. SINUSES/MASTOIDS: No paranasal sinus mucosal disease. No middle ear or mastoid effusion.     1.  Again seen is a shunting type moderate-sized arteriovenous malformation in the parasagittal right parietal area superiorly and posteriorly measuring approximately 3.5 cm x 2 cm x 4 cm. It is not changed significantly when compared to MRI head 10/08/2016 from [facility]. No new hemorrhage, edema or mass effect. Follow-up conventional angiography could be considered. 2.  No evidence for new infarct, hemorrhage elsewhere, hydrocephalus or intracranial mass. 3.  Mild age-related changes.     Xr  Swallow Study W Speech    Result Date: 8/13/2020  EXAM: XR SWALLOW STUDY W SPEECH OR OT LOCATION: [facility] DATE/TIME: 8/13/2020 2:47 PM INDICATION: Difficulty swallowing. COMPARISON: None. TECHNIQUE: Routine. FINDINGS: FLUOROSCOPIC TIME: 2.7 minutes NUMBER OF IMAGES: 0 Swallow study with Speech Pathology using multiple barium thicknesses. Mendez aspiration with nectar consistency that is silent. Thin liquids not tried. With honey consistency and pudding there is premature spill to the vallecula but no penetration or aspiration and otherwise normal swallowing reflex.     1.  High risk for aspiration with nectar consistency and likely thin. 2.  Patient should build tolerate honey and pudding consistency.     Cta Chest Pe Run    Result Date: 8/3/2020  EXAM: CTA CHEST PE RUN LOCATION: [facility] DATE/TIME: 8/3/2020 5:01 PM INDICATION: PE suspected, high pretest prob SOB, tachycardia COMPARISON: CTA chest, abdomen and pelvis 07/12/2019 TECHNIQUE: CT chest pulmonary angiogram during arterial phase injection of IV contrast. Multiplanar reformats and MIP reconstructions were performed. Dose reduction techniques were used. CONTRAST: Iohexol (Omni) 100 mL FINDINGS: ANGIOGRAM CHEST: The right and left main pulmonary arteries and the segmental vessels are all patent without evidence for emboli. Suboptimal opacification involving the subsegmental vessels to both lower lobes. Thoracic aorta is negative for dissection. No CT evidence of right heart strain. LUNGS AND PLEURA: Advanced centrilobular emphysema. Extensive airspace and groundglass opacities throughout both upper lobes, with additional interstitial opacities throughout the mid and lower lung fields, all of which are new since the previous study. Subsegmental atelectasis in the bases. No effusions. MEDIASTINUM/AXILLAE: Numerous borderline enlarged mediastinal and hilar nodes with minimal change. UPPER ABDOMEN: Advanced atherosclerotic disease.  Splenomegaly. Trace ascites adjacent to the liver. Collateral vessels along the lesser curvature the stomach MUSCULOSKELETAL: Hypertrophic changes thoracic spine.     1.  No evidence for central pulmonary emboli. The peripheral subsegmental vessels to both lower lobes are suboptimally opacified. 2.  Advanced centrilobular emphysema. 3.  Extensive groundglass and airspace infiltrates within both upper lobes concerning for pneumonia, including Covid 19. There are additional diffuse interstitial densities both lungs likely representing edema.     Us Venous Legs Bilateral    Result Date: 8/11/2020  EXAM: US VENOUS LEGS BILATERAL LOCATION: [facility] DATE/TIME: 8/11/2020 12:31 PM INDICATION: hypoxia, eval for DVT COMPARISON: None. TECHNIQUE: Venous Duplex ultrasound of bilateral lower extremities with and without compression, augmentation and duplex. Color flow and spectral Doppler with waveform analysis performed. FINDINGS: Exam includes the common femoral, femoral, popliteal veins as well as segmentally visualized deep calf veins and greater saphenous vein. RIGHT: No deep vein thrombosis. No superficial thrombophlebitis. No popliteal cyst. LEFT: No deep vein thrombosis. No superficial thrombophlebitis. No popliteal cyst.     1.  No deep venous thrombosis in the bilateral lower extremities.    Ct Abdomen Pelvis Without Oral With Iv Contrast    Result Date: 8/3/2020  EXAM: CT ABDOMEN PELVIS WO ORAL W IV CONTRAST LOCATION: [facility] DATE/TIME: 8/3/2020 5:03 PM INDICATION: RLQ abdominal pain, appendicitis suspected (Age > 14y) Right sided abdominal pain COMPARISON: None. TECHNIQUE: CT scan of the abdomen and pelvis was performed following injection of IV contrast. Multiplanar reformats were obtained. Dose reduction techniques were used. CONTRAST: Iohexol (Omni) 100 mL FINDINGS: LOWER CHEST: Diffuse interstitial opacities throughout the visualized lower lung fields. HEPATOBILIARY: Trace ascites adjacent to  the liver. Moderate distention of the gallbladder. PANCREAS: Normal. SPLEEN: Borderline splenomegaly is unchanged measuring 13 cm with trace adjacent fluid. ADRENAL GLANDS: Normal. KIDNEYS/BLADDER: Normal. BOWEL: The appendix is identified within the mid right pelvis and is of normal caliber containing a tiny amount of air. There is some minimal edema adjacent to the appendix and cecum. Slight wall thickening involving the cecum and ascending colon with a small amount of adjacent ascites. No evidence for bowel obstruction. LYMPH NODES: Normal. VASCULATURE: Advanced atherosclerotic disease abdominal aorta and iliac arteries with high-grade stenosis involving the proximal right and left common iliac arteries. Prominent collateral vessels along the lesser curvature of the stomach are more distended compared to the prior study and could be secondary to portal venous hypertension. PELVIC ORGANS: Normal. MUSCULOSKELETAL: Small fat-containing ventral hernia.     1.  Appendix is of normal caliber. There is a small amount of ascites adjacent to the base of the cecum and ascending colon with slight wall thickening which could represent an acute colitis. No evidence for bowel obstruction. 2.  Trace ascites adjacent to the liver and spleen. 3.  Advanced atherosclerotic disease. 4.  Prominent collateral vessels/possible gastric varices lesser curvature of the stomach. 5.  Interstitial opacities throughout the visualized lower lung fields. Please refer to CTA chest report for further discussion.    Us Abdomen Limited    Result Date: 8/20/2020  EXAM: US ABDOMEN LIMITED LOCATION: [facility] DATE/TIME: 8/20/2020 5:07 PM INDICATION: Ascites. Hyperbilirubinemia. COMPARISON: CT of the abdomen and pelvis 08/17/2020; abdominal ultrasound 08/13/2020 TECHNIQUE: Limited abdominal ultrasound. FINDINGS: GALLBLADDER: Nondistended gallbladder. Diffuse gallbladder wall thickening measuring up to 8 mm. No echogenic calculi or billary  sludge are detected. There is mild pericholecystic fluid. BILE DUCTS: No biliary dilatation. The common duct measures 7 mm. LIVER: Coarse parenchymal echogenicity with nodular border consistent with cirrhosis. No focal mass. RIGHT KIDNEY: No hydronephrosis. PANCREAS: The visualized portions are normal. Moderate ascites in the right upper quadrant.     1.  Diffuse wall thickening, accentuated by decompression. New mild pericholecystic fluid. No cholelithiasis. Findings could relate to third spacing and underlying liver disease. Acalculous cholecystitis is in the differential diagnosis in setting of sepsis from colitis. Consider HIDA scan if patient is able. 2.  Cirrhosis. 3.  Moderate right upper quadrant abdominal ascites.    Us Abdomen Limited    Result Date: 8/13/2020  EXAM: US ABDOMEN LIMITED LOCATION: [facility] DATE/TIME: 8/13/2020 8:02 AM INDICATION: worsening LFT's right upper quad pain, eval for galbladder infection, or stones in the ducts COMPARISON: None. TECHNIQUE: Limited abdominal ultrasound. FINDINGS: GALLBLADDER: Normal. No gallstones, wall thickening, or pericholecystic fluid. Negative sonographic Ludwig's sign. BILE DUCTS: No biliary dilatation. The common duct measures 4 mm. LIVER: Coarsened hepatic parenchymal echotexture. Appearance suggestive of underlying hepatic fibrosis. No focal mass. RIGHT KIDNEY: No hydronephrosis. PANCREAS: The visualized portions are normal. No ascites.     1.  Coarsened hepatic parenchymal echotexture concerning for underlying hepatic fibrosis. 2.  No other significant findings.    Us Abdomen Limited    Result Date: 8/4/2020  EXAM: US ABDOMEN LIMITED LOCATION: [facility] DATE/TIME: 8/4/2020 5:59 PM INDICATION: Hyperbilirubinemia, sepsis, evaluate for cholecystitis, CBD dilation COMPARISON: CT 8/3/2020 . TECHNIQUE: Limited abdominal ultrasound. FINDINGS: GALLBLADDER: Isoechoic intraluminal tissue related to the anterior wall of the gallbladder measuring  approximately 2.5 x 2.0 x 1.0 cm has no internal Doppler flow. No significant gallbladder wall thickening with gallbladder wall thickness approximately 3  mm. Gallbladder otherwise negative. Negative sonographic Ludwig's sign. BILE DUCTS: No intrahepatic biliary dilatation. The common duct measures 6 mm, upper normal. LIVER: Coarsened hepatic parenchymal echotexture and slight hepatic contour irregularity. No focal mass. RIGHT KIDNEY: No hydronephrosis. PANCREAS: The visualized portions are normal. Trace ascites.     1.  Isoechoic intraluminal tissue related to the anterior wall of the gallbladder measuring approximately 2.5 x 2.0 x 1.0 cm indeterminate for adherent sludge material versus polyp but favor sludge material given lack of internal Doppler flow. 2.  No gallbladder wall thickening. 3.  No intrahepatic biliary dilatation. The common duct measures 6 mm, upper normal. 4.  Coarsened hepatic parenchymal echotexture and slight hepatic contour irregularity suggestive of underlying hepatic fibrosis/cirrhosis. 5.  Trace ascites.    Us Venous Arms Bilateral    Result Date: 8/11/2020  EXAM: US VENOUS ARMS BILATERAL LOCATION: [facility] DATE/TIME: 8/11/2020 12:31 PM INDICATION: hypoxia, eval for DVT COMPARISON: None. TECHNIQUE: Venous Duplex ultrasound of both upper extremities with (when possible) and without compression, augmentation, and duplex. Color flow and spectral Doppler with waveform analysis performed. FINDINGS: Ultrasound includes evaluation of the internal jugular veins, innominate veins, subclavian veins, axillary veins, and brachial veins. The superficial cephalic and basilic veins were also evaluated where seen. RIGHT: No deep venous thrombosis. No superficial thrombophlebitis. LEFT: No deep venous thrombosis. No superficial thrombophlebitis.     1.  No deep venous thrombosis in the bilateral upper extremities.    Us Abdomen Duplex Hepatic And Portal Vasculature Complete    Result Date:  "8/21/2020  EXAM: US ABDOMEN HEPATIC AND PORTAL VASCULATURE COMPLETE LOCATION: [facility] DATE/TIME: 8/20/2020 5:16 PM INDICATION: Evaluate for hepatic vein thrombus / Budd-Chiari /venous thromboembolism COMPARISON: Abdominal ultrasound performed the same day. TECHNIQUE: Complete abdominal ultrasound. Color flow with spectral Doppler and waveform analysis performed.  FINDINGS: ABDOMINAL DUPLEX: The main, right and left portal veins are patent with hepatopedal fugal/reversed flow. The hepatic veins, IVC and splenic vein are patent with flow in the normal direction. The hepatic artery appears patent. Again noted is a cirrhotic configuration of the liver with perihepatic ascites.     1.  Patent main, right and left portal veins with hepatofugal/reversed flow. 2.  Patent inferior vena cava, hepatic veins and splenic vein with normal flow direction. 3.  Cirrhotic configuration of the liver with perihepatic ascites.    Poc Us 3cg Picc Placement Guidance    Result Date: 8/20/2020  Exam was performed as guidance for PICC line insertion.  Click \"PACS images\" hyperlink below to view any stored images.  For specific procedure details, view procedure note authored by PICC/Vascular Access Nurse.    Poc Us 3cg Picc Placement Guidance    Result Date: 8/4/2020  Exam was performed as guidance for PICC line insertion.  Click \"PACS images\" hyperlink below to view any stored images.  For specific procedure details, view procedure note authored by PICC/Vascular Access Nurse.    Ct Chest Abdomen Pelvis Without Oral With Iv Contrast    Result Date: 8/17/2020  EXAM: CT CHEST ABDOMEN PELVIS WO ORAL W IV CONTRAST LOCATION: [facility] DATE/TIME: 8/17/2020 12:22 PM INDICATION: Sepsis of uncertain etiology. COMPARISON: Abdominal ultrasound 8/13/2020, CT chest 11/20/2020 and CT abdomen pelvis 8/3/2020 TECHNIQUE: CT scan of the chest, abdomen, and pelvis was performed following injection of IV contrast. Multiplanar reformats were " obtained. Dose reduction techniques were used. CONTRAST: 100 mL Omnipaque [id number] FINDINGS: LUNGS AND PLEURA: Extensive upper lung predominant emphysema. Subsegmental atelectasis within the right lower lobe. Small amount of endobronchial airway secretions in the lower lobes, greater on the right. Tiny focus of consolidation within the medial left lower lobe image 114. No Pleural effusion. MEDIASTINUM/AXILLAE: Right upper extremity PICC. No mass/adenopathy nor pericardial effusion. Moderate coronary artery calcifications. HEPATOBILIARY: Cirrhotic appearing liver without focal mass. Recanalized paraumbilical vein. Gallbladder wall thickening could be secondary to chronic liver disease. No bile duct dilatation. PANCREAS: No significant mass, duct dilatation, or inflammatory change. SPLEEN: Stable borderline splenomegaly measuring 12.8 cm. Splenic vein is patent. ADRENAL GLANDS: No significant nodules. KIDNEYS/BLADDER: No significant mass, stone, or hydronephrosis. BOWEL: New right-sided colitis extending from the cecum to the proximal transverse colon could be infectious or ischemic. No pneumatosis. New mild ascites extending from right upper quadrant to the pelvis. LYMPH NODES: No lymphadenopathy. VASCULATURE: Prominent calcified esophageal varices. Extensive aortoiliac atherosclerosis. PELVIC ORGANS: Normal-appearing uterus and ovaries. MUSCULOSKELETAL: No suspicious osseous lesions.     1.  Left-sided colitis extending from cecum to proximal transverse colon. Differential considerations would include both infectious and ischemic etiologies. New mild ascites in the right abdomen with no evidence of pneumatosis, perforation nor abscess. 2.  Extensive emphysema. Bibasilar subsegmental atelectasis with no focal pneumonia nor pleural effusion. 3.  Cirrhotic appearing liver with gastroesophageal varices and stable borderline splenomegaly.

## 2021-06-10 NOTE — PROGRESS NOTES
Hospice services continue to be available if there is anything we can help with.  Currently following on the periphery per daughters wishes to continue with aggressive treatment measures.

## 2021-06-10 NOTE — PLAN OF CARE
"  Problem: Inadequate Gas Exchange  Goal: Patient will achieve/maintain normal respiratory rate/effort  Outcome: Progressing     Problem: Daily Care  Goal: Daily care needs are met  Outcome: Progressing   Patient repositioned throughout day to help promote skin integrity. Continues to be npo due to concern of aspiration. Purewick utilized to monitor urine output. Concerned about decreased urine output this afternoon. Patient finally voided this evening and urine was dark genesis/orange in color. Hospitalist notified and new orders received. Patient has gotten a total of 1L today total with boluses. Echo bubble study. US of extremities negative. Labs sent. Still need chest CT - waiting for availability.     Patient's daughter showed up this evening around 1800. Writer had called her an hour prior asking if she was planning on still coming today. Had many excuses as to why she did not make it earlier in the day. Writer emphasized the importance of setting a time for tomorrow so that healthcare team can talk with patient about goals of care/plan. Patient very anxious and constantly moving. Was going on about all of her concerns with everything that is going on and why this is happening. Therapeutic communication utilized with some success. Daughter reports she has worked in healthcare in the past. Wishes she didn't \"know so much\". Writer emphasized that we would pick a time in the morning.     Alarms utilized to promote patient safety. Purposeful rounding performed. Will continue to monitor. Genesis Yousif RN    "

## 2021-06-10 NOTE — PROGRESS NOTES
"Palliative Care Note    Called and spoke with daughter Telma as she had concerns regarding her mother's possible unaddressed pain. Telma states that her mother was taking tylenol regularly for pain in her upper back, lower back and bilateral hips. She also used topical treatments such as icy hot. She understands the concerns with opioid pain medications in terms of somnolence, aspiration risk etc. We discussed that tylenol would not be recommended given liver disease. Telma asked about Toradol. We discussed that NSAIDs would not be recommended given TCP. Even Voltaren gel per pharmacy would be contraindicated with her liver disease. Telma understands this.     We then had a long conversation about her mother's medical condition, particularly liver disease. Telma realizes that her mother has an overall poor prognosis given liver disease and advanced COPD. She does not think her mother will ever be able to come home again as she needs more care than she can provide, even if she did enroll in hospice. Shared concern that patient is at high risk of re-admission given her underlying medical problems and asked her what her goals are for her mother. If her time were limited, would her mother want to focus on quality of life or risk being in and out of the hospital at end of life. Telma feels her mother would want a \"chance\" to see if her cognition and strength can improve and to follow up with a neurologist regarding encephalopathy and liver specialist to see if she is really at a point of irreversible liver damage. Her biggest concern is her mother going to a SNF because she feels her mother would decline rapidly. She would consider hospice in that case but would prefer a smaller residential hospice facility for her mother such as the [facility].     Kimberley Nava PA-C  Palliative Care   To contact me via Text Page, click here or Palliative Care Service Line at [phone]      "

## 2021-06-10 NOTE — PROGRESS NOTES
Tried to jointly call daughter Telma with colleague Melia however unable to reach. Per chart review see that dtr Telma is coming between 3-4 to meet with Dr. Brower, however per Elizabeth RN no callback to confirm this from Dr. Brower. Will attempt to meet with Telma while she is here. Asked Elizabeth RN to call writer when she arrives.     Hailey MORIN, NP-C  Palliative Care   To contact me via Text Page, click here or Palliative Care Service Line at [phone]

## 2021-06-10 NOTE — PROGRESS NOTES
Patient on 5L NC this AM sating low-mid 80's, placed on 7L Oxymizer at 0815 and has remained low 90's this shift. Continue o monitor and support as needed.

## 2021-06-10 NOTE — PLAN OF CARE
Problem: Discharge Barriers  Goal: Patient's discharge needs are met  Outcome: Progressing   Goal: Patient s discharge needs are met.  Outcome: Care Progression reviewed with Hospitalist, Care Manager.  Discharge Disposition: Discussed and plan to discharge to: TCU vs Hospice care   Planned Discharge Date: 1-2 days   Problem: Barriers to discharge include: IV meds, O2 needs, Ethics following   Transportation needs/Ride Time: DARWIN Conde, SW

## 2021-06-10 NOTE — PROGRESS NOTES
"Patient off Bi-pap from 0800 today and on HFNC . Flow has been on 60 L/M all day. FIO2 has been from 80% down to 50% from time to time. SpO2 varies with patient position.     Plan to place patient back on Bi-pap for the night as needed.    Nebs continued with albuterol/atrovent every six hours.    /82 (Patient Position: Semi-hopper)   Pulse 87   Temp 98.2  F (36.8  C) (Axillary)   Resp 24   Ht 5' 2\" (1.575 m)   Wt 167 lb 4.8 oz (75.9 kg)   SpO2 90%   BMI 30.60 kg/m      Lungs diminished    Oswaldo Garcia, LRT    "

## 2021-06-10 NOTE — PLAN OF CARE
Problem: Impaired Gas Exchange  Goal: Demonstrate improved ventilation and adequate oxygenation of tissues as evidenced by absence of respiratory distress  Outcome: Progressing     Yazmin Deng, LRT

## 2021-06-10 NOTE — PROGRESS NOTES
[facility] Palliative Progress Note    History of Present Illness:  60 y.o. female with past medical history of tobacco use, alcohol use disorder, bipolar disorder, previous suicide attempt with overdose, GÉNESIS, PAD, worsening dementia and imbalance (possible Warnicke encephalopathy), and radiographic emphysema (presumed COPD) who was admitted on 8/3/2020 for abdominal pain, lactic acidosis, hyperbilirubinemia, acute respiratory failure with hypoxia, and acute encephalopathy. Hospital course was notable for severe hypoxia requiring up to 60 L HF NC and 55% FiO2, guarded prognosis, and ongoing encephalopathy.  Hypoxic respiratory failure likely secondary to combination of severe emphysema, volume overload/pulmonary edema, possible COPD exacerbation, and/or CAP VS aspiration.  She was treated with antibiotics, diuresis.     Her course has been complicated by agitation, confusion. Psychiatry consulted to assist. She is also medically complex with liver failure, possible hepatic encephalopathy, acute respiratory failure. +Sepsis and colitis.  ID and psychiatry following.        Chart review of last 24 hours:  No events noted overnight. Still encephalopathic    Chief Complaint:   Wanting water    Health Care Directives/Code Status:  No HCD. DNR/DNI.     Review of Systems:  Unable due to AMS      PPS:   40%- 1. Mainly in bed; 2. Unable to do most activity, extensive disease; 3. Mainly assistance; 4. Normal or reduced; 5. Full or drowsy +/- confusion    Physical Examination:  General: restless, looks ill  HEENT: dry oral mucosa  RESPIRATORY: NC O2,  non-labored breathing  SKIN: Exposed skin intact, dusky skin  NEURO: Alert, confused, not oriented  PSYCH: anxious, restless, easily irritable    Pertinent Labs:  I have personally reviewed all pertinent labs.  Results for orders placed or performed during the hospital encounter of 08/03/20   Comprehensive Metabolic Panel   Result Value Ref Range    Sodium 137 136 - 145  "mmol/L    Potassium 3.7 3.5 - 5.0 mmol/L    Chloride 108 (H) 98 - 107 mmol/L    CO2 22 22 - 31 mmol/L    Anion Gap, Calculation 7 5 - 18 mmol/L    Glucose 193 (H) 70 - 125 mg/dL    BUN 16 8 - 22 mg/dL    Creatinine 0.70 0.60 - 1.10 mg/dL    GFR MDRD Af Amer >60 >60 mL/min/1.73m2    GFR MDRD Non Af Amer >60 >60 mL/min/1.73m2    Bilirubin, Total 6.1 (H) 0.0 - 1.0 mg/dL    Calcium 7.5 (L) 8.5 - 10.5 mg/dL    Protein, Total 5.5 (L) 6.0 - 8.0 g/dL    Albumin 1.5 (L) 3.5 - 5.0 g/dL    Alkaline Phosphatase 252 (H) 45 - 120 U/L    AST 52 (H) 0 - 40 U/L    ALT 77 (H) 0 - 45 U/L     CAP on 8/17/20:     IMPRESSION:   1.  Left-sided colitis extending from cecum to proximal transverse colon. Differential considerations would include both infectious and ischemic etiologies. New mild ascites in the right abdomen with no evidence of pneumatosis, perforation nor abscess.  2.  Extensive emphysema. Bibasilar subsegmental atelectasis with no focal pneumonia nor pleural effusion.  3.  Cirrhotic appearing liver with gastroesophageal varices and stable borderline splenomegaly.    Assessment & Plan:    Palliative Care Encounter:   Had a telephone conference call with the patient's daughter/surrogate Telma, hospitalist Dr. Greenberg, Melia  colleague from the palliative care team, and writer to review patient's most recent medical status and goals of care.  Dr. Greenberg reviewed medically what has been most recently happening with Gali as well as the most recent update of her ischemic colitis.  Telma with multiple questions as to the etiology as to why this ischemic colitis has happened?  She would like further tests done to determine the etiology of this.  Dr. Greenberg expressed that patient has been in distress and stating that she wants to drink water when she has not been able to because of her aspiration risk as well as making comments to the nurses stating \"let me die.\"  He wanted to ensure that we are honoring what the patient would " want and that it is still appropriate to continue with medical interventions?  Per Telma she feels that her mom's mentation is altered with her current encephalopathy and does not feel that this is not a true accurate representation of what she would or would not want.      She wants to continue with medical measures at this point.  She continues to express that she realizes that her mom's prognosis is guarded and feels like she knows when the time will come to stop pursuing further interventions but at this time wants to continue to treat and figure out what is going on with her mom.  She did express concern as to how her mom could not be with such severity of illness so quickly in her mind?    Called Melia stayed on the phone to process information with her and emotions.  Please refer to her note for further details.  Appreciate our interdisciplinary team collaboration to provide best care for Gali and her daughter Telma.    Symptom Management:  AMS- multifactoral secondary to hepatic, Warnicke's encephalopathy, and sepsis. Treatment per primary team.     Restlessness with agitation- psychiatry following, appreciate their recs.       Hailey Williamson CNP  Palliative Care Team  Office #: [phone]    Total Time >65 mins, over 50% spent in counseling and coordination of care including review of notes, labs and imaging in EPIC, discussion of care plan with patient's dtr Telma regarding goals of care and symptom management, and time spent in documentation on the patient's floor today. Coordinated with nursing and primary team.     Advance [facility] Plannin minutes spent from [id number] discussing advance care planning and reviewing medical status with her surrogate Telma via phone.

## 2021-06-10 NOTE — PLAN OF CARE
Goal: Patient s discharge needs are met.  Outcome: Care Progression reviewed with Hospitalist, Care Manager.  Discharge Disposition: Discussed and plan to discharge to: TCU, referrals sent  Planned Discharge Date: 8/17-8/18  Problem: Barriers to discharge include: pain control, monitor labs, clinical progression, placement  Transportation needs/Ride Time: TBD    SREEDHAR placed calls to referred facilities to check on bed availability. SREEDHAR left VMs for [facility] admissions requesting call back. SREEDHAR spoke with admissions at [facility] who request SW resend the referral which SREEDHAR did. SREEDHAR will follow.  Christine Kaur, MYRNA, [facility]     Addendum: SREEDHAR heard back from facilities above that they are unable to accept pt, most not in network with pts insurance. SREEDHAR reviewed in network facilities on [facility] website. SREEDHAR placed call to pts daughter Telma to discuss. She reported understanding. Discussed sending additional referrals and she was agreeable though was concerned about pt as she reports she was already not wanting to go. She also reports concern about the 14 day quarantine as she feels pt will struggle if she is unable to visit. Discussed checking in [facility] and checking on their quarantine policies. Etlma agreeable and also asks to check with Iza Romano. SREEDHAR sent these additional referrals.  11:00 AM    SREEDHAR reached out to additional referred facilities.  11:43 AM    SREEDHAR discussed with MD. SREEDHAR placed call to pts daughter Telma. Discussed information from TCU facilities including that [facility] could potentially accept pt but the facility would depend on when pt is ready for discharge from the hospital. Per [facility] admissions they are requiring a 14 day quarantine with no visitors but allow window visits if someone is on the first floor of their building. Discussed still waiting to hear back from some facilities. Telma reports understanding. She reports that she is feeling pt may end up  going on hospice since she has had a decline. She reports being open to revisiting this with MD tomorrow.  SREEDHAR updated MD via text page.  3:08 PM

## 2021-06-10 NOTE — PLAN OF CARE
Care Plan-Care Management  Care Management Goals of the Day: progression of care    Care Progression Reviewed With: Charge RN, RNCM, CMSW, BRENDAN, MD  Barriers to Discharge: Clinical Progression  Discharge Disposition: TCU vs Hospice   Expected Discharge Date: 1-2 days   Transportation: Family     3:45 PM  08/19/20  SREEDHAR Montez

## 2021-06-10 NOTE — PROGRESS NOTES
[facility] MEDICINE PROGRESS NOTE      Identification/Summary: Keiko Guo is a 60 y.o. female with a past medical history of tobacco use, alcohol use disorder, bipolar disorder, previous suicide attempt with overdose, GÉNESIS, PAD, worsening dementia and imbalance (possible Warnicke encephalopathy), and radiographic emphysema (presumed COPD) who was admitted on 8/3/2020 for abdominal pain, lactic acidosis, hyperbilirubinemia, acute respiratory failure with hypoxia, and acute encephalopathy. Hospital course was notable for severe hypoxia requiring up to 60 L HF NC and 55% FiO2, guarded prognosis, and ongoing encephalopathy.  Hypoxic respiratory failure likely secondary to combination of severe emphysema, volume overload/pulmonary edema, possible COPD exacerbation, and/or CAP VS aspiration.  She was treated with antibiotics, diuresis.    She has clinically improved from a medical standpoint and is now weaning from oxygen. She is more alert and taking things orally although she does require thickened liquids. She is still confused, agitated and accusatory at times.    Chart review 8/16:  -Temperature 98.1, heart rate 70, respirations 18, blood pressure 90/53, oxygen saturation 91% on nasal cannula at 2 L/min  -Potassium 3.4 this morning, sodium is 136, creatinine is 0.8, AST is 93, ALT of 120, alkaline phosphatase is 216 which is rising slightly over the past several days, bilirubin is 5.3 which is decreasing over the last several days.  -White count of 11.7, hemoglobin 10.6 which is stable, platelets of 33 which is stable from yesterday  -Seen by psychiatry this morning, started scheduled Depakote, Keppra at bedtime  -Nursing note overnight found patient to be lethargic throughout the shift, one-to-one sitter for safety, incontinence of bowel and bladder, large bowel movement    Assessment and Plan:  Acute Hypoxic Respiratory Failure, multifactorial  Severe Emphysema with COPD Acute  Exacerbation  Aspiration PNA vs CAP, SARS-CoV-2 negative-completed course of antibiotics  Pulmonary Edema, Volume Overload  She is clinically improved from a respiratory standpoint at this time  Now requiring oxygen at 2 L/min via nasal cannula  Currently on prednisone taper  Seems like events leading to presentation were: Abdominal pain or back pain, taking opiates at home leading to somnolence, respiratory acidosis, possible aspiration  At baseline her health is very poor, she has a history of alcohol abuse  Her pulmonary status may be at baseline at this time    Alcohol abuse/dependence  Wernicke encephalopathy  Acute metabolic encephalopathy, questionable hepatic encephalopathy  Bipolar disorder versus anxiety/depression at baseline-on Seroquel at home  Alert now but still very confused  Has asterixis  Continue thiamine supplement  Trying low-dose lactulose to see if this improves some of her confusion  Psychiatry consulted, appreciate their recommendations  Started scheduled Depakote 3 times daily, Keppra at bedtime, melatonin at bedtime  Clinically very consistent with Wernicke's today  I think that Hospice at a facility would be appropriate  Appreciate palliative care help, will ask for hospice to speak with family again    Abdominal pain  Acutely elevated liver enzymes, acute on chronic thrombocytopenia  Recent Hep B infection  Jaundice, hepatic fibrosis  Likely acute liver failure in setting of alcoholic cirrhosis  Appreciate GI recs    Moderate to severe malnutrition  Now able to tolerate oral intake, seems to be doing well with thickened liquids  Albumin very low at 1.7  Appreciate dietitian recommendations    Hypernatremia, resolved  Appreciate renal recs, now on 1/2NS with KCl at 50/hr  Oral intake improving  IV fluids discontinued    Advanced Care Planning  She is DNR and is a hospice candidate if pt/family are interested at dc  Not comfort care at this time    Prolonged QTC   on last check  -  avoid QT prolonging meds as able, replace mag and potassium prn    Diet: Diet Dysphagia I (Pureed), 2 Gm Na; Honey thick liquids LIQUIDS BY SPOON  Dietary nutrition supplements Magic cup; BID with meals  DVT Prophylaxis:  VTE Prophylaxis contraindicated due to thrombocytopenia  Code Status: No CPR- Do NOT Intubate  Disposition: Stay today as medications are being adjusted.  Needs to be off of one-to-one, able to tolerate oral intake or be comfort care/hospice to discharge.    Interval History/Subjective:  Pt is angry, frustrated today. Denies pain or dyspnea but is not really making sense with her responses to questions today.    Physical Exam/Objective:  Vitals I/O   Temp:  [97.2  F (36.2  C)-98.5  F (36.9  C)] 98.1  F (36.7  C)  Heart Rate:  [70-98] 70  Resp:  [17-18] 18  BP: ()/(53-58) 90/53  SpO2:  [82 %-95 %] 91 %  I/O last 3 completed shifts:  In: 1090 [P.O.:1090]  Out: -    155 lb 3.2 oz (70.4 kg)  Body mass index is 28.39 kg/m .    Physical Exam:    Gen: no acute distress, agitated, ill-appearing  ENT: notable scleral icterus  Pulm: slightly labored breathing with conversation  CV: regular rate and rhythm, no edema  Derm: bruising of the face, jaundiced  Psych: Confused, angry, occasionally may be confabulating with odd stories about a cabin, about a drain in her head    Medications:   Personally Reviewed.    divalproex  125 mg Oral TID     lactulose  20 g Oral BID     melatonin  3 mg Oral QHS     nicotine  1 patch Transdermal DAILY     omeprazole  20 mg Oral BID     potassium chloride  10 mEq Intravenous Q1H     predniSONE  20 mg Oral Daily with brkfst    Followed by     [START ON 8/19/2020] predniSONE  15 mg Oral Daily with brkfst    Followed by     [START ON 8/22/2020] predniSONE  10 mg Oral Daily with brkfst    Followed by     [START ON 8/25/2020] predniSONE  5 mg Oral Daily with brkfst     QUEtiapine  25 mg Oral QHS     sodium chloride  10-30 mL Intravenous Q8H FIXED TIMES     thiamine  100 mg  Oral DAILY           Liang Moore, DO

## 2021-06-10 NOTE — PROGRESS NOTES
RESPIRATORY CARE NOTE     Patient Name: Keiko Guo  Today's Date: 8/7/2020       Pt continues to receive duo neb. BS are deminished. Pt is on 55% of oxygen via BiPAP 14/8, SpO2 is 92%. Post treatment there is increased aeration. Pt also perceives improvement.  RT encouraged deep breathing and coughing techniques .  RT will continue to monitor and assess.     Kushal Noyola, WILFRIDT

## 2021-06-10 NOTE — PLAN OF CARE
"Writer spoke with patient's daughter. Established a time of 1430 for her to come. Made note of the current time (1210) when writer called to the daughter. Daughter is saying that she is \"horrible with time\" and does not like to \"commit\" to times. She went on about this yesterday evening as well when writer spoke with her. Rapid speech. Daughter agreed on being here by 1430. Emphasized importance of her being here to establish goals of care/plan - agreeable. Writer notified hospitalist and  on patient's care team as well. Leatha Yousif RN    "

## 2021-06-10 NOTE — PROGRESS NOTES
[facility] MEDICINE PROGRESS NOTE      Identification/Summary: Keiko Guo is a 60 y.o. female with a past medical history of tobacco use, alcohol use disorder, bipolar disorder, previous suicide attempt with overdose, GÉNESIS, PAD, worsening dementia and imbalance (possible Warnicke encephalopathy), and radiographic emphysema (presumed COPD) who was admitted on 8/3/2020 for abdominal pain, lactic acidosis, hyperbilirubinemia, acute respiratory failure with hypoxia, and acute encephalopathy. Hospital course was notable for severe hypoxia requiring up to 60 L HF NC and 55% FiO2, guarded prognosis, and ongoing encephalopathy.  Hypoxic respiratory failure likely secondary to combination of severe emphysema, volume overload/pulmonary edema, possible COPD exacerbation, and/or CAP VS aspiration.  She was treated with antibiotics, diuresis.    Her course has been complicated by agitation, confusion. Psychiatry consulted to assist. She is also medically complex with liver failure, possible hepatic encephalopathy, acute respiratory failure. Unfortunately, on 8/17, she developed sepsis with rising WBC, tachycardia, elevated lactic acid and guarding to palpation in the RUQ; she was found to have developed severe sepsis 2/2 colitis, with lactic acid peaked to 5.6 at 19:49 on 8/17; IVF and ciprofloxacin/metronidazole started and lactic acid has downtrended to < 3.0 at this point; ID was consulted on 8/18 for further assistance and did further broaden to meropenem. Prognosis remains guarded, hospice has been in-touch with the patient's daughter Telma. Telemetry started given sepsis which we will continue for at least another 24-hours. Hyperbilirubinemia noted in mid 5's, and noted for the entirety of her hospitalization has mostly been between 5-6 and no recent or acute changes.     On 8/20/2020, we had family care conference directly on floor with 4-way - Palliative Care KAROLYN Hart, MYRNA Manuel, myself attending MD, and  "patient's daughter/POA Telma, from 12:30-1:00 PM; Telma was fully updated and all medical questions she had were answered several times and repeatedly. Conversation was tangential, circular, repetitive, and ultimately Telma decided, as current POA, to re-insert the PICC that Gali had removed and to proceed with abdominal US and hepatic duplex and continue all current cares and continue current diet restrictions. She often would state that she \"understands\" one aspect/topic and then will make a statement immediately thereafter or shortly thereafter that would directly contradict that. She also stated her wishes for staff to not take the patient's comments, such as \"just let me die\" or \"just let me go home\" or \"just let me drink water please\" literally or seriously as Telma feels those comments are all made in an altered sensorium and not representative of her wishes as proxy/POA.     Yesterday evening patient's daughter Telma tried to visit outside of designated visiting hours (limited because of the worldwide COVID19 pandemic) and when informed that she needed to return during designated visiting hours, she stated at one point that her mother is \"on hospice and going to die in 2 weeks\" - yet this is not true as we have explicitly and on several occasions offered hospice services and transitioning to comfort cares, yet every time Telma has declined this and wished for ongoing aggressive medical care and evaluation.     HIDA scan ordered to evaluated for acalculus cholecystitis, which she is certainly at risk for given her prolonged and severe illness with rising hyperbilirubinemia. Discussed directly with ID, RN, unit charge RN, CM, SW, House Lead/Supervisor (ethics).     OT re-consulted to perform updated cognitive testing today.     Assessment and Plan:  Ischemic Colitis on CT, 8/17  Severe sepsis 2/2 colitis  Lactic Acidosis to 5.6 (8/17), improving  RUQ pain  -More severe pain on 8/17, guarding to palpation  -Not a good " "surgical candidate  -Has had multiple evals by GI, surgery, multiple RUQ ultrasound.  -On 8/17 developed sepsis with tachy with elevated WBC, elevated lactic acid; a STAT CT was ordered, which demonstrated new colitis and IV ciprofloxacin and IV metronidazole started.  -Severe sepsis by definition with lactic acid > 4.0. Peaked to 5.6 overnight at 19:49.  -Lactic acid has downtrended to < 3.0 at this point; ID was consulted on 8/18 for further assistance and did further broaden to meropenem.  -Stabilizing on meropenem; ID recommends for \"up to one week\" duration of treatment  -After extensive family care conference 8/20, POA/daughter Telma decided to continue aggressive medical care and work-up.   -8/20 re-inserted PICC.  -Abdominal US and hepatic duplex nonrevealing but did recommend HIDA to evaluate for acalculus cholecystitis.   -HIDA Scan ordered today. Discussed directly with ID as well.     Acute Hypoxic Respiratory Failure, multifactorial - ongoing, now up to 2 L of O2   Severe Emphysema with COPD Acute Exacerbation  Aspiration PNA vs CAP, SARS-CoV-2 negative-completed course of antibiotics  Pulmonary Edema, Volume Overload  -She is clinically improved from a respiratory standpoint at this time  -Required HFNC 60 L in ICU initially; improved to room air transiently.  -Now back up to 2 L of O2, 4 L transiently overnight, weaning down to goal 88-92%.  -Currently on prednisone taper  -Seems like events leading to presentation were: Abdominal pain or back pain, taking opiates at home leading to somnolence, respiratory acidosis, possible aspiration  -At baseline her health is very poor, she has a history of alcohol abuse  -Her pulmonary status may be at baseline at this time  -I've personally checked the MAR - she already completed a 5 day course of azithromycin and 7-day course of Zosyn to cover CAP as well as aspiration PNA organism.    Alcohol abuse/dependence  Wernicke encephalopathy  Acute metabolic " encephalopathy, questionable hepatic encephalopathy  Bipolar disorder versus anxiety/depression at baseline-on Seroquel at home  -Alert now but still very confused  -Has asterixis  -Continue thiamine supplement  -Trying low-dose lactulose to see if this improves some of her confusion  -Psychiatry consulted, appreciate their recommendations  Per note scheduled Depakote 3 times daily, Keppra at bedtime, melatonin at bedtime   -Clinically still consistent with Wernicke's  -Would consider starting hospice soon as patient's POA would agree to this.  -Telma has declined hospice on several occassions over several days.     Abdominal pain  Acutely elevated liver enzymes, acute on chronic thrombocytopenia  Recent Hep B infection  Jaundice, hepatic fibrosis  Likely acute liver failure in setting of alcoholic cirrhosis  Hyperbilirubinemia  -Appreciate GI recs  -Abdominal US and hepatic duplex nonrevealing but did recommend HIDA to evaluate for acalculus cholecystitis.   -HIDA Scan ordered today. Discussed directly with ID as well.     Moderate to severe malnutrition  -Now able to tolerate oral intake, less alert today  -Albumin very low at 1.7  -Appreciate dietitian recommendations    Hypernatremia, resolved  -Oral intake improved, fluids stopped    Hypocalcemia  -Replacement ordered.   -Agree, given low albumin that may confound, today ordered measured ionized calcium.     Advanced Care Planning  Goals of Care  Family Care Conference 8/20  -She is DNR and is a hospice candidate if pt/family are interested at dc  -Not comfort care at this time   -Appreciate hospice and palliative care help with goals/options at dc  -Given her known Wernicke's encephalopathy as well as hepatic encephalopathy, and certainly with hospital-associated delirium superimposed, I'm not convinced she has capacity to make meaningful medical decisions that would have been consistent with her pre-sick values and thoughts. Her daughter Telma would be proxy as  "POA. Will discuss with psychiatry regarding capacity and proxy decision making.   -8/20 had family care conference directly on floor with 4-way - Palliative Care KAROLYN Hart, MYRNA Manuel, myself attending MD, and patient's daughter/POA Telma, from 12:30-1:00 PM; Telma was fully updated and all medical questions she had were answered several times and repeatedly. Conversation was tangential, circular, repetitive, and ultimately Telma decided, as current POA, to re-insert the PICC that Gali had removed and to proceed with abdominal US and hepatic duplex and continue all current cares and continue current diet restrictions. She often would state that she \"understands\" one aspect/topic and then will make a statement immediately thereafter or shortly thereafter that would directly contradict that. She also stated her wishes for staff to not take the patient's comments, such as \"just let me die\" or \"just let me go home\" or \"just let me drink water please\" literally or to let it impact the patient's care, as Telma feels those comments are all made in an altered sensorium and not representative of her wishes as proxy/POA.     Prolonged QTC  - on last check  - avoid QT prolonging meds as able, replace mag and potassium prn    Diet: Diet Dysphagia I (Pureed), 2 Gm Na; Honey thick liquids LIQUIDS BY SPOON  Dietary nutrition supplements Magic cup; BID with meals  DVT Prophylaxis:  VTE Prophylaxis contraindicated due to thrombocytopenia  Code Status: No CPR- Do NOT Intubate  Disposition: TBD.    Interval History/Subjective:  NAEO.     Family Care Conference yesterday as detailed above.     Yesterday evening patient's daughter Telma tried to visit outside of designated visiting hours (limited because of the worldwide COVID19 pandemic) and when informed that she needed to return during designated visiting hours, she stated at one point that her mother is \"on hospice and going to die in 2 weeks\" - yet this is not true as we have " "explicitly and on several occasions offered hospice services and transitioning to comfort cares, yet every time Telma has declined this and wished for ongoing aggressive medical care.     This morning patient told me that she wants us \"stop everything, I do not want any more testing\" and when discussing the HIDA scan she stated \"I don't want to do it... I won't do it today, no, you cannot make me\" and also asked for \"water please, let me drink water.\" She then declined ionized lab draw, and then declined and would not allow RN's to give medication or perform routine/vitals check.     Discussed directly with ID, RN, unit charge RN, CM, SW, House Lead/Supervisor (ethics).     Physical Exam/Objective:  Vitals I/O   Temp:  [97.3  F (36.3  C)-97.8  F (36.6  C)] 97.5  F (36.4  C)  Heart Rate:  [103-114] 110  Resp:  [18-20] 20  BP: (106-129)/(55-79) 106/59  SpO2:  [90 %-94 %] 94 %  I/O last 3 completed shifts:  In: 1716 [P.O.:470; I.V.:1246]  Out: 325 [Urine:325]   163 lb 12.8 oz (74.3 kg)  Body mass index is 29.96 kg/m .    Physical Exam:    GENERAL:  Alert, appears comfortable, in no acute distress, appears stated age   HEAD:  Normocephalic, without obvious abnormality, atraumatic   EYES:  PERRL, conjunctiva/corneas clear, no scleral icterus, EOM's intact   NOSE: Nares normal, septum midline, mucosa normal, no drainage   THROAT: Lips, mucosa, and tongue normal; teeth and gums normal, mouth moist   NECK: Supple, symmetrical, trachea midline   BACK:   Symmetric, no curvature, ROM normal   LUNGS:   Clear to auscultation bilaterally, no rales, rhonchi, or wheezing, symmetric chest rise on inhalation, respirations unlabored   CHEST WALL:  No tenderness or deformity   HEART:  Regular rate and rhythm, S1 and S2 normal, no murmur, rub, or gallop    ABDOMEN:   Soft, nontender today, no peritoneal finding, palpable ascites moderate volume, no involuntary guarding, bowel sounds normoactive all four quadrants, no masses, no " "organomegaly, no rebound or guarding   EXTREMITIES: Extremities normal, atraumatic, no cyanosis or edema    SKIN: Dry to touch, no exanthems in the visualized areas   NEURO: Alert, oriented to self/name and building name only but not to context, year, month, date, time moves all four extremities freely   PSYCH: For most part is cooperative, behavior is appropriate given context and medical history, affect is withdrawn and angry, and mood is \"not okay\" and saying over and over for us to stop what we're doing     Medications:   Personally Reviewed.    levETIRAcetam  250 mg Oral QHS     melatonin  3 mg Oral QHS     meropenem  1 g Intravenous Q8H     nicotine  1 patch Transdermal DAILY     omeprazole  20 mg Oral BID     potassium chloride  10 mEq Intravenous Q1H     [START ON 8/22/2020] predniSONE  10 mg Oral Daily with brkfst    Followed by     [START ON 8/25/2020] predniSONE  5 mg Oral Daily with brkfst     sodium chloride  10-30 mL Intravenous Q8H FIXED TIMES     sodium chloride  10-30 mL Intravenous Q8H FIXED TIMES     sodium chloride  3 mL Intravenous Line Care     thiamine  100 mg Oral DAILY       >46 mins with 50% of the floor time (for inpatient hospital services) spent providing counseling or coordination of care (C/CC) including directly with ID, RN, unit charge RN, CM, SW, House Lead/Supervisor (ethics).  This includes advanced care planning and goals of care discussion with patient regarding acalculus cholecystitis evaluation, Abd US and Abd Hepatic Duplex results discussion, plan for HIDA scan, discussing ethics with several staff and care members and arranging for updated cognitive testing, treating for infectious and ischemic colitis, stabilizing the patient's hemodynamics including blood pressure and heart rate, formulating the appropriate care plan for today.      Dalton Greenberg MD  Internal Medicine  Hospitalist  [facility]  Phone: #[phone]    "

## 2021-06-10 NOTE — PLAN OF CARE
Problem: Discharge Barriers  Goal: Patient's discharge needs are met  Outcome: Progressing    Continues on IV Abx of Meropenem every 8hrs  K protocol. Recheck tomorrow  Repositioning every 2 hrs.   Childress intact and patent. Cath cares done.  On high flow nc with CROCKER. Instructed DBE.  Safety alarms in use with bed at lowest level.  Frequent rounding done.  Plt critical at 23. MD informed. NNO

## 2021-06-10 NOTE — PROGRESS NOTES
[facility] MEDICINE PROGRESS NOTE      Identification/Summary: Keiko Guo is a 60 year old female with a history of longstanding and active tobacco dependence, probable excessive alcohol use, bipolar disorder with psychotic features and previous overdose suicide attempt, untreated GÉNESIS, PAD, recent worsening dementia and imbalance with presumed diagnosis of Wernicke encephalopathy, radiographic emphysema with presumed COPD, who was admitted with abdominal pain, lactic acidosis, hyperbilirubinemia, dyspnea, acute respiratory failure with hypoxia with abnormal chest CT, and acute encephalopathy. Severe respiratory failure, guarded prognosis, ongoing delirium and encephalopathy. Hospice was consulted 8/9/2020 and her daughter is currently undecided on moving towards comfort cares only but would be interested in discharge to [facility] with [facility] if her mother's clinical status does not improve.  Acute severe hypoxic respiratory failure likely secondary to combined underlying severe emphysema, volume overload/pulmonary edema with COPD exacerbation and +/0 aspirtation vs CAP.    Assessment and Plan:  Acute Hypoxic Respiratory Failure, multifactorial - requiring 60 L of HFNC  Severe Emphysema with COPD Acute Exacerbation  Aspiration PNA / CAP  Pulmonary Edema, Volume Overload  -Ordered lasix 40 mg two times a day   -Prednisone 40 mg daily ordered as per pulmonology  -Duo-nebs scheduled treatments q6H  -Zosyn ordered. She already completed a course of Azithromycin.     Advanced Care Planning  Goals of Care  Hospice and Palliative Care evaluations  -Hospice met on 8/9  -Palliative Care can meet for FCC 8/11    Hypernatremia  -Free water deficit likely from inadequate oral intake in setting of acute illness.  -Na 151 yesterday worsened to 152 overnight, now 150.   -She needs free water. 1/2 NS ordered. CCS/ICU following.  -Check Na every 8-hours ordered.  -May need to consider transitioning to D5 if  not improved; if D5 started then may need to monitor BG more closely and may need more insulin.     Abdominal pain  -Abdomen CT 8/3- slight wall thickening of ascending colon, no obstruction, distended gallbladder  -Abdominal ultrasound subsequently performed- no gallbladder wall thickening or biliary dilatation   -Not a surgical candidate  -Continue IV Zosyn     Alcohol abuse/dependence  Warnicke encephalopathy  -Getting thiamine and folic acid supplement  -Unstable gait secondary to Warnicke encephalopathy  -Getting thiamine     Acute metabolic encephalopathy   -Multifactorial including delirium from recent illness, hypoxemia     Moderate malnutrition, secondary to poor oral intake  -Dietary and nutrition consultation     Hypokalemiia, resume replacement     Thrombocytopenia, secondary to alcohol use  Tobacco use     History of anxiety, depression, bipolar disorder  Patient is confused, extremely weak  Not on medications, will monitor     DVT prophylaxis  No subcu heparin due to thrombocytopenia     Code DNR  Prognosis is extremely guarded.  Met palliative care. Hospice on 8/9/2020 and daughter is not ready to transition to comfort cares yet. Will re-visit daily.     Diet: No diet orders on file  Code Status: No CPR- Do NOT Intubate    Anticipated possible discharge in few days to [facility] (?hospice) once medically progress, course becomes more clear in next 24-48 hrs milestones are met.    Interval History/Subjective:  NAEO.     Patient is brand new to me today. She is very somnolent and opens eyes when prompted but does not follow commands. Per RN, her mental status has been like this since admission.   Patient does not endorse or deny any symptoms on ROS.     Physical Exam/Objective:  Vitals I/O   Temp:  [98.1  F (36.7  C)-98.8  F (37.1  C)] 98.5  F (36.9  C)  Heart Rate:  [82-89] 89  Resp:  [16-24] 20  BP: (101-161)/(57-82) 138/71  SpO2:  [90 %-98 %] 92 %  FiO2 (%):  [50 %-64 %] 64 %  I/O last 3 completed  shifts:  In: 1320.8 [I.V.:870.8; IV Piggyback:450]  Out: 1950 [Urine:1950]   163 lb 14.4 oz (74.3 kg)  Body mass index is 29.98 kg/m .  GENERAL:  Sleepy but opens eyes , appears comfortable, in no acute distress, appears older than reported age; on 60 L of HFNC   HEAD:  Normocephalic, without obvious abnormality, atraumatic   EYES:  PERRL, conjunctiva/corneas clear, no scleral icterus   NOSE: Nares normal, septum midline, mucosa normal, no drainage   THROAT: Lips, mucosa, and tongue normal; teeth and gums normal, mouth appear dry   NECK: Supple, symmetrical, trachea midline   BACK:   Symmetric, no curvature, ROM normal   LUNGS:   Transmitted HFNC sounds, coarse throughout, symmetric chest rise on inhalation, respirations unlabored   CHEST WALL:  No tenderness or deformity   HEART:  Regular rate and rhythm, S1 and S2 normal, no murmur, rub, or gallop    ABDOMEN:   Soft, non-tender, bowel sounds active all four quadrants, no masses, no organomegaly, no rebound or guarding   EXTREMITIES: Extremities normal, atraumatic, no cyanosis or edema    SKIN: Dry to touch, no exanthems in the visualized areas   NEURO: Alert, oriented x 4, moves all four extremities spontaneously, does not follow any commands when prompted   PSYCH: Not cooperative        Medications:   Personally Reviewed.    furosemide  40 mg Intravenous BID - diuretic     ipratropium-albuteroL  3 mL Nebulization Q6H - RT     metOLazone  10 mg Oral BID - diuretic     nicotine  1 patch Transdermal DAILY     omeprazole  20 mg Oral QAM (0630)     piperacillin-tazobactam  3.375 g Intravenous Q8H     predniSONE  40 mg Oral Daily with brkfst     QUEtiapine  300 mg Oral QHS     sodium chloride  10-30 mL Intravenous Q8H FIXED TIMES     thiamine (vitamin B1) IVPB  100 mg Intravenous DAILY       Data reviewed today: I personally reviewed all new medications, labs, imaging/diagnostics reports over the past 24 hours. Pertinent findings include:     Labs:    ,   Results from  last 7 days   Lab Units 08/10/20  0444 08/09/20 0417 08/08/20 0447   LN-WHITE BLOOD CELL COUNT thou/uL 6.9 6.6 6.7   LN-HEMOGLOBIN g/dL 10.2* 9.9* 9.0*   LN-HEMATOCRIT % 31.4* 30.1* 26.4*   LN-MEAN CORPUSCULAR VOLUME fL 115* 112* 109*   LN-PLATELET COUNT thou/uL 50* 53* 52*   ,   Results from last 7 days   Lab Units 08/10/20  0444 08/10/20  0000 08/09/20  1414 08/09/20 0417 08/08/20 0447   LN-SODIUM mmol/L 150* 152*  --  151*  --  146*   LN-POTASSIUM mmol/L 4.0 4.1 4.1 2.9*   < > 2.7*   LN-CHLORIDE mmol/L 100 99  --  95*  --  96*   LN-CO2 mmol/L 41* 42*  --  42*  --  37*   LN-BLOOD UREA NITROGEN mg/dL 25* 23*  --  25*  --  21   LN-CREATININE mg/dL 0.66 0.71  --  0.73  --  0.66   LN-CALCIUM mg/dL 7.8* 8.0*  --  7.7*  --  7.3*   LN-ALBUMIN g/dL 2.0*  --   --  2.0*  --  1.8*   LN-PROTEIN TOTAL g/dL 7.0  --   --  7.0  --  6.4   LN-BILIRUBIN TOTAL mg/dL 4.8*  --   --  4.5*  --  3.7*   LN-ALKALINE PHOSPHATASE U/L 169*  --   --  188*  --  180*   LN-ALT (SGPT) U/L 72*  --   --  57*  --  43   LN-AST (SGOT) U/L 91*  --   --  83*  --  75*    < > = values in this interval not displayed.   ,   Results from last 7 days   Lab Units 08/06/20  0115 08/05/20 2013 08/04/20  0941   LN-LACTIC ACID mmol/L 1.6 2.2* 2.7*   ,    and   Results from last 7 days   Lab Units 08/04/20  1031   PROCALCITONIN ng/mL 2.84*        Imaging:  Imaging results 30 days: Xr Chest 1 View Portable    Result Date: 8/10/2020  EXAM: XR CHEST 1 VIEW PORTABLE [facility] DATE/TIME: 8/10/2020 10:45 AM INDICATION: persistent hypoxia COMPARISON: CT chest 8/3/2020     Stable diffuse reticulation and groundglass opacification likely in part part related to chronic fibrotic and emphysematous changes seen on comparison CT. Cannot exclude superimposed edema or atypical pneumonia. No focal consolidation or pleural effusion. Stable heart size.    Ct Head Without Contrast    Result Date: 8/3/2020  EXAM: CT HEAD WO CONTRAST LOCATION: [facility]  DATE/TIME: 8/3/2020 5:04 PM INDICATION: Head trauma, abnormal mental status (Age 19-64y) Fall, bruising to face COMPARISON: Head CT 12/20/2017 and MRI brain 07/23/2017. TECHNIQUE: Routine without IV contrast. Multiplanar reformats. Dose reduction techniques were used. FINDINGS: No evidence of acute intracranial hemorrhage or mass effect. 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 Brain attenuation morphology otherwise normal. The ventricles and sulci are normal for age. Normal gray-white matter differentiation. The basilar cisterns are patent. The globes are unremarkable. The partially imaged paranasal sinuses demonstrate air-fluid level within the right maxillary sinus which could represent acute sinusitis in the appropriate setting. The partially imaged paranasal sinuses are otherwise unremarkable. The mastoid air cells and middle ear cavities are unremarkable. The visualized skull base and calvarium are unremarkable. The     1.  No evidence of acute intracranial hemorrhage or mass effect. 2.  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 3.  Air-fluid level within the right maxillary sinus which could represent acute sinusitis in the appropriate setting.    Mr Brain With Without Contrast    Result Date: 7/23/2020  EXAM: MR BRAIN W WO CONTRAST LOCATION: [facility] DATE/TIME: 7/23/2020 5:51 PM INDICATION: WORSENING ATAXIA-MENTAL STATUS COMPARISON: MRI head 10/08/2016 from [facility]. CT head 12/20/2017 from [facility]. CONTRAST: [facility] 8 TECHNIQUE: Routine multiplanar multisequence head MRI without and with intravenous contrast. FINDINGS: INTRACRANIAL CONTENTS: No acute or subacute infarct. Again seen is  a shunting type moderate-sized arteriovenous malformation in the parasagittal right parietal region superiorly and posteriorly. It measures at least 3.5 cm AP x 2 cm transverse x 4 cm craniocaudad. It has a very similar appearance to the MRI 10/08/2016 from [facility]. No associated new hemorrhage, edema, or mass effect. Scattered nonspecific T2/FLAIR hyperintensities within the cerebral white matter most consistent with mild chronic microvascular ischemic change. Mild generalized cerebral atrophy. No hydrocephalus. Normal position of the cerebellar tonsils. No pathologic contrast enhancement of the brain parenchyma or meninges allowing for some vascular enhancement with the right parietal AVM. SELLA: No abnormality accounting for technique. OSSEOUS STRUCTURES/SOFT TISSUES: Normal marrow signal. The major intracranial vascular flow voids are maintained. ORBITS: No abnormality accounting for technique. SINUSES/MASTOIDS: No paranasal sinus mucosal disease. No middle ear or mastoid effusion.     1.  Again seen is a shunting type moderate-sized arteriovenous malformation in the parasagittal right parietal area superiorly and posteriorly measuring approximately 3.5 cm x 2 cm x 4 cm. It is not changed significantly when compared to MRI head 10/08/2016 from [facility]. No new hemorrhage, edema or mass effect. Follow-up conventional angiography could be considered. 2.  No evidence for new infarct, hemorrhage elsewhere, hydrocephalus or intracranial mass. 3.  Mild age-related changes.     Cta Chest Pe Run    Result Date: 8/3/2020  EXAM: CTA CHEST PE RUN LOCATION: [facility] DATE/TIME: 8/3/2020 5:01 PM INDICATION: PE suspected, high pretest prob SOB, tachycardia COMPARISON: CTA chest, abdomen and pelvis 07/12/2019 TECHNIQUE: CT chest pulmonary angiogram during arterial phase injection of IV contrast. Multiplanar reformats and MIP reconstructions were performed. Dose reduction techniques were used.  CONTRAST: Iohexol (Omni) 100 mL FINDINGS: ANGIOGRAM CHEST: The right and left main pulmonary arteries and the segmental vessels are all patent without evidence for emboli. Suboptimal opacification involving the subsegmental vessels to both lower lobes. Thoracic aorta is negative for dissection. No CT evidence of right heart strain. LUNGS AND PLEURA: Advanced centrilobular emphysema. Extensive airspace and groundglass opacities throughout both upper lobes, with additional interstitial opacities throughout the mid and lower lung fields, all of which are new since the previous study. Subsegmental atelectasis in the bases. No effusions. MEDIASTINUM/AXILLAE: Numerous borderline enlarged mediastinal and hilar nodes with minimal change. UPPER ABDOMEN: Advanced atherosclerotic disease. Splenomegaly. Trace ascites adjacent to the liver. Collateral vessels along the lesser curvature the stomach MUSCULOSKELETAL: Hypertrophic changes thoracic spine.     1.  No evidence for central pulmonary emboli. The peripheral subsegmental vessels to both lower lobes are suboptimally opacified. 2.  Advanced centrilobular emphysema. 3.  Extensive groundglass and airspace infiltrates within both upper lobes concerning for pneumonia, including Covid 19. There are additional diffuse interstitial densities both lungs likely representing edema.     Ct Abdomen Pelvis Without Oral With Iv Contrast    Result Date: 8/3/2020  EXAM: CT ABDOMEN PELVIS WO ORAL W IV CONTRAST LOCATION: [facility] DATE/TIME: 8/3/2020 5:03 PM INDICATION: RLQ abdominal pain, appendicitis suspected (Age > 14y) Right sided abdominal pain COMPARISON: None. TECHNIQUE: CT scan of the abdomen and pelvis was performed following injection of IV contrast. Multiplanar reformats were obtained. Dose reduction techniques were used. CONTRAST: Iohexol (Omni) 100 mL FINDINGS: LOWER CHEST: Diffuse interstitial opacities throughout the visualized lower lung fields. HEPATOBILIARY: Trace  ascites adjacent to the liver. Moderate distention of the gallbladder. PANCREAS: Normal. SPLEEN: Borderline splenomegaly is unchanged measuring 13 cm with trace adjacent fluid. ADRENAL GLANDS: Normal. KIDNEYS/BLADDER: Normal. BOWEL: The appendix is identified within the mid right pelvis and is of normal caliber containing a tiny amount of air. There is some minimal edema adjacent to the appendix and cecum. Slight wall thickening involving the cecum and ascending colon with a small amount of adjacent ascites. No evidence for bowel obstruction. LYMPH NODES: Normal. VASCULATURE: Advanced atherosclerotic disease abdominal aorta and iliac arteries with high-grade stenosis involving the proximal right and left common iliac arteries. Prominent collateral vessels along the lesser curvature of the stomach are more distended compared to the prior study and could be secondary to portal venous hypertension. PELVIC ORGANS: Normal. MUSCULOSKELETAL: Small fat-containing ventral hernia.     1.  Appendix is of normal caliber. There is a small amount of ascites adjacent to the base of the cecum and ascending colon with slight wall thickening which could represent an acute colitis. No evidence for bowel obstruction. 2.  Trace ascites adjacent to the liver and spleen. 3.  Advanced atherosclerotic disease. 4.  Prominent collateral vessels/possible gastric varices lesser curvature of the stomach. 5.  Interstitial opacities throughout the visualized lower lung fields. Please refer to CTA chest report for further discussion.    Us Abdomen Limited    Result Date: 8/4/2020  EXAM: US ABDOMEN LIMITED LOCATION: [facility] DATE/TIME: 8/4/2020 5:59 PM INDICATION: Hyperbilirubinemia, sepsis, evaluate for cholecystitis, CBD dilation COMPARISON: CT 8/3/2020 . TECHNIQUE: Limited abdominal ultrasound. FINDINGS: GALLBLADDER: Isoechoic intraluminal tissue related to the anterior wall of the gallbladder measuring approximately 2.5 x 2.0 x 1.0 cm  "has no internal Doppler flow. No significant gallbladder wall thickening with gallbladder wall thickness approximately 3  mm. Gallbladder otherwise negative. Negative sonographic Ludwig's sign. BILE DUCTS: No intrahepatic biliary dilatation. The common duct measures 6 mm, upper normal. LIVER: Coarsened hepatic parenchymal echotexture and slight hepatic contour irregularity. No focal mass. RIGHT KIDNEY: No hydronephrosis. PANCREAS: The visualized portions are normal. Trace ascites.     1.  Isoechoic intraluminal tissue related to the anterior wall of the gallbladder measuring approximately 2.5 x 2.0 x 1.0 cm indeterminate for adherent sludge material versus polyp but favor sludge material given lack of internal Doppler flow. 2.  No gallbladder wall thickening. 3.  No intrahepatic biliary dilatation. The common duct measures 6 mm, upper normal. 4.  Coarsened hepatic parenchymal echotexture and slight hepatic contour irregularity suggestive of underlying hepatic fibrosis/cirrhosis. 5.  Trace ascites.    Poc Us 3cg Picc Placement Guidance    Result Date: 8/4/2020  Exam was performed as guidance for PICC line insertion.  Click \"PACS images\" hyperlink below to view any stored images.  For specific procedure details, view procedure note authored by PICC/Vascular Access Nurse.      EKG: Personally reviewed.    >41 mins with 50% of the floor time critical care spent providing counseling or coordination of care (C/CC).  This includes acute evaluation, management/treatment, and stabilization of severe acute hypoxic respiratory failure requiring 60 L of HFNC, aspiration/community pneumonia, COPD acute exacerbation, acute metabolic encephalopathy multifactorial, Wernicke's encephalopathy, and advanced care planning and goals of care and consideration of hospice and palliation for this patient.         Dalton Nichols  [facility]    "

## 2021-06-10 NOTE — PROGRESS NOTES
Infectious Disease Progress Note    Assessment/Plan  Impression: Pneumonia and respiratory failure.     No pathogen identified, concern for COVID-19 although first to cover test were negative, they were acquired within 8 hours of each other.    Somewhat better today.      Recommendations: If patient not clearly improving tomorrow, would recommend another COVID-19 PCR test.     Let's get another COVID-19 PCR. If still negative, and as improving, I think could dc special precautions.  Principal Problem:    COPD with acute exacerbation (H)  Active Problems:    Tobacco abuse    Acute respiratory failure with hypoxemia (H)    Abnormal chest CT    Right upper quadrant pain    Acute pulmonary edema (H)      Js Agosto MD  [phone]      Subjective  Still a bit encephalopathic.    Having some loose stool.       Objective    Vital signs in last 24 hours  Temp:  [97.4  F (36.3  C)-98.2  F (36.8  C)] 97.4  F (36.3  C)  Heart Rate:  [] 108  Resp:  [22-30] 22  BP: (103-137)/(54-82) 137/61  FiO2 (%):  [35 %-60 %] 35 %  Weight:   183 lb 1.6 oz (83.1 kg)    Intake/Output last 3 shifts  I/O last 3 completed shifts:  In: 630 [P.O.:120; I.V.:60; IV Piggyback:450]  Out: 200 [Urine:200]  Intake/Output this shift:  I/O this shift:  In: 100 [IV Piggyback:100]  Out: 900 [Urine:900]    Review of Systems   Pertinent items are noted in HPI., otherwise negative.    Physical Exam  General appearance: alert, appears older than stated age, cooperative and mildly obese  Head: Normocephalic, without obvious abnormality, Some bruises around the right eye.  No worse  Eyes: Extraocular muscles intact    Lungs: hard to auscultate.    Extremities: extremities normal, atraumatic, no cyanosis or edema  Skin: Bruises and discoloration noted.  Neurologic: Grossly normal    Pertinent Labs   Lab Results: personally reviewed.     Results from last 7 days   Lab Units 08/06/20  0353 08/05/20  0351 08/04/20  1314   LN-WHITE BLOOD CELL COUNT  thou/uL 6.8 8.3 9.0   LN-HEMOGLOBIN g/dL 9.1* 9.0* 8.9*   LN-HEMATOCRIT % 26.2* 25.6* 25.3*   LN-PLATELET COUNT thou/uL 64* 69* 64*        Results from last 7 days   Lab Units 08/06/20  0353 08/05/20  0351 08/04/20  1314   LN-SODIUM mmol/L 140 138 138   LN-POTASSIUM mmol/L 4.1 4.2 4.3   LN-CHLORIDE mmol/L 107 107 108*   LN-CO2 mmol/L 25 25 22   LN-BLOOD UREA NITROGEN mg/dL 21 14 10   LN-CREATININE mg/dL 0.63 0.60 0.52*   LN-CALCIUM mg/dL 7.5* 7.5* 7.1*     Procedure  Component  Value  Units  Date/Time    Blood Culture from PERIPHERAL SITE (2nd One) [[id number]]   Collected: 08/04/20 1041    Order Status: Sent  Specimen: Blood from Vein, Peripheral  Updated: 08/04/20 1052        Pertinent Radiology   Radiology Results: Personally reviewed image/s and Personally reviewed impression/s    No results found.

## 2021-06-10 NOTE — PROGRESS NOTES
Clinical Nutrition Therapy Follow Up Note      Hyponatremia    Volume overload with pulmonary edema    Dysphagia    Per MD notations:    If continuing care and if TF is elected, possibly a PEG tube feeding might be a better option   Patient is currently NPO & cannot take free water orally.  Once enteral feeding is started free water can be given with tube feeds.     Current Nutrition Prescription:   Diet: SLP continues to recommend NPO except pills with pureed consistency    IF TF elected: RD suggests Fibersource at 25ml/hr and increase by 20 ml q 8 hrs to goal rate of of 65ml/hr     =1872 kcals, 84 g protein, 256 g CHO, 24 g fiber, and 1260 ml fluid with 200 ml fluid flush three times a day = 1860 mls free water ( 25 mls/kg)   Increase fluid flush to 230 ml QID once IVF discontinued (30 mls/kg).     IV dextrose or Fluids:dextrose 5%, Last Rate: 50 mL/hr (08/12/20 1652)    Anthropometrics:  Weight: 160 lb 4.8 oz (72.7 kg)    GI Status/Output:   GI symptoms include: WDL per nurse  Bowel Sounds hypoactive per nurse  Last BM: 8/8 Incontinent stool documented per nurse    Skin/Wound:  Jonny score 18  Pressure ulcer/Decubitus ulcer was noted.~ Stage 2 Sacral Ulcer    Medications:  Medications reviewed.    Labs:  Results for MAISHA SUAZO (MRN [id number])    Ref. Range 8/11/2020 20:23 8/12/2020 04:56 8/12/2020 13:25   Sodium Latest Ref Range: 136 - 145 mmol/L 150 (H) 153 (HH) 150 (H)     Malnutrition: Patient meets diagnostic criteria for moderate protein calorie malnutrition in the context of chronic illness as evidenced by  unintentional weight loss and poor nutrient intake     Nutrition Risk Level: high risk     Nutrition dx:   Malnutrition r/t chronic  as evidenced by wt loss, reduced po .  Inadequate intake r/t  AEB delayed diet adv      Goal:   Meet estimated nutrition needs  Correct hyponatremia      Monitoring/Evaluation:   Will monitor plan

## 2021-06-10 NOTE — CONSULTS
Spoke with pts daughter, Telma to discuss Hospice's availability to her and her mom.  Discussed [facility] requirements if they are interested in [facility], made her aware that [facility] are only offered in the final 2 weeks of life.  Discussed that [facility] is not able to manage psych or behavior issues.  Made her aware that we can definitely send the request over to [facility] if/when she decides to focus on comfort care and transition to Hospice at discharge.      Telma was tearful throughout our conversation.  Per Telma her grandfather was admitted to [facility]'s ICU last night for COVID r/o, she is now carrying the weight of managing his care as well as the plan for her mom.  Hospice will plan on following up with Telma towards the end of the week if we haven't heard from her sooner.  In the meantime we will be monitoring the pts progress and we are available at any time if our services would be beneficial or are requested.    Thank you for the consult!

## 2021-06-10 NOTE — PLAN OF CARE
Problem: Pain  Goal: Patient's pain/discomfort is manageable  Outcome: Progressing  Problem: Potential for Compromised Skin Integrity  Goal: Skin integrity is maintained or improved  Outcome: Progressing  Repositioning q2 hours, denies pain at this time. Speciality bed low air loss mattress in use. Miconazole ointment/cream applied      Problem: Daily Care  Goal: Daily care needs are met  Outcome: Progressing  Needs help feeding, pureed with honey thick liquids   Childress patent and draining dark genesis to brown cloudy urine   Disorientated to time; situation, frequent rounding  Alarms in place       Problem: Potential for Falls  Goal: Patient will remain free of falls  Outcome: Progressing   PT/OT/SLP working with pt.  Assist x2 with transfers and walker. Speach deferred today, will reassess tomorrow      Problem: Discharge Barriers  Goal: Patient's discharge needs are met  Outcome: Progressing  IV antibiotics: Meropenem  Potassium protocol, check in AM   Continues on 7L oximyzer, cont pulse ox remains in the high 80s, at times will desat to 70s- will take about 10min to recover

## 2021-06-10 NOTE — PLAN OF CARE
Problem: Risk for injury  Goal: Experience no physical injury  Outcome: Progressing   No physical injury noted  Problem: Safety  Goal: Patient will be injury free during hospitalization  Outcome: Progressing  Room close to nursing station, bed alarm

## 2021-06-10 NOTE — PLAN OF CARE
Problem: Potential for Compromised Skin Integrity  Goal: Nutritional status is improving  Outcome: Progressing     Problem: Malnutrition  (NI-5.2)  Goal: Food and/or nutrient delivery  Outcome: Progressing    Patient tolerating Pureed diet with honey thick liquids  Send Magic cup BID

## 2021-06-10 NOTE — PROGRESS NOTES
"Clinical Nutrition Therapy Assessment Note      Reason for Assessment:   Keiko Guo is a 60 y.o. female assessed by the registered Dietitian for length of stay screen. Pt being seen by palliative/hospice-family not ready for hospice at this time per note. Pt admitted with pneumonia, resp failure, COPD exacerbation, bipolar, probably ETOH abuse, moderate malnutrition     Nutrition History:  Information from chart.  Pt is NPO. Speech deferred due to pt not being aware enough and high O2 needs   Cultural/Sikhism food needs or preferences: none  Food allergies or intolerances: none    Current Nutrition Prescription:   Diet: NPO x 7 days   Diet Supplements:   IV dextrose or Fluids:sodium chloride 0.45%        Anthropometrics:  Height: 5' 2\" (157.5 cm)  Weight: 163 lb 14.4 oz (74.3 kg)  BMI (Calculated): 29.3  BMI indication: 25-29.9 overweight  Ideal body weight 50kg+/- 10%  % Ideal body weight 148%  Weight History:  Wt Readings from Last 5 Encounters:   08/10/20 163 lb 14.4 oz (74.3 kg)   02/28/20 183 lb (83 kg)   07/12/19 184 lb 8.4 oz (83.7 kg)   10/06/18 180 lb (81.6 kg)   Pt has a 20# wt loss in 6 months  (11%)       RD Nutrition Focused Physical Exam:  The patient has the following physical signs which could indicate malnutrition: none noted     GI Status/Output:   GI symptoms include: WDL     Skin/Wound:  Jonny score Jonny Scale Score: 16    Stage 2 sacral wound     Medications:  Medications reviewed.    Labs:  Labs reviewed; Na-150, Gluc-162    Estimated Nutrition Needs:  Assessment weight is 74kg, current weight    Energy Needs: 0855-5986 kcals daily, 25-30 kcal/kg  Protein Needs:  g daily, 1-2 g/kg.  Fluid Needs: 2220 mls daily, 30 mls/kg    Malnutrition: Patient meets diagnostic criteria for moderate protein calorie malnutrition in the context of chronic illness as evidenced by  unintentional weight loss and poor nutrient intake    Nutrition Risk Level: high risk    Nutrition dx: "   Malnutrition r/t chronic  as evidenced by wt loss, reduced po .    Inadequate intake r/t Safety of eating AEB delayed diet adv     Goal:   Meet estimated nutrition needs   Promote healing     Intervention:   May want to consider TF if diet isn't going to advance since she has been without nutrition for some time.    I would suggest Fibersource at 25ml/hr and increase by 20ml q 8 hrs to goal rate of of 65ml/hr    =1872kcal, 108g protein, 326g CHO, 79g fat, and 1660ml fluid with 200ml fluid flush three times a day     Monitoring/Evaluation:   Will monitor plan     Electronically signed by:  Milagros Harris RD

## 2021-06-10 NOTE — PROGRESS NOTES
Brief Update Note    Goals of Care conference between patient's daughter Telma, RAFAEL, myself the resident physician, Que Brower, attending MD.     Telma would like her mother to be on hospice therefore at this time, will proceed with comfort cares. Telma wishes to discuss further placement with hospice and palliative services tomorrow.    Comfort Care orders placed.  D/C labs, IV antimicrobials, potassium replacement protocol, NPO diet.  -Increase oxycodone to 2.5-5mg q2hrs for dyspnea.     Alistair Aguilar PGY2 8/26/2020  [facility] Medicine  Pager: [phone] (from 8AM-5:30PM)

## 2021-06-10 NOTE — PLAN OF CARE
Problem: Sensory perceptual alterations  Goal: Demonstrate absence of untoward effects of withdrawal  Outcome: Progressing     Problem: Risk for ineffective breathing pattern  Goal: Maintain effective breathing pattern with respiratory rate within normal range, lungs clear; be free of cyanosis and other signs/symptoms of hypoxia  Outcome: Not Progressing     Problem: Discharge Barriers  Goal: Patient's discharge needs are met  Outcome: Not Progressing   Pt's drowsy this shift, unable to take PO meds. CIWA scores below 10, pt somewhat anxious unable to give gabapentin d/t not able to take PO. Turning and repositioning every 2 hours for comfort and to prevent skin breakdown. Pt on bipap mask, desats quickly into the 80s without mask on.

## 2021-06-10 NOTE — PROGRESS NOTES
Wound Ostomy  WOC Assessment - Virtual Visit        Allergies:  No Known Allergies    Diagnosis:   Patient Active Problem List    Diagnosis Date Noted     Hypocalcemia 08/20/2020     Colitis 08/18/2020     Severe sepsis with acute organ dysfunction (H) 08/18/2020     Lactic acidosis 08/18/2020     Metabolic encephalopathy      Cognitive and behavioral changes      Sleep difficulties      Hepatic fibrosis (H) 08/13/2020     Aspiration pneumonia due to gastric secretions (H) 08/10/2020     Hypernatremia 08/10/2020     Bipolar affective disorder, currently manic, severe, with psychotic features (H) 08/09/2020     Chronic pain 08/09/2020     DNR (do not resuscitate) 08/09/2020     Pneumonitis 08/09/2020     Acute pulmonary edema (H)      Abnormal chest CT      COPD with acute exacerbation (H) 08/03/2020     Fungal esophagitis 07/13/2019     Adjustment disorder with mixed anxiety and depressed mood      Hypotension 07/12/2019     Swallowing painful 07/12/2019     Lactic acid acidosis 07/12/2019     QT prolongation 07/12/2019     Odynophagia 07/11/2019     Bipolar I disorder, most recent episode depressed, severe without psychotic features (H) 10/08/2018     Goals of care, counseling/discussion 10/08/2018     Overdose 10/07/2018     Suicide attempt (H)      Relationship problem between caregiver and child      History of posttraumatic stress disorder (PTSD)      Alcoholic intoxication with complication (H) 10/05/2018     Intentional drug overdose, initial encounter (H) 10/05/2018     Acute respiratory failure with hypoxemia (H) 10/05/2018     Acidemia      Metabolic acidosis      Bipolar disorder (H) 02/01/2018     Alcohol abuse 02/01/2018     Hyperglyceridemia 02/01/2018     Tobacco abuse 02/01/2018     GERD (gastroesophageal reflux disease) 02/01/2018     Alcohol use disorder, severe, dependence (H) 12/21/2017     Alcohol withdrawal syndrome (H) 09/22/2016     Pulmonary emphysema (H) 03/05/2015     Depression 03/05/2015  "    Neurosis, posttraumatic 03/05/2015       Height:  5' 2\" (1.575 m)    Weight:   211 lb 6.4 oz (95.9 kg)    Labs:  Recent Labs     08/24/20  0826   HGB 10.6*   ALBUMIN 1.5*       Jonny:  Jonny Scale Score: 15    Patient off floor in [facility]. Chart reviewed and patient noted to have an open area documented to coccyx on 8/23/30 (same day as admission). It appears patient has been refusing dressing placement per note in flow sheet on 8/23/20.     If patient will allow, recommend nursing place Mepilex sacral dressing placed for prevention and treatment of current open area. Will begin low air loss mattress given poor overall health status (Hospice noted to be following peripherally)and open area of over coccyx.    WOC RN will attempt to see tomorrow. Orders entered.  "

## 2021-06-10 NOTE — ED TRIAGE NOTES
Patient lives at home, history of COPD, c/o shortness of breath and RLQ pain.  Patient states daughter was supposed to  supplies for a UA to rule out UTI and hasn't.  She fell 1 1/2 weeks ago and had MRI of her head, bruising to right side of head and eye.  States she has been smoking up to 2 days prior to coming here.  Uses inhalers and nebs without relief.  Uses Oxycodone for falls pain, daughter told EMS uses appropriately.  History of Wernicke disease.

## 2021-06-10 NOTE — PROGRESS NOTES
Speech Language/Pathology  Speech Therapy Daily Progress Note    Patient presents as awake and reluctant during this session.  An  was not applicable.  Mod prompts and assistance to reposition in bed to allow for upright position for po intake. Upper dentures placed. Ill-fitting. Does not have lower dentures.     Objective  PO trial for safe po intake of observed food/liquids without s/s aspiration  Patient was given 3  ounces NDD2 and presented with no s/s of aspiration. Notable for decreased and inadequate mastication. Mild oral residual noted following swallows. Reluctant to follow cues. Pt insisting on thin liquids. Re-educated re: results of video swallow study, aspiration risk. Patient expresses skepticism despite evidence from objective evaluation.     Assessment  Still not appropriate to advance food d/t oral weakness and ill-fitting dentures leading to inadequate mastication. Continues to need reinforcement re: aspiration risk and potential complications. Likely will benefit from consistent message and familiar staff to reinforce.     Plan/Recommendations  Continue per plan    The ST Care Plan has been reviewed and current plan remains appropriate.    20 dysphagia minutes     Ramon Templeton MA, CCC-SLP

## 2021-06-10 NOTE — PROGRESS NOTES
Speech Language/Pathology  Chart reviewed and consulted with RN regarding patient care.  Per RN, patient able to take pills this AM with some difficulty, felt to be related to reduced ability to consistently follow directions.  Patient currently on BiPAP; however, also tolerating HFNC if she does not remove it.  Patient lethargic at this time and resting in bed, not appropriate for initiation of oral diet on today's date.  Will follow-up on 8/7/20 or as appropriate.    Shannan Mcclellan M.S. CCC-SLP  No charge

## 2021-06-10 NOTE — PLAN OF CARE
Problem: Pain  Goal: Patient's pain/discomfort is manageable  Outcome: Progressing     [id number].    New COVID swab collected and sent.    She did well most of day on either HFNC or bipap 12/5 35% (she kept swatting the HFNC out of nose) and sats 89%.  She is not following commands well today, at her most alert she was disoriented x2 and after treating CIWA x2 has been drowsy and reluctant to open eyes the rest of the day.

## 2021-06-10 NOTE — PROGRESS NOTES
08/05/20 0406   Non-Invasive    Pt Owned Device No   Device V 60   Mode ST   IPAP 12 cmH2O   EPAP 5 cmH2O   Resp Rate (Set) 16   Insp Time (sec) 1 sec   Insp Rise Time (%) 2 %   Monitoring   Resp Rate Observed 31   Tidal (Observed) 666 mL   Minute Ventilation (L/min) 20.8 L/min   PIP Observed (cm H2O) 13 cm H2O   Ti/Ttot 34   Pt Leak 37   Alarms   Insp Pressure High (cm H2O) 16 cm H2O   Insp Pressure Low (cm H2O) 5 cm H2O   Tidal Volume High 1000   Tidal Volume Low 200   Apnea Alarm Delay 20 Seconds   MV Low (L/min) 3 L/min   High Resp Rate 50   Low Resp Rate 10   NPPV Other   SpO2 92 %   Heart Rate 99   Skin Under Mask No breakdown

## 2021-06-10 NOTE — PLAN OF CARE
Problem: Safety  Goal: Patient will be injury free during hospitalization  Outcome: Progressing   Bed alarm in use. Room near nursing station.   Problem: Potential for Compromised Skin Integrity  Goal: Skin integrity is maintained or improved  Outcome: Progressing  Turning Q2h

## 2021-06-10 NOTE — PLAN OF CARE
Problem: Discharge Barriers  Goal: Patient's discharge needs are met  Outcome: Progressing   Goal: Patient s discharge needs are met.  Outcome: Care Progression reviewed with Hospitalist, Care Manager.  Discharge Disposition: Discussed and plan to discharge to: TBD  Planned Discharge Date: 3+ days  Problem: Barriers to discharge include: Clinical improvement of respiratory status, IV steroids, IV abx, IV diuretics, High flow o2, Hospice consult/ goals of care  Transportation needs/Ride Time: TBD    Spoke with [facility] RN. Plan for hospice consult with daughterTelma tomorrow, 8/9 at 1pm. CM to follow for discharge planning needs. SW consult if and when appropriate.

## 2021-06-10 NOTE — PLAN OF CARE
Consulted with nursing who reports patient has shown respiratory decline, now on 7L Oxymizer. Reports coughing with current liquids, but was unclear if this was related to swallow or coincidental cough that happened to occur with sip of liquid. Attempted to see patient, resting on her side, reclined in bed. Refused to sit more upright despite education re: aspiration risk. Patient refused. Deferred session. Will re-attempt tomorrow pending status.

## 2021-06-10 NOTE — PROGRESS NOTES
[facility] Medicine PROGRESS NOTE      Identification/Summary: Keiko Guo is a 60 y.o. female with a past medical history of COPD, MA, PAD, alcohol abuse presented to ER with shortness of breath who was presented on 8/3/2020 with shortness of breath. She was tachycardic on admission, had elevated lactic acid. CT chest with severe emphysema and ground glass opacity. She started on IV antibiotic. Resuscitated with IVF. She is complaining of abdominal pain and CT abdomen showed distended gall bladder, US pending. She is currently on high flow oxygen. Received a dose of Lasix in am.     Assessment and Plan:        COPD exacerbation  Acute hypoxic respiratory failure   Community acquired PNA  Sepsis,    CT chest showed advanced emphysema and extensive ground glass and airspace infiltrate in upper lung concerning for PNA and diffuse interstitial densities likely edema in mid to lower lungs. CT abdomen with distended GB  On ceftriaxone and azithromycin, switch ceftriaxone to Zosyn to cover for Zosyn. Surgery consult. US RUQ    On Solumedrol IV  Duoneb scheduled and prn     She was tested negative for COVID x 2. Continue isolation,  Received a dose of Lasix 20 mg IV in am,   She has pulmonary edema in CT chest. Received a dose of Lasix 20 mg IV in am, monitor I/O. Ideally will need an echocardiography, defer until her COVID status becomes more clear, consult ID        Alcohol abuse/dependence   [facility] protocol   Thiamine and folic acid     Fall  She has bruise around her right eye and right knee  CT head negative      Thrombocytopenia  Likely 2/2 ETOH  Monitor Plt count         DVT prophylaxis  Plt is 64, avoid heparin   SCD     Bipolar disorder  Continue home medications     Diet: Diet Regular  DVT Prophylaxis: SCD  Code Status: No CPR- Do NOT Intubate    Anticipated possible discharge in few days     Interval History/Subjective:  She was seen earlier today, she was hypoxic, started on high flow oxygen.    Denies any other complains to the writer     Physical Exam/Objective:  Vitals I/O   Temp:  [97.4  F (36.3  C)-98.7  F (37.1  C)] 97.6  F (36.4  C)  Heart Rate:  [] 92  Resp:  [9-55] 24  BP: ()/(50-78) 119/57  SpO2:  [83 %-96 %] 94 %  FiO2 (%):  [40 %-45 %] 45 %  I/O last 3 completed shifts:  In: 1040 [I.V.:990; IV Piggyback:50]  Out: -    169 lb 8 oz (76.9 kg)  Body mass index is 31 kg/m .      GENERAL:  Somnolent,  on high flow oxygen   HEAD:  Normocephalic, without obvious abnormality, atraumatic   EYES:  PERRL, conjunctiva/corneas clear   NECK: Supple, symmetrical, trachea midline   LUNGS:   Bilateral crackles    HEART:  Regular rate and rhythm, S1 and S2 normal, no murmur   ABDOMEN:   Soft, non-tender, bowel sounds active, no organomegaly detected, no rebound or guarding   EXTREMITIES: no cyanosis or edema    SKIN: Dry to touch, no exanthems in the visualized areas   NEURO: moves all four extremities freely, non-focal       Medications:   Personally Reviewed.    azithromycin  500 mg Intravenous Q24H     folic acid  1 mg Oral DAILY    Or     folic acid  1 mg Intramuscular DAILY     ipratropium-albuteroL  3 mL Nebulization QID - RT     methylPREDNISolone sodium succinate  60 mg Intravenous Q12H     multivitamin therapeutic  1 tablet Oral DAILY     omeprazole  20 mg Oral QAM (0630)     piperacillin-tazobactam  3.375 g Intravenous Once    And     piperacillin-tazobactam  3.375 g Intravenous Q8H     QUEtiapine  300 mg Oral QHS     sodium chloride  10-30 mL Intravenous Q8H FIXED TIMES     thiamine  100 mg Oral DAILY    Or     thiamine  100 mg Intramuscular DAILY     thiamine  50 mg Oral DAILY       Labs:  Lab Results   Component Value Date    WBC 9.0 08/04/2020    HGB 8.9 (L) 08/04/2020    HCT 25.3 (L) 08/04/2020     (H) 08/04/2020    PLT 64 (L) 08/04/2020     Lab Results   Component Value Date    CREATININE 0.52 (L) 08/04/2020    BUN 10 08/04/2020     08/04/2020    K 4.3 08/04/2020    CL  108 (H) 08/04/2020    CO2 22 08/04/2020        Imaging:  Ct Head Without Contrast  Result Date: 8/3/2020  1.  No evidence of acute intracranial hemorrhage or mass effect. 2.  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 3.  Air-fluid level within the right maxillary sinus which could represent acute sinusitis in the appropriate setting.        Result Date: 8/3/2020  1.  No evidence for central pulmonary emboli. The peripheral subsegmental vessels to both lower lobes are suboptimally opacified. 2.  Advanced centrilobular emphysema. 3.  Extensive groundglass and airspace infiltrates within both upper lobes concerning for pneumonia, including Covid 19. There are additional diffuse interstitial densities both lungs likely representing edema.     Ct Abdomen Pelvis Without Oral With Iv Contrast  Result Date: 8/3/2020  1.  Appendix is of normal caliber. There is a small amount of ascites adjacent to the base of the cecum and ascending colon with slight wall thickening which could represent an acute colitis. No evidence for bowel obstruction. 2.  Trace ascites adjacent to the liver and spleen. 3.  Advanced atherosclerotic disease. 4.  Prominent collateral vessels/possible gastric varices lesser curvature of the stomach. 5.  Interstitial opacities throughout the visualized lower lung fields. Please refer to CTA chest report for further discussion.      [facility]  [facility]ist  [facility]

## 2021-06-10 NOTE — PLAN OF CARE
Problem: Potential for Falls  Goal: Patient will remain free of falls  Outcome: Progressing     Problem: Daily Care  Goal: Daily care needs are met  Outcome: Progressing   Patient repositioned self throughout day. Worked with therapy - was able to sit up in chair for a little bit. Restless and confused. Speaking more today - sometime unintelligible. Still not able to make eye contact. Able to follow some simple commands. Bedside swallow eval - video swallow tomorrow. Electrolytes replaced per protocols. Daughter came today and had long talk with hospitalist. Daughter was still very restless and anxious. Demeanor did appear more appropriate today compared to yesterday. Able to sit still for longer periods of time. Does appear very supportive for patient. Alarms utilized to promote patient safety. Purposeful rounding performed. Will continue to monitor. Leatha Yousif RN

## 2021-06-10 NOTE — PROGRESS NOTES
Pulmonary Progress Note  8/5/2020      Admit Date: 8/3/2020  CODE: No CPR- Do NOT Intubate    Reason for Consult: acute respiratory failure with hypoxia, abnormal chest CT     Assessment/Plan:   60 year old female with a history of longstanding and active tobacco dependence, likely excessive alcohol use, bipolar disorder with psychotic features and prior overdose suicide attempt, mild obesity, untreated GÉNESIS, peripheral arterial disease, recent worsening dementia and imbalance with presumed diagnosis of Wernicke encephalopathy, extensive radiographic emphysema with likely severe COPD (No PFTs on record), admitted with abdominal pain, lactic acidosis, hyperbilirubinemia, dyspnea, acute respiratory failure with hypoxia, abnormal chest CT, and acute encephalopathy.     Acute respiratory failure with hypoxia, abnormal chest CT: DDx includes infectious pneumonitis, aspiration pneumonitis, pulmonary edema. The CT findings are nonspecific, and have a biapical predominance. The patient has trouble swallowing per her daughter, with advancing dementia, which increases her risk of aspiration pneumonitis. She drinks 2-4 oz of schnapps daily per her daughter, which also increases aspiration risk. The opacities are in areas of significant emphysema, resulting in a reticular and atypical appearance. Has had progressive worsening over months to years. Patient smokes one pack of cigarettes daily. She is also at high risk of alcoholic cardiomyopathy, and her BNP was elevated on admission, though bedside ultrasound shows hyperdynamic function; EF appears normal or elevated. No formal TTE yet because of her special respiratory isolation. One troponin negative and ECG shows no evidence of ischemia. Her radiographic emphysema is extensive, though I am unable to locate prior pulmonary function testing or outpatient pulmonary consultation; she is on an unusual dose of tiotropium respimat at home 1.25 mcg two inhalations daily, which is half  the standard dose. She has a CPAP machine at home that she never uses per her daughter. She has some evidence of acute cholecystitis with positive Ludwig sign, hyperbilirubinemia, and distended gallbladder. The patient stated on 8/4 when she was more coherent that she wanted to be DNR/DNI; her daughter confirms that this would be appropriate.     Plan:  - continue systemic corticosteroids for now given possible component of COPD exacerbation  - scheduled nebulized ipratropium-albuterol  - 12-lead ECG  - formal TTE when out of special respiratory isolation  - [facility]  - piperacillin-tazobactam  - azithromycin x 5 days  - smoking cessation counseling and nicotine patch  - abstinence from alcohol recommended  - avoid excessive benzos to minimize further somnolence and respiratory depression; likely has Wernicke encephalopathy and advancing dementia; unclear if she is truly withdrawing from alcohol as her consumption is not extreme; can reorder if she is overtly withdrawing  - SLP to evaluate swallow  - BiPAP at night (untreated GÉNESIS) and as needed during the day  - difficult volume status; may need judicious IV fluids if unable to take PO  - attempt to wean down oxygen requirement as able  - spoke with her daughter by phone on 8/5  - DNR/DNI    Balwinder Spear MD  Pulmonary and Critical Care Medicine  [facility]  Cell [phone]  Office [phone]  Pager [phone]    Subjective/Interim Events:   On BiPAP, tolerating, FiO2 40%, trying to vocalize    Medications:       azithromycin  500 mg Intravenous Q24H     folic acid  1 mg Intravenous DAILY     ipratropium-albuteroL  3 mL Nebulization QID - RT     methylPREDNISolone sodium succinate  60 mg Intravenous Q12H     multivitamin therapeutic  1 tablet Oral DAILY     nicotine  1 patch Transdermal DAILY     omeprazole  20 mg Oral QAM (0630)     piperacillin-tazobactam  3.375 g Intravenous Q8H     QUEtiapine  300 mg Oral QHS     sodium chloride  10-30 mL  "Intravenous Q8H FIXED TIMES     thiamine (vitamin B1) IVPB  100 mg Intravenous DAILY     thiamine  50 mg Oral DAILY         Exam/Data:   Vitals  /60 (Patient Position: Semi-hopper)   Pulse 99   Temp 97.7  F (36.5  C) (Axillary)   Resp 24   Ht 5' 2\" (1.575 m)   Wt 171 lb 6.4 oz (77.7 kg)   SpO2 93%   BMI 31.35 kg/m       I/O last 3 completed shifts:  In: 1402.5 [P.O.:440; I.V.:912.5; IV Piggyback:50]  Out: 1601 [Urine:1600; Stool:1]  Weight change: 11 lb 6.4 oz (5.171 kg)  Wt Readings from Last 3 Encounters:   08/05/20 171 lb 6.4 oz (77.7 kg)   02/28/20 183 lb (83 kg)   07/12/19 184 lb 8.4 oz (83.7 kg)     FiO2 (%):  [40 %-50 %] 50 %    EXAM:  Physical Exam  Gen: on BiPAP, somnolent  HEENT: NT, no BENNY  CV: tachy, regular, no m/g/r  Resp: increased work of breathing, diminished bilaterally  Abd: soft, nontender, BS+  Skin: ecchymoses on face  Ext: no edema  Neuro: PERRL, nonfocal exam, somnolent but trying to vocalize through BiPAP    ROS:  A 10-system review was obtained and is negative with the exception of the symptoms noted above.    DATA:    IMAGING:   Chest CTA (8/3/20):  - images directly reviewed, formal interpretation follows:  FINDINGS:  ANGIOGRAM CHEST: The right and left main pulmonary arteries and the segmental vessels are all patent without evidence for emboli. Suboptimal opacification involving the subsegmental vessels to both lower lobes. Thoracic aorta is negative for dissection.   No CT evidence of right heart strain.     LUNGS AND PLEURA: Advanced centrilobular emphysema. Extensive airspace and groundglass opacities throughout both upper lobes, with additional interstitial opacities throughout the mid and lower lung fields, all of which are new since the previous study.   Subsegmental atelectasis in the bases. No effusions.     MEDIASTINUM/AXILLAE: Numerous borderline enlarged mediastinal and hilar nodes with minimal change.     UPPER ABDOMEN: Advanced atherosclerotic disease. " Splenomegaly. Trace ascites adjacent to the liver. Collateral vessels along the lesser curvature the stomach     MUSCULOSKELETAL: Hypertrophic changes thoracic spine.     IMPRESSION:   1.  No evidence for central pulmonary emboli. The peripheral subsegmental vessels to both lower lobes are suboptimally opacified.  2.  Advanced centrilobular emphysema.   3.  Extensive groundglass and airspace infiltrates within both upper lobes concerning for pneumonia, including Covid 19. There are additional diffuse interstitial densities both lungs likely representing edema.

## 2021-06-10 NOTE — PLAN OF CARE
"  Problem: Pain  Goal: Patient's pain/discomfort is manageable  Outcome: Progressing   Pt denies all pain      Problem: Cognitive Impairment or Disorientation  Goal: Patient will maintain or return to normal baseline cognitive function  Outcome: Not Progressing   Pt alert to place and self, but not alert to time or situation. Pt very paranoid and keeps stating \"Stop lying to me.\" Pt is not reorientable and does not believe staff.       Problem: Impaired Gas Exchange  Goal: Demonstrate improved ventilation and adequate oxygenation of tissues as evidenced by absence of respiratory distress  Outcome: Progressing   Pt on 2L O2 sating 95%. However, pt will occassionally take off her oxygen and then sats will drop to 87%. Lung sounds are diminished. Continue with prednisone and IV meropenem as ordered.       Problem: Daily Care  Goal: Daily care needs are met  Outcome: Progressing   Pt had several brown loose incontinent stools this shift. Brief in place, barrier cream applied. Pt is able to turn and reposition herself and often will turn back to her right side after staff attempts to turn her on her left. Continue with lactulose. Tele - [facility]. [facility] in place with dark genesis output.   "

## 2021-06-10 NOTE — PROGRESS NOTES
[facility] Palliative Care Social Work Note:    Called dtr Telma to provide psychosocial support. She is overwhelmed but reports feeling a little better. She is going to the hospital soon to be with her mom. She hasn't received any medical updates today. She is preparing herself for pt not getting better. She has spoken with Palliative NP today and Hospice recently. She expressed appreciation for call and ongoing support. Provided active listening, validation of feelings and emotional support.     PC SW remains available to provide psychosocial support to dtr.     Updated Hailey Williamson NP.    Melia Dennis, [facility]  Palliative Care   Office # [phone]

## 2021-06-10 NOTE — PLAN OF CARE
Goal: Patient s discharge needs are progressing.  Outcome: Care Progression reviewed with Hospitalist, Care Manager.  Discharge Disposition: Discussed and plan to discharge to:  TCU vs Hospice  Planned Discharge Date:  2+ days  Problem: Barriers to discharge include:  pt increased oxygen needs,   Transportation needs/Ride Time: transport to be determined.     Hospice called and updated writer that they continue to be available as needed.      1:42 PM  Lashell pts [facility]  called to writer.  Writer updated .  Lashell told she may call into room if she wished to get further information from pt or to contact pts daughter.
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
## 2021-06-10 NOTE — PLAN OF CARE
Problem: Pain  Goal: Patient's pain/discomfort is manageable  Outcome: Progressing   Pt complained of tenderness in lower ab but when asked stated they were not in any pain.      Problem: Potential for Falls  Goal: Patient will remain free of falls  Outcome: Progressing   Pt restless. Q2 turns. Bed and chair alarms.      Problem: Discharge Barriers  Goal: Patient's discharge needs are met  Outcome: Progressing   Social work looking into TCU placement. Pt refused hospice. Monitoring labs, IV abx, pain management, and clinical progression    Pt put on enteric precautions. C-diff sample and PICC line BC needs to be obtained. Potassium lab level will be checked at 1745. Speech eval resulted in same diet of 2G sodium, pureed, honey thick. Pills crushed with applesauce

## 2021-06-10 NOTE — PROGRESS NOTES
KODI completed. Writer spoke with patient's daughter Telma: [phone]. Pt lives with daughter in a private residence.     Per daughter, patient has declined in the last few months. Pt's balance is worse; she fell one week ago. She said her mom's right side is worse, she is now having more memory issues, not eating much, is in bed more. Daughter says her mom is a chronic alcoholic but has been drinking less. Daughter said her mom goes to [facility] for counseling. She also has a [facility] who she keeps in touch with. Daughter is providing more care giving due to patient's decline. Patient has oxycodone- new Rx for pain but gets confused on it.      Anticipate pt will DC back home with HC depending on progression of care. Family will transport upon DC. Informed that CM will follow progression of care.   Garima Guallpa RNCM        Abbreviation Code:  [facility] Home [facility] (HC), Patient (Pt), Transitional Care Unit (TCU), Skilled Nursing Facility (SNF), Assisted Living (AL), Independent Living (IL), Physical Therapy (PT), Occupational Therapy (OT)

## 2021-06-10 NOTE — PROGRESS NOTES
Spiritual Health Services Note:    Spiritual Assessment:  I followed up with the patient's daughter today after she had moved to comfort cares. Her daughter had a lot of things to process and was grateful for the conversation.     Care Provided:  I collaborated with the patient's nurse, reviewed the her chart, and continued relationship of care and support.    Plan of Care:  I will make additional visits as requested by the patient, their family or other visitors, and as referred by staff.    Louis Velasquez MDiv, [facility]  Manager/   [phone]

## 2021-06-10 NOTE — PROGRESS NOTES
Speech Language/Pathology  Videofluoroscopic Swallow Study     Problem:  Patient Active Problem List   Diagnosis     Pulmonary emphysema (H)     Depression     Neurosis, posttraumatic     Bipolar disorder (H)     Alcohol abuse     Hyperglyceridemia     Tobacco abuse     GERD (gastroesophageal reflux disease)     Alcoholic intoxication with complication (H)     Intentional drug overdose, initial encounter (H)     Acute respiratory failure with hypoxemia (H)     Acidemia     Metabolic acidosis     Suicide attempt (H)     Relationship problem between caregiver and child     History of posttraumatic stress disorder (PTSD)     Overdose     Bipolar I disorder, most recent episode depressed, severe without psychotic features (H)     Goals of care, counseling/discussion     Hypotension     Swallowing painful     Lactic acid acidosis     Odynophagia     QT prolongation     Fungal esophagitis     Adjustment disorder with mixed anxiety and depressed mood     COPD with acute exacerbation (H)     Abnormal chest CT     Acute pulmonary edema (H)     Alcohol use disorder, severe, dependence (H)     Bipolar affective disorder, currently manic, severe, with psychotic features (H)     Chronic pain     Alcohol withdrawal syndrome (H)     DNR (do not resuscitate)     Pneumonitis     Aspiration pneumonia due to gastric secretions (H)     Hypernatremia     Hepatic fibrosis (H)       Onset date: 8/3/20  Reason for evaluation: assess swallowing in setting of pneumonia  Pertinent History: Reports s/s aspiration by nursing, s/s aspiration noted inconsistently by SLP clinically.   Current Diet: NPO  Baseline Diet: regular with thin    Assessment:    Patient demonstrated no oral and moderate pharyngeal dysphagia. Risk is increased somewhat by evidence of impulsivity and decreased ability to follow compensatory strategies.     Patient is at high aspiration risk with nectar thick liquids and thin liquids.    Rehab potential is good based on prior  level of function and evaluation results.    Due to current cognitive status, patient is not a good candidate to complete therapeutic exercises or learn compensatory swallow strategies.  Diet modification will be implemented to compensate for current pharyngeal dysphagia/risk of aspiration. As cognition improves, may prove to be more appropriate for exercises or strategies.    Recommendations:    Plan:Recommend NDD1 and honey thick liquids due to aspiration with thicker consistencies    Strategies: upright to 90 degrees, small bites and sips, assistance with feeding to avoid impulsivity, especially related to large gulps    Speech therapy 4 times per week    Referrals: repeat video swallow study as respiratory and cognitive status improves    Subjective:    Patient presents as alert, cooperative and confused during this evaluation.   An  was not applicable    Patient was given puree, honey and nectar.    Objective:    Dentition/Oral hygiene: xerostomia, edentulous    Oral motor function was weak.     Bolus prep and oral control was mildly impaired.     Anterior-Posterior transit was not impaired.    Premature spill beyond the base of the tongue into the valleculae occurred with all textures trialed.    Tongue base retraction was not impaired.    Oral stasis did not occur with any texture.    Aspiration occurred with nectar thick. Patient had no response with aspiration. Aspiration amount was moderate to large.    Laryngeal penetration did not occur with any texture.     Of note, visualization some swallows were obscured at level of vocal folds and inferior by body habitus, decreased validity of aspiration risk to some extent.     The strategies of reduced bolus size were trialed with nectar thick but patient did not follow instructions.    Due to poor cognitive status, swallow strategies were not trialed under fluoroscopy.    Swallow response was delayed with all textures trialed.  Swallow was triggered  at the level of valleculae with puree and honey-thick liquids, past tip of epiglottis with nectar-thick liquids.    Epiglottic movement was complete consistently across texture trials.    Pharyngeal stasis did not occur with any texture.     Pharyngeal constriction was not impaired.    Hyolaryngeal elevation was not impaired. Hyolaryngeal excursion was not impaired.    Cricopharyngeal function was not impaired. Cervical esophageal function was not observed .    23 dysphagia minutes    Ramon Templeton MA, CCC-SLP

## 2021-06-10 NOTE — PROGRESS NOTES
SLUMS  Scoring If High School Educated If Less than High School Educated   Normal 27-30 25-30   Mild Neurocognitive Disorder 21-26 20-24   Dementia 1-20 1-19   The SLUMS is a cognitive screening assessment used to identify the presence of cognitive deficits, and/or to identify a change in cognition over time.      Patient Score: 11/30    Score Interpretation: {t displays severe cog impairment.  Score may be skewed as pt is also hallucinating at times and behaving irrationally. Recommend 24 hr supervision at this time and may benefit from a psych eval. Cues for all ADLS for safety, sequencing and folowthrough.       8/21/2020 by Elizabeth Dean, OT

## 2021-06-10 NOTE — PROGRESS NOTES
Speech Language/Pathology  Consulted with RN regarding appropriateness for further dysphagia assessment, deferred again on this date due to high oxygen needs.  Will continue to complete chart review/consult RN daily and follow-up as appropriate.    Shannan Mcclellan M.S. CCC-SLP  No charge

## 2021-06-10 NOTE — PROGRESS NOTES
[facility] MEDICINE PROGRESS NOTE      Identification/Summary: Keiko Guo is a 60 y.o. female with a past medical history of tobacco use, alcohol use disorder, bipolar disorder, previous suicide attempt with overdose, GÉNESIS, PAD, worsening dementia and imbalance (possible Warnicke encephalopathy), and radiographic emphysema (presumed COPD) who was admitted on 8/3/2020 for abdominal pain, lactic acidosis, hyperbilirubinemia, acute respiratory failure with hypoxia, and acute encephalopathy. Hospital course was notable for severe hypoxia requiring up to 60 L HF NC and 55% FiO2, guarded prognosis, and ongoing encephalopathy.  Hypoxic respiratory failure likely secondary to combination of severe emphysema, volume overload/pulmonary edema, possible COPD exacerbation, and/or CAP VS aspiration.     She has clinically improved and is now weaning from oxygen. She is more alert and taking things orally although she does require thickened liquids. She is still confused but does know her name and is able to answer questions.     Assessment and Plan:  Acute Hypoxic Respiratory Failure, multifactorial  Severe Emphysema with COPD Acute Exacerbation  Aspiration PNA vs CAP  Pulmonary Edema, Volume Overload  Seems like she may have had some abdominal pain or back pain and was given opiates  This led to somnolence, possible aspiration, cough, dyspnea    Alcohol abuse/dependence  Wernicke encephalopathy  Acute metabolic encephalopathy   Alert now but still very confused  Continue thiamine supplement  Will try some oral lactulose as her ammonia level is mildly elevated    Abdominal pain  Elevated liver enzymes, thrombocytopenia  Recent Hep B infection  Jaundice, hepatic fibrosis  Likely acute liver failure  Appreciate GI recs  Most likely underlying alcoholic cirrhosis, other studies pending  Imaging without evidence of cholecystitis or CBD dilatation    Moderate malnutrition  Much more alert now and actually feeding  herself  Appreciate SLP and dietician recs    Hypernatremia  Improving now after D5 infusion  Appreciate renal recs, now on 1/2NS with KCl at 50/hr    Advanced Care Planning  She is DNR and is a hospice candidate if pt/family are interested at dc  Not comfort care at this time    Prolonged QTC  QTC on admission 500.  Repeat on 8/13/20 is 539  - avoid QT prolonging meds, replace mag and potassium prn     History of anxiety, depression, bipolar disorder  Patient is confused, not currently on home medications, will monitor    Diet: Diet Dysphagia I (Pureed), 2 Gm Na; Honey thick liquids LIQUIDS BY SPOON  Dietary nutrition supplements Magic cup; BID with meals  DVT Prophylaxis:  VTE Prophylaxis contraindicated due to thrombocytopenia  Code Status: No CPR- Do NOT Intubate    Anticipated possible discharge to hospice or TCU in the next several days.    Interval History/Subjective:  Patient is alert, says that she feels shitty. Says that everything hurts but she can't elaborate. She is very hungry and thirsty. She does not complain of dyspnea.     Physical Exam/Objective:  Vitals I/O   Temp:  [97.4  F (36.3  C)-98.3  F (36.8  C)] 97.4  F (36.3  C)  Heart Rate:  [] 100  Resp:  [15-21] 15  BP: ()/(48-59) 96/52  SpO2:  [90 %-95 %] 90 %  I/O last 3 completed shifts:  In: 884.2 [P.O.:10; I.V.:524.2; IV Piggyback:350]  Out: 250 [Urine:250]   163 lb (73.9 kg)  Body mass index is 29.81 kg/m .    Physical Exam:    Gen: no acute distress, comfortable, very ill-appearing, appears much older than her age  ENT: notable scleral icterus  Pulm: lungs are diminished at bases, scattered crackles, no wheezing.  CV: regular rate and rhythm, no edema  GI: abdomen is soft, no guarding to light palpation  Derm: bruising of the face, jaundiced  Psych: calm but confused.    Medications:   Personally Reviewed.    enoxaparin ANTICOAGULANT  40 mg Subcutaneous DAILY     nicotine  1 patch Transdermal DAILY     omeprazole  20 mg Oral BID      potassium phosphate ivpb  0.24 mmol/kg Intravenous Once     predniSONE  30 mg Oral Daily with brkfst     [START ON 8/16/2020] predniSONE  20 mg Oral Daily with brkfst    Followed by     [START ON 8/19/2020] predniSONE  15 mg Oral Daily with brkfst    Followed by     [START ON 8/22/2020] predniSONE  10 mg Oral Daily with brkfst    Followed by     [START ON 8/25/2020] predniSONE  5 mg Oral Daily with brkfst     sodium chloride  10-30 mL Intravenous Q8H FIXED TIMES     thiamine  100 mg Oral DAILY       Data reviewed today: I personally reviewed all new medications, labs, imaging/diagnostics reports over the past 24 hours. Pertinent findings include Na 153, K 3.0, , , Alk Phos 187.     Labs:  Results from last 7 days   Lab Units 08/14/20  0546 08/13/20  0514 08/12/20  0456   LN-WHITE BLOOD CELL COUNT thou/uL 13.1* 12.0* 9.4   LN-HEMOGLOBIN g/dL 11.0* 11.9* 12.0   LN-HEMATOCRIT % 33.1* 36.6 36.5   LN-MEAN CORPUSCULAR VOLUME fL 112* 114* 113*   LN-PLATELET COUNT thou/uL 40* 51* 50*   ,   Results from last 7 days   Lab Units 08/14/20  1227 08/14/20  0547 08/14/20  0256 08/13/20  2046 08/13/20  1232  08/13/20  0514  08/12/20  0456   LN-SODIUM mmol/L 139 143  --  142  --    < > 150*  150*   < > 153*   LN-POTASSIUM mmol/L  --  4.1 5.3*  --  3.5  --  3.1*  3.2*   < > 3.0*   LN-CHLORIDE mmol/L  --  96*  --   --   --   --  97*  97*  --  98   LN-CO2 mmol/L  --  38*  --   --   --   --  44*  43*  --  42*   LN-BLOOD UREA NITROGEN mg/dL  --  24*  --   --   --   --  31*  30*  --  37*   LN-CREATININE mg/dL  --  0.84  --   --   --   --  0.82  0.79  --  0.77   LN-CALCIUM mg/dL  --  8.3*  --   --   --   --  8.6  8.6  --  8.2*   LN-ALBUMIN g/dL  --  2.0*  --   --   --   --  2.2*  2.2*  --  2.2*   LN-PROTEIN TOTAL g/dL  --  6.2  --   --   --   --  7.0  7.1  --  7.2   LN-BILIRUBIN TOTAL mg/dL  --  6.3*  --   --   --   --  6.5*  6.5*  --  6.3*   LN-ALKALINE PHOSPHATASE U/L  --  190*  --   --   --   --  189*  186*  --   187*   LN-ALT (SGPT) U/L  --  145*  --   --   --   --  135*  --  122*   LN-AST (SGOT) U/L  --  137*  --   --   --   --  127*  127*  --  122*    < > = values in this interval not displayed.    and   Results from last 7 days   Lab Units 08/14/20  0547 08/13/20  0514 08/12/20  0456   LN-MAGNESIUM mg/dL 2.1 2.9* 2.6        Imaging:  Imaging results 30 days: Xr Chest 1 View Portable    Result Date: 8/10/2020  EXAM: XR CHEST 1 VIEW PORTABLE LOCATION: [facility] DATE/TIME: 8/10/2020 10:45 AM INDICATION: persistent hypoxia COMPARISON: CT chest 8/3/2020     Stable diffuse reticulation and groundglass opacification likely in part part related to chronic fibrotic and emphysematous changes seen on comparison CT. Cannot exclude superimposed edema or atypical pneumonia. No focal consolidation or pleural effusion. Stable heart size.    Ct Head Without Contrast    Result Date: 8/3/2020  EXAM: CT HEAD WO CONTRAST LOCATION: [facility] DATE/TIME: 8/3/2020 5:04 PM INDICATION: Head trauma, abnormal mental status (Age 19-64y) Fall, bruising to face COMPARISON: Head CT 12/20/2017 and MRI brain 07/23/2017. TECHNIQUE: Routine without IV contrast. Multiplanar reformats. Dose reduction techniques were used. FINDINGS: No evidence of acute intracranial hemorrhage or mass effect. 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 Brain attenuation morphology otherwise normal. The ventricles and sulci are normal for age. Normal gray-white matter differentiation. The basilar cisterns are patent. The globes are unremarkable. The partially imaged paranasal sinuses demonstrate air-fluid level within the right maxillary sinus which could represent acute sinusitis in the appropriate setting. The partially imaged paranasal sinuses are otherwise unremarkable. The mastoid air cells and middle ear  cavities are unremarkable. The visualized skull base and calvarium are unremarkable. The     1.  No evidence of acute intracranial hemorrhage or mass effect. 2.  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 3.  Air-fluid level within the right maxillary sinus which could represent acute sinusitis in the appropriate setting.    Ct Chest Without Contrast    Result Date: 8/11/2020  EXAM: CT CHEST WO CONTRAST LOCATION: [facility] DATE/TIME: 8/11/2020 8:11 PM INDICATION: Respiratory illness, acute (Age > 40y) Shortness of breath persistent hypoxemia despite max diuresis. re-eval opacities COMPARISON: CT chest 08/03/2020, 07/12/2019 TECHNIQUE: CT chest without IV contrast. Multiplanar reformats were obtained. Dose reduction techniques were used. CONTRAST: None. FINDINGS: LUNGS AND PLEURA: Mild to moderate tracheal secretions. Moderate to severe emphysema with associated interlobular septal thickening. Mosaic lung attenuation. No focal consolidation or pleural effusion. No mass or definite suspicious nodule. MEDIASTINUM/AXILLAE: Upper normal heart size. No pericardial effusion. Moderate to severe coronary artery calcifications. UPPER ABDOMEN: Stable soft tissue density nodules adjacent to the left adrenal gland. MUSCULOSKELETAL: Spinal degenerative changes.     1.  Moderate to severe emphysema. 2.  Interlobular septal thickening and groundglass pulmonary opacities likely reflecting pulmonary edema, increased since the CT from 08/03/2020. No pneumonic consolidation or pleural effusion. 3.  Mild to moderate tracheal secretions.     Mr Brain With Without Contrast    Result Date: 7/23/2020  EXAM: MR BRAIN W WO CONTRAST LOCATION: [facility] DATE/TIME: 7/23/2020 5:51 PM INDICATION: WORSENING ATAXIA-MENTAL STATUS COMPARISON: MRI head 10/08/2016 from [facility]. CT  head 12/20/2017 from [facility]. CONTRAST: [facility] 8 TECHNIQUE: Routine multiplanar multisequence head MRI without and with intravenous contrast. FINDINGS: INTRACRANIAL CONTENTS: No acute or subacute infarct. Again seen is a shunting type moderate-sized arteriovenous malformation in the parasagittal right parietal region superiorly and posteriorly. It measures at least 3.5 cm AP x 2 cm transverse x 4 cm craniocaudad. It has a very similar appearance to the MRI 10/08/2016 from [facility]. No associated new hemorrhage, edema, or mass effect. Scattered nonspecific T2/FLAIR hyperintensities within the cerebral white matter most consistent with mild chronic microvascular ischemic change. Mild generalized cerebral atrophy. No hydrocephalus. Normal position of the cerebellar tonsils. No pathologic contrast enhancement of the brain parenchyma or meninges allowing for some vascular enhancement with the right parietal AVM. SELLA: No abnormality accounting for technique. OSSEOUS STRUCTURES/SOFT TISSUES: Normal marrow signal. The major intracranial vascular flow voids are maintained. ORBITS: No abnormality accounting for technique. SINUSES/MASTOIDS: No paranasal sinus mucosal disease. No middle ear or mastoid effusion.     1.  Again seen is a shunting type moderate-sized arteriovenous malformation in the parasagittal right parietal area superiorly and posteriorly measuring approximately 3.5 cm x 2 cm x 4 cm. It is not changed significantly when compared to MRI head 10/08/2016 from [facility]. No new hemorrhage, edema or mass effect. Follow-up conventional angiography could be considered. 2.  No evidence for new infarct, hemorrhage elsewhere, hydrocephalus or intracranial mass. 3.  Mild age-related changes.     Xr Swallow Study W Speech    Result Date: 8/13/2020  EXAM: XR SWALLOW STUDY W SPEECH OR OT LOCATION: [facility] DATE/TIME: 8/13/2020 2:47 PM INDICATION: Difficulty swallowing. COMPARISON: None.  TECHNIQUE: Routine. FINDINGS: FLUOROSCOPIC TIME: 2.7 minutes NUMBER OF IMAGES: 0 Swallow study with Speech Pathology using multiple barium thicknesses. Mendez aspiration with nectar consistency that is silent. Thin liquids not tried. With honey consistency and pudding there is premature spill to the vallecula but no penetration or aspiration and otherwise normal swallowing reflex.     1.  High risk for aspiration with nectar consistency and likely thin. 2.  Patient should build tolerate honey and pudding consistency.     Cta Chest Pe Run    Result Date: 8/3/2020  EXAM: CTA CHEST PE RUN LOCATION: [facility] DATE/TIME: 8/3/2020 5:01 PM INDICATION: PE suspected, high pretest prob SOB, tachycardia COMPARISON: CTA chest, abdomen and pelvis 07/12/2019 TECHNIQUE: CT chest pulmonary angiogram during arterial phase injection of IV contrast. Multiplanar reformats and MIP reconstructions were performed. Dose reduction techniques were used. CONTRAST: Iohexol (Omni) 100 mL FINDINGS: ANGIOGRAM CHEST: The right and left main pulmonary arteries and the segmental vessels are all patent without evidence for emboli. Suboptimal opacification involving the subsegmental vessels to both lower lobes. Thoracic aorta is negative for dissection. No CT evidence of right heart strain. LUNGS AND PLEURA: Advanced centrilobular emphysema. Extensive airspace and groundglass opacities throughout both upper lobes, with additional interstitial opacities throughout the mid and lower lung fields, all of which are new since the previous study. Subsegmental atelectasis in the bases. No effusions. MEDIASTINUM/AXILLAE: Numerous borderline enlarged mediastinal and hilar nodes with minimal change. UPPER ABDOMEN: Advanced atherosclerotic disease. Splenomegaly. Trace ascites adjacent to the liver. Collateral vessels along the lesser curvature the stomach MUSCULOSKELETAL: Hypertrophic changes thoracic spine.     1.  No evidence for central pulmonary  emboli. The peripheral subsegmental vessels to both lower lobes are suboptimally opacified. 2.  Advanced centrilobular emphysema. 3.  Extensive groundglass and airspace infiltrates within both upper lobes concerning for pneumonia, including Covid 19. There are additional diffuse interstitial densities both lungs likely representing edema.     Us Venous Legs Bilateral    Result Date: 8/11/2020  EXAM: US VENOUS LEGS BILATERAL LOCATION: [facility] DATE/TIME: 8/11/2020 12:31 PM INDICATION: hypoxia, eval for DVT COMPARISON: None. TECHNIQUE: Venous Duplex ultrasound of bilateral lower extremities with and without compression, augmentation and duplex. Color flow and spectral Doppler with waveform analysis performed. FINDINGS: Exam includes the common femoral, femoral, popliteal veins as well as segmentally visualized deep calf veins and greater saphenous vein. RIGHT: No deep vein thrombosis. No superficial thrombophlebitis. No popliteal cyst. LEFT: No deep vein thrombosis. No superficial thrombophlebitis. No popliteal cyst.     1.  No deep venous thrombosis in the bilateral lower extremities.    Ct Abdomen Pelvis Without Oral With Iv Contrast    Result Date: 8/3/2020  EXAM: CT ABDOMEN PELVIS WO ORAL W IV CONTRAST LOCATION: [facility] DATE/TIME: 8/3/2020 5:03 PM INDICATION: RLQ abdominal pain, appendicitis suspected (Age > 14y) Right sided abdominal pain COMPARISON: None. TECHNIQUE: CT scan of the abdomen and pelvis was performed following injection of IV contrast. Multiplanar reformats were obtained. Dose reduction techniques were used. CONTRAST: Iohexol (Omni) 100 mL FINDINGS: LOWER CHEST: Diffuse interstitial opacities throughout the visualized lower lung fields. HEPATOBILIARY: Trace ascites adjacent to the liver. Moderate distention of the gallbladder. PANCREAS: Normal. SPLEEN: Borderline splenomegaly is unchanged measuring 13 cm with trace adjacent fluid. ADRENAL GLANDS: Normal. KIDNEYS/BLADDER: Normal.  BOWEL: The appendix is identified within the mid right pelvis and is of normal caliber containing a tiny amount of air. There is some minimal edema adjacent to the appendix and cecum. Slight wall thickening involving the cecum and ascending colon with a small amount of adjacent ascites. No evidence for bowel obstruction. LYMPH NODES: Normal. VASCULATURE: Advanced atherosclerotic disease abdominal aorta and iliac arteries with high-grade stenosis involving the proximal right and left common iliac arteries. Prominent collateral vessels along the lesser curvature of the stomach are more distended compared to the prior study and could be secondary to portal venous hypertension. PELVIC ORGANS: Normal. MUSCULOSKELETAL: Small fat-containing ventral hernia.     1.  Appendix is of normal caliber. There is a small amount of ascites adjacent to the base of the cecum and ascending colon with slight wall thickening which could represent an acute colitis. No evidence for bowel obstruction. 2.  Trace ascites adjacent to the liver and spleen. 3.  Advanced atherosclerotic disease. 4.  Prominent collateral vessels/possible gastric varices lesser curvature of the stomach. 5.  Interstitial opacities throughout the visualized lower lung fields. Please refer to CTA chest report for further discussion.    Us Abdomen Limited    Result Date: 8/13/2020  EXAM: US ABDOMEN LIMITED LOCATION: [facility] DATE/TIME: 8/13/2020 8:02 AM INDICATION: worsening LFT's right upper quad pain, eval for galbladder infection, or stones in the ducts COMPARISON: None. TECHNIQUE: Limited abdominal ultrasound. FINDINGS: GALLBLADDER: Normal. No gallstones, wall thickening, or pericholecystic fluid. Negative sonographic Ludwig's sign. BILE DUCTS: No biliary dilatation. The common duct measures 4 mm. LIVER: Coarsened hepatic parenchymal echotexture. Appearance suggestive of underlying hepatic fibrosis. No focal mass. RIGHT KIDNEY: No hydronephrosis. PANCREAS:  The visualized portions are normal. No ascites.     1.  Coarsened hepatic parenchymal echotexture concerning for underlying hepatic fibrosis. 2.  No other significant findings.    Us Abdomen Limited    Result Date: 8/4/2020  EXAM: US ABDOMEN LIMITED LOCATION: [facility] DATE/TIME: 8/4/2020 5:59 PM INDICATION: Hyperbilirubinemia, sepsis, evaluate for cholecystitis, CBD dilation COMPARISON: CT 8/3/2020 . TECHNIQUE: Limited abdominal ultrasound. FINDINGS: GALLBLADDER: Isoechoic intraluminal tissue related to the anterior wall of the gallbladder measuring approximately 2.5 x 2.0 x 1.0 cm has no internal Doppler flow. No significant gallbladder wall thickening with gallbladder wall thickness approximately 3  mm. Gallbladder otherwise negative. Negative sonographic Ludwig's sign. BILE DUCTS: No intrahepatic biliary dilatation. The common duct measures 6 mm, upper normal. LIVER: Coarsened hepatic parenchymal echotexture and slight hepatic contour irregularity. No focal mass. RIGHT KIDNEY: No hydronephrosis. PANCREAS: The visualized portions are normal. Trace ascites.     1.  Isoechoic intraluminal tissue related to the anterior wall of the gallbladder measuring approximately 2.5 x 2.0 x 1.0 cm indeterminate for adherent sludge material versus polyp but favor sludge material given lack of internal Doppler flow. 2.  No gallbladder wall thickening. 3.  No intrahepatic biliary dilatation. The common duct measures 6 mm, upper normal. 4.  Coarsened hepatic parenchymal echotexture and slight hepatic contour irregularity suggestive of underlying hepatic fibrosis/cirrhosis. 5.  Trace ascites.    Us Venous Arms Bilateral    Result Date: 8/11/2020  EXAM: US VENOUS ARMS BILATERAL LOCATION: [facility] DATE/TIME: 8/11/2020 12:31 PM INDICATION: hypoxia, eval for DVT COMPARISON: None. TECHNIQUE: Venous Duplex ultrasound of both upper extremities with (when possible) and without compression, augmentation, and duplex. Color  "flow and spectral Doppler with waveform analysis performed. FINDINGS: Ultrasound includes evaluation of the internal jugular veins, innominate veins, subclavian veins, axillary veins, and brachial veins. The superficial cephalic and basilic veins were also evaluated where seen. RIGHT: No deep venous thrombosis. No superficial thrombophlebitis. LEFT: No deep venous thrombosis. No superficial thrombophlebitis.     1.  No deep venous thrombosis in the bilateral upper extremities.    Poc Us 3cg Picc Placement Guidance    Result Date: 8/4/2020  Exam was performed as guidance for PICC line insertion.  Click \"PACS images\" hyperlink below to view any stored images.  For specific procedure details, view procedure note authored by PICC/Vascular Access Nurse.      Most Recent EKG     Units 08/13/20  0907   VENTRATE    ATRIALRATE    QRSDURATION ms 78   QTINTERVAL ms 414   QTCCALC ms 539   P Axis degrees 47   RAXIS degrees 54   TAXIS degrees 31   MUSEDX  Sinus tachycardia with occasional Premature ventricular complexes  Otherwise normal ECG  When compared with ECG of 04-AUG-2020 14:32,  Premature ventricular complexes are now Present  Nonspecific T wave abnormality has replaced inverted T waves in Anterior leads  Confirmed by LELA LOPEZ MD LOC:[facility] ([id number]) on 8/13/2020 2:31:53 PM         Liang Moore, DO          "

## 2021-06-10 NOTE — PROGRESS NOTES
[facility] Palliative Care Social Work Note:     Supportive check in with dtr Telma and Hailey Williamson NP. Pt now on comfort care. She is grateful that she was able to spend the night with her mom. She feels her mom is comfortable.  Telma hopes pt can be discharged with hospice so she doesn't die in the hospital, but acknowledged that her mom may not be stable enough to transport. She will meet with hospice today.     She is experiencing multiple losses and stressors at this time. Her grandfather  a few days ago. Provided active and empathetic listening, validation of feelings and emotional support.     PC SW remains available to provide psychosocial support to Telma if needed.     Melia Dennis, [facility]  Palliative Care   Office # [phone]

## 2021-06-10 NOTE — TREATMENT PLAN
RCAT Treatment Plan    Patient Score: 14    Patient Acuity: 3    Clinical Indication for Therapy: shortness of breath, bronchospasm.     Therapy Ordered: Duoneb four times a day and continue albuterol q4hprn for wheezing.     Assessment Summary: RT called to assess patient this morning due to low sats. Pt is on an oxymask at 10 lpm, SpO2 90%. Pt has been between and oxymask and an oximizer cannula and MD is aware. BS were very diminished t/o with scattered crackles t/o. HR pre neb was 116 bpm, HR post neb was 122 bpm. Pt's HR was high before neb tx. RT will continue to monitor pt's HR closely. RN gave pt lasix this morning. Pt given duoneb tx this morning for shortness of breath. Pt states the neb helped her. RT will follow with duoneb four times a day to help with shortness of breath. RT notified RN and will notify MD. RT will re-assess in 48 hours and as needed per RCAT protocol.        08/26/20 1023   Reason for Assessment (RCAT)   RCAT Assesment Re-eval   Assessment Reason  COPD   $ RCAT Eval Time 15 min.  Yes   Vitals (RCAT)   Heart Rate (!) 122   /55  (last bp at 07:48)   Resp 22   SpO2 90 %   FiO2 (%)   (10 lpm oxymask)   Patient Temp 97.7 F  (last temp at 07:48)   Chart Assessment (RCAT)   Pulmonary Status  3   Surgical Status  0   Chest Xray  3  (per last chest CT)   Patient Assessment (RCAT)    Respiratory Pattern  1   Mental Status  0   Breath Sounds  3   Cough Effectiveness  0   Level of Activity  1   O2 Required SpO2 >= 92%  3   Chart + Pt. Assessment Total Points  14   RCAT Acuity Score 14 (Acuity 3)   Clinical Indications (RCAT)   Aerosol Hygiene History of bronchospasm;RCAT protocol   Broncho-Pulmonary Therapy   (Pt has a strong cough)   Volume Expansion Therapy   (encouraged deep breathing. )   RCAT Order Placed  Yes;Other (comment)       Maite Hayes RRT

## 2021-06-10 NOTE — PROGRESS NOTES
[facility] Medicine PROGRESS NOTE      Identification/Summary: Keiko Guo is a 60 y.o. female with a past medical history of COPD, PAD, alcohol abuse, cigarette smoking, GÉNESIS, dementia presented to ER with shortness of breath on 8/3/2020. She was tachycardic on admission, had elevated lactic acid. CT chest with severe emphysema and ground glass opacity. She started on IV antibiotic Zosyn and azithromycin for PNA (possible aspiration). Resuscitated with IVF. She was complaining of abdominal pain and CT abdomen showed distended gall bladder, US showed gallbladder sludge and no wall thickening. She is now on BiPAP/high flow oxygen, delirious. She was tested three times for COVID, negative. She is DNR/DNI. Palliative care consulted to clarify the goal of care. Her prognosis is guarded. intensivist is following.  Hospice consulted today. Her daughter is decision maker       Assessment and Plan:    COPD exacerbation  Acute hypoxic respiratory failure   PNA (aspiration?)  Sepsis,    CT chest showed advanced emphysema and extensive ground glass and airspace infiltrate in upper lung concerning for PNA and diffuse interstitial densities likely edema in mid to lower lungs.  On Solumedrol IV  Lasix 40 mg IV q 8 hours   Duoneb scheduled and prn   On Zosyn and azithromycin. Dc azithromycin today (she has received 5 doses)       Elevated liver enzymes  RUQ tenderness, cholecystitis?  AST and Alk phos elevated but its been a chronic issue   US finding not very supportive for the diagnosis of cholecystitis   Continue with Zosyn for now and monitor her symptoms   She is not a surgery candidate, surgery consulted and they signed off       Alcohol abuse/dependence   On [facility] protocol  Thiamine and folic acid    Acute encephalopathy  History of unstable gate  Wernicke encephalopathy?  On thiamine and folic acid      Fall  She has bruise around her right eye and right knee from a recent fall  CT head negative       Thrombocytopenia  Likely 2/2 ETOH  Monitor Plt count     Tobacco abuse  Current smoker        DVT prophylaxis  Plt is 64, avoid heparin   SCD     Bipolar disorder  Continue home medications     Diet: NPO for now until swallow evaluation   DVT Prophylaxis: SCD, avoid heparin because of thrombocytopenia   Code Status: No CPR- Do NOT Intubate    Anticipated possible discharge: to be determined    Interval History/Subjective:  On high flow currently, she was on BiPAP overnight        Physical Exam/Objective:  Vitals I/O   Temp:  [97.1  F (36.2  C)-98.7  F (37.1  C)] 98  F (36.7  C)  Heart Rate:  [] 97  Resp:  [16-31] 16  BP: (118-166)/() 166/102  SpO2:  [88 %-99 %] 99 %  FiO2 (%):  [50 %-70 %] 50 %  I/O last 3 completed shifts:  In: 200 [IV Piggyback:200]  Out: 2745 [Urine:2745]   176 lb 12.8 oz (80.2 kg)  Body mass index is 32.34 kg/m .    Physical Exam:    General Appearance:  confused, on BiPAP   Head:  Normocephalic, notable for bruise around her right eyes   Eyes:  PERRL    Neck: No JVD, no LAP   Lungs:   Clear to auscultation bilaterally, using accessory muscle to breath   Heart:  Regular rate and rhythm, S1, S2   Abdomen:   Soft, non-tender, non distended, bowel sounds present, no guarding or rigidity   Extremities: No edema, no joint swelling   Skin: Skin color, texture, turgor normal, no rashes or lesions   Neurologic: Moves all 4 extremities         Medications:   Personally Reviewed.    azithromycin  500 mg Intravenous Q24H     furosemide  40 mg Intravenous Q8H     ipratropium-albuteroL  3 mL Nebulization Q6H - RT     methylPREDNISolone sodium succinate  40 mg Intravenous Q8H     multivitamin therapeutic  1 tablet Oral DAILY     nicotine  1 patch Transdermal DAILY     omeprazole  20 mg Oral QAM (0630)     piperacillin-tazobactam  3.375 g Intravenous Q8H     QUEtiapine  300 mg Oral QHS     sodium chloride  10-30 mL Intravenous Q8H FIXED TIMES     thiamine (vitamin B1) IVPB  100 mg Intravenous  DAILY       Labs:  Lab Results   Component Value Date    WBC 6.9 08/07/2020    HGB 8.9 (L) 08/07/2020    HCT 26.2 (L) 08/07/2020     (H) 08/07/2020    PLT 57 (L) 08/07/2020     Lab Results   Component Value Date    CREATININE 0.65 08/07/2020    BUN 23 (H) 08/07/2020     08/07/2020    K 3.6 08/07/2020     08/07/2020    CO2 30 08/07/2020        Imaging:  Ct Head Without Contrast  Result Date: 8/3/2020  1.  No evidence of acute intracranial hemorrhage or mass effect. 2.  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 3.  Air-fluid level within the right maxillary sinus which could represent acute sinusitis in the appropriate setting.        Result Date: 8/3/2020  1.  No evidence for central pulmonary emboli. The peripheral subsegmental vessels to both lower lobes are suboptimally opacified. 2.  Advanced centrilobular emphysema. 3.  Extensive groundglass and airspace infiltrates within both upper lobes concerning for pneumonia, including Covid 19. There are additional diffuse interstitial densities both lungs likely representing edema.     Ct Abdomen Pelvis Without Oral With Iv Contrast  Result Date: 8/3/2020  1.  Appendix is of normal caliber. There is a small amount of ascites adjacent to the base of the cecum and ascending colon with slight wall thickening which could represent an acute colitis. No evidence for bowel obstruction. 2.  Trace ascites adjacent to the liver and spleen. 3.  Advanced atherosclerotic disease. 4.  Prominent collateral vessels/possible gastric varices lesser curvature of the stomach. 5.  Interstitial opacities throughout the visualized lower lung fields. Please refer to CTA chest report for further discussion.      [facility]

## 2021-06-10 NOTE — PLAN OF CARE
Problem: Discharge Barriers  Goal: Patient's discharge needs are met  Outcome: Progressing       Goal: Patient s discharge needs are met.  Outcome: Care Progression reviewed with Hospitalist, Care Manager.  Discharge Disposition: Discussed and plan to discharge to: Home  Planned Discharge Date: multiple days  Problem: Barriers to discharge include: ID consult, surgery consult, IV Abx, O2 needs  Transportation needs/Ride Time: Family     SREEDHARCM attempted to call patient 2x to introduce self, CM role, complete assessment. Pt on COVID isolation precautions.  SW attempted to call patient on room phone number and cell phone number. CM will attempt to call again and follow for progression of care.     SREEDHAR Tejada  8/5/2020  3:14 PM

## 2021-06-10 NOTE — PROGRESS NOTES
[facility] MEDICINE PROGRESS NOTE      Identification/Summary: Keiko Guo is a 60 y.o. female with a past medical history of tobacco use, alcohol use disorder, bipolar disorder, previous suicide attempt with overdose, GÉNESIS, PAD, worsening dementia and imbalance (possible Warnicke encephalopathy), and radiographic emphysema (presumed COPD) who was admitted on 8/3/2020 for abdominal pain, lactic acidosis, hyperbilirubinemia, acute respiratory failure with hypoxia, and acute encephalopathy. Hospital course is notable for severe hypoxia requiring up to 60 L HF NC and 55% FiO2, guarded prognosis, and ongoing encephalopathy.  Hypoxic respiratory failure likely secondary to combination of severe emphysema, volume overload/pulmonary edema, possible COPD exacerbation, and/or CAP VS aspiration.  Given poor prognosis, hospice was consulted 8/9/2020 with patient's daughter, Telma be agreeable to a hospice discharge to [facility] if symptoms not improve, however she is not comfortable with making the patient comfort cares at this time.  There is some concern about the daughter's mental status, possible intoxication at hospital, ability to understand the prognosis, and make decisions concerning the patient, thus Ethics consultation was placed. I spoke with the patient's daughter at length today and although she was quite anxious I feel that she was appropriate and able to make decisions on behalf of her mother.     Assessment and Plan:  Acute Hypoxic Respiratory Failure, multifactorial  Severe Emphysema with COPD Acute Exacerbation  Aspiration PNA vs CAP  Pulmonary Edema, Volume Overload  Patient with acute hypoxic respiratory failure likely of multifactorial etiology as above.  Did require BiPAP on admission and required high flow nasal cannula yesterday.  Currently on 4 L nasal cannula.  Repeat chest CT 8/11 shows worsened groundglass opacities consistent with possible pulmonary edema.  COVID-19 PCR negative x3.   COVID-19 IgG negative.  Ultrasound of all lower and upper extremities demonstrated no evidence of DVT. TTE with bubble study 8/11 -no evidence of right to left shunting  - Infectious diseases, pulmonology service following  - Diuretics discontinued 8/11  - Has completed zosyn and azithromycin treatment  - Prednisone 40 mg daily - taper ordered as per pulmonology  - Continue duo-nebs  4 times daily  - Fungal work-up pending -ordered by pulm/ID.  - Lactic acidosis improving - continue to monitor    Alcohol abuse/dependence  Wernicke encephalopathy  Acute metabolic encephalopathy   Patient with longstanding alcohol use disorder.  Came in with altered mental status, which is improving.  Patient currently alert but unable to speak clearly.  -Continue thiamine and folic acid supplement  -Unstable gait secondary to Wernicke encephalopathy  -Altered mental status likely multifactorial including delirium from illness, hypoxemia, baseline  -Multifactorial including delirium from recent illness, hypoxemia, possible baseline wernicke's  - Ammonia mildly elevated - 40 umol/L    Moderate malnutrition, secondary to poor oral intake   - Dietary and nutrition consulted: recommend Tube Feeds if pt is to remain NPO  - SLP bedside swallow eval due to concern for aspiration  - daughter does not feel mother would want tube feeding but would consider TPN for a short time if she was eventually alert enough to eat    Hypernatremia  Likely secondary to poor oral intake in the setting of acute illness for many days.  Free water deficit in context of CHF, pulmonary edema.  -Critical care team following  -Diuretics discontinued 8/11  -IV fluids discontinued 8/12 by pulmonology given pulmonary edema, hypoxia, recent discontinuation of diuretics  -Check Na every 8-hours ordered  - Na improved to 150 from 153 this morning  -Consider D5 free water when restarting fluids, especially given poor nutrition  -Nephrology consulted -appreciate  recommendations regarding fluid management     Advanced Care Planning  Goals of Care  Hospice and Palliative Care evaluations  -Hospice met with daughter on 8/9 who expressed willingness for discharge to hospice, but not to making patient comfort care at this time while in hospital.  -Daughter was unable to attend planned family conference 8/11. Physician who discussed with daughter on 8/11 noted that she hung up twice, was tangential, fixated minute details, did not demonstrate understanding of patient's illness  or clinical context, and continually requests more testing and imaging be completed.  See EMR documentation for more details.  Multiple sources of concern from care team including physician, nursing, case management. --  Ethics consult placed  -spoke with dtr today in person for about 30 minutes and she was reasonable although somewhat anxious. She did not appear to be acutely intoxicated or in mental crisis.    Abdominal pain  Elevated liver enzymes, thrombocytopenia  Jaundice   On presentation there was some concern of abdominal pain.  History of alcohol abuse - unclear if pt has underlying chronic  liver disease.  Elevated liver enzymes.  -Abdomen CT 8/3- slight wall thickening of ascending colon, no obstruction, distended gallbladder  -Abdominal ultrasound subsequently performed- no gallbladder wall thickening or severe biliary dilatation; but possible hepatic scarring/ cirrhosis  -General surgery consulted --pt is not a surgical candidate.  - Pt has been treated with  Zosyn  -Dtr notes that for some time prior to admission the pt was not eating due to postprandial pain, nausea and vomiting.   - will ask for GI recommendations regarding any possible treatable cause of her postprandial nausea and vomiting     Hypokalemia: replacement protocol     History of anxiety, depression, bipolar disorder  Patient is confused, not on medications, will monitor    Diet: No diet orders on file  DVT Prophylaxis:  VTE  Prophylaxis contraindicated due to thrombocytopenia  Code Status: No CPR- Do NOT Intubate    Anticipated possible discharge to hospice when more medically stable.    Interval History/Subjective:  Today patient is more awake than she has been throughout hospital stay.  She arouses to her name and tries to speak, but words are unintelligible.  Does not seem to endorse any complaints, but interview is limited by poor communication.  Patient's daughter Telma visited yesterday and plans to come by again today.    Physical Exam/Objective:  Vitals I/O   Temp:  [97  F (36.1  C)-98.9  F (37.2  C)] 97  F (36.1  C)  Heart Rate:  [] 87  Resp:  [13-24] 13  BP: (111-134)/(56-71) 111/56  SpO2:  [84 %-97 %] 87 %  FiO2 (%):  [35 %-50 %] 35 %  I/O last 3 completed shifts:  In: 1847.5 [I.V.:1792.5; IV Piggyback:55]  Out: 300 [Urine:300]   160 lb 4.8 oz (72.7 kg)  Body mass index is 29.32 kg/m .    GENERAL:  Alert, ill appearing, in no acute distress, appears older than stated age   HEAD:  Normocephalic, without obvious abnormality, atraumatic   EYES:  conjunctiva/corneas clear, icteric slcera, EOM's intact   NECK: Supple, symmetrical, trachea midline   BACK:   Symmetric, no curvature, ROM normal   LUNGS:   Crackles in right mid lung, no  rales, rhonchi, or wheezing, symmetric chest rise on inhalation, respirations unlabored   CHEST WALL:  No tenderness or deformity   HEART:  Regular rate and rhythm, S1 and S2 normal, no murmur, rub, or gallop    ABDOMEN:   Soft, non-tender, bowel sounds active all four quadrants, no masses, no organomegaly, no rebound or guarding   EXTREMITIES: Extremities normal, atraumatic, no cyanosis or edema    SKIN: Dry to touch, no exanthems in the visualized areas   NEURO: Alert, responds to name, but unintelligible speech, does not follow commands (significant improvement in mentation);  moves all four extremities freely, non-focal     Medications:   Personally Reviewed.    [Held by provider] enoxaparin  ANTICOAGULANT  40 mg Subcutaneous DAILY     ipratropium-albuteroL  3 mL Nebulization QID - RT     nicotine  1 patch Transdermal DAILY     omeprazole  20 mg Oral BID     predniSONE  40 mg Oral Daily with brkfst     sodium chloride  10-30 mL Intravenous Q8H FIXED TIMES     thiamine  100 mg Oral DAILY       Data reviewed today: I personally reviewed all new medications, labs, imaging/diagnostics reports over the past 24 hours. Pertinent findings include Na 153, K 3.0, , , Alk Phos 187.     Labs:  Results from last 7 days   Lab Units 08/12/20  0456 08/11/20  0429 08/10/20  0444   LN-WHITE BLOOD CELL COUNT thou/uL 9.4 12.8* 6.9   LN-HEMOGLOBIN g/dL 12.0 12.2 10.2*   LN-HEMATOCRIT % 36.5 37.4 31.4*   LN-MEAN CORPUSCULAR VOLUME fL 113* 113* 115*   LN-PLATELET COUNT thou/uL 50* 76* 50*   ,   Results from last 7 days   Lab Units 08/12/20 0456 08/11/20 2023 08/11/20  1248 08/11/20  0429  08/10/20  0444   LN-SODIUM mmol/L 153* 150* 147* 152*   < > 150*   LN-POTASSIUM mmol/L 3.0*  --   --  4.0  --  4.0   LN-CHLORIDE mmol/L 98  --   --  89*  --  100   LN-CO2 mmol/L 42*  --   --  47*  --  41*   LN-BLOOD UREA NITROGEN mg/dL 37*  --   --  35*  --  25*   LN-CREATININE mg/dL 0.77  --   --  0.78  --  0.66   LN-CALCIUM mg/dL 8.2*  --   --  8.7  --  7.8*   LN-ALBUMIN g/dL 2.2*  --   --  2.4*  --  2.0*   LN-PROTEIN TOTAL g/dL 7.2  --   --  7.9  --  7.0   LN-BILIRUBIN TOTAL mg/dL 6.3*  --   --  6.6*  --  4.8*   LN-ALKALINE PHOSPHATASE U/L 187*  --   --  185*  --  169*   LN-ALT (SGPT) U/L 122*  --   --  109*  --  72*   LN-AST (SGOT) U/L 122*  --   --  128*  --  91*    < > = values in this interval not displayed.    and   Results from last 7 days   Lab Units 08/12/20  0456 08/11/20  0429 08/10/20  1246   LN-MAGNESIUM mg/dL 2.6 2.4 2.3        Imaging:  Imaging results 30 days: Xr Chest 1 View Portable    Result Date: 8/10/2020  EXAM: XR CHEST 1 VIEW PORTABLE LOCATION: [facility] DATE/TIME: 8/10/2020 10:45 AM  INDICATION: persistent hypoxia COMPARISON: CT chest 8/3/2020     Stable diffuse reticulation and groundglass opacification likely in part part related to chronic fibrotic and emphysematous changes seen on comparison CT. Cannot exclude superimposed edema or atypical pneumonia. No focal consolidation or pleural effusion. Stable heart size.    Ct Head Without Contrast    Result Date: 8/3/2020  EXAM: CT HEAD WO CONTRAST LOCATION: [facility] DATE/TIME: 8/3/2020 5:04 PM INDICATION: Head trauma, abnormal mental status (Age 19-64y) Fall, bruising to face COMPARISON: Head CT 12/20/2017 and MRI brain 07/23/2017. TECHNIQUE: Routine without IV contrast. Multiplanar reformats. Dose reduction techniques were used. FINDINGS: No evidence of acute intracranial hemorrhage or mass effect. 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 Brain attenuation morphology otherwise normal. The ventricles and sulci are normal for age. Normal gray-white matter differentiation. The basilar cisterns are patent. The globes are unremarkable. The partially imaged paranasal sinuses demonstrate air-fluid level within the right maxillary sinus which could represent acute sinusitis in the appropriate setting. The partially imaged paranasal sinuses are otherwise unremarkable. The mastoid air cells and middle ear cavities are unremarkable. The visualized skull base and calvarium are unremarkable. The     1.  No evidence of acute intracranial hemorrhage or mass effect. 2.  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 3.  Air-fluid level within the right maxillary sinus which could represent acute sinusitis in the appropriate setting.    Ct Chest Without  Contrast    Result Date: 8/11/2020  EXAM: CT CHEST WO CONTRAST LOCATION: [facility] DATE/TIME: 8/11/2020 8:11 PM INDICATION: Respiratory illness, acute (Age > 40y) Shortness of breath persistent hypoxemia despite max diuresis. re-eval opacities COMPARISON: CT chest 08/03/2020, 07/12/2019 TECHNIQUE: CT chest without IV contrast. Multiplanar reformats were obtained. Dose reduction techniques were used. CONTRAST: None. FINDINGS: LUNGS AND PLEURA: Mild to moderate tracheal secretions. Moderate to severe emphysema with associated interlobular septal thickening. Mosaic lung attenuation. No focal consolidation or pleural effusion. No mass or definite suspicious nodule. MEDIASTINUM/AXILLAE: Upper normal heart size. No pericardial effusion. Moderate to severe coronary artery calcifications. UPPER ABDOMEN: Stable soft tissue density nodules adjacent to the left adrenal gland. MUSCULOSKELETAL: Spinal degenerative changes.     1.  Moderate to severe emphysema. 2.  Interlobular septal thickening and groundglass pulmonary opacities likely reflecting pulmonary edema, increased since the CT from 08/03/2020. No pneumonic consolidation or pleural effusion. 3.  Mild to moderate tracheal secretions.     Mr Brain With Without Contrast    Result Date: 7/23/2020  EXAM: MR BRAIN W WO CONTRAST LOCATION: [facility] DATE/TIME: 7/23/2020 5:51 PM INDICATION: WORSENING ATAXIA-MENTAL STATUS COMPARISON: MRI head 10/08/2016 from [facility]. CT head 12/20/2017 from [facility]. CONTRAST: [facility] 8 TECHNIQUE: Routine multiplanar multisequence head MRI without and with intravenous contrast. FINDINGS: INTRACRANIAL CONTENTS: No acute or subacute infarct. Again seen is a shunting type moderate-sized arteriovenous malformation in the parasagittal right parietal region superiorly and posteriorly. It measures at least 3.5 cm AP x 2 cm transverse x 4 cm craniocaudad. It has a very similar appearance to the MRI 10/08/2016 from  [facility]. No associated new hemorrhage, edema, or mass effect. Scattered nonspecific T2/FLAIR hyperintensities within the cerebral white matter most consistent with mild chronic microvascular ischemic change. Mild generalized cerebral atrophy. No hydrocephalus. Normal position of the cerebellar tonsils. No pathologic contrast enhancement of the brain parenchyma or meninges allowing for some vascular enhancement with the right parietal AVM. SELLA: No abnormality accounting for technique. OSSEOUS STRUCTURES/SOFT TISSUES: Normal marrow signal. The major intracranial vascular flow voids are maintained. ORBITS: No abnormality accounting for technique. SINUSES/MASTOIDS: No paranasal sinus mucosal disease. No middle ear or mastoid effusion.     1.  Again seen is a shunting type moderate-sized arteriovenous malformation in the parasagittal right parietal area superiorly and posteriorly measuring approximately 3.5 cm x 2 cm x 4 cm. It is not changed significantly when compared to MRI head 10/08/2016 from [facility]. No new hemorrhage, edema or mass effect. Follow-up conventional angiography could be considered. 2.  No evidence for new infarct, hemorrhage elsewhere, hydrocephalus or intracranial mass. 3.  Mild age-related changes.     Cta Chest Pe Run    Result Date: 8/3/2020  EXAM: CTA CHEST PE RUN LOCATION: [facility] DATE/TIME: 8/3/2020 5:01 PM INDICATION: PE suspected, high pretest prob SOB, tachycardia COMPARISON: CTA chest, abdomen and pelvis 07/12/2019 TECHNIQUE: CT chest pulmonary angiogram during arterial phase injection of IV contrast. Multiplanar reformats and MIP reconstructions were performed. Dose reduction techniques were used. CONTRAST: Iohexol (Omni) 100 mL FINDINGS: ANGIOGRAM CHEST: The right and left main pulmonary arteries and the segmental vessels are all patent without evidence for emboli. Suboptimal opacification involving the subsegmental vessels to both lower lobes. Thoracic  aorta is negative for dissection. No CT evidence of right heart strain. LUNGS AND PLEURA: Advanced centrilobular emphysema. Extensive airspace and groundglass opacities throughout both upper lobes, with additional interstitial opacities throughout the mid and lower lung fields, all of which are new since the previous study. Subsegmental atelectasis in the bases. No effusions. MEDIASTINUM/AXILLAE: Numerous borderline enlarged mediastinal and hilar nodes with minimal change. UPPER ABDOMEN: Advanced atherosclerotic disease. Splenomegaly. Trace ascites adjacent to the liver. Collateral vessels along the lesser curvature the stomach MUSCULOSKELETAL: Hypertrophic changes thoracic spine.     1.  No evidence for central pulmonary emboli. The peripheral subsegmental vessels to both lower lobes are suboptimally opacified. 2.  Advanced centrilobular emphysema. 3.  Extensive groundglass and airspace infiltrates within both upper lobes concerning for pneumonia, including Covid 19. There are additional diffuse interstitial densities both lungs likely representing edema.     Us Venous Legs Bilateral    Result Date: 8/11/2020  EXAM: US VENOUS LEGS BILATERAL LOCATION: [facility] DATE/TIME: 8/11/2020 12:31 PM INDICATION: hypoxia, eval for DVT COMPARISON: None. TECHNIQUE: Venous Duplex ultrasound of bilateral lower extremities with and without compression, augmentation and duplex. Color flow and spectral Doppler with waveform analysis performed. FINDINGS: Exam includes the common femoral, femoral, popliteal veins as well as segmentally visualized deep calf veins and greater saphenous vein. RIGHT: No deep vein thrombosis. No superficial thrombophlebitis. No popliteal cyst. LEFT: No deep vein thrombosis. No superficial thrombophlebitis. No popliteal cyst.     1.  No deep venous thrombosis in the bilateral lower extremities.    Ct Abdomen Pelvis Without Oral With Iv Contrast    Result Date: 8/3/2020  EXAM: CT ABDOMEN PELVIS WO  ORAL W IV CONTRAST LOCATION: [facility] DATE/TIME: 8/3/2020 5:03 PM INDICATION: RLQ abdominal pain, appendicitis suspected (Age > 14y) Right sided abdominal pain COMPARISON: None. TECHNIQUE: CT scan of the abdomen and pelvis was performed following injection of IV contrast. Multiplanar reformats were obtained. Dose reduction techniques were used. CONTRAST: Iohexol (Omni) 100 mL FINDINGS: LOWER CHEST: Diffuse interstitial opacities throughout the visualized lower lung fields. HEPATOBILIARY: Trace ascites adjacent to the liver. Moderate distention of the gallbladder. PANCREAS: Normal. SPLEEN: Borderline splenomegaly is unchanged measuring 13 cm with trace adjacent fluid. ADRENAL GLANDS: Normal. KIDNEYS/BLADDER: Normal. BOWEL: The appendix is identified within the mid right pelvis and is of normal caliber containing a tiny amount of air. There is some minimal edema adjacent to the appendix and cecum. Slight wall thickening involving the cecum and ascending colon with a small amount of adjacent ascites. No evidence for bowel obstruction. LYMPH NODES: Normal. VASCULATURE: Advanced atherosclerotic disease abdominal aorta and iliac arteries with high-grade stenosis involving the proximal right and left common iliac arteries. Prominent collateral vessels along the lesser curvature of the stomach are more distended compared to the prior study and could be secondary to portal venous hypertension. PELVIC ORGANS: Normal. MUSCULOSKELETAL: Small fat-containing ventral hernia.     1.  Appendix is of normal caliber. There is a small amount of ascites adjacent to the base of the cecum and ascending colon with slight wall thickening which could represent an acute colitis. No evidence for bowel obstruction. 2.  Trace ascites adjacent to the liver and spleen. 3.  Advanced atherosclerotic disease. 4.  Prominent collateral vessels/possible gastric varices lesser curvature of the stomach. 5.  Interstitial opacities throughout the  visualized lower lung fields. Please refer to CTA chest report for further discussion.    Us Abdomen Limited    Result Date: 8/4/2020  EXAM: US ABDOMEN LIMITED LOCATION: [facility] DATE/TIME: 8/4/2020 5:59 PM INDICATION: Hyperbilirubinemia, sepsis, evaluate for cholecystitis, CBD dilation COMPARISON: CT 8/3/2020 . TECHNIQUE: Limited abdominal ultrasound. FINDINGS: GALLBLADDER: Isoechoic intraluminal tissue related to the anterior wall of the gallbladder measuring approximately 2.5 x 2.0 x 1.0 cm has no internal Doppler flow. No significant gallbladder wall thickening with gallbladder wall thickness approximately 3  mm. Gallbladder otherwise negative. Negative sonographic Ludwig's sign. BILE DUCTS: No intrahepatic biliary dilatation. The common duct measures 6 mm, upper normal. LIVER: Coarsened hepatic parenchymal echotexture and slight hepatic contour irregularity. No focal mass. RIGHT KIDNEY: No hydronephrosis. PANCREAS: The visualized portions are normal. Trace ascites.     1.  Isoechoic intraluminal tissue related to the anterior wall of the gallbladder measuring approximately 2.5 x 2.0 x 1.0 cm indeterminate for adherent sludge material versus polyp but favor sludge material given lack of internal Doppler flow. 2.  No gallbladder wall thickening. 3.  No intrahepatic biliary dilatation. The common duct measures 6 mm, upper normal. 4.  Coarsened hepatic parenchymal echotexture and slight hepatic contour irregularity suggestive of underlying hepatic fibrosis/cirrhosis. 5.  Trace ascites.    Us Venous Arms Bilateral    Result Date: 8/11/2020  EXAM: US VENOUS ARMS BILATERAL LOCATION: [facility] DATE/TIME: 8/11/2020 12:31 PM INDICATION: hypoxia, eval for DVT COMPARISON: None. TECHNIQUE: Venous Duplex ultrasound of both upper extremities with (when possible) and without compression, augmentation, and duplex. Color flow and spectral Doppler with waveform analysis performed. FINDINGS: Ultrasound includes  "evaluation of the internal jugular veins, innominate veins, subclavian veins, axillary veins, and brachial veins. The superficial cephalic and basilic veins were also evaluated where seen. RIGHT: No deep venous thrombosis. No superficial thrombophlebitis. LEFT: No deep venous thrombosis. No superficial thrombophlebitis.     1.  No deep venous thrombosis in the bilateral upper extremities.    Poc Us 3cg Picc Placement Guidance    Result Date: 8/4/2020  Exam was performed as guidance for PICC line insertion.  Click \"PACS images\" hyperlink below to view any stored images.  For specific procedure details, view procedure note authored by PICC/Vascular Access Nurse.        Patient was seen and discussed with attending physician Dr. Liang Moore  ----  Peggy Carrasquillo MD PGY-2  [facility], [facility]  Pager: [phone]    I have reviewed the notes, assessments, and/or procedures performed by Dr Carrasquillo, I concur with her/his documentation of Keiko Guo.    Liang Moore, DO          "

## 2021-06-10 NOTE — PLAN OF CARE
Problem: Decreased Mental Status Causing Increased Need for Safety  Goal: Provide a safe environment for patient (Implement appropriate elements in plan of care)  Outcome: Progressing     Problem: Impaired Gas Exchange  Goal: Demonstrate improved ventilation and adequate oxygenation of tissues as evidenced by absence of respiratory distress  Outcome: Progressing     Problem: Cognitive Impairment or Disorientation  Goal: Patient will maintain or return to normal baseline cognitive function  Outcome: Progressing   Patient VSS. Pt denies pain. Patient daughter was in the room and making patient very anxious 0.5 mg ativan given at 2024. Patient took medication is pudding. Patient had large loose BM. Pt refuses to get up and walk to bathroom. Patient is resting comfortably in bed. Please pass on that PICC dressing is due to be changed.

## 2021-06-10 NOTE — PROGRESS NOTES
"/54 (Patient Position: Lying)   Pulse (!) 101   Temp 98  F (36.7  C) (Axillary)   Resp 22   Ht 5' 2\" (1.575 m)   Wt 171 lb 6.4 oz (77.7 kg)   SpO2 90%   BMI 31.35 kg/m      Pt throughout the day has gone back and forth from HFNC and Bipap. When last seen, pt was on bipap 12/5, r16 and fio2 of 50%. BS have been diminished bilaterally pre and post nebs. When pt goes on HFNC, setting are 50 lpm @ 50% fio2. RT will continue to follow.   "

## 2021-06-10 NOTE — PROGRESS NOTES
"Writer in Charge RN role; was Pt bedside RN on Friday and Saturday. Pt daughter stopped writer in moyer asking, \"How do you think my mom is doing?\" Pt daughter, Telma, appeared agitated and inebriated, slurring her words, unsteady gait, not allowing writer to talk. Daughter very fixated on pt gall bladder and getting her started on tube feeds. Writer attempted to explain to daughter importance of stabilizing pt respiratory status but was constantly interrupted. Writer asked daughter if she was okay to which she responded, \"I have ADHD real bad.\"   "

## 2021-06-10 NOTE — PROGRESS NOTES
Infectious Disease Progress Note    Assessment/Plan  Impression: Pneumonia and respiratory failure.     No pathogen identified, concern for COVID-19 although now with three negative PCR    Struggling a bit more today. Palliative involved.     Recommendations: continuing zosyn for now.  Azithromycin finishing soon.  Please call with additional questions or change in clinical status over the weekend.    Principal Problem:    COPD with acute exacerbation (H)  Active Problems:    Tobacco abuse    Acute respiratory failure with hypoxemia (H)    Goals of care, counseling/discussion    Abnormal chest CT    Right upper quadrant pain    Acute pulmonary edema (H)      Kym Moy MD  [phone]      Subjective  Drowsy this morning. Family met with palliative yesterday.     Objective    Vital signs in last 24 hours  Temp:  [98  F (36.7  C)-98.9  F (37.2  C)] 98.4  F (36.9  C)  Heart Rate:  [] 86  Resp:  [16-22] 22  BP: (120-171)/() 125/63  FiO2 (%):  [50 %-89 %] 89 %  Weight:   173 lb 8 oz (78.7 kg)    Intake/Output last 3 shifts  I/O last 3 completed shifts:  In: 400 [IV Piggyback:400]  Out: 4220 [Urine:4220]  Intake/Output this shift:  I/O this shift:  In: 150 [IV Piggyback:150]  Out: -     Review of Systems   Pertinent items are noted in HPI., otherwise negative.    Physical Exam  General appearance: sleepy appears older than stated age, and mildly obese  Head: Normocephalic, without obvious abnormality, Some bruises around the right eye.  No worse  Eyes: Extraocular muscles intact    Lungs: hard to auscultate.  Few rhonchi.    Extremities: extremities normal, atraumatic, no cyanosis or edema  Skin: Bruises and discoloration noted.  Neurologic: Grossly normal    Pertinent Labs   Lab Results: personally reviewed.     Results from last 7 days   Lab Units 08/08/20  0447 08/07/20  0503 08/06/20  0353   LN-WHITE BLOOD CELL COUNT thou/uL 6.7 6.9 6.8   LN-HEMOGLOBIN g/dL 9.0* 8.9* 9.1*   LN-HEMATOCRIT %  26.4* 26.2* 26.2*   LN-PLATELET COUNT thou/uL 52* 57* 64*        Results from last 7 days   Lab Units 08/08/20  0447 08/07/20  0503 08/06/20  0353   LN-SODIUM mmol/L 146* 144 140   LN-POTASSIUM mmol/L 2.7* 3.6 4.1   LN-CHLORIDE mmol/L 96* 104 107   LN-CO2 mmol/L 37* 30 25   LN-BLOOD UREA NITROGEN mg/dL 21 23* 21   LN-CREATININE mg/dL 0.66 0.65 0.63   LN-CALCIUM mg/dL 7.3* 7.5* 7.5*       Procedure  Component  Value  Units  Date/Time    Blood Culture from PERIPHERAL SITE (2nd One) [[id number]]   Collected: 08/04/20 1041    Order Status: Sent  Specimen: Blood from Vein, Peripheral  Updated: 08/04/20 1052      Results for MAISHA SUAZO (MRN [id number]) as of 8/7/2020 13:25   Ref. Range 8/3/2020 15:48 8/3/2020 22:07 8/6/2020 14:40   COVID-19 VIRUS SPECIMEN SOURCE Unknown Nares Nasopharyngeal Nasopharyngeal   SARS-CoV-2 Virus Specimen Source Unknown Nares Nasopharyngeal Nasopharyngeal   SARS-CoV-2 PCR Result Unknown NEGATIVE NEGATIVE NEGATIVE   SARS-COV-2 PCR COMMENT Unknown Testing was performed using the Xpert Xpress SARS-CoV-2 Assay on the [email or web address] Testing was performed using the Xpert Xpress SARS-CoV-2 Assay on the [employer] Testing was performed using the Xpert Xpress SARS-CoV-2 Assay on the [email or web address]       Pertinent Radiology   Radiology Results: Personally reviewed image/s and Personally reviewed impression/s    No results found.

## 2021-06-10 NOTE — PLAN OF CARE
Problem: Sensory perceptual alterations  Goal: Demonstrate absence of untoward effects of withdrawal  Outcome: Progressing   Pt continues to be very confused and mumbles incoherently; will nod yes/no to pain. Pt continues to be on CIWA protocol, scored high this AM but has scored 0 past 8 hours.     Problem: Risk for decreased cardiac output  Goal: Display vital signs within patient's normal range; absence of/reduced frequency of dysrhythmias  Outcome: Progressing   Pt had Echo today EF 73%. Pt responding very well to IV lasix, large output via purewick.     Problem: Potential for transmission of organism by Contact, Enteric, Droplet and/or Airborne routes  Goal: Prevent transmission of organisms  Outcome: Completed  Pt covid negative x3, precautions discontinued.      Problem: Impaired Gas Exchange  Goal: Demonstrate improved ventilation and adequate oxygenation of tissues as evidenced by absence of respiratory distress  Outcome: Not Progressing  Pt has needed bipap continuously today to maintain sats; FiO2 50-70%. Transitioned to Hi CIARA NC at 70% to assist with communications with Palliative and Daughter. Hospice consult placed today, will hopefully have patient/family meeting tomorrow.

## 2021-06-10 NOTE — PLAN OF CARE
Problem: Safety  Goal: Patient will be injury free during hospitalization  Outcome: Progressing     Problem: Impaired Gas Exchange  Goal: Demonstrate improved ventilation and adequate oxygenation of tissues as evidenced by absence of respiratory distress  Outcome: Progressing     Problem: Knowlegde Deficit  Goal: Demonstrate technique for CPAP/BiPAP  Outcome: Progressing     Yazmin Deng, LRT

## 2021-06-10 NOTE — PROGRESS NOTES
RESPIRATORY CARE NOTE     Patient Name: Keiko Guo  Today's Date: 8/11/2020     Pt continues on heated high flow NC. RT weaned flow today from 60 lpm to 50 lpm and decreased FiO2 from 45 to 40%. MD is aware. Bipap has not been needed and continues on standby. Pt looks comfortable on high flow NC and she is in no respiratory distress at this time. SpO2 is 90%, RR 20 bpm. Pt receives duoneb q6h. Pt tolerated nebs well. BS were diminished t/o pre and post neb tx. Pt has not been able to cough up any secretions at this time. Sputum trap in the room. Pt is aware we need a sputum sample. RT will continue to follow as ordered and monitor pt's respiratory status closely.       Maite Hayes RRT

## 2021-06-10 NOTE — PROGRESS NOTES
[facility] MEDICINE PROGRESS NOTE      Identification/Summary: Keiko Guo is a 60 y.o. female with a past medical history of tobacco use, alcohol use disorder, bipolar disorder, previous suicide attempt with overdose, GÉNESIS, PAD, worsening dementia and imbalance (possible Warnicke encephalopathy), and radiographic emphysema (presumed COPD) who was admitted on 8/3/2020 for abdominal pain, lactic acidosis, hyperbilirubinemia, acute respiratory failure with hypoxia, and acute encephalopathy. Hospital course was notable for severe hypoxia requiring up to 60 L HF NC and 55% FiO2, guarded prognosis, and ongoing encephalopathy.  Hypoxic respiratory failure likely secondary to combination of severe emphysema, volume overload/pulmonary edema, possible COPD exacerbation, and/or CAP VS aspiration.  She was treated with antibiotics, diuresis.    Her course has been complicated by agitation, confusion. Psychiatry consulted to assist. She is also medically complex with liver failure, possible hepatic encephalopathy, acute respiratory failure. Unfortunately, on 8/17, she developed sepsis with rising WBC, tachycardia, elevated lactic acid and guarding to palpation in the RUQ; she was found to have developed severe sepsis 2/2 colitis, with lactic acid peaked to 5.6 at 19:49 on 8/17; IVF and ciprofloxacin/metronidazole started and lactic acid has downtrended to < 3.0 at this point; ID was consulted on 8/18 for further assistance and did further broaden to meropenem. Prognosis remains guarded, hospice has been in-touch with the patient's daughter Telma. Telemetry started given sepsis which we will continue for at least another 24-hours. Hyperbilirubinemia noted in mid 5's, and noted for the entirety of her hospitalization has mostly been between 5-6 and no recent or acute changes.     On 8/20/2020, we had family care conference directly on floor with 4-way - Palliative Care KAROLYN Hart, MYRNA Manuel, myself attending MD, and  "patient's daughter/POA Telma, from 12:30-1:00 PM; Telma was fully updated and all medical questions she had were answered several times and repeatedly. Conversation was tangential, circular, repetitive, and ultimately Telma decided, as current POA, to re-insert the PICC that Gali had removed and to proceed with abdominal US and hepatic duplex and continue all current cares and continue current diet restrictions. She often would state that she \"understands\" one aspect/topic and then will make a statement immediately thereafter or shortly thereafter that would directly contradict that. She also stated her wishes for staff to not take the patient's comments, such as \"just let me die\" or \"just let me go home\" or \"just let me drink water please\" literally or seriously as Telma feels those comments are all made in an altered sensorium and not representative of her wishes as proxy/POA.     On 8/21, HIDA scan ordered to evaluated for acalculus cholecystitis, which she is certainly at risk for given her prolonged and severe illness with rising hyperbilirubinemia.     OT re-consulted to perform updated cognitive testing on 8/21/2020 and SLUMS was 11/30. She was non-cooperative during yesterday's HIDA scan. This morning she is agreeable to it. IR informed me previously that they will perform it on Monday if she becomes cooperative.     Assessment and Plan:  Ischemic Colitis on CT, 8/17  Severe sepsis 2/2 colitis  Lactic Acidosis to 5.6 (8/17), improving  RUQ pain  -Pain has improved over past 2 days  -Not a good surgical candidate  -Has had multiple evals by GI, surgery, multiple RUQ ultrasound.  -On 8/17 developed sepsis with tachy with elevated WBC, elevated lactic acid; a STAT CT was ordered, which demonstrated new colitis and IV ciprofloxacin and IV metronidazole started.  -Severe sepsis by definition with lactic acid > 4.0. Peaked to 5.6 overnight at 19:49.  -ID was consulted on 8/18 for further assistance and did further broaden " "to meropenem.  -Stabilizing on meropenem; ID recommends for \"up to one week\" duration of treatment  -After extensive family care conference 8/20, POA/daughter Telma decided to continue aggressive medical care and work-up.   -8/20 re-inserted PICC after she pulled out her previous PICC.  -Abdominal US and hepatic duplex nonrevealing but did recommend HIDA to evaluate for acalculus cholecystitis.   -On 8/21, HIDA scan ordered to evaluated for acalculus cholecystitis.  -She was non-cooperative during yesterday's HIDA scan. This morning she is agreeable to it. IR informed me previously that they will perform it on Monday if she becomes cooperative.     Acute Hypoxic Respiratory Failure, multifactorial - ongoing, now up to 2 L of O2   Severe Emphysema with COPD Acute Exacerbation  Aspiration PNA vs CAP, SARS-CoV-2 negative-completed course of antibiotics  Pulmonary Edema, Volume Overload  -She is clinically improved from a respiratory standpoint at this time  -Required HFNC 60 L in ICU initially; improved to room air transiently.  -Currently on prednisone taper  -Seems like events leading to presentation were: Abdominal pain or back pain, taking opiates at home leading to somnolence, respiratory acidosis, possible aspiration  -At baseline her health is very poor, she has a history of alcohol abuse  -Her pulmonary status may be at baseline at this time  -I've personally checked the MAR - she already completed a 5 day course of azithromycin and 7-day course of Zosyn to cover CAP as well as aspiration PNA organism.  -Now on 2 L     Alcohol abuse/dependence  Wernicke encephalopathy  Acute metabolic encephalopathy, questionable hepatic encephalopathy  Bipolar disorder versus anxiety/depression at baseline-on Seroquel at home  -Alert now but still very confused  -Has asterixis  -Continue thiamine supplement  -Trying low-dose lactulose to see if this improves some of her confusion  -Psychiatry consulted, appreciate their " recommendations  Per note scheduled Depakote 3 times daily, Keppra at bedtime, melatonin at bedtime   -Clinically still consistent with Wernicke's  -Would consider starting hospice soon as patient's POA would agree to this.  -Telma has declined hospice on several occassions over several days. Telma still declined hospice with family care conference on 8/20/2020.    Abdominal pain  Acutely elevated liver enzymes, acute on chronic thrombocytopenia  Recent Hep B infection  Jaundice, hepatic fibrosis  Likely acute liver failure in setting of alcoholic cirrhosis  Hyperbilirubinemia  -Appreciate GI recs. Appears GI signed off days ago.  -Abdominal US and hepatic duplex nonrevealing but did recommend HIDA to evaluate for acalculus cholecystitis.   -On 8/21, HIDA scan ordered to evaluated for acalculus cholecystitis.  -She was non-cooperative during HIDA scan process.     Moderate to severe malnutrition  -Now able to tolerate oral intake, less alert today  -Albumin very low at 1.7  -Appreciate dietitian recommendations    Hypernatremia, resolved  -Oral intake improved, fluids stopped    Hypocalcemia  -Replacement ordered.   -Agree, given low albumin that may confound, today ordered measured ionized calcium.     Advanced Care Planning  Goals of Care  Family Care Conference 8/20  -She is DNR and is a hospice candidate if pt/family are interested at dc  -Not comfort care at this time   -Appreciate hospice and palliative care help with goals/options at dc  -Given her known Wernicke's encephalopathy as well as hepatic encephalopathy, and certainly with hospital-associated delirium superimposed, I'm not convinced she has capacity to make meaningful medical decisions that would have been consistent with her pre-sick values and thoughts. Her daughter Telma would be proxy as POA. Will discuss with psychiatry regarding capacity and proxy decision making.   -8/20 had family care conference directly on floor with 4-way - Palliative Care CNP  "Hailey, MYRNA Manuel, myself attending MD, and patient's daughter/POA Telma, from 12:30-1:00 PM; Telma was fully updated and all medical questions she had were answered several times and repeatedly. Conversation was tangential, circular, repetitive, and ultimately Telma decided, as current POA, to re-insert the PICC that Gali had removed and to proceed with abdominal US and hepatic duplex and continue all current cares and continue current diet restrictions. She often would state that she \"understands\" one aspect/topic and then will make a statement immediately thereafter or shortly thereafter that would directly contradict that. She also stated her wishes for staff to not take the patient's comments, such as \"just let me die\" or \"just let me go home\" or \"just let me drink water please\" literally or to let it impact the patient's care, as Telma feels those comments are all made in an altered sensorium and not representative of her wishes as proxy/POA.   -Ethics team notified on 8/21/2020  -OT performed new SLUMS - 11/30.    Prolonged QTC  - on last check  - avoid QT prolonging meds as able, replace mag and potassium prn    Diet: Dietary nutrition supplements Magic cup; BID with meals  Diet Dysphagia I (Pureed), 2 Gm Na; Honey thick liquids  DVT Prophylaxis:  VTE Prophylaxis contraindicated due to thrombocytopenia  Code Status: No CPR- Do NOT Intubate  Disposition: TBD.    Interval History/Subjective:  NAEO.     She was brought down to radiology after agreeing to HIDA. While the procedure was started, she declined and became non-cooperative.     This AM she is pleasant, states RUQ is better, and states she is agreeable to HIDA. RN pointed out she had said the same thing yesterday before refusing care and HIDA scan when actually in the radiology suite for HIDA. She has no new complaints.     Physical Exam/Objective:  Vitals I/O   Temp:  [97.2  F (36.2  C)-98.1  F (36.7  C)] 97.2  F (36.2  C)  Heart Rate:  [103-111] " "103  Resp:  [16-20] 18  BP: (101-122)/(53-77) 122/77  SpO2:  [94 %-96 %] 95 %  I/O last 3 completed shifts:  In: 2663 [P.O.:630; I.V.:2033]  Out: 520 [Urine:520]   159 lb 11.2 oz (72.4 kg)  Body mass index is 29.21 kg/m .    Physical Exam:    GENERAL:  Alert, appears comfortable, in no acute distress, appears stated age   HEAD:  Normocephalic, without obvious abnormality, atraumatic   EYES:  PERRL, conjunctiva/corneas clear, no scleral icterus, EOM's intact   NOSE: Nares normal, septum midline, mucosa normal, no drainage   THROAT: Lips, mucosa, and tongue normal; teeth and gums normal, mouth moist   NECK: Supple, symmetrical, trachea midline   BACK:   Symmetric, no curvature, ROM normal   LUNGS:   Clear to auscultation bilaterally, no rales, rhonchi, or wheezing, symmetric chest rise on inhalation, respirations unlabored   CHEST WALL:  No tenderness or deformity   HEART:  Regular rate and rhythm, S1 and S2 normal, no murmur, rub, or gallop    ABDOMEN:   Soft, remains largely nontender (improved from previous), some ascites moderate volume, no involuntary guarding, bowel sounds normoactive all four quadrants, no masses, no organomegaly, no rebound or guarding   EXTREMITIES: Extremities normal, atraumatic, no cyanosis or edema    SKIN: Dry to touch, no exanthems in the visualized areas   NEURO: Alert, oriented to self/name and building name only but not to context, year, month, date, time moves all four extremities freely   PSYCH: For most part is cooperative, behavior is appropriate given context and medical history, affect is withdrawn and angry, and mood is \"not okay\" and saying over and over for us to stop what we're doing     Medications:   Personally Reviewed.    levETIRAcetam  250 mg Oral QHS     melatonin  3 mg Oral QHS     meropenem  1 g Intravenous Q8H     nicotine  1 patch Transdermal DAILY     omeprazole  20 mg Oral BID     predniSONE  10 mg Oral Daily with brkfst    Followed by     [START ON 8/25/2020] " predniSONE  5 mg Oral Daily with brkfst     sodium chloride  10-30 mL Intravenous Q8H FIXED TIMES     sodium chloride  10-30 mL Intravenous Q8H FIXED TIMES     sodium chloride  3 mL Intravenous Line Care     thiamine  100 mg Oral DAILY         Dalton Greenberg MD  Internal Medicine  Hospitalist  [facility]  Phone: #[phone]

## 2021-06-10 NOTE — PLAN OF CARE
Problem: Pain  Goal: Patient's pain/discomfort is manageable  Outcome: Progressing   Patient denies having any pain. Patient was expressing some discomfort at her bottom when staff was cleaning her up after a bowel movement. Patient is very red between her buttocks and at the top of her legs. Area was cleaned well and barrier cream was applied. Patient stated the barrier cream helps and she doesn't feel any pain after she is done being changed.     Problem: Daily Care  Goal: Daily care needs are met  Outcome: Progressing  Patient is getting turned every two hours. Patient was cleaned after large incontinent bowel movement. New bedding and sheets. Patient has normal saline running through PICC line. Both lumens are patent. Patient had expressed some anxiety and PRN Ativan was given to help alleviate per patient's request. Patient has nasal cannula in place. Patient complained that the nasal cannula kept getting dislodged so small Tegaderm strips were applied to face to keep nasal cannula in place. Patient is on 4L of oxygen.

## 2021-06-10 NOTE — PLAN OF CARE
Problem: Pain  Goal: Patient's pain/discomfort is manageable  Outcome: Progressing  Patient was unable to report to writer if she was in pain. The advanced dementia pain scale indicated that the patient is not in pain. Patient did appear comfortable in bed.     Problem: Risk for Infection  Goal: Identify and demonstrate techniques, lifestyle changes to prevent/reduce risk of infection and promote safe environment  Outcome: Progressing  Patient is receiving IV ABXs, flagyl and cipro.     Problem: Discharge Barriers  Goal: Patient's discharge needs are met  Outcome: Progressing  Blood cultures pending, c.diff pending, IV fluids, IV ABXs, and magnesium and potassium replacement protocols. Last Mag was 1.9 and is in the middle of being replaced. Potassium was 3.5 and 4 IV pumps were administered on this shift. Potassium check one hour after medication administration was 4.3 and will be checked again tomorrow AM.    Caridad Raymond RN
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
## 2021-06-10 NOTE — PLAN OF CARE
Problem: Pain  Goal: Patient's pain/discomfort is manageable  Outcome: Progressing     Problem: Safety  Goal: Patient will be injury free during hospitalization  Outcome: Progressing     Problem: Daily Care  Goal: Daily care needs are met  Outcome: Progressing     Patient alert, oriented x3. Reports moderate to severe upper abdominal pain. O2 on 2l per NC to keep saturation above 90%. No shortness of breath. No chest pain. Patient had 3-4 loose yellow/brown large incontinent stools. A2 with walker and gait belt with ambulation. Patient restless and agitated Seroquel 25mg PRN given at midnight, Ativan 0.1mg PRN given around 2:30am - effective.   K and Mg protocol will recheck in the morning.   Will continue to monitor.

## 2021-06-10 NOTE — PLAN OF CARE
Patient's mentation is slightly improved today. More alert and able to communicate needs. Requiring more oxygen today with 4L Complained of pain to coccyx(pressure sore) that's alleviated with frequent repositioning. Worked with PT today and was up in chair for couple hours. Provided with zackary and catheter care.

## 2021-06-10 NOTE — PROGRESS NOTES
Speech Language/Pathology  Speech Therapy Daily Progress Note    Patient presents as lethargic and cooperative during this session.  Patient resting in bed upon arrival.  Patient lethargic but agreeable to therapy session.  Inconsistent verbal responses to communication attempts.  An  was not applicable.    Objective  Dysphagia: Dysphagia targeted in session as it relates to swallow function and patient tolerating oral diet.  Patient currently on a diet of NDD1/honey thick liquids.  Per EMR, patient remains afebrile and on 2L oxygen via nasal cannula, supportive of diet tolerance.    PO Trials:   -Honey Thick: x5 sips by spoon with delayed throat clear noted x2.  Swallow initiation variable across trials.  -NDD2: x1 bite of NDD2 textures trialed.  Patient made minimal attempt to manipulate bolus and required verbal cues to masticate/maintain level of alertness.  No s/s aspiration noted and adequate oral clearance with x2 liquid washes.    Assessment  Patient is not yet ready to advance solid textures at this time due to lethargy, inefficient mastication, and ill-fitting dentures.      Plan/Recommendations  Continue plan of care, will plan to see in 2 days, on 8/21/20 to reassess appropriateness for solid consistency upgrades.    The ST Care Plan has been reviewed and current plan remains appropriate.    15 dysphagia minutes     Shannan Mcclellan MS, CCC-SLP

## 2021-06-10 NOTE — PROGRESS NOTES
[facility] MEDICINE PROGRESS NOTE      Identification and Summary (Extended Prolong Hospitalization): Keiko Guo is a 60 y.o. female with a past medical history of tobacco use, alcohol use disorder, bipolar disorder, previous suicide attempt with overdose, GÉNESIS, PAD, worsening dementia and imbalance (possible Warnicke encephalopathy), and radiographic emphysema (presumed COPD) who was admitted on 8/3/2020 for abdominal pain, lactic acidosis, hyperbilirubinemia, acute respiratory failure with hypoxia, and acute encephalopathy. Hospital course was notable for severe hypoxia requiring up to 60 L HF NC and 55% FiO2, guarded prognosis, and ongoing encephalopathy.  Hypoxic respiratory failure likely secondary to combination of severe emphysema, volume overload/pulmonary edema, possible COPD exacerbation, and/or CAP VS aspiration.  She was treated with antibiotics, diuresis.    Her course has been complicated by agitation, confusion. Psychiatry consulted to assist. She is also medically complex with liver failure, known Wernicke's encephalopathy complicated by hepatic encephalopathy, and acute respiratory failure. Unfortunately, on 8/17, she developed sepsis with rising WBC, tachycardia, elevated lactic acid and guarding to palpation in the RUQ; she was found to have developed severe sepsis 2/2 colitis; ID was consulted on 8/18 for further assistance and did further broaden to meropenem. Prognosis remains guarded, hospice has been in-touch with the patient's daughter Telma.     On 8/20/2020, we had family care conference directly on floor with 4-way - Palliative Care KAROLYN Hart, MYRNA Manuel, myself the attending MD, and patient's daughter/POA Telma; Telma was fully updated and all medical questions she had were answered several times and repeatedly. Conversation was tangential, circular, repetitive, and ultimately Telma decided, as current POA, to re-insert the PICC that Gali had removed earlier that day and to  "proceed with abdominal US and hepatic duplex and continue all current cares and continue current diet restrictions. She also stated her wishes for staff to not take the patient's comments, such as \"just let me die\" or \"just let me go home\" or \"just let me drink water please\" literally or seriously as Telma feels those comments are all made in an altered sensorium and not representative of her wishes as proxy/POA.     On 8/21, HIDA scan ordered to evaluated for acalculus cholecystitis, which she is certainly at risk for given her prolonged and severe illness with rising hyperbilirubinemia. Unfortunately she was not cooperative once physically inside the nuclear medicine radiology suite and HIDA scan attempt was discontinued. Order is still active and patient has agreed to it for tomorrow, Monday 8/24. Our team has been in touch with Ethics team to review this case given several ongoing, daily concerns from RN, unit charge RN, several MD providers across different specialties. Patient herself has stated she wishes to discontinue current aggressive treatment and to let her eat what she wants and to go home; however, again POA daughter Telma currently has MDM and wishes for aggressive work-up and care. OT was re-consulted to perform updated cognitive testing on 8/21/2020 and SLUMS was 11/30. IR informed me that they will perform HIDA again on Monday if she remains cooperative. HIDA scan planned for tomorrow, Monday 8/24. If HIDA scan positive, she would then require percutaneous cholecystostomy tube. Ethics team review ongoing/in-process.      Assessment and Plan:  Ischemic Colitis on CT, 8/17  Severe sepsis 2/2 colitis, improving  Lactic Acidosis to 5.6 (8/17), normalized  RUQ pain, stabilized/improving  -Not a good surgical candidate  -Has had multiple evals by GI, surgery, multiple RUQ ultrasound.  -On 8/17 developed sepsis with tachy with elevated WBC, elevated lactic acid; a STAT CT was ordered, which demonstrated new " "colitis and IV ciprofloxacin and IV metronidazole started.  -Severe sepsis by definition with lactic acid to 5.6. Normalized with treatment.  -ID was consulted on 8/18 for further assistance and did further broaden to meropenem.  -Stabilizing on meropenem; ID recommends for \"up to one week\" duration of treatment.Day # 7 of meropenem  -After extensive family care conference 8/20, POA/daughter Telma decided to continue aggressive medical care and work-up.   -8/20 re-inserted PICC after she pulled out her previous PICC.  -Abdominal US and hepatic duplex nonrevealing but did recommend HIDA to evaluate for acalculus cholecystitis.   -On 8/21, HIDA scan ordered to evaluated for acalculus cholecystitis.  -IR informed me that they will perform HIDA again on Monday if she remains cooperative. HIDA scan planned for Monday 8/24.   -If HIDA scan positive, she would then require percutaneous cholecystostomy tube.    Acute Hypoxic Respiratory Failure, multifactorial - worsening now requiring 7 L oxygen  Severe Emphysema with COPD Acute Exacerbation  Aspiration PNA vs CAP, SARS-CoV-2 negative-completed course of antibiotics  Pulmonary Edema, Volume Overload  -she had improved but resp status now worsening  -Required HFNC 60 L in ICU initially; improved to room air transiently.  -Currently on prednisone taper.  -Seems like events leading to presentation were: Abdominal pain or back pain, taking opiates at home leading to somnolence, respiratory acidosis, possible aspiration  -At baseline her health is very poor, she has a history of alcohol abuse.  -she already completed a 5 day course of azithromycin and 7-day course of Zosyn to cover CAP as well as aspiration PNA organism.  -was stable on 2 L, now needing 7 liters  -cbc/cmp/BNP/Chest xray   -dc IVF   -if CXR unrevealing then may consider CT scan     Alcohol abuse/dependence  Wernicke encephalopathy  Acute metabolic encephalopathy, questionable hepatic encephalopathy  Bipolar " disorder versus anxiety/depression at baseline-on Seroquel at home  -Alert now but remains very confused.  -Has asterixis.  -Continue thiamine supplement.  -Lactulose has been helpful and more or less demonstrates hepatic encephalopathy.  -Psychiatry consulted, appreciate their recommendations.  Per note scheduled Depakote 3 times daily, Keppra at bedtime, melatonin at bedtime   -Clinically still consistent with Wernicke's.  -Would consider starting hospice soon if patient's POA would agree to this.  -Telma has declined hospice on several occassions over several days. Telma still declined hospice with family care conference on 8/20/2020. Yet next day she stated to RN that her mother is on hospice as a means to visit during non-designated visiting hours.     Abdominal pain  Acutely elevated liver enzymes, acute on chronic thrombocytopenia  Recent Hep B infection  Jaundice, hepatic fibrosis  Likely acute liver failure in setting of alcoholic cirrhosis  Hyperbilirubinemia  -Appreciate GI recs. Appears GI has signed off days ago.  -Abdominal US and hepatic duplex nonrevealing but did recommend HIDA to evaluate for acalculus cholecystitis.   -On 8/21, HIDA scan ordered to evaluated for acalculus cholecystitis.  -She was non-cooperative during HIDA scan process.   -IR informed me that they will perform HIDA again on Monday if she remains cooperative. HIDA scan planned for tomorrow, Monday 8/24. Order is still active, per IR.   ==> plt 9 K,  Will require   plt transfusion     1unit   Consent obtained from dtr  ==> Hb slight drop   Repeat check in AM   Doesn't require any further evaluation at this time.   Moderate to severe malnutrition  -Now able to tolerate oral intake, less alert today  -Albumin very low at 1.7  -Appreciate dietitian recommendations  -Dietary following. Appreciate input.     Hypernatremia, resolved  -Oral intake improved, fluids stopped    Hypocalcemia  -Replacement ordered.   -Ionized calcium still low.  "  -Ordered addition of 2- more days oral calcium replacement.   -Ordered another ionized calcium level tomorrow AM to ensure response to replacement.    Advanced Care Planning  Goals of Care  Family Care Conference   -She is DNR and is a hospice candidate if pt/family are interested at [place].  -Not comfort care at this time. Confirmed again on Family Care Conference .   -Appreciate hospice and palliative care help with goals/options at [place].  - had family care conference directly on floor with 4-way - Palliative Care KAROLYN Hart, MYRNA Manuel, myself attending MD, and patient's daughter/POA Telma, from 12:30-1:00 PM; Telma was fully updated and all medical questions she had were answered several times and repeatedly. Conversation was tangential, circular, repetitive, and ultimately Telma decided, as current POA, to re-insert the PICC that Gali had removed and to proceed with abdominal US and hepatic duplex and continue all current cares and continue current diet restrictions. She often would state that she \"understands\" one aspect/topic and then will make a statement immediately thereafter or shortly thereafter that would directly contradict that. She also stated her wishes for staff to not take the patient's comments, such as \"just let me die\" or \"just let me go home\" or \"just let me drink water please\" literally or to let it impact the patient's care, as Telma feels those comments are all made in an altered sensorium and not representative of her wishes as proxy/POA.   -Ethics team notified on 2020.  -OT performed new SLUMS - .  -Daily, interdisciplinary and multiple staff and ancillary team members directly report ethics concerns to me.   -Ethics team review ongoing/in-process.      Prolonged QTC  - on last check  - avoid QT prolonging meds as able, replace mag and potassium prn  Goals of care: Discussion with the patient's daughter Telma, daughter reported that her paternal grandfather had  today at " [facility],she was very emotional and distraught today.  She mentioned hospice for mother but we decided not to pursue discussion further today given grandfather's death and agreed to revisit the topic tomorrow.  Telma is leaning towards hospice.  Diet: Dietary nutrition supplements Magic cup; BID with meals  Diet Dysphagia I (Pureed), 2 Gm Na; Honey thick liquids  DVT Prophylaxis:  VTE Prophylaxis contraindicated due to thrombocytopenia  Code Status: No CPR- Do NOT Intubate  Disposition: TBD.    Interval History/Subjective:  Progressive dyspnea/hypoxia since last night and now requiring 7 L oxygen though she states that she feels ok.   No fever or chills   1 large BM   No other issues     Physical Exam/Objective:  Vitals I/O   Temp:  [97.2  F (36.2  C)-98.6  F (37  C)] 98.6  F (37  C)  Heart Rate:  [113-127] 127  Resp:  [20-22] 22  BP: (110-115)/(62-72) 110/62  SpO2:  [82 %-92 %] 82 %  I/O last 3 completed shifts:  In: 3685 [P.O.:2140; I.V.:1545]  Out: 300 [Urine:300]   211 lb 6.4 oz (95.9 kg)  Body mass index is 38.67 kg/m .    Physical Exam:    General: awake/alert, appears comfortable. Intermittently takes a deep breath   Lungs: basal crackles   No rhonchi   Heart: S1, S2 without murmurs  tachy  Abdomen: Soft nontender, bowel sounds positive  CNS: Nonfocal  Extremities: ++ edema b/l     Medications:   Personally Reviewed.    calcium (as carbonate)  400 mg Oral QID     levETIRAcetam  250 mg Oral QHS     melatonin  3 mg Oral QHS     meropenem  1 g Intravenous Q8H     nicotine  1 patch Transdermal DAILY     omeprazole  20 mg Oral BID     predniSONE  10 mg Oral Daily with brkfst    Followed by     [START ON 8/25/2020] predniSONE  5 mg Oral Daily with brkfst     sodium chloride  10-30 mL Intravenous Q8H FIXED TIMES     sodium chloride  10-30 mL Intravenous Q8H FIXED TIMES     sodium chloride  3 mL Intravenous Line Care     thiamine  100 mg Oral DAILY     Que Brower MD  [facility]  [place]  Office # [phone]

## 2021-06-10 NOTE — ED NOTES
Patient repositioned and states anxiety, gave ordered Ativan. 02 placed for low sats in 80's when lying and needing a boost. Awaiting admission.

## 2021-06-10 NOTE — PROGRESS NOTES
[facility] MEDICINE PROGRESS NOTE      Identification/Summary: Keiko Guo is a 60 y.o. female with a past medical history of tobacco use, alcohol use disorder, bipolar disorder, previous suicide attempt with overdose, GÉNESIS, PAD, worsening dementia and imbalance (possible Warnicke encephalopathy), and radiographic emphysema (presumed COPD) who was admitted on 8/3/2020 for abdominal pain, lactic acidosis, hyperbilirubinemia, acute respiratory failure with hypoxia, and acute encephalopathy. Hospital course was notable for severe hypoxia requiring up to 60 L HF NC and 55% FiO2, guarded prognosis, and ongoing encephalopathy.  Hypoxic respiratory failure likely secondary to combination of severe emphysema, volume overload/pulmonary edema, possible COPD exacerbation, and/or CAP VS aspiration.  She was treated with antibiotics, diuresis.    She has clinically improved and is now weaning from oxygen. She is more alert and taking things orally although she does require thickened liquids. She is still confused but does know her name and is able to answer questions.    Chart review 8/15:  Temperature 98.3, heart rate 96, respirations 12, blood pressure 98/55, oxygen saturation 89% on 2 L.  Sodium of 142, potassium 3.4, morning bicarb of 37, creatinine of 0.8, , , alkaline phosphatase 187, total bilirubin 6.1.  White count 9.4, hemoglobin 10.4, , platelets 30    Assessment and Plan:  Acute Hypoxic Respiratory Failure, multifactorial  Severe Emphysema with COPD Acute Exacerbation  Aspiration PNA vs CAP, SARS-CoV-2 negative  Pulmonary Edema, Volume Overload  She is clinically improved at this time, completed antibiotic course  Now requiring oxygen at 2 L/min via nasal cannula  Currently on prednisone taper  Seems like events leading to presentation for abdominal pain or back pain and was given opiates at home leading to somnolence, respiratory acidosis, possible aspiration  At baseline her  health is very poor  Last imaging of the chest with CT showing moderate to severe emphysema, pulmonary edema    Alcohol abuse/dependence  Wernicke encephalopathy  Acute metabolic encephalopathy, questionable hepatic encephalopathy  Alert now but still very confused  Continue thiamine supplement  Trying low-dose lactulose to see if this improves some of her confusion  Agitated today, will try Ativan as needed    Abdominal pain  Acutely elevated liver enzymes, acute on chronic thrombocytopenia  Recent Hep B infection  Jaundice, hepatic fibrosis  Likely acute liver failure  Appreciate GI recs  Denies any abdominal pain today  LFTs still elevated but improving, they were normal at the time of admission    Moderate to severe malnutrition  Now able to tolerate oral intake, seems to be doing well with thickened liquids  Albumin very low at 1.8    Hypernatremia, resolved  Improving now after D5 infusion  Appreciate renal recs, now on 1/2NS with KCl at 50/hr  Oral intake improving  I think we could try discontinuing her fluids for now if nephrology agrees    Advanced Care Planning  She is DNR and is a hospice candidate if pt/family are interested at [place]  Not comfort care at this time    Prolonged QTC  QTC on admission 500.  Repeat on 8/13/20 is 539  - avoid QT prolonging meds, replace mag and potassium prn     History of anxiety, depression, bipolar disorder  Patient is confused, not currently on home medications, will monitor  Prescribed Seroquel at home  Would like to resume Seroquel, will repeat EKG prior to resuming    Diet: Diet Dysphagia I (Pureed), 2 Gm Na; Honey thick liquids LIQUIDS BY SPOON  Dietary nutrition supplements Magic cup; BID with meals  DVT Prophylaxis:  VTE Prophylaxis contraindicated due to thrombocytopenia  Code Status: No CPR- Do NOT Intubate    Anticipated possible discharge to hospice or TCU in the next several days.    Interval History/Subjective:  Actually says that she feels well this morning.   Denies any shortness of breath.  Denies any abdominal pain or pain anywhere.  She is agitated and angry about being in the hospital.  She is accusing the hospital staff of lying to her.  She would like to have hard candies but has been declined this due to aspiration risk.    Physical Exam/Objective:  Vitals I/O   Temp:  [97.4  F (36.3  C)-98.5  F (36.9  C)] 98.3  F (36.8  C)  Heart Rate:  [] 96  Resp:  [12-18] 12  BP: ()/(52-60) 98/55  SpO2:  [89 %-93 %] 89 %  I/O last 3 completed shifts:  In: 1580 [P.O.:590; I.V.:990]  Out: 0    160 lb 14.4 oz (73 kg)  Body mass index is 29.43 kg/m .    Physical Exam:    Gen: no acute distress, comfortable, very ill-appearing, appears much older than her age, extremely weak but agitated  ENT: notable scleral icterus  Pulm: lungs are diminished at bases, scattered crackles, no wheezing.  CV: regular rate and rhythm, no edema  GI: abdomen is soft, no guarding to light palpation  Derm: bruising of the face, jaundiced  Psych: Confused, able answer questions but accusatory, agitated    Medications:   Personally Reviewed.    enoxaparin ANTICOAGULANT  40 mg Subcutaneous DAILY     lactulose  20 g Oral BID     nicotine  1 patch Transdermal DAILY     omeprazole  20 mg Oral BID     predniSONE  30 mg Oral Daily with brkfst     [START ON 8/16/2020] predniSONE  20 mg Oral Daily with brkfst    Followed by     [START ON 8/19/2020] predniSONE  15 mg Oral Daily with brkfst    Followed by     [START ON 8/22/2020] predniSONE  10 mg Oral Daily with brkfst    Followed by     [START ON 8/25/2020] predniSONE  5 mg Oral Daily with brkfst     sodium chloride  10-30 mL Intravenous Q8H FIXED TIMES     thiamine  100 mg Oral DAILY           Liang Moore DO

## 2021-06-10 NOTE — PLAN OF CARE
Problem: Physical Therapy  Goal: PT Goals  Description: Patient will demonstrate the following by 8/17/20, in order to maximize independence with functional mobility to facilitate safe discharge:   -Supine<>sit with head of bed flat, no rail, Min A x1  -Sit<>stand with FWW assistive device, Min A x1  -Ambulate 15 feet with FWW assistive device, Min A x1    Goals entered on 8/12/2020 by Coco Oviedo PT, DPT  ----------------------------------------------------------  Patient will demonstrate the following by 8/21/20, in order to maximize independence with functional mobility to facilitate safe discharge:   -Supine<>sit with head of bed flat, no rail, Min A x1  -Sit<>stand with FWW assistive device, Min A x1  -Ambulate 15 feet with FWW assistive device, Min A x1    Goals reviewed on 8/17/2020 -Michelle Blanco PT    ______________________________________________________________________________________________________________  Patient will demonstrate the following by 8/27/20, in order to maximize independence with functional mobility to facilitate safe discharge:   -Supine<>sit with head of bed flat, no rail, Min A x1  -Sit<>stand with FWW assistive device, Min A x1  -Ambulate 15 feet with FWW assistive device, Min A x1    Goals reviewed and remain appropriate on 8/21/2020 by Coco Oviedo, PT, DPT      Outcome: Completed    Physical Therapy Discharge Summary    Date of PT Discharge: 8/26/2020  Recommended Equipment: transfer belt, assist of 2, FWW  Discharge Destination: Remains in hospital, palliative/Hospice following  Discharge Comments: Goals not met d/t decline in medical status/shortness of breath/weakness/decreased activity tolerance/decreased mentation. Pt is not appropriate for skilled PT services at this time. Will defer to nursing for mobility bed to chair. Plan was discussed with RN and OT.  Will D/C current PT orders.  Please reorder if status changes. Thank you.    8/26/2020 by April A  Olga, PT

## 2021-06-10 NOTE — PROGRESS NOTES
Patient currently on 12L oxymask. Resting comfortably. Nebs given for comfort. Continue as ordered, monitor and support as needed.       08/27/20 1530 08/27/20 1540   Respiratory Assessment   Assessment Type Pre-treatment Post-treatment   Respiratory Pattern Irregular;Shallow Shallow   Chest Assessment Chest expansion symmetrical Chest expansion symmetrical   Bilateral Breath Sounds Diminished;Faint;Crackles Diminished;Faint;Crackles   Respiratory Treatments    Medications Albuterol;Atrovent  --    $ Aerosol Subsequent Tx  HHN  --    Pre-Treatment Respirations 22  --    Breath Sounds Pre-Treatment Right Diminished;Crackles  --    Breath Sounds Pre-Treatment Left Diminished;Crackles  --    Post-Treatment Respirations  --  22   Breath Sounds Post-Treatment Right  --  Diminished;Crackles   Breath Sounds Post-Treatment Left  --  Diminished;Crackles   Position Semi Eid's Semi Eid's   Treatment Tolerance  --  Tolerated well

## 2021-06-10 NOTE — PROGRESS NOTES
Renal progress note-new to me chart reviewed.   CC:Hypernatremia    Assessment and Plan:  60 y.o. female the past medical history of COPD GÉNESIS PAD history of tobacco use and alcohol use disorder BPD admitted with abdominal pain lactic acidosis and acute respiratory failure with encephalopathy hospital course complicated by severe hypoxemia attributed to a combination of severe emphysema and volume overload with pulmonary edema versus aspiration on 8/3/2020.  Has been COVID negative x3 chest CT on 8/11/2020 shows groundglass opacities bilaterally possibly attributed to pulmonary edema secondary to severe hyponatremia nephrology was consulted off diuretics for last 24 hours she has completed antibiotic treatments and has been on a prednisone taper     Hypernatremia  Likely secondary to excess free water losses  Sodium stable now  Currently her volume status appears pretty even although her CT scan is significant for pulmonary infiltrates  Taking po, will stop ivf and f/u Na       Hypokalemia  Replete per nursing protocol  Follow on labs daily  Will keep on maintenance fluids     Hypervolemia with pulmonary edema  Acute respiratory failure  Possible pneumonia versus COPD exacerbation/pulmonary edema  -- follow daily weight since UO is hard to monitor   -- use PRN lasix for breathing issues or edema     Metabolic alkalosis , with additional respiratory acidosis likely secondary to COPD  Blood gases noted PH 7.52  Improved bicarb, noted with improved K and holding diuresis with gentle hydration  -- will monitor for now     Malnutrition  Now cleared for / starting po.      History of chronic liver disease  Transaminases elevated,   Thrombocytopenia  History of EtOH use  Imaging with possible scarring  -- GI consult noted     Chronic EtOH dependence  Wernicke's encephalopathy  Acute on chronic encephalopathy  On thiamine and folic supplement  Ammonia acceptable     History of mood disorders and bipolar disorder  No meds  currently     Val Jennings MD  [facility]  [phone]        Subjective  Appears alert today although still confused   Wants to go home. Feels we're conspiring to keep her here.       Objective    Vital signs in last 24 hours  Temp:  [98.3  F (36.8  C)-98.5  F (36.9  C)] 98.3  F (36.8  C)  Heart Rate:  [90-96] 96  Resp:  [12-18] 12  BP: ()/(53-55) 98/55  Weight:   160 lb 14.4 oz (73 kg)    Intake/Output last 3 shifts  I/O last 3 completed shifts:  In: 1580 [P.O.:590; I.V.:990]  Out: 0   Intake/Output this shift:  No intake/output data recorded.    Physical Exam  Alert awake, NAD facial bruising.  CV: RRR without murmur or rub  Lung: clear and equal; no extra sounds, decreased aeration  Ab: soft and NT; not distended; normal bs  Ext: no edema and well perfused  Skin; no rash    Pertinent Labs   Lab Results   Component Value Date    WBC 9.4 08/15/2020    HGB 10.4 (L) 08/15/2020    HCT 30.4 (L) 08/15/2020     (H) 08/15/2020    PLT 30 (LL) 08/15/2020     Lab Results   Component Value Date    CREATININE 0.79 08/15/2020    BUN 19 08/15/2020     08/15/2020    K 3.4 (L) 08/15/2020    CL 96 (L) 08/15/2020    CO2 37 (H) 08/15/2020       Lab Results   Component Value Date    ALBUMIN 1.8 (L) 08/15/2020     Lab Results   Component Value Date    CALCIUM 7.9 (L) 08/15/2020    PHOS 3.4 08/15/2020     I reviewed all lab results  Val Jeninngs

## 2021-06-10 NOTE — PROGRESS NOTES
Infectious Disease Progress Note    Assessment/Plan  Impression: Pneumonia and respiratory failure.     No pathogen identified, concern for COVID-19 although now with three negative PCR    Struggling a bit more today.     Recommendations: continuing zosyn for now.  Azithromycin finishing soon.    Principal Problem:    COPD with acute exacerbation (H)  Active Problems:    Tobacco abuse    Acute respiratory failure with hypoxemia (H)    Abnormal chest CT    Right upper quadrant pain    Acute pulmonary edema (H)      Js Agosto MD  [phone]      Subjective  More alert.  Still wearing positive pressure mask.     Objective    Vital signs in last 24 hours  Temp:  [97.1  F (36.2  C)-98.7  F (37.1  C)] 98  F (36.7  C)  Heart Rate:  [] 97  Resp:  [16-31] 16  BP: (118-166)/() 166/102  FiO2 (%):  [35 %-70 %] 55 %  Weight:   176 lb 12.8 oz (80.2 kg)    Intake/Output last 3 shifts  I/O last 3 completed shifts:  In: 200 [IV Piggyback:200]  Out: 2025 [Urine:2025]  Intake/Output this shift:  I/O this shift:  In: 100 [IV Piggyback:100]  Out: 1870 [Urine:1870]    Review of Systems   Pertinent items are noted in HPI., otherwise negative.    Physical Exam  General appearance: alert, appears older than stated age, cooperative and mildly obese  Head: Normocephalic, without obvious abnormality, Some bruises around the right eye.  No worse  Eyes: Extraocular muscles intact    Lungs: hard to auscultate.  Few rhonchi.    Extremities: extremities normal, atraumatic, no cyanosis or edema  Skin: Bruises and discoloration noted.  Neurologic: Grossly normal    Pertinent Labs   Lab Results: personally reviewed.     Results from last 7 days   Lab Units 08/07/20  0503 08/06/20 0353 08/05/20  0351   LN-WHITE BLOOD CELL COUNT thou/uL 6.9 6.8 8.3   LN-HEMOGLOBIN g/dL 8.9* 9.1* 9.0*   LN-HEMATOCRIT % 26.2* 26.2* 25.6*   LN-PLATELET COUNT thou/uL 57* 64* 69*        Results from last 7 days   Lab Units 08/07/20  0503 08/06/20 0353  08/05/20  0351   LN-SODIUM mmol/L 144 140 138   LN-POTASSIUM mmol/L 3.6 4.1 4.2   LN-CHLORIDE mmol/L 104 107 107   LN-CO2 mmol/L 30 25 25   LN-BLOOD UREA NITROGEN mg/dL 23* 21 14   LN-CREATININE mg/dL 0.65 0.63 0.60   LN-CALCIUM mg/dL 7.5* 7.5* 7.5*       Procedure  Component  Value  Units  Date/Time    Blood Culture from PERIPHERAL SITE (2nd One) [[id number]]   Collected: 08/04/20 1041    Order Status: Sent  Specimen: Blood from Vein, Peripheral  Updated: 08/04/20 1052      Results for MAISHA SUAZO (MRN [id number]) as of 8/7/2020 13:25   Ref. Range 8/3/2020 15:48 8/3/2020 22:07 8/6/2020 14:40   COVID-19 VIRUS SPECIMEN SOURCE Unknown Nares Nasopharyngeal Nasopharyngeal   SARS-CoV-2 Virus Specimen Source Unknown Nares Nasopharyngeal Nasopharyngeal   SARS-CoV-2 PCR Result Unknown NEGATIVE NEGATIVE NEGATIVE   SARS-COV-2 PCR COMMENT Unknown Testing was performed using the Xpert Xpress SARS-CoV-2 Assay on the [place] Testing was performed using the Xpert Xpress SARS-CoV-2 Assay on the [place] Testing was performed using the Xpert Xpress SARS-CoV-2 Assay on the [place]       Pertinent Radiology   Radiology Results: Personally reviewed image/s and Personally reviewed impression/s    No results found.

## 2021-06-10 NOTE — CONSULTS
NEPHROLOGY CONSULTATION      ASSESSMENT/PLAN:  60 y.o. female the past medical history of COPD GÉNESIS PAD history of tobacco use and alcohol use disorder BPD admitted with abdominal pain lactic acidosis and acute respiratory failure with encephalopathy hospital course complicated by severe hypoxemia attributed to a combination of severe emphysema and volume overload with pulmonary edema versus aspiration on 8/3/2020.  Has been COVID negative x3 chest CT on 8/11/2020 shows groundglass opacities bilaterally possibly attributed to pulmonary edema secondary to severe hyponatremia nephrology was consulted off diuretics for last 24 hours she has completed antibiotic treatments and has been on a prednisone taper    Hypernatremia  Likely secondary to excess free water losses  Sodium currently in 150s some improvement with half NS or NS but has remained high,   Noted that with holding diuretics her urine output has decreased significantly  Currently her volume status appears pretty even although her CT scan is significant for pulmonary infiltrates  --We will keep her on D5 50 mL/h for the next 24 hours follow sodium on tomorrow morning's labs  --Okay to use Lasix 40 mg IV 1 time if any evidence of breathing difficulty with concerns for hypervolemia  --If needed , can use hydrochlorothiazide 12.5 mg twice daily for free water and natriuresis  Elevated bicarbonate with significant metabolic alkalosis which seems to be contraction alkalosis may benefit from trial with acetazolamide.  For the next 24 hours we will hold all diuretics and gently hydrate her with D5 and follow in response  --Patient is currently n.p.o. so cannot take p.o. free water, once enteral feeding is started free water can be given with tube feeds    Hypokalemia  Replete per nursing protocol  Follow on labs daily    Hypervolemia with pulmonary edema  Acute respiratory failure  Possible pneumonia versus COPD exacerbation/pulmonary edema  -- follow daily weight  since UO is hard to monitor   -- hold diuresis for today although this may not be a long term solution , will consider a low dose thiazide for diuresis and natriuresis if this need persists    Metabolic alkalosis , with additional respiratory acidosis likely secondary to COPD  8/11 Bicarb at 47, corrected PCO2 should be around 57, noted to be 67 on the last blood gas   --We will continue to monitor blood bicarb noted a small downtrend especially with some volume replacement with NS, aggressive potassium replacement will help that too.  -- will monitor for now    Malnutrition  Currently n.p.o. concerns for aspiration  Possibly considering tube feeds, with a tenuous volume problem poor urine output off of diuretics in pulmonary edema managing volume status with TPN will be very hard  If continuing care with artificial feed is considered possibly a PEG with tube feeding might be a better option  --We will defer to primary team for this decision    History of chronic liver disease  Transaminases elevated,   Thrombocytopenia  History of EtOH use  Imaging with possible scarring    Chronic EtOH dependence  Wernicke's encephalopathy  Acute on chronic encephalopathy  On thiamine and folic supplement  Ammonia acceptable    History of mood disorders and bipolar disorder  No meds currently    Thank you for the consultation  We will follow    Iva Figueroa MD  [facility]  [phone]    CC: Hyponatremia    REASON FOR CONSULTATION: We are asked to see pt by Peggy Carrasquillo    HISTORY OF PRESENT ILLNESS:  60 y.o. female the past medical history of COPD GÉNESIS PAD history of tobacco use and alcohol use disorder BPD admitted with abdominal pain lactic acidosis and acute respiratory failure with encephalopathy hospital course complicated by severe hypoxemia attributed to a combination of severe emphysema and volume overload with pulmonary edema versus aspiration on 8/3/2020.  Has been COVID negative x3 chest CT on  "8/11/2020 shows groundglass opacities bilaterally possibly attributed to pulmonary edema secondary to severe hyponatremia nephrology was consulted off diuretics for last 24 hours she has completed antibiotic treatments and has been on a prednisone taper.  Her kidney functions have been stable based on the creatinine clearance.   Trending sodium noted starting 8/8/2020 when it was 146 and potassium was 2.7 along with worsening bicarb elevated to 37 on those labs.  Currently noted to have a sodium that is persistently in 150s over the past 3 days and bicarb elevated into high 40s .  She has been on normal saline or half normal saline and some doses over the past 3 days diuretics were discontinued yesterday, was on Lasix 40 mg every 8 hours that was reduced to twice daily on 8/9/2020, and now completely discontinued since yesterday.  Urine output and weights have been stable has been as high as 180 pounds now down to her admission weight.  Kidney functions have remained stable throughout her admission.  Met with daughter at bedside who reports that patient mental status has been pretty much similar to what it was on the day of admission she does not feel that her mental status has worsened or improved she feels the mental status changes were likely secondary to her pain medications like oxycodone.  Asked appropriate questions about sodium and potassium and why are we giving her water.  She does notice that her mom has significantly less swelling than she has had in the past few weeks.  Patient was able to say yes and no but was not able to provide much history she said \"I am confused\"    REVIEW OF SYSTEMS:  ROS was completely reviewed and otherwise negative and non-contributory    Past Medical History:   Diagnosis Date     COPD (chronic obstructive pulmonary disease) (H)      PAD (peripheral artery disease) (H)      Sleep apnea        Social History     Socioeconomic History     Marital status: Single     Spouse name: " Not on file     Number of children: Not on file     Years of education: Not on file     Highest education level: Not on file   Occupational History     Not on file   Social Needs     Financial resource strain: Not on file     Food insecurity     Worry: Not on file     Inability: Not on file     Transportation needs     Medical: Not on file     Non-medical: Not on file   Tobacco Use     Smoking status: Current Every Day Smoker     Packs/day: 0.75     Types: Cigarettes     Smokeless tobacco: Never Used   Substance and Sexual Activity     Alcohol use: Yes     Comment: minimal      Drug use: No     Sexual activity: Not on file   Lifestyle     Physical activity     Days per week: Not on file     Minutes per session: Not on file     Stress: Not on file   Relationships     Social connections     Talks on phone: Not on file     Gets together: Not on file     Attends Denominational service: Not on file     Active member of club or organization: Not on file     Attends meetings of clubs or organizations: Not on file     Relationship status: Not on file     Intimate partner violence     Fear of current or ex partner: Not on file     Emotionally abused: Not on file     Physically abused: Not on file     Forced sexual activity: Not on file   Other Topics Concern     Not on file   Social History Narrative     Not on file       Family History   Problem Relation Age of Onset     Depression Mother      Hypertension Mother      Diabetes Father      Hypertension Father      Schizophrenia Brother        No Known Allergies    MEDICATIONS:    [Held by provider] enoxaparin ANTICOAGULANT  40 mg Subcutaneous DAILY     ipratropium-albuteroL  3 mL Nebulization QID - RT     nicotine  1 patch Transdermal DAILY     omeprazole  20 mg Oral BID     [START ON 8/13/2020] predniSONE  30 mg Oral Daily with brkfst     sodium chloride  10-30 mL Intravenous Q8H FIXED TIMES     thiamine  100 mg Oral DAILY         PHYSICAL EXAM    Vitals:    08/12/20 1232   BP:     Pulse:    Resp:    Temp:    SpO2: (!) 87%         Intake/Output Summary (Last 24 hours) at 8/12/2020 1533  Last data filed at 8/12/2020 1120  Gross per 24 hour   Intake 1542.5 ml   Output 300 ml   Net 1242.5 ml       Alert/oriented x 3; awake and NAD  HEENT NC/AT; perrla; OP clear without lesions; mmm  Neck supple without LAD, TM  CV; RRR without rub or murmur  Lung: clear and equal; no extra sounds  Ab: soft and NT; not distended; normal bs  Ext: no edema and well perfused  Skin; no rash  Neuro; grossly intact    LABORATORIES    Results from last 7 days   Lab Units 08/12/20  0456 08/11/20  0429 08/10/20  0444   LN-WHITE BLOOD CELL COUNT thou/uL 9.4 12.8* 6.9   LN-HEMOGLOBIN g/dL 12.0 12.2 10.2*   LN-HEMATOCRIT % 36.5 37.4 31.4*   LN-PLATELET COUNT thou/uL 50* 76* 50*     Results from last 7 days   Lab Units 08/12/20  1325 08/12/20  0456 08/11/20  2023  08/11/20  0429  08/10/20  0444   LN-SODIUM mmol/L 150* 153* 150*   < > 152*   < > 150*   LN-POTASSIUM mmol/L 4.1 3.0*  --   --  4.0  --  4.0   LN-CHLORIDE mmol/L  --  98  --   --  89*  --  100   LN-CO2 mmol/L  --  42*  --   --  47*  --  41*   LN-BLOOD UREA NITROGEN mg/dL  --  37*  --   --  35*  --  25*   LN-CREATININE mg/dL  --  0.77  --   --  0.78  --  0.66   LN-CALCIUM mg/dL  --  8.2*  --   --  8.7  --  7.8*   LN-PROTEIN TOTAL g/dL  --  7.2  --   --  7.9  --  7.0   LN-BILIRUBIN TOTAL mg/dL  --  6.3*  --   --  6.6*  --  4.8*   LN-ALKALINE PHOSPHATASE U/L  --  187*  --   --  185*  --  169*   LN-ALT (SGPT) U/L  --  122*  --   --  109*  --  72*   LN-AST (SGOT) U/L  --  122*  --   --  128*  --  91*    < > = values in this interval not displayed.                   I reviewed all labs and imaging    Thank you for the consultation we will follow    Iva Figueroa MD  [facility]  [phone]

## 2021-06-10 NOTE — PROGRESS NOTES
Spoke with pts daughter, Telma by phone since she had not yet returned to the hospital since leaving earlier this morning.  Telma was still at home according to her when I spoke with her.  Discussed the concerns related to transporting her mom and the possibility of her reaching EOL during transport.  Discussed that the nurse had been slowly decreasing the LPM of O2 throughout the day, currently at 11 LPM and sats in the 80's and we would need her at 10 LPM for discharge.  Discussed that the pt would require 24/7 care and asked if that was something that could be managed at home, Telma stated that is not something that she could manage.  Discussed possible Hospice house OL, will have CM send a referral.  [facility] currently does not have a bed available. Telma asked about if there were hotel rooms she could go to in order to reach EOL.  I explained that this is not something that we would be involved with and that there is nothing similar to this that I am aware of in the community.  CM updated with current situation and conversation had with pts daughter.      Pts brother is in the room with pt when I stopped to visit, introduced myself and my role.  Pts brother is shaken up and notably upset at the situation, was just made aware of the pts decline per his remarks.  Pts breathing is labored, weeping areas noted on left forearm.  Discussed with nurse     Hospice will continue to follow while pt remains here in the hospital and offer assistance where necessary.

## 2021-06-10 NOTE — PROGRESS NOTES
08/04/20 2020   Respiratory Assessment   Assessment Type Pre-treatment   Respiratory Pattern Tachypneic   Chest Assessment Chest expansion symmetrical   Bilateral Breath Sounds Diminished;Inspiratory wheezes   Respiratory Treatments    Medications Albuterol;Atrovent   $ Aerosol Subsequent Tx  HHN   Pre-Treatment Pulse 91   Pre-Treatment Respirations 29   Pre-Treatment Sp02 96   Post-Treatment Pulse 91   Post-Treatment Respirations 27   Post-Treatment Sp02 95   Breath Sounds Post-Treatment Right Diminished   Position Lying right side  (on bipap)   Treatment Tolerance Tolerated well

## 2021-06-10 NOTE — PROGRESS NOTES
Speech Language/Pathology  Consulted with RN. Pt is not appropriate for ST to address dysphagia today d/t respiratory and overall medical status. SLP will continue to f/u with RN regarding patient's readiness for participation.     Roxy Watters MA, CCC-SLP

## 2021-06-10 NOTE — PROGRESS NOTES
Pt remains on HFNC, Flow of 60 and 60% FiO2.  Pt received one neb treatment on my shift and tolerated well.   Pt resting comfortably, so 0200 neb wasn't given. RN and RT both agreed to allowed the pt to rest but will give PRN if need to. We will continue to monitor and assess pt.     WILFRID PickettT

## 2021-06-10 NOTE — PROGRESS NOTES
Brother Andrea called and asked for information about his sister.  RN asked Keiko if it was ok to give Andrea an update.  She was alert and knew who he was and verbally gave a general update and then transferred phone call into patient.  He would appreciate a call from the doctor to get more of an update.

## 2021-06-10 NOTE — PROGRESS NOTES
GI PROGRESS NOTE      Subjective:      Sitting in chair, confused.  Has eaten some of the food on her tray.       Objective:                Vital signs in last 24 hrs;  Temp:  [97.4  F (36.3  C)-98.3  F (36.8  C)] 97.4  F (36.3  C)  Heart Rate:  [] 100  Resp:  [15-21] 15  BP: ()/(48-59) 96/52  SpO2:  [90 %-95 %] 90 %    Physical Exam:   General: chronically ill appearing  MS: alert, not oriented    GI: soft, nt       Current Labs;  Results from last 7 days   Lab Units 08/14/20  0546 08/13/20  0514 08/12/20  0456   LN-WHITE BLOOD CELL COUNT thou/uL 13.1* 12.0* 9.4   LN-HEMOGLOBIN g/dL 11.0* 11.9* 12.0   LN-HEMATOCRIT % 33.1* 36.6 36.5   LN-PLATELET COUNT thou/uL 40* 51* 50*     Results from last 7 days   Lab Units 08/14/20  1227 08/14/20  0547   LN-SODIUM mmol/L 139 143   LN-POTASSIUM mmol/L  --  4.1   LN-CHLORIDE mmol/L  --  96*   LN-CO2 mmol/L  --  38*   LN-BLOOD UREA NITROGEN mg/dL  --  24*   LN-CREATININE mg/dL  --  0.84   LN-CALCIUM mg/dL  --  8.3*   LN-ALBUMIN g/dL  --  2.0*   LN-PROTEIN TOTAL g/dL  --  6.2   LN-BILIRUBIN TOTAL mg/dL  --  6.3*   LN-ALKALINE PHOSPHATASE U/L  --  190*   LN-ALT (SGPT) U/L  --  145*   LN-AST (SGOT) U/L  --  137*     Hep B cAby IgM positive indicates recent hepatitis B infection.  BsAg is negative.      Impression:    1) etoh liver disease with cirrhosis/hepatitis-longstanding history of etoh use and complications including probable Wernicke's encephalopathy. CT and US suggest chronic liver disease and portal htn with possible gastric varices. Persistently abnormal lfts all consistent with her chronic liver disease.    2) recent (prior 6 months) hepatitis B infection suggestive by BcAby IgM.  Negative BsAg indicates infection has cleared. There is no evidence of chronic hepatitis B infection.   3) dysphagia- may be related to multiple medical problems including CNS issues.  Seems to be eating.  Per speech path patient demonstrated no oral and moderate pharyngeal  dysphagia. Risk is increased somewhat by evidence of impulsivity and decreased ability to follow compensatory strategies.    Patient is at high aspiration risk with nectar thick liquids and thin liquids.  4) pneumonia and resp failure- no pathogen noted.  abx completed and now on steroids for copd.   5) electrolyte abnormalities including hypernatremia and hypokalemia.     Plan:  -diet per speech path (NDD1 and honey thick liquids due to aspiration with thicker consistencies)  - NJ feedings if po intake is not adequate.  No need for EGD at this time.   -not a good candidate for PEG due to t-penia, hypoprothrombinemia and cirrhosis with ascites as well as gastric varices.   -no treatment needed for recent hepatitis B infection.   Will sign off      Krzysztof Tyler  8/14/2020 1:41 PM  [facility]  [phone]

## 2021-06-10 NOTE — PROGRESS NOTES
"/60 (Patient Position: Semi-hopper)   Pulse 93   Temp 98.1  F (36.7  C) (Axillary)   Resp 20   Ht 5' 2\" (1.575 m)   Wt 163 lb 14.4 oz (74.3 kg)   SpO2 92%   BMI 29.98 kg/m      Pt was on HFNC 60 lpm @ 55% during my shift. I tried to wean fio2 below 55%, but pt did not tolerate. BS were diminished throughout and pt received 2 duonebs from me. RT will continue to follow.   "

## 2021-06-10 NOTE — PROGRESS NOTES
Pulmonary Progress Note  8/6/2020      Admit Date: 8/3/2020  CODE: No CPR- Do NOT Intubate    Reason for Consult: hypoxemic resp failure, emphysema, CAP, AECPOD    Assessment/Plan:   60 year old female with a history of longstanding and active tobacco dependence, likely excessive alcohol use, bipolar disorder with psychotic features and prior overdose suicide attempt, mild obesity, untreated GÉNESIS, peripheral arterial disease, recent worsening dementia and imbalance with presumed diagnosis of Wernicke encephalopathy, extensive radiographic emphysema with presumed COPD (No PFTs on record), admitted with abdominal pain, lactic acidosis, hyperbilirubinemia, dyspnea, acute respiratory failure with hypoxia, abnormal chest CT, and acute encephalopathy. Clinically appears very tenuous with ongoing severe respiratory failure, delirium and encephalopathy. Ongoing hypoxemic resp failure likely due to underlying severe emphysema, vol overload/pulmonary edema and COPD exacerbation +/- aspirtation vs. Community acquired pneumonia.  COVID-19 negative x2, still on isolation per ID.   I spoke with her dtr Telma on the phone. She does drink a lot. She has not been hospitalized since July 2019 and was doing OK with independent living, and taking her medications. Telma confirmed DNR/DNI code status and said her mother would NOT want to live on machines or have prolonged hospitalization if she were not improving.     Recommendations:  - titrate FiO2 for goal O2 sat 88-92%, avoid hyperoxia  - cont azithromycin for 5 days total; additional abx per ID  - cont BiPAP alternating with HHFNC as tolerated  - methylpred IV 40mg q8h  - I stopped her IVF as she appears volume up now  - form TTE when off special precautions per ID.   - Lasix 40mg IV two times a day, strict ins/outs and daily weights please  - scheduled duonebs q6h, albuterol nebs prn  - etoh withdrawal/CIWA treatment per hospitalist  - DNR/DNI code status noted.    Prognosis guarded.  "Consider palliative care consult.  Spoke with her dtr Telma and voiced my concerns re ongoing severe respiratory failure and delirium    Marcos Younger MD (Avi)  [facility]  Pager [phone]  Clinic [phone]      Subjective/Interim Events:   Very confused, fidgety this AM  Knows she's at [facility]  Unable to answer additional questions  On HFNC and quickly desats when she takes it off.  Was on BiPAP overnight   tachypneic and wheezy this AM    Appears weight is up ?20lbs since admission  Ins/outs not accurate (has elif)      Medications:       azithromycin  500 mg Intravenous Q24H     furosemide  40 mg Intravenous BID - diuretic     ipratropium-albuteroL  3 mL Nebulization QID - RT     methylPREDNISolone sodium succinate  60 mg Intravenous Q12H     multivitamin therapeutic  1 tablet Oral DAILY     nicotine  1 patch Transdermal DAILY     omeprazole  20 mg Oral QAM (0630)     piperacillin-tazobactam  3.375 g Intravenous Q8H     QUEtiapine  300 mg Oral QHS     sodium chloride  10-30 mL Intravenous Q8H FIXED TIMES     thiamine (vitamin B1) IVPB  100 mg Intravenous DAILY         Exam/Data:   Vitals  /82 (Patient Position: Semi-hopper)   Pulse 97   Temp 97.5  F (36.4  C) (Axillary)   Resp 24   Ht 5' 2\" (1.575 m)   Wt 183 lb 1.6 oz (83.1 kg)   SpO2 93%   BMI 33.49 kg/m       I/O last 3 completed shifts:  In: 630 [P.O.:120; I.V.:60; IV Piggyback:450]  Out: 200 [Urine:200]  Weight change: 11 lb 11.2 oz (5.307 kg)  Wt Readings from Last 3 Encounters:   08/06/20 183 lb 1.6 oz (83.1 kg)   02/28/20 183 lb (83 kg)   07/12/19 184 lb 8.4 oz (83.7 kg)     FiO2 (%):  [40 %-60 %] 60 %    EXAM:  Physical Exam  Gen: awake, alert, oriented, moderate distress  HEENT: NT, no BENNY  CV: RRR, no m/g/r  Resp: CTAB  Abd: soft, nontender, BS+  Skin: no rashes or lesions  Ext: no edema  Neuro: PERRL, nonfocal exam    ROS:  A 10-system review was obtained and is negative with the " exception of the symptoms noted above.    DATA:    PFT DATA   None available      Micro  PCT 2.84  Blood pending  No sputum or urine cx date    IMAGING:   Ct Head Without Contrast    Result Date: 8/3/2020  EXAM: CT HEAD WO CONTRAST LOCATION: [facility] DATE/TIME: 8/3/2020 5:04 PM INDICATION: Head trauma, abnormal mental status (Age 19-64y) Fall, bruising to face COMPARISON: Head CT 12/20/2017 and MRI brain 07/23/2017. TECHNIQUE: Routine without IV contrast. Multiplanar reformats. Dose reduction techniques were used. FINDINGS: No evidence of acute intracranial hemorrhage or mass effect. 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 Brain attenuation morphology otherwise normal. The ventricles and sulci are normal for age. Normal gray-white matter differentiation. The basilar cisterns are patent. The globes are unremarkable. The partially imaged paranasal sinuses demonstrate air-fluid level within the right maxillary sinus which could represent acute sinusitis in the appropriate setting. The partially imaged paranasal sinuses are otherwise unremarkable. The mastoid air cells and middle ear cavities are unremarkable. The visualized skull base and calvarium are unremarkable. The     1.  No evidence of acute intracranial hemorrhage or mass effect. 2.  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 3.  Air-fluid level within the right maxillary sinus which could represent acute sinusitis in the appropriate setting.    Cta Chest Pe Run    Result Date: 8/3/2020  EXAM: CTA CHEST PE RUN LOCATION: [facility] DATE/TIME: 8/3/2020 5:01 PM INDICATION: PE suspected, high pretest prob SOB, tachycardia COMPARISON: CTA chest, abdomen  and pelvis 07/12/2019 TECHNIQUE: CT chest pulmonary angiogram during arterial phase injection of IV contrast. Multiplanar reformats and MIP reconstructions were performed. Dose reduction techniques were used. CONTRAST: Iohexol (Omni) 100 mL FINDINGS: ANGIOGRAM CHEST: The right and left main pulmonary arteries and the segmental vessels are all patent without evidence for emboli. Suboptimal opacification involving the subsegmental vessels to both lower lobes. Thoracic aorta is negative for dissection. No CT evidence of right heart strain. LUNGS AND PLEURA: Advanced centrilobular emphysema. Extensive airspace and groundglass opacities throughout both upper lobes, with additional interstitial opacities throughout the mid and lower lung fields, all of which are new since the previous study. Subsegmental atelectasis in the bases. No effusions. MEDIASTINUM/AXILLAE: Numerous borderline enlarged mediastinal and hilar nodes with minimal change. UPPER ABDOMEN: Advanced atherosclerotic disease. Splenomegaly. Trace ascites adjacent to the liver. Collateral vessels along the lesser curvature the stomach MUSCULOSKELETAL: Hypertrophic changes thoracic spine.     1.  No evidence for central pulmonary emboli. The peripheral subsegmental vessels to both lower lobes are suboptimally opacified. 2.  Advanced centrilobular emphysema. 3.  Extensive groundglass and airspace infiltrates within both upper lobes concerning for pneumonia, including Covid 19. There are additional diffuse interstitial densities both lungs likely representing edema.     Ct Abdomen Pelvis Without Oral With Iv Contrast    Result Date: 8/3/2020  EXAM: CT ABDOMEN PELVIS WO ORAL W IV CONTRAST LOCATION: [facility] DATE/TIME: 8/3/2020 5:03 PM INDICATION: RLQ abdominal pain, appendicitis suspected (Age > 14y) Right sided abdominal pain COMPARISON: None. TECHNIQUE: CT scan of the abdomen and pelvis was performed following injection of IV contrast. Multiplanar  reformats were obtained. Dose reduction techniques were used. CONTRAST: Iohexol (Omni) 100 mL FINDINGS: LOWER CHEST: Diffuse interstitial opacities throughout the visualized lower lung fields. HEPATOBILIARY: Trace ascites adjacent to the liver. Moderate distention of the gallbladder. PANCREAS: Normal. SPLEEN: Borderline splenomegaly is unchanged measuring 13 cm with trace adjacent fluid. ADRENAL GLANDS: Normal. KIDNEYS/BLADDER: Normal. BOWEL: The appendix is identified within the mid right pelvis and is of normal caliber containing a tiny amount of air. There is some minimal edema adjacent to the appendix and cecum. Slight wall thickening involving the cecum and ascending colon with a small amount of adjacent ascites. No evidence for bowel obstruction. LYMPH NODES: Normal. VASCULATURE: Advanced atherosclerotic disease abdominal aorta and iliac arteries with high-grade stenosis involving the proximal right and left common iliac arteries. Prominent collateral vessels along the lesser curvature of the stomach are more distended compared to the prior study and could be secondary to portal venous hypertension. PELVIC ORGANS: Normal. MUSCULOSKELETAL: Small fat-containing ventral hernia.     1.  Appendix is of normal caliber. There is a small amount of ascites adjacent to the base of the cecum and ascending colon with slight wall thickening which could represent an acute colitis. No evidence for bowel obstruction. 2.  Trace ascites adjacent to the liver and spleen. 3.  Advanced atherosclerotic disease. 4.  Prominent collateral vessels/possible gastric varices lesser curvature of the stomach. 5.  Interstitial opacities throughout the visualized lower lung fields. Please refer to CTA chest report for further discussion.    Us Abdomen Limited    Result Date: 8/4/2020  EXAM: US ABDOMEN LIMITED LOCATION: [facility] DATE/TIME: 8/4/2020 5:59 PM INDICATION: Hyperbilirubinemia, sepsis, evaluate for cholecystitis, CBD  "dilation COMPARISON: CT 8/3/2020 . TECHNIQUE: Limited abdominal ultrasound. FINDINGS: GALLBLADDER: Isoechoic intraluminal tissue related to the anterior wall of the gallbladder measuring approximately 2.5 x 2.0 x 1.0 cm has no internal Doppler flow. No significant gallbladder wall thickening with gallbladder wall thickness approximately 3  mm. Gallbladder otherwise negative. Negative sonographic Ludwig's sign. BILE DUCTS: No intrahepatic biliary dilatation. The common duct measures 6 mm, upper normal. LIVER: Coarsened hepatic parenchymal echotexture and slight hepatic contour irregularity. No focal mass. RIGHT KIDNEY: No hydronephrosis. PANCREAS: The visualized portions are normal. Trace ascites.     1.  Isoechoic intraluminal tissue related to the anterior wall of the gallbladder measuring approximately 2.5 x 2.0 x 1.0 cm indeterminate for adherent sludge material versus polyp but favor sludge material given lack of internal Doppler flow. 2.  No gallbladder wall thickening. 3.  No intrahepatic biliary dilatation. The common duct measures 6 mm, upper normal. 4.  Coarsened hepatic parenchymal echotexture and slight hepatic contour irregularity suggestive of underlying hepatic fibrosis/cirrhosis. 5.  Trace ascites.    Poc Us 3cg Picc Placement Guidance    Result Date: 8/4/2020  Exam was performed as guidance for PICC line insertion.  Click \"PACS images\" hyperlink below to view any stored images.  For specific procedure details, view procedure note authored by PICC/Vascular Access Nurse.      "

## 2021-06-10 NOTE — PROGRESS NOTES
"Patient's daughter called unit tryng to visit her mom after visiting hours.  She was hysterical on the phone and stated, \"my mom is on hospice and is going to die in 2 weeks.  Please let me come up for a few minutes.\"  RN explained that per the phone conversation with the doctor today, that she (daughter Telma) refused to pursue hospice and comfort care status at this time and therefore we must follow the general visiting guidelines.  RN apologized that she missed visiting hours and offered to give her the patient's room phone number so she could talk to her mom.  She refused using profanity and then hung up on RN.  "

## 2021-06-10 NOTE — PROGRESS NOTES
Critical Platelets of 35 thousands, no evidence of bleeding, MD notified, No new orders. Will continue to monitor.

## 2021-06-10 NOTE — PLAN OF CARE
Problem: Impaired Gas Exchange  Goal: Demonstrate improved ventilation and adequate oxygenation of tissues as evidenced by absence of respiratory distress  Outcome: Progressing     Problem: Inadequate Gas Exchange  Goal: Patient will achieve/maintain normal respiratory rate/effort  Outcome: Progressing     Problem: Ineffective Airway Clearance  Goal: Maintain airway patency  Outcome: Progressing     Problem: Abnormal Respirations  Goal: Patient will maintain/improve baseline respiratory rate/effort  Outcome: Progressing       See progress note.    Maite Hayes RRT

## 2021-06-10 NOTE — PROGRESS NOTES
[facility] MEDICINE PROGRESS NOTE      Identification and Summary (Extended Prolong Hospitalization): Keiko Guo is a 60 y.o. female with a past medical history of tobacco use, alcohol use disorder, bipolar disorder, previous suicide attempt with overdose, GÉNESIS, PAD, worsening dementia and imbalance (possible Warnicke encephalopathy), and radiographic emphysema (presumed COPD) who was admitted on 8/3/2020 for abdominal pain, lactic acidosis, hyperbilirubinemia, acute respiratory failure with hypoxia, and acute encephalopathy. Hospital course was notable for severe hypoxia requiring up to 60 L HF NC and 55% FiO2, guarded prognosis, and ongoing encephalopathy.  Hypoxic respiratory failure likely secondary to combination of severe emphysema, volume overload/pulmonary edema, possible COPD exacerbation, and/or CAP VS aspiration.  She was treated with antibiotics, diuresis.    Her course has been complicated by agitation, confusion. Psychiatry consulted to assist. She is also medically complex with liver failure, known Wernicke's encephalopathy complicated by hepatic encephalopathy, and acute respiratory failure. Unfortunately, on 8/17, she developed sepsis with rising WBC, tachycardia, elevated lactic acid and guarding to palpation in the RUQ; she was found to have developed severe sepsis 2/2 colitis; ID was consulted on 8/18 for further assistance and did further broaden to meropenem. Prognosis remains guarded, hospice has been in-touch with the patient's daughter Telma.     On 8/20/2020, we had family care conference directly on floor with 4-way - Palliative Care KAROLYN Hart, MYRNA Manuel, myself the attending MD, and patient's daughter/POA Telma; Telma was fully updated and all medical questions she had were answered several times and repeatedly. Conversation was tangential, circular, repetitive, and ultimately Telma decided, as current POA, to re-insert the PICC that Gali had removed earlier that day and to  "proceed with abdominal US and hepatic duplex and continue all current cares and continue current diet restrictions. She also stated her wishes for staff to not take the patient's comments, such as \"just let me die\" or \"just let me go home\" or \"just let me drink water please\" literally or seriously as Telma feels those comments are all made in an altered sensorium and not representative of her wishes as proxy/POA.     On 8/21, HIDA scan ordered to evaluated for acalculus cholecystitis, which she is certainly at risk for given her prolonged and severe illness with rising hyperbilirubinemia. Unfortunately she was not cooperative once physically inside the nuclear medicine radiology suite and HIDA scan attempt was discontinued. Order is still active and patient has agreed to it for tomorrow, Monday 8/24. Our team has been in touch with Ethics team to review this case given several ongoing, daily concerns from RN, unit charge RN, several MD providers across different specialties. Patient herself has stated she wishes to discontinue current aggressive treatment and to let her eat what she wants and to go home; however, again POA daughter Telma currently has MDM and wishes for aggressive work-up and care. OT was re-consulted to perform updated cognitive testing on 8/21/2020 and SLUMS was 11/30. IR informed me that they will perform HIDA again on Monday if she remains cooperative. HIDA scan planned for tomorrow, Monday 8/24. If HIDA scan positive, she would then require percutaneous cholecystostomy tube. Ethics team review ongoing/in-process.      Assessment and Plan:  Ischemic Colitis on CT, 8/17  Severe sepsis 2/2 colitis, improving  Lactic Acidosis to 5.6 (8/17), normalized  RUQ pain, stabilized/improving  -Not a good surgical candidate  -Has had multiple evals by GI, surgery, multiple RUQ ultrasound.  -On 8/17 developed sepsis with tachy with elevated WBC, elevated lactic acid; a STAT CT was ordered, which demonstrated new " "colitis and IV ciprofloxacin and IV metronidazole started.  -Severe sepsis by definition with lactic acid to 5.6. Normalized with treatment.  -ID was consulted on 8/18 for further assistance and did further broaden to meropenem.  -Stabilizing on meropenem; ID recommends for \"up to one week\" duration of treatment. Getting close to 1-week of meropenem now.  -After extensive family care conference 8/20, POA/daughter Telma decided to continue aggressive medical care and work-up.   -8/20 re-inserted PICC after she pulled out her previous PICC.  -Abdominal US and hepatic duplex nonrevealing but did recommend HIDA to evaluate for acalculus cholecystitis.   -On 8/21, HIDA scan ordered to evaluated for acalculus cholecystitis.  -IR informed me that they will perform HIDA again on Monday if she remains cooperative. HIDA scan planned for tomorrow, Monday 8/24.   -If HIDA scan positive, she would then require percutaneous cholecystostomy tube.    Acute Hypoxic Respiratory Failure, multifactorial - stable at 2 L of O2  Severe Emphysema with COPD Acute Exacerbation  Aspiration PNA vs CAP, SARS-CoV-2 negative-completed course of antibiotics  Pulmonary Edema, Volume Overload  -She is clinically improved from a respiratory standpoint at this time.  -Required HFNC 60 L in ICU initially; improved to room air transiently.  -Currently on prednisone taper.  -Seems like events leading to presentation were: Abdominal pain or back pain, taking opiates at home leading to somnolence, respiratory acidosis, possible aspiration  -At baseline her health is very poor, she has a history of alcohol abuse.  -Agree that this pulmonary status may be her baseline at this time.  -I've personally checked the MAR - she already completed a 5 day course of azithromycin and 7-day course of Zosyn to cover CAP as well as aspiration PNA organism.  -Now on 2 L, has remained on just 2 L now.     Alcohol abuse/dependence  Wernicke encephalopathy  Acute metabolic " encephalopathy, questionable hepatic encephalopathy  Bipolar disorder versus anxiety/depression at baseline-on Seroquel at home  -Alert now but remains very confused.  -Has asterixis.  -Continue thiamine supplement.  -Lactulose has been helpful and more or less demonstrates hepatic encephalopathy.  -Psychiatry consulted, appreciate their recommendations.  Per note scheduled Depakote 3 times daily, Keppra at bedtime, melatonin at bedtime   -Clinically still consistent with Wernicke's.  -Would consider starting hospice soon as patient's POA would agree to this.  -Telma has declined hospice on several occassions over several days. Telma still declined hospice with family care conference on 8/20/2020. Yet next day she stated to RN that her mother is on hospice as a means to visit during non-designated visiting hours.     Abdominal pain  Acutely elevated liver enzymes, acute on chronic thrombocytopenia  Recent Hep B infection  Jaundice, hepatic fibrosis  Likely acute liver failure in setting of alcoholic cirrhosis  Hyperbilirubinemia  -Appreciate GI recs. Appears GI has signed off days ago.  -Abdominal US and hepatic duplex nonrevealing but did recommend HIDA to evaluate for acalculus cholecystitis.   -On 8/21, HIDA scan ordered to evaluated for acalculus cholecystitis.  -She was non-cooperative during HIDA scan process.   -IR informed me that they will perform HIDA again on Monday if she remains cooperative. HIDA scan planned for tomorrow, Monday 8/24. Order is still active, per IR.     Moderate to severe malnutrition  -Now able to tolerate oral intake, less alert today  -Albumin very low at 1.7  -Appreciate dietitian recommendations  -Dietary following. Appreciate input.     Hypernatremia, resolved  -Oral intake improved, fluids stopped    Hypocalcemia  -Replacement ordered.   -Ionized calcium still low.   -Ordered addition of 2- more days oral calcium replacement.   -Ordered another ionized calcium level tomorrow AM to  "ensure response to replacement.    Advanced Care Planning  Goals of Care  Family Care Conference 8/20  -She is DNR and is a hospice candidate if pt/family are interested at [place].  -Not comfort care at this time. Confirmed again on Family Care Conference 8/20.   -Appreciate hospice and palliative care help with goals/options at [place].  -8/20 had family care conference directly on floor with 4-way - Palliative Care KAROLYN Hart, MYRNA Manuel, myself attending MD, and patient's daughter/POA Telma, from 12:30-1:00 PM; Telma was fully updated and all medical questions she had were answered several times and repeatedly. Conversation was tangential, circular, repetitive, and ultimately Telma decided, as current POA, to re-insert the PICC that Gali had removed and to proceed with abdominal US and hepatic duplex and continue all current cares and continue current diet restrictions. She often would state that she \"understands\" one aspect/topic and then will make a statement immediately thereafter or shortly thereafter that would directly contradict that. She also stated her wishes for staff to not take the patient's comments, such as \"just let me die\" or \"just let me go home\" or \"just let me drink water please\" literally or to let it impact the patient's care, as Telma feels those comments are all made in an altered sensorium and not representative of her wishes as proxy/POA.   -Ethics team notified on 8/21/2020.  -OT performed new SLUMS - 11/30.  -Daily, interdisciplinary and multiple staff and ancillary team members directly report ethics concerns to me.   -Ethics team review ongoing/in-process.      Prolonged QTC  - on last check  - avoid QT prolonging meds as able, replace mag and potassium prn    Diet: Dietary nutrition supplements Magic cup; BID with meals  Diet Dysphagia I (Pureed), 2 Gm Na; Honey thick liquids  DVT Prophylaxis:  VTE Prophylaxis contraindicated due to thrombocytopenia  Code Status: No CPR- Do NOT " "Intubate  Disposition: TBD.    Interval History/Subjective:  NAEO.     Remains tachycardic. Fever has defervesced. Remains on 2 L of O2.     She states her abdominal pain is better. She denies chest pain. She denies shortness of breath. She states \"I don't want to do any more testing, please.\" She states \"let me drink water.\" She states \"I want to go home.\" All questions she had at this moment were answered. She is agreeable to HIDA scan tomorrow when asked directly for that specifially, but then again states she doesn't want to do any more testing in general.     Physical Exam/Objective:  Vitals I/O   Temp:  [96.8  F (36  C)-98.4  F (36.9  C)] 98.3  F (36.8  C)  Heart Rate:  [107-113] 113  Resp:  [16-18] 16  BP: (106-109)/(56-77) 109/65  SpO2:  [89 %-94 %] 89 %  I/O last 3 completed shifts:  In: 1520 [P.O.:960; I.V.:560]  Out: 575 [Urine:575]   159 lb 11.2 oz (72.4 kg)  Body mass index is 29.21 kg/m .    Physical Exam:    GENERAL:  Alert, appears comfortable, in no acute distress, appears stated age   HEAD:  Normocephalic, without obvious abnormality, atraumatic   EYES:  PERRL, conjunctiva/corneas clear, no scleral icterus, EOM's intact   NOSE: Nares normal, septum midline, mucosa normal, no drainage   THROAT: Lips, mucosa, and tongue normal; teeth and gums normal, mouth moist   NECK: Supple, symmetrical, trachea midline   BACK:   Symmetric, no curvature, ROM normal   LUNGS:   Clear to auscultation bilaterally, no rales, rhonchi, or wheezing, symmetric chest rise on inhalation, respirations unlabored   CHEST WALL:  No tenderness or deformity   HEART:  Regular rate and rhythm, S1 and S2 normal, no murmur, rub, or gallop    ABDOMEN:   Soft, marked improvement in tenderness with only minimal tenderness at RUQ and epigastrium, no involuntary guarding, bowel sounds normal and active in all four quadrants, no masses, no organomegaly, no rebound or guarding   EXTREMITIES: Extremities normal, atraumatic, no cyanosis or " "edema    SKIN: Dry to touch, no exanthems in the visualized areas   NEURO: Alert, oriented to self/name and building name only but not to context, year, month, date, time moves all four extremities freely   PSYCH: Cooperative all things considered, behavior is appropriate given context and medical history, affect is defeated and morose, and mood is reported as \"okay\" today, and saying over and over for us to stop testing and to let her drink water and go home     Medications:   Personally Reviewed.    levETIRAcetam  250 mg Oral QHS     melatonin  3 mg Oral QHS     meropenem  1 g Intravenous Q8H     nicotine  1 patch Transdermal DAILY     omeprazole  20 mg Oral BID     predniSONE  10 mg Oral Daily with brkfst    Followed by     [START ON 8/25/2020] predniSONE  5 mg Oral Daily with brkfst     sodium chloride  10-30 mL Intravenous Q8H FIXED TIMES     sodium chloride  10-30 mL Intravenous Q8H FIXED TIMES     sodium chloride  3 mL Intravenous Line Care     thiamine  100 mg Oral DAILY         Dalton Greenberg MD  Internal Medicine  Hospitalist  [facility]  Phone: #[phone]    "

## 2021-06-10 NOTE — PROGRESS NOTES
[facility] MEDICINE PROGRESS NOTE      Identification/Summary: Keiko Guo is a 60 y.o. female with a past medical history of tobacco use, alcohol use disorder, bipolar disorder, previous suicide attempt with overdose, GÉNESIS, PAD, worsening dementia and imbalance (possible Warnicke encephalopathy), and radiographic emphysema (presumed COPD) who was admitted on 8/3/2020 for abdominal pain, lactic acidosis, hyperbilirubinemia, acute respiratory failure with hypoxia, and acute encephalopathy. Hospital course is notable for severe hypoxia requiring up to 60 L HF NC and 55% FiO2, guarded prognosis, and ongoing encephalopathy.  Hypoxic respiratory failure likely secondary to combination of severe emphysema, volume overload/pulmonary edema, possible COPD exacerbation, and/or CAP VS aspiration.  Given poor prognosis, hospice was consulted 8/9/2020 with patient's daughter, Telma be agreeable to a hospice discharge to [facility] if symptoms not improve, however she is not comfortable with making the patient comfort cares at this time.  There is some concern about the daughter's mental status, possible intoxication at hospital, ability to understand the prognosis, and make decisions concerning the patient, thus Ethics consultation was placed 8/11. I spoke with the patient's daughter at length 8/12 and although she was quite anxious I feel that she was appropriate and able to make decisions on behalf of her mother.     Assessment and Plan:  Acute Hypoxic Respiratory Failure, multifactorial  Severe Emphysema with COPD Acute Exacerbation  Aspiration PNA vs CAP  Pulmonary Edema, Volume Overload  Patient with acute hypoxic respiratory failure likely of multifactorial etiology as above.  Did require BiPAP on admission and required high flow nasal cannula yesterday.  Repeat chest CT 8/11 shows worsened groundglass opacities consistent with possible pulmonary edema.  COVID-19 PCR negative x3.  COVID-19 IgG negative.   Ultrasound of all lower and upper extremities demonstrated no evidence of DVT. TTE with bubble study 8/11 -no evidence of right to left shunting.  Patient currently on 4 L nasal cannula stable vital signs.  - Infectious diseases, pulmonology service following  - Diuretics discontinued 8/11 -we will continue to hold diuretics  - Has completed zosyn and azithromycin treatment  - Prednisone 30 mg daily - taper ordered as per pulmonology  - Continue duo-nebs  4 times daily  - Fungal work-up pending -ordered by pulm/ID.  - Lactic acidosis still present- continue to monitor, not septic, does not appear to have any actionable ischemic disease    Alcohol abuse/dependence  Wernicke encephalopathy  Acute metabolic encephalopathy   Patient with longstanding alcohol use disorder.  Came in with altered mental status, which is improving.  Ammonia elevated at 40. Patient currently alert and speaking, but not in full sentences and not oriented.    -Continue thiamine and folic acid supplement  -Unstable gait secondary to Wernicke encephalopathy  -Altered mental status likely multifactorial including delirium from illness, hypoxemia, baseline  -Multifactorial including delirium from recent illness, hypoxemia, possible baseline wernicke's  - Ammonia mildly elevated - 40 umol/L    Abdominal pain  Elevated liver enzymes, thrombocytopenia  Jaundice, hepatic fibrosis  Likely acute liver failure  On presentation there was some concern of abdominal pain.  Cording to daughter, patient had complained of postprandial pain, nausea and vomiting and had not eaten much for some time.  Additionally, patient was still drinking alcohol at time of admission.  Likely has underlying chronic liver disease.   Abdominal CT 8/3 showed slight wall thickening in ascending colon and distended gallbladder.  However, subsequent abdominal ultrasound shows no gallbladder wall thickening or biliary dilatation, but did show possible hepatic cirrhosis.    Repeat  abdominal ultrasound 8/13/2020 shows likely hepatic fibrosis.  Given elevated liver enzymes, jaundice, thrombocytopenia, and hepatic fibrosis patient most likely has acute liver failure at this time.  She is tender to palpation in right upper quadrant  -Gastroenterology consulted-appreciate recommendations and cares  -General surgery consulted on admission--pt is not a surgical candidate.  - Pt has been treated with  Zosyn  -Patient is not a candidate for PEG tube at this time given thrombocytopenia    Moderate malnutrition  secondary to poor oral intake.  Daughter states that patient would not want a feeding tube, but willing to consider TPN for short time if she is eventually alert enough to eat.  - Dietary and nutrition consulted: recommend Tube Feeds if pt is to remain NPO  -Failed bedside swallow  -Video swallow study later to this afternoon    Hypernatremia  Likely secondary to poor oral intake in the setting of acute illness for many days.  Free water deficit in context of CHF, pulmonary edema.  Sodium is improving since discontinuation of diuretics.  -Critical care team following  -Nephrology consulted -appreciate recommendations regarding fluid management   -continue D5W at 50 cc/hr  -Diuretics discontinued 8/11  -Check Na every 8-hours ordered  - Na improved to 147 this morning    Advanced Care Planning  Goals of Care  Hospice and Palliative Care evaluations  -Hospice met with daughter on 8/9 who expressed willingness for discharge to hospice, but not to making patient comfort care at this time while in hospital.  -Daughter was unable to attend planned family conference 8/11. Physician who discussed with daughter on 8/11 noted that she hung up twice, was tangential, fixated minute details, did not demonstrate understanding of patient's illness  or clinical context, and continually requested more testing and imaging be completed.  See EMR documentation for more details.  Multiple sources of concern from  care team including physician, nursing, case management. --  Ethics consult placed  -spoke with dtr on 8/12 in person for about 30 minutes and she was reasonable although somewhat anxious. She did not appear to be acutely intoxicated or in mental crisis. Ethics consult discontinued.    Prolonged QTC  QTC on admission 500.  Repeat on 8/13/20 is 539  - avoid QT prolonging meds     Hypokalemia: replacement protocol     History of anxiety, depression, bipolar disorder  Patient is confused, not currently on medications, will monitor    Concern for urinary retention  Patient has been known to remove external urinary catheter due to confusion.  Patient with bladder scan greater than 400 mL.  Urinalysis 8/13/2020 shows genesis-colored urine and no signs of infection.  Patient remains afebrile.  -Initiate bladder protocol and notify provider if Childress catheter is needed    Diet: NPO Diet  DVT Prophylaxis:  VTE Prophylaxis contraindicated due to thrombocytopenia  Code Status: No CPR- Do NOT Intubate    Anticipated possible discharge to hospice when more medically stable.    Interval History/Subjective:  Patient is far more awake today than previously.  She is able to say a few words, although clearly still confused.  She is requesting water and is able to state her name.  Denies pain at this time.    Physical Exam/Objective:  Vitals I/O   Temp:  [97  F (36.1  C)-98.4  F (36.9  C)] 98.4  F (36.9  C)  Heart Rate:  [] 98  Resp:  [18-24] 18  BP: (110-130)/(54-62) 119/58  SpO2:  [79 %-98 %] 92 %  I/O last 3 completed shifts:  In: 756.7 [I.V.:456.7; IV Piggyback:300]  Out: 0    163 lb (73.9 kg)  Body mass index is 29.81 kg/m .    GENERAL:  Alert, jaundiced, ill appearing, in no acute distress, appears older than stated age   HEAD:  Normocephalic, without obvious abnormality, atraumatic   EYES:  conjunctiva/corneas clear, icteric slcera, EOM's intact   NECK: Supple, symmetrical, trachea midline   BACK:   Symmetric, no curvature,  ROM normal   LUNGS:    Clear to auscultation bilaterally, no  rales, rhonchi, or wheezing, symmetric chest rise on inhalation, respirations unlabored   CHEST WALL:  No tenderness or deformity   HEART:  Regular rate and rhythm, S1 and S2 normal, no murmur, rub, or gallop    ABDOMEN:   Soft, tender to palpation in RUQ only, bowel sounds active all four quadrants, no masses, no organomegaly, no rebound or guarding   EXTREMITIES: Extremities normal, atraumatic, no cyanosis or edema    SKIN: Dry to touch, no exanthems in the visualized areas   NEURO: Alert, speaks a few words clearly, but not speaking in full sentences;  moves all four extremities freely, non-focal     Medications:   Personally Reviewed.    enoxaparin ANTICOAGULANT  40 mg Subcutaneous DAILY     nicotine  1 patch Transdermal DAILY     omeprazole  20 mg Oral BID     potassium phosphate ivpb  0.24 mmol/kg Intravenous Once     predniSONE  30 mg Oral Daily with brkfst     [START ON 8/16/2020] predniSONE  20 mg Oral Daily with brkfst    Followed by     [START ON 8/19/2020] predniSONE  15 mg Oral Daily with brkfst    Followed by     [START ON 8/22/2020] predniSONE  10 mg Oral Daily with brkfst    Followed by     [START ON 8/25/2020] predniSONE  5 mg Oral Daily with brkfst     sodium chloride  10-30 mL Intravenous Q8H FIXED TIMES     thiamine  100 mg Oral DAILY       Data reviewed today: I personally reviewed all new medications, labs, imaging/diagnostics reports over the past 24 hours. Pertinent findings include Na 153, K 3.0, , , Alk Phos 187.     Labs:  Results from last 7 days   Lab Units 08/13/20  0514 08/12/20  0456 08/11/20  0429   LN-WHITE BLOOD CELL COUNT thou/uL 12.0* 9.4 12.8*   LN-HEMOGLOBIN g/dL 11.9* 12.0 12.2   LN-HEMATOCRIT % 36.6 36.5 37.4   LN-MEAN CORPUSCULAR VOLUME fL 114* 113* 113*   LN-PLATELET COUNT thou/uL 51* 50* 76*   ,   Results from last 7 days   Lab Units 08/13/20  1232 08/13/20  1230 08/13/20  0514 08/12/20 2013  08/12/20  1325 08/12/20  0456  08/11/20  0429   LN-SODIUM mmol/L  --  147* 150*  150* 150* 150* 153*   < > 152*   LN-POTASSIUM mmol/L 3.5  --  3.1*  3.2*  --  4.1 3.0*  --  4.0   LN-CHLORIDE mmol/L  --   --  97*  97*  --   --  98  --  89*   LN-CO2 mmol/L  --   --  44*  43*  --   --  42*  --  47*   LN-BLOOD UREA NITROGEN mg/dL  --   --  31*  30*  --   --  37*  --  35*   LN-CREATININE mg/dL  --   --  0.82  0.79  --   --  0.77  --  0.78   LN-CALCIUM mg/dL  --   --  8.6  8.6  --   --  8.2*  --  8.7   LN-ALBUMIN g/dL  --   --  2.2*  2.2*  --   --  2.2*  --  2.4*   LN-PROTEIN TOTAL g/dL  --   --  7.0  7.1  --   --  7.2  --  7.9   LN-BILIRUBIN TOTAL mg/dL  --   --  6.5*  6.5*  --   --  6.3*  --  6.6*   LN-ALKALINE PHOSPHATASE U/L  --   --  189*  186*  --   --  187*  --  185*   LN-ALT (SGPT) U/L  --   --  135*  --   --  122*  --  109*   LN-AST (SGOT) U/L  --   --  127*  127*  --   --  122*  --  128*    < > = values in this interval not displayed.    and   Results from last 7 days   Lab Units 08/13/20  0514 08/12/20  0456 08/11/20 0429   LN-MAGNESIUM mg/dL 2.9* 2.6 2.4        Imaging:  Imaging results 30 days: Xr Chest 1 View Portable    Result Date: 8/10/2020  EXAM: XR CHEST 1 VIEW PORTABLE LOCATION: [facility] DATE/TIME: 8/10/2020 10:45 AM INDICATION: persistent hypoxia COMPARISON: CT chest 8/3/2020     Stable diffuse reticulation and groundglass opacification likely in part part related to chronic fibrotic and emphysematous changes seen on comparison CT. Cannot exclude superimposed edema or atypical pneumonia. No focal consolidation or pleural effusion. Stable heart size.    Ct Head Without Contrast    Result Date: 8/3/2020  EXAM: CT HEAD WO CONTRAST LOCATION: [facility] DATE/TIME: 8/3/2020 5:04 PM INDICATION: Head trauma, abnormal mental status (Age 19-64y) Fall, bruising to face COMPARISON: Head CT 12/20/2017 and MRI brain 07/23/2017. TECHNIQUE: Routine without IV contrast. Multiplanar  reformats. Dose reduction techniques were used. FINDINGS: No evidence of acute intracranial hemorrhage or mass effect. 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 Brain attenuation morphology otherwise normal. The ventricles and sulci are normal for age. Normal gray-white matter differentiation. The basilar cisterns are patent. The globes are unremarkable. The partially imaged paranasal sinuses demonstrate air-fluid level within the right maxillary sinus which could represent acute sinusitis in the appropriate setting. The partially imaged paranasal sinuses are otherwise unremarkable. The mastoid air cells and middle ear cavities are unremarkable. The visualized skull base and calvarium are unremarkable. The     1.  No evidence of acute intracranial hemorrhage or mass effect. 2.  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 3.  Air-fluid level within the right maxillary sinus which could represent acute sinusitis in the appropriate setting.    Ct Chest Without Contrast    Result Date: 8/11/2020  EXAM: CT CHEST WO CONTRAST LOCATION: [facility] DATE/TIME: 8/11/2020 8:11 PM INDICATION: Respiratory illness, acute (Age > 40y) Shortness of breath persistent hypoxemia despite max diuresis. re-eval opacities COMPARISON: CT chest 08/03/2020, 07/12/2019 TECHNIQUE: CT chest without IV contrast. Multiplanar reformats were obtained. Dose reduction techniques were used. CONTRAST: None. FINDINGS: LUNGS AND PLEURA: Mild to moderate tracheal secretions. Moderate to severe emphysema with associated interlobular septal thickening. Mosaic lung attenuation. No focal consolidation or pleural effusion. No mass or definite suspicious nodule.  MEDIASTINUM/AXILLAE: Upper normal heart size. No pericardial effusion. Moderate to severe coronary artery calcifications. UPPER ABDOMEN: Stable soft tissue density nodules adjacent to the left adrenal gland. MUSCULOSKELETAL: Spinal degenerative changes.     1.  Moderate to severe emphysema. 2.  Interlobular septal thickening and groundglass pulmonary opacities likely reflecting pulmonary edema, increased since the CT from 08/03/2020. No pneumonic consolidation or pleural effusion. 3.  Mild to moderate tracheal secretions.     Mr Brain With Without Contrast    Result Date: 7/23/2020  EXAM: MR BRAIN W WO CONTRAST LOCATION: [facility] DATE/TIME: 7/23/2020 5:51 PM INDICATION: WORSENING ATAXIA-MENTAL STATUS COMPARISON: MRI head 10/08/2016 from [facility]. CT head 12/20/2017 from [facility]. CONTRAST: Gadavist 8 TECHNIQUE: Routine multiplanar multisequence head MRI without and with intravenous contrast. FINDINGS: INTRACRANIAL CONTENTS: No acute or subacute infarct. Again seen is a shunting type moderate-sized arteriovenous malformation in the parasagittal right parietal region superiorly and posteriorly. It measures at least 3.5 cm AP x 2 cm transverse x 4 cm craniocaudad. It has a very similar appearance to the MRI 10/08/2016 from [facility]. No associated new hemorrhage, edema, or mass effect. Scattered nonspecific T2/FLAIR hyperintensities within the cerebral white matter most consistent with mild chronic microvascular ischemic change. Mild generalized cerebral atrophy. No hydrocephalus. Normal position of the cerebellar tonsils. No pathologic contrast enhancement of the brain parenchyma or meninges allowing for some vascular enhancement with the right parietal AVM. SELLA: No abnormality accounting for technique. OSSEOUS STRUCTURES/SOFT TISSUES: Normal marrow signal. The major intracranial vascular flow voids are maintained. ORBITS: No abnormality accounting for technique. SINUSES/MASTOIDS: No  paranasal sinus mucosal disease. No middle ear or mastoid effusion.     1.  Again seen is a shunting type moderate-sized arteriovenous malformation in the parasagittal right parietal area superiorly and posteriorly measuring approximately 3.5 cm x 2 cm x 4 cm. It is not changed significantly when compared to MRI head 10/08/2016 from [facility]. No new hemorrhage, edema or mass effect. Follow-up conventional angiography could be considered. 2.  No evidence for new infarct, hemorrhage elsewhere, hydrocephalus or intracranial mass. 3.  Mild age-related changes.     Cta Chest Pe Run    Result Date: 8/3/2020  EXAM: CTA CHEST PE RUN LOCATION: [facility] DATE/TIME: 8/3/2020 5:01 PM INDICATION: PE suspected, high pretest prob SOB, tachycardia COMPARISON: CTA chest, abdomen and pelvis 07/12/2019 TECHNIQUE: CT chest pulmonary angiogram during arterial phase injection of IV contrast. Multiplanar reformats and MIP reconstructions were performed. Dose reduction techniques were used. CONTRAST: Iohexol (Omni) 100 mL FINDINGS: ANGIOGRAM CHEST: The right and left main pulmonary arteries and the segmental vessels are all patent without evidence for emboli. Suboptimal opacification involving the subsegmental vessels to both lower lobes. Thoracic aorta is negative for dissection. No CT evidence of right heart strain. LUNGS AND PLEURA: Advanced centrilobular emphysema. Extensive airspace and groundglass opacities throughout both upper lobes, with additional interstitial opacities throughout the mid and lower lung fields, all of which are new since the previous study. Subsegmental atelectasis in the bases. No effusions. MEDIASTINUM/AXILLAE: Numerous borderline enlarged mediastinal and hilar nodes with minimal change. UPPER ABDOMEN: Advanced atherosclerotic disease. Splenomegaly. Trace ascites adjacent to the liver. Collateral vessels along the lesser curvature the stomach MUSCULOSKELETAL: Hypertrophic changes thoracic  spine.     1.  No evidence for central pulmonary emboli. The peripheral subsegmental vessels to both lower lobes are suboptimally opacified. 2.  Advanced centrilobular emphysema. 3.  Extensive groundglass and airspace infiltrates within both upper lobes concerning for pneumonia, including Covid 19. There are additional diffuse interstitial densities both lungs likely representing edema.     Us Venous Legs Bilateral    Result Date: 8/11/2020  EXAM: US VENOUS LEGS BILATERAL LOCATION: [facility] DATE/TIME: 8/11/2020 12:31 PM INDICATION: hypoxia, eval for DVT COMPARISON: None. TECHNIQUE: Venous Duplex ultrasound of bilateral lower extremities with and without compression, augmentation and duplex. Color flow and spectral Doppler with waveform analysis performed. FINDINGS: Exam includes the common femoral, femoral, popliteal veins as well as segmentally visualized deep calf veins and greater saphenous vein. RIGHT: No deep vein thrombosis. No superficial thrombophlebitis. No popliteal cyst. LEFT: No deep vein thrombosis. No superficial thrombophlebitis. No popliteal cyst.     1.  No deep venous thrombosis in the bilateral lower extremities.    Ct Abdomen Pelvis Without Oral With Iv Contrast    Result Date: 8/3/2020  EXAM: CT ABDOMEN PELVIS WO ORAL W IV CONTRAST LOCATION: [facility] DATE/TIME: 8/3/2020 5:03 PM INDICATION: RLQ abdominal pain, appendicitis suspected (Age > 14y) Right sided abdominal pain COMPARISON: None. TECHNIQUE: CT scan of the abdomen and pelvis was performed following injection of IV contrast. Multiplanar reformats were obtained. Dose reduction techniques were used. CONTRAST: Iohexol (Omni) 100 mL FINDINGS: LOWER CHEST: Diffuse interstitial opacities throughout the visualized lower lung fields. HEPATOBILIARY: Trace ascites adjacent to the liver. Moderate distention of the gallbladder. PANCREAS: Normal. SPLEEN: Borderline splenomegaly is unchanged measuring 13 cm with trace adjacent fluid.  ADRENAL GLANDS: Normal. KIDNEYS/BLADDER: Normal. BOWEL: The appendix is identified within the mid right pelvis and is of normal caliber containing a tiny amount of air. There is some minimal edema adjacent to the appendix and cecum. Slight wall thickening involving the cecum and ascending colon with a small amount of adjacent ascites. No evidence for bowel obstruction. LYMPH NODES: Normal. VASCULATURE: Advanced atherosclerotic disease abdominal aorta and iliac arteries with high-grade stenosis involving the proximal right and left common iliac arteries. Prominent collateral vessels along the lesser curvature of the stomach are more distended compared to the prior study and could be secondary to portal venous hypertension. PELVIC ORGANS: Normal. MUSCULOSKELETAL: Small fat-containing ventral hernia.     1.  Appendix is of normal caliber. There is a small amount of ascites adjacent to the base of the cecum and ascending colon with slight wall thickening which could represent an acute colitis. No evidence for bowel obstruction. 2.  Trace ascites adjacent to the liver and spleen. 3.  Advanced atherosclerotic disease. 4.  Prominent collateral vessels/possible gastric varices lesser curvature of the stomach. 5.  Interstitial opacities throughout the visualized lower lung fields. Please refer to CTA chest report for further discussion.    Us Abdomen Limited    Result Date: 8/13/2020  EXAM: US ABDOMEN LIMITED LOCATION: [facility] DATE/TIME: 8/13/2020 8:02 AM INDICATION: worsening LFT's right upper quad pain, eval for galbladder infection, or stones in the ducts COMPARISON: None. TECHNIQUE: Limited abdominal ultrasound. FINDINGS: GALLBLADDER: Normal. No gallstones, wall thickening, or pericholecystic fluid. Negative sonographic Ludwig's sign. BILE DUCTS: No biliary dilatation. The common duct measures 4 mm. LIVER: Coarsened hepatic parenchymal echotexture. Appearance suggestive of underlying hepatic fibrosis. No focal  mass. RIGHT KIDNEY: No hydronephrosis. PANCREAS: The visualized portions are normal. No ascites.     1.  Coarsened hepatic parenchymal echotexture concerning for underlying hepatic fibrosis. 2.  No other significant findings.    Us Abdomen Limited    Result Date: 8/4/2020  EXAM: US ABDOMEN LIMITED LOCATION: [facility] DATE/TIME: 8/4/2020 5:59 PM INDICATION: Hyperbilirubinemia, sepsis, evaluate for cholecystitis, CBD dilation COMPARISON: CT 8/3/2020 . TECHNIQUE: Limited abdominal ultrasound. FINDINGS: GALLBLADDER: Isoechoic intraluminal tissue related to the anterior wall of the gallbladder measuring approximately 2.5 x 2.0 x 1.0 cm has no internal Doppler flow. No significant gallbladder wall thickening with gallbladder wall thickness approximately 3  mm. Gallbladder otherwise negative. Negative sonographic Ludwig's sign. BILE DUCTS: No intrahepatic biliary dilatation. The common duct measures 6 mm, upper normal. LIVER: Coarsened hepatic parenchymal echotexture and slight hepatic contour irregularity. No focal mass. RIGHT KIDNEY: No hydronephrosis. PANCREAS: The visualized portions are normal. Trace ascites.     1.  Isoechoic intraluminal tissue related to the anterior wall of the gallbladder measuring approximately 2.5 x 2.0 x 1.0 cm indeterminate for adherent sludge material versus polyp but favor sludge material given lack of internal Doppler flow. 2.  No gallbladder wall thickening. 3.  No intrahepatic biliary dilatation. The common duct measures 6 mm, upper normal. 4.  Coarsened hepatic parenchymal echotexture and slight hepatic contour irregularity suggestive of underlying hepatic fibrosis/cirrhosis. 5.  Trace ascites.    Us Venous Arms Bilateral    Result Date: 8/11/2020  EXAM: US VENOUS ARMS BILATERAL LOCATION: [facility] DATE/TIME: 8/11/2020 12:31 PM INDICATION: hypoxia, eval for DVT COMPARISON: None. TECHNIQUE: Venous Duplex ultrasound of both upper extremities with (when possible) and  "without compression, augmentation, and duplex. Color flow and spectral Doppler with waveform analysis performed. FINDINGS: Ultrasound includes evaluation of the internal jugular veins, innominate veins, subclavian veins, axillary veins, and brachial veins. The superficial cephalic and basilic veins were also evaluated where seen. RIGHT: No deep venous thrombosis. No superficial thrombophlebitis. LEFT: No deep venous thrombosis. No superficial thrombophlebitis.     1.  No deep venous thrombosis in the bilateral upper extremities.    Poc Us 3cg Picc Placement Guidance    Result Date: 8/4/2020  Exam was performed as guidance for PICC line insertion.  Click \"PACS images\" hyperlink below to view any stored images.  For specific procedure details, view procedure note authored by PICC/Vascular Access Nurse.      Most Recent EKG     Units 08/13/20  0907   VENTRATE    ATRIALRATE    QRSDURATION ms 78   QTINTERVAL ms 414   QTCCALC ms 539   P Axis degrees 47   RAXIS degrees 54   TAXIS degrees 31   MUSEDX  Sinus tachycardia with occasional Premature ventricular complexes  Otherwise normal ECG  When compared with ECG of 04-AUG-2020 14:32,  Premature ventricular complexes are now Present  Nonspecific T wave abnormality has replaced inverted T waves in Anterior leads       Patient was seen and discussed with attending physician Dr. Liang Moore  ----  Peggy Carrasquillo MD PGY-2  Family medicine resident, Hospitalist service  [facility]  Pager: [phone]    I have reviewed the notes, assessments, and/or procedures performed by Dr Carrasquillo, I concur with her/his documentation of Keiko Guo.    Liang Moore, DO          "

## 2021-06-10 NOTE — PLAN OF CARE
Problem: Impaired Gas Exchange  Goal: Demonstrate improved ventilation and adequate oxygenation of tissues as evidenced by absence of respiratory distress  Outcome: Progressing     Problem: Knowlegde Deficit  Goal: Demonstrate technique for CPAP/BiPAP  Outcome: Progressing     Patient Active Problem List   Diagnosis    Pulmonary emphysema (H)    Depression    Neurosis, posttraumatic    Bipolar disorder (H)    Alcohol abuse    Hyperglyceridemia    Smoker    GERD (gastroesophageal reflux disease)    Alcoholic intoxication with complication (H)    Intentional drug overdose, initial encounter (H)    Acute respiratory failure with hypoxia (H)    Acidemia    Metabolic acidosis    Suicide attempt (H)    Relationship problem between caregiver and child    History of posttraumatic stress disorder (PTSD)    Overdose    Bipolar I disorder, most recent episode depressed, severe without psychotic features (H)    Neuroleptic consent was signed on October 8, 2018    Hypotension    Swallowing painful    Lactic acid acidosis    Odynophagia    QT prolongation    Fungal esophagitis    Adjustment disorder with mixed anxiety and depressed mood    COPD with acute exacerbation (H)    Abnormal chest CT

## 2021-06-10 NOTE — PROGRESS NOTES
Spiritual Health Services Note:    Spiritual Assessment:  I went by to follow up with the patient and her family after I received the Epic consult of her moving to comfort cares. The patient was asleep and her daughter was not here yet. I will continue to check on them throughout the day.    Care Provided:  I responded to the Epic consult, reviewed the patient's chart, continued relationship of care and support, and allowed the patient to rest.    Plan of Care:  I will make additional visits as requested by the patient, their family or other visitors, and as referred by staff.    Louis Velasquez MDiv, [facility]  Manager/   [phone]

## 2021-06-10 NOTE — PROGRESS NOTES
Patient seen at 0800 for neb with albuterol/atrovent. Patient was not aware of what I was doing and asked many times questions that were not making sense? Patient kept mask on for about 70% of the treatment.  Lungs clear with no change from neb.    Second neb at 1130 patient did not want the neb at all. She kept removing the mask repeatedly no matter what I said. About 10% of the treatment done before I discontinued treatment.    MD changed treatments to as needed basis.    Oswaldo Garcia, WILFRIDT

## 2021-06-10 NOTE — PROGRESS NOTES
Pt remains on high flow NC with 50 for flow and 70% FiO2. Pt received neb treatment and tolerated. BS; diminished.     WILFRID PickettT

## 2021-06-10 NOTE — PLAN OF CARE
Problem: Pain  Goal: Patient's pain/discomfort is manageable  Outcome: Progressing     Problem: Safety  Goal: Patient will be injury free during hospitalization  Outcome: Progressing     Problem: Daily Care  Goal: Daily care needs are met  Outcome: Progressing    Pt on comfort cares now. She is having pain per non-verbal pain scale, using PRN oxycodone sublingual for pain, with relief. Pt using 10-15 liters oxygen with oxy mask, satting right around 90% on 10 liters. Childress intact and draining. Pt using scheduled nebs. Regular diet, but patient has not ate anything this shift.

## 2021-06-10 NOTE — CONSULTS
Consultation - [facility]  Keiko Guo,  1960, MRN [id number]    [facility]  Abdominal pain, right upper quadrant [R10.11]  SOB (shortness of breath) [R06.02]  Tachycardia [R00.0]  Elevated bilirubin [R17]    PCP: Provider, No Primary Care, None   Code status:  No CPR- Do NOT Intubate       Extended Emergency Contact Information  Primary Emergency Contact: BrantTelma   [facility]  Home Phone: [phone]  Mobile Phone: [phone]  Relation: Child  Secondary Emergency Contact: PT, PER   [place]  Relation: Declined       Assessment and Plan   Principal Problem:    COPD with acute exacerbation (H)  Active Problems:    Abnormal chest CT    Impression: Pneumonia and respiratory failure.    No pathogen identified, concern for COVID-19 although first to cover test were negative, they were acquired within 8 hours of each other.    Recommendations: If patient not clearly improving tomorrow, would recommend another COVID-19 PCR test.    Continue other supportive cares as directed by pulmonary medicine.    Would not remove special precautions unless or until another process is identified or COVID-19 is definitively excluded.    Thank you for consulting [facility]    Js Agosto MD  [phone]     Chief Complaint COPD with acute exacerbation (H)       HPI   We have been requested by Dr. Ella Bond to evaluate Keiko Guo for pneumonia and respiratory failure who is a 60 y.o. year old female.    Patient is a 60-year-old woman who was admitted through the emergency department on August 3 when she presented with shortness of breath and right lower quadrant pain.  She also had a history of a fall a couple weeks ago and had some bruising to the right of the head.    She is longstanding history of COPD and emphysema.  During the initial evaluation, CT scan of the chest was  performed which showed bilateral groundglass opacities concerning, but not definitively diagnostic for, COVID-19 pneumonia.  She did have 2 COVID PCR's done within 8 hours of each other that were negative.    Since admission, patient has continued to require respiratory support and is currently on a BiPAP mask with 40% FiO2.  She denies pain at this time.  She is currently receiving azithromycin, methylprednisolone, and Pipracillin/tazobactam.    She was evaluated by general surgery did not feel there is any surgical indication, an ultrasound of the gallbladder, which was of concern on the CT, was normal except for some sludge.    Review of systems is somewhat difficult to acquire from this patient as she has this BiPAP mask on.  She does deny pain.  Cannot really get much else history from her.  She also has a history of alcohol abuse and is currently on withdrawal protocol.     Medical History  Active Ambulatory (Non-Hospital) Problems    Diagnosis     Fungal esophagitis     Adjustment disorder with mixed anxiety and depressed mood     Hypotension     Swallowing painful     Lactic acid acidosis     QT prolongation     Odynophagia     Bipolar I disorder, most recent episode depressed, severe without psychotic features (H)     Neuroleptic consent was signed on October 8, 2018     Overdose     Suicide attempt (H)     Relationship problem between caregiver and child     History of posttraumatic stress disorder (PTSD)     Alcoholic intoxication with complication (H)     Intentional drug overdose, initial encounter (H)     Acute respiratory failure with hypoxia (H)     Acidemia     Metabolic acidosis     Bipolar disorder (H)     Alcohol abuse     Hyperglyceridemia     Smoker     GERD (gastroesophageal reflux disease)     Pulmonary emphysema (H)     Depression     Neurosis, posttraumatic     Past Medical History:   Diagnosis Date     COPD (chronic obstructive pulmonary disease) (H)      PAD (peripheral artery disease)  (H)      Sleep apnea     Surgical History  She  has a past surgical history that includes Appendectomy (); osteomyelitis ();  section; and pr esophagogastroduodenoscopy transoral diagnostic (N/A, 2019).   Social History  Reviewed, and she  reports that she has been smoking cigarettes. She has been smoking about 0.75 packs per day. She has never used smokeless tobacco. She reports current alcohol use. She reports that she does not use drugs.   Allergies  No Known Allergies Family History  Noncontributory to current problem except as mentioned above    Psychosocial Needs  Social History     Social History Narrative     Not on file     Additional psychosocial needs reviewed per nursing assessment.     Prior to Admission Medications   Medications Prior to Admission   Medication Sig Dispense Refill Last Dose     acetaminophen (TYLENOL) 650 MG CR tablet Take 650 mg by mouth every 8 (eight) hours as needed for pain.   8/3/2020 at 0900     albuterol (PROAIR HFA;PROVENTIL HFA;VENTOLIN HFA) 90 mcg/actuation inhaler Inhale 2 puffs 4 (four) times a day as needed for shortness of breath.   8/3/2020 at not with     melatonin 5 mg Tab tablet Take 15 mg by mouth at bedtime as needed.   2020     nicotine (NICODERM CQ) 21 mg/24 hr Place 1 patch on the skin daily as needed for smoking cessation.   More than a month at Unknown time     nystatin (MYCOSTATIN) 100,000 unit/mL suspension Take 500,000 Units by mouth 4 (four) times a day as needed (thrush).    More than a month at Unknown time     omeprazole (PRILOSEC) 20 MG capsule Take 20 mg by mouth daily as needed.   Past Week at Unknown time     oxyCODONE (ROXICODONE) 5 MG immediate release tablet Take 5 mg by mouth every 6 (six) hours as needed for pain.   8/3/2020 at 0900     polyethylene glycol (MIRALAX) 17 gram packet Take 17 g by mouth daily as needed.    Past Week at Unknown time     QUEtiapine (SEROQUEL) 25 MG tablet Take 25 mg by mouth 3 (three) times a  "day as needed.    Past Week at Unknown time     QUEtiapine (SEROQUEL) 300 MG tablet Take 300 mg by mouth at bedtime as needed.    7/31/2020     SPIRIVA RESPIMAT 1.25 mcg/actuation Mist Inhale 2 Inhalation daily.    8/3/2020 at not with     thiamine 100 MG tablet Take 50 mg by mouth daily.   8/1/2020          Review of Systems:  Pertinent items are noted in HPI., otherwise all others negative. Physical Exam:  Temp:  [96.5  F (35.8  C)-98.1  F (36.7  C)] 98  F (36.7  C)  Heart Rate:  [] 95  Resp:  [24-28] 24  BP: (109-132)/(54-66) 132/66  FiO2 (%):  [40 %-50 %] 40 %    /66 (Patient Position: Semi-hopper)   Pulse 95   Temp 98  F (36.7  C) (Axillary)   Resp 24   Ht 5' 2\" (1.575 m)   Wt 171 lb 6.4 oz (77.7 kg)   SpO2 90%   BMI 31.35 kg/m    General appearance: alert, appears older than stated age, cooperative and mildly obese  Head: Normocephalic, without obvious abnormality, Some bruises around the right eye.  Eyes: Extraocular muscles intact  Throat: Occult to assess with BiPAP mask on, but no gross abnormalities appreciated.  Lungs: With distant breath sounds.  Heart: regular rate and rhythm, S1, S2 normal, no murmur, click, rub or gallop  Abdomen: In left lateral decubitus, there is no significant pain to palpation or rebound tenderness.  Extremities: extremities normal, atraumatic, no cyanosis or edema  Skin: Bruises and discoloration noted.  Neurologic: Grossly normal       Pertinent Labs  Lab Results: personally reviewed.   Results from last 7 days   Lab Units 08/05/20  0351 08/04/20  1314 08/03/20  1558   LN-WHITE BLOOD CELL COUNT thou/uL 8.3 9.0 6.3   LN-HEMOGLOBIN g/dL 9.0* 8.9* 10.5*   LN-HEMATOCRIT % 25.6* 25.3* 30.8*   LN-PLATELET COUNT thou/uL 69* 64* 85*        Results from last 7 days   Lab Units 08/05/20  0351 08/04/20  1314 08/03/20  1558   LN-SODIUM mmol/L 138 138 135*   LN-POTASSIUM mmol/L 4.2 4.3 3.6   LN-CHLORIDE mmol/L 107 108* 101   LN-CO2 mmol/L 25 22 20*   LN-BLOOD UREA " NITROGEN mg/dL 14 10 12   LN-CREATININE mg/dL 0.60 0.52* 0.76   LN-CALCIUM mg/dL 7.5* 7.1* 8.3*     Blood cultures obtained on admission remain negative to date.    Results for MAISHA SUAZO (MRN [id number]) as of 8/5/2020 12:11   Ref. Range 8/3/2020 17:24   Color, UA Latest Ref Range: Colorless, Yellow, Straw, Light Yellow  Orange (!)   Clarity, UA Latest Ref Range: Clear  Clear   Blood, UA Latest Ref Range: Negative  Negative   Bilirubin, UA Latest Ref Range: Negative  Moderate (!)   Urobilinogen, UA Latest Ref Range: <2.0 E.U./dL, 2.0 E.U./dL  8.0 E.U./dL (!)   Ketones, UA Latest Ref Range: Negative  Negative   Glucose, UA Latest Ref Range: Negative  Negative   Protein, UA Latest Ref Range: Negative mg/dL 30 mg/dL (!)   Nitrite, UA Latest Ref Range: Negative  Negative   Leukocytes, UA Latest Ref Range: Negative  Negative   pH, UA Latest Ref Range: 4.5 - 8.0  6.0   Specific Gravity, UA Latest Ref Range: 1.001 - 1.030  1.015   RBC, UA Latest Ref Range: None Seen, 0-2 hpf 0-2   WBC, UA Latest Ref Range: None Seen, 0-5 hpf 0-5   Bacteria, UA Latest Ref Range: None Seen hpf None Seen   Mucus, UA Latest Ref Range: None Seen lpf Few (!)   Squam Epithel, UA Latest Ref Range: None Seen, 0-5 lpf 25-50 (!)        Pertinent Radiology  Radiology Results: Personally reviewed image/s and Personally reviewed impression/s       CTA Chest PE Run [[id number]]  Collected: 08/03/20 1701    Order Status: Completed  Updated: 08/03/20 3661    Narrative:      EXAM: CTA CHEST PE RUN   LOCATION: [facility]   DATE/TIME: 8/3/2020 5:01 PM     INDICATION: PE suspected, high pretest prob SOB, tachycardia   COMPARISON: CTA chest, abdomen and pelvis 07/12/2019   TECHNIQUE: CT chest pulmonary angiogram during arterial phase injection of IV contrast. Multiplanar reformats and MIP reconstructions were performed. Dose reduction techniques were used.   CONTRAST: Iohexol (Omni) 100 mL     FINDINGS:   ANGIOGRAM CHEST: The right and left  main pulmonary arteries and the segmental vessels are all patent without evidence for emboli. Suboptimal opacification involving the subsegmental vessels to both lower lobes. Thoracic aorta is negative for dissection.   No CT evidence of right heart strain.     LUNGS AND PLEURA: Advanced centrilobular emphysema. Extensive airspace and groundglass opacities throughout both upper lobes, with additional interstitial opacities throughout the mid and lower lung fields, all of which are new since the previous study.   Subsegmental atelectasis in the bases. No effusions.     MEDIASTINUM/AXILLAE: Numerous borderline enlarged mediastinal and hilar nodes with minimal change.     UPPER ABDOMEN: Advanced atherosclerotic disease. Splenomegaly. Trace ascites adjacent to the liver. Collateral vessels along the lesser curvature the stomach     MUSCULOSKELETAL: Hypertrophic changes thoracic spine.    Impression:      1.  No evidence for central pulmonary emboli. The peripheral subsegmental vessels to both lower lobes are suboptimally opacified.   2.  Advanced centrilobular emphysema.   3.  Extensive groundglass and airspace infiltrates within both upper lobes concerning for pneumonia, including Covid 19. There are additional diffuse interstitial densities both lungs likely representing edema.    CT Abdomen Pelvis Without Oral With IV Contrast [[id number]]  Collected: 08/03/20 1703    Order Status: Completed  Updated: 08/03/20 1733    Narrative:      EXAM: CT ABDOMEN PELVIS WO ORAL W IV CONTRAST   LOCATION: [facility]   DATE/TIME: 8/3/2020 5:03 PM     INDICATION: RLQ abdominal pain, appendicitis suspected (Age > 14y) Right sided abdominal pain   COMPARISON: None.   TECHNIQUE: CT scan of the abdomen and pelvis was performed following injection of IV contrast. Multiplanar reformats were obtained. Dose reduction techniques were used.   CONTRAST: Iohexol (Omni) 100 mL     FINDINGS:   LOWER CHEST: Diffuse interstitial opacities  throughout the visualized lower lung fields.     HEPATOBILIARY: Trace ascites adjacent to the liver. Moderate distention of the gallbladder.     PANCREAS: Normal.     SPLEEN: Borderline splenomegaly is unchanged measuring 13 cm with trace adjacent fluid.     ADRENAL GLANDS: Normal.     KIDNEYS/BLADDER: Normal.     BOWEL: The appendix is identified within the mid right pelvis and is of normal caliber containing a tiny amount of air. There is some minimal edema adjacent to the appendix and cecum. Slight wall thickening involving the cecum and ascending colon with a   small amount of adjacent ascites. No evidence for bowel obstruction.     LYMPH NODES: Normal.     VASCULATURE: Advanced atherosclerotic disease abdominal aorta and iliac arteries with high-grade stenosis involving the proximal right and left common iliac arteries. Prominent collateral vessels along the lesser curvature of the stomach are more   distended compared to the prior study and could be secondary to portal venous hypertension.     PELVIC ORGANS: Normal.     MUSCULOSKELETAL: Small fat-containing ventral hernia.    Impression:      1.  Appendix is of normal caliber. There is a small amount of ascites adjacent to the base of the cecum and ascending colon with slight wall thickening which could represent an acute colitis. No evidence for bowel obstruction.   2.  Trace ascites adjacent to the liver and spleen.   3.  Advanced atherosclerotic disease.   4.  Prominent collateral vessels/possible gastric varices lesser curvature of the stomach.   5.  Interstitial opacities throughout the visualized lower lung fields. Please refer to CTA chest report for further discussion.

## 2021-06-10 NOTE — PROGRESS NOTES
Hospitalist follow up note:    CT with fiindings of colitis, increased ascites. Will give fluid bolus, start cipro/flagyl, trend lactate.     Liang Moore DO  Pager #: [phone]

## 2021-06-10 NOTE — ED PROVIDER NOTES
"TRANSFER OF CARE NOTE ED SIGNOUT      HPI    Patient seen by Dr. Arita  For shortness of breath  and signed out  care at 4:27 PM.     Pending studies include imaging and admission.     Patient reports shortness of breath which has been bothering her for \"quite some time\". She became extremely short of breath today, leading her to call for EMS. She has been using her inhalers at home without relief. Denies fever or cough. Patient also reports right sided abdominal pain, rated as a 5/10 in severity during triage, but denies recent emesis. She states she had fallen ~1 week ago and had an MRI brain which was negative. Patient has no other medical concerns at this time. She states she last drank alcohol 3 days ago and last smoked cigarettes 2 days ago.     PHYSICAL EXAM      VITAL SIGNS: BP 99/69   Pulse (!) 114   Temp 98.2  F (36.8  C) (Oral)   Resp (!) 31   Ht 5' 2\" (1.575 m)   Wt 160 lb (72.6 kg)   SpO2 94%   BMI 29.26 kg/m    Repeat exam reveals  General: Chronically ill-appearing elderly female patient, laying in bed, moderate respiratory distress  CV: Tachycardic, extremities well-perfused  Pulmonary: Tachypneic, expiratory wheezing in all lung fields, coarse lung sounds bilaterally.  Abdomen: Nontender this time      LABS  Pertinent lab results reviewed in chart.  Results for orders placed or performed during the hospital encounter of 08/03/20   Basic Metabolic Panel   Result Value Ref Range    Sodium 135 (L) 136 - 145 mmol/L    Potassium 3.6 3.5 - 5.0 mmol/L    Chloride 101 98 - 107 mmol/L    CO2 20 (L) 22 - 31 mmol/L    Anion Gap, Calculation 14 5 - 18 mmol/L    Glucose 154 (H) 70 - 125 mg/dL    Calcium 8.3 (L) 8.5 - 10.5 mg/dL    BUN 12 8 - 22 mg/dL    Creatinine 0.76 0.60 - 1.10 mg/dL    GFR MDRD Af Amer >60 >60 mL/min/1.73m2    GFR MDRD Non Af Amer >60 >60 mL/min/1.73m2   Hepatic Profile   Result Value Ref Range    Bilirubin, Total 6.0 (H) 0.0 - 1.0 mg/dL    Bilirubin, Direct 3.4 (H) <=0.5 mg/dL    " Protein, Total 7.4 6.0 - 8.0 g/dL    Albumin 1.8 (L) 3.5 - 5.0 g/dL    Alkaline Phosphatase 254 (H) 45 - 120 U/L    AST 57 (H) 0 - 40 U/L    ALT 30 0 - 45 U/L   Lipase   Result Value Ref Range    Lipase 38 0 - 52 U/L   Troponin I   Result Value Ref Range    Troponin I <0.01 0.00 - 0.29 ng/mL   INR   Result Value Ref Range    INR 1.59 (H) 0.90 - 1.10   Blood Gases, Venous   Result Value Ref Range    pH, Venous 7.34 (L) 7.35 - 7.45    pCO2, Venous 42 35 - 50 mm Hg    pO2, Bo 19 (L) 25 - 47 mm Hg    Base Excess, Venous -3.6 mmol/L    HCO3, Venous 20.3 (L) 24.0 - 30.0 mmol/L    Oxyhemoglobin 21.5 (L) 70.0 - 75.0 %    O2 Sat, Venous 22.1 (L) 70.0 - 75.0 %   HM1 (CBC with Diff)   Result Value Ref Range    WBC 6.3 4.0 - 11.0 thou/uL    RBC 2.85 (L) 3.80 - 5.40 mill/uL    Hemoglobin 10.5 (L) 12.0 - 16.0 g/dL    Hematocrit 30.8 (L) 35.0 - 47.0 %     (H) 80 - 100 fL    MCH 36.8 (H) 27.0 - 34.0 pg    MCHC 34.1 32.0 - 36.0 g/dL    RDW 17.9 (H) 11.0 - 14.5 %    Platelets 85 (L) 140 - 440 thou/uL    MPV 10.4 8.5 - 12.5 fL    Neutrophils % 68 50 - 70 %    Lymphocytes % 19 (L) 20 - 40 %    Monocytes % 10 2 - 10 %    Eosinophils % 2 0 - 6 %    Basophils % 1 0 - 2 %    Neutrophils Absolute 4.3 2.0 - 7.7 thou/uL    Lymphocytes Absolute 1.2 0.8 - 4.4 thou/uL    Monocytes Absolute 0.6 0.0 - 0.9 thou/uL    Eosinophils Absolute 0.1 0.0 - 0.4 thou/uL    Basophils Absolute 0.0 0.0 - 0.2 thou/uL         RADIOLOGY  No results found.      ED COURSE & MEDICAL DECISION MAKING    5:20 PM I met with the patient. I wore the following PPE while examining the patient: Face shield, N95 mask, gown, surgical cap and gloves.  6:11 PM I spoke with hospitalist Dr. Bond who agrees to admit patient.    Patient is a 60-year-old female who presented today with shortness of breath.  She has a history of advanced COPD and alcohol abuse.  She was found to be in moderate respiratory distress today with expiratory wheezing in all lung fields.  She was  treated empirically for COPD exacerbation with Solu-Medrol and magnesium with addition of multiple doses of albuterol inhaler here.  CT was ordered by the previous provider and resulted most significantly for bilateral opacities concerning for pneumonia with Covid-19 being a possibility.  She was tested for symptomatic COVID and will be covered broadly for hospital-acquired pneumonia given her recent admissions with vancomycin, Zosyn, and azithromycin.  She also was briefly hypotensive, improved after 1 L of IV fluids, would not add more IV fluids in spite of her elevated lactate given that she also had some developing pulmonary edema on her CT scan.  She remained relatively stable across a few hours of observation here in the emergency department and will be admitted to the cardiac telemetry floor for ongoing management of respiratory failure and COPD.  Spoke with the admitting hospitalist.      FINAL DIAGNOSIS:   1. SOB (shortness of breath)    2. Abdominal pain, right upper quadrant    3. Tachycardia              I, Dr. Brandon Hunt, personally performed the services described in this documentation.       Brandon Hunt MD  08/03/20 3917

## 2021-06-10 NOTE — PROGRESS NOTES
[facility] MEDICINE PROGRESS NOTE      Identification/Summary: Keiko Guo is a 60 y.o. female with a past medical history of tobacco use, alcohol use disorder, bipolar disorder, previous suicide attempt with overdose, GÉNESIS, PAD, worsening dementia and imbalance (possible Warnicke encephalopathy), and radiographic emphysema (presumed COPD) who was admitted on 8/3/2020 for abdominal pain, lactic acidosis, hyperbilirubinemia, acute respiratory failure with hypoxia, and acute encephalopathy. Hospital course was notable for severe hypoxia requiring up to 60 L HF NC and 55% FiO2, guarded prognosis, and ongoing encephalopathy.  Hypoxic respiratory failure likely secondary to combination of severe emphysema, volume overload/pulmonary edema, possible COPD exacerbation, and/or CAP VS aspiration.  She was treated with antibiotics, diuresis.    Her course has been complicated by agitation, confusion. Psychiatry consulted to assist. She is also medically complex with liver failure, possible hepatic encephalopathy, acute respiratory failure. Unfortunately, on 8/17, she developed sepsis with rising WBC, tachycardia, elevated lactic acid and guarding to palpation in the RUQ. Unfortunately she developed severe sepsis 2/2 colitis, with lactic acid peaked to 5.6 at 19:49; no repeated lactic acid checked since then. Patient is new to me again today (I had met her over a week ago) and I've ordered a repeat lactic acid this AM; however, she remains hemodynamically stable.     Assessment and Plan:  New Colitis on CT, 8/17  Severe sepsis 2/2 colitis  Lactic Acidosis to 5.6 (8/17)  RUQ pain  -More severe pain on 8/17, guarding to palpation  -Not a good surgical candidate  -Has had multiple evals by GI, surgery, multiple RUQ ultrasound  -On 8/17 developed sepsis with tachy with elevated WBC, elevated lactic acid  -A STAT CT was ordered, which demonstrated new colitis and IV ciprofloxacin and IV metronidazole started.  -Severe  sepsis by definition with lactic acid > 4.0. Peaked to 5.6 overnight at 19:49; no repeated lactic acid checked since then. Patient is new to me again today (I had met her over a week ago) and I've ordered a repeat lactic acid this AM.  -Update: lactic acid is now 3.5, improving with treatment, IV antibiotics and IVF.     Acute Hypoxic Respiratory Failure, multifactorial - ongoing, now up to 2 L of O2   Severe Emphysema with COPD Acute Exacerbation  Aspiration PNA vs CAP, SARS-CoV-2 negative-completed course of antibiotics  Pulmonary Edema, Volume Overload  -She is clinically improved from a respiratory standpoint at this time  -Required HFNC 60 L in ICU initially; improved to room air transiently.  -Now back up to 2 L of O2, 4 L transiently overnight, weaning down to goal 88-92%.  -Currently on prednisone taper  -Seems like events leading to presentation were: Abdominal pain or back pain, taking opiates at home leading to somnolence, respiratory acidosis, possible aspiration  -At baseline her health is very poor, she has a history of alcohol abuse  -Her pulmonary status may be at baseline at this time  -I've personally checked the MAR - she already completed a 5 day course of azithromycin and 7-day course of Zosyn to cover CAP as well as aspiration PNA organism.    Alcohol abuse/dependence  Wernicke encephalopathy  Acute metabolic encephalopathy, questionable hepatic encephalopathy  Bipolar disorder versus anxiety/depression at baseline-on Seroquel at home  -Alert now but still very confused  -Has asterixis  -Continue thiamine supplement  -Trying low-dose lactulose to see if this improves some of her confusion  --Psychiatry consulted, appreciate their recommendations  Per note scheduled Depakote 3 times daily, Keppra at bedtime, melatonin at bedtime   -Clinically very consistent with Wernicke's  -Agree that Hospice at a facility would be appropriate    Abdominal pain  Acutely elevated liver enzymes, acute on chronic  "thrombocytopenia  Recent Hep B infection  Jaundice, hepatic fibrosis  Likely acute liver failure in setting of alcoholic cirrhosis  -Appreciate GI recs    Moderate to severe malnutrition  -Now able to tolerate oral intake, less alert today  -Albumin very low at 1.7  -Appreciate dietitian recommendations    Hypernatremia, resolved  -Oral intake improved, fluids stopped    Advanced Care Planning  -She is DNR and is a hospice candidate if pt/family are interested at dc  -Not comfort care at this time   -Appreciate hospice and palliative care help with goals/options at dc    Prolonged QTC  - on last check  - avoid QT prolonging meds as able, replace mag and potassium prn    Diet: Diet Dysphagia I (Pureed), 2 Gm Na; Honey thick liquids LIQUIDS BY SPOON  Dietary nutrition supplements Magic cup; BID with meals  DVT Prophylaxis:  VTE Prophylaxis contraindicated due to thrombocytopenia  Code Status: No CPR- Do NOT Intubate  Disposition: Stay today as medications are being adjusted.  Needs to be off of one-to-one, able to tolerate oral intake or be comfort care/hospice to discharge.    Interval History/Subjective:  Acute worsening of RUQ pain, CT scan obtained STAT, showed new colitis. New IV antibiotics started. Lactic acid 5.6 at 19:49; no changes to treatment overnight and no repeat lactic acid levels checked.     STAT repeat Lactic acid ordered by me at start of my shift. Update: resulted at 3.5, trending down.     Patient is in a lot of pain and yelling out while her blood is being drawn urgently. She [facility], and states that she's here \"because of my daughter\" and not able to appropriately state she had PNA and now with colitis. I explained to her the unfortunate new severe sepsis and colitis that we are treating.     Discussed with RN TANYA gayle, , CM/SW.     Physical Exam/Objective:  Vitals I/O   Temp:  [97.6  F (36.4  C)-98.9  F (37.2  C)] 98.9  F (37.2 "  C)  Heart Rate:  [108-120] 115  Resp:  [20] 20  BP: (108-158)/(53-81) 117/81  SpO2:  [92 %-94 %] 94 %  I/O last 3 completed shifts:  In: 2137 [P.O.:360; I.V.:802; IV Piggyback:975]  Out: -    159 lb (72.1 kg)  Body mass index is 29.08 kg/m .    Physical Exam:    GENERAL:  Alert, appears comfortable, in no acute distress, appears stated age   HEAD:  Normocephalic, without obvious abnormality, atraumatic   EYES:  PERRL, conjunctiva/corneas clear, no scleral icterus, EOM's intact   NOSE: Nares normal, septum midline, mucosa normal, no drainage   THROAT: Lips, mucosa, and tongue normal; teeth and gums normal, mouth moist   NECK: Supple, symmetrical, trachea midline   BACK:   Symmetric, no curvature, ROM normal   LUNGS:   Clear to auscultation bilaterally, no rales, rhonchi, or wheezing, symmetric chest rise on inhalation, respirations unlabored   CHEST WALL:  No tenderness or deformity   HEART:  Regular rate and rhythm, S1 and S2 normal, no murmur, rub, or gallop    ABDOMEN:   Soft, TTP diffusely and worse in right-side compared to left, no peritoneal finding, no involuntary guarding, bowel sounds hyperactive all four quadrants, no masses, no organomegaly, no rebound or guarding   EXTREMITIES: Extremities normal, atraumatic, no cyanosis or edema    SKIN: Dry to touch, no exanthems in the visualized areas   NEURO: Alert, oriented to self/name, location, but not to context, moves all four extremities freely   PSYCH: Mostly cooperative, behavior is appropriate          Medications:   Personally Reviewed.    ciprofloxacin  400 mg Intravenous Q12H     levETIRAcetam  250 mg Oral QHS     magnesium oxide  400 mg Oral TID     melatonin  3 mg Oral QHS     metroNIDAZOLE  500 mg Intravenous Q12H     nicotine  1 patch Transdermal DAILY     omeprazole  20 mg Oral BID     predniSONE  20 mg Oral Daily with brkfst    Followed by     [START ON 8/19/2020] predniSONE  15 mg Oral Daily with brkfst    Followed by     [START ON 8/22/2020]  predniSONE  10 mg Oral Daily with brkfst    Followed by     [START ON 8/25/2020] predniSONE  5 mg Oral Daily with brkfst     sodium chloride  10-30 mL Intravenous Q8H FIXED TIMES     sodium chloride  3 mL Intravenous Line Care     thiamine  100 mg Oral DAILY       >41 mins of critical care time; this includes acute evaluation of life-threatening severe sepsis with lactic acidosis > 5.5 secondary to new onset/discovered colitis, severe lactic acidosis, worsened right-sided abdominal pain, and continued treatment of acute hypoxic respiratory failure after completing CAP and aspiration PNA treatment.       Dalton Greenberg MD  Internal Medicine  Hospitalist  [facility]  Phone: #[phone]

## 2021-06-10 NOTE — PROGRESS NOTES
"Pharmacy Consult: Vancomycin Dosing in the Emergency Department    Pharmacist consulted by Dr. Hunt to dose vancomycin for Keiko Guo, a 60 y.o. female.    Indication for vancomycin therapy: Hospital Acquired Pneumonia    Other current antimicrobials              vancomycin 1,250 mg in sodium chloride 0.9% 500 mL (VANCOCIN)  Once          piperacillin-tazobactam 3.375 g in NaCl 0.9 % 50 mL (MINI-BAG Plus) (ZOSYN)  Once          azithromycin in D5W 500 mg IVPB 250 mL  Once                   Assessment/Plan    1. Vancomycin 1250 mg IV once in the ED (17 mg/kg actual body weight).  2. If the patient is admitted to the hospital and vancomycin therapy should continue, please re-consult pharmacy.    Subjective/Objective    Keiko Guo presented to the ED on 8/3/2020 for Shortness of Breath    Height: 5' 2\" (1.575 m)  Actual Body Weight (ABW): 72.6 kg (160 lb)    Ideal body weight: 50.1 kg (110 lb 7.2 oz)  Adjusted ideal body weight: 59.1 kg (130 lb 4.3 oz)    BMI: Body mass index is 29.26 kg/m .    No Known Allergies    Patient Active Problem List   Diagnosis     Pulmonary emphysema (H)     Depression     Neurosis, posttraumatic     Bipolar disorder (H)     Alcohol abuse     Hyperglyceridemia     Smoker     GERD (gastroesophageal reflux disease)     Alcoholic intoxication with complication (H)     Intentional drug overdose, initial encounter (H)     Acute respiratory failure with hypoxia (H)     Acidemia     Metabolic acidosis     Suicide attempt (H)     Relationship problem between caregiver and child     History of posttraumatic stress disorder (PTSD)     Overdose     Bipolar I disorder, most recent episode depressed, severe without psychotic features (H)     Neuroleptic consent was signed on October 8, 2018     Hypotension     Swallowing painful     Lactic acid acidosis     Odynophagia     QT prolongation     Fungal esophagitis     Adjustment disorder with mixed anxiety and depressed mood    Past " Medical History:   Diagnosis Date     COPD (chronic obstructive pulmonary disease) (H)      PAD (peripheral artery disease) (H)      Sleep apnea         Temp Readings from Current Encounter:     08/03/20 1528   Temp: 98.2  F (36.8  C)       Recent Labs     08/03/20  1558   WBC 6.3   NEUTROABS 4.3     Recent Labs     08/03/20  1558   BUN 12   CREATININE 0.76       Estimated Creatinine Clearance: 71.6 mL/min (by C-G formula based on SCr of 0.76 mg/dL).    Thank you for the consult,  Eve Lopez, PharmD  8/3/2020  5:54 PM

## 2021-06-10 NOTE — PLAN OF CARE
Care Plan-Care Management  Care Management Goals of the Day: progression of care    Care Progression Reviewed With: Charge RN, RNCM, CMSW, BRENDAN MD  Barriers to Discharge: Respiratory Progression   Discharge Disposition: TCU   Expected Discharge Date: 1-2 days   Transportation: TBD     Care Coordination Narrative: [facility] called daughterTelma to follow-up with TCU choices.  Telma continues to look and list and do research and has agreed to referrals to [facility], [facility] and [facility].      SREEDHAR received call from [facility] with [facility] who is checking with [facility] billing to see if they have a contract. PAS needed.     2:33 PM  08/14/20  SREEDHAR Montez called [facility] and unable to accept Pt.     2:38 PM  08/14/20  SREEDHAR Montez     Daughter called and gave 2 more choices for TCU of [facility] and [facility].  [facility] is currently not admitting Pt.  Daughter updated. Referral sent to [facility].     2:53 PM  08/14/20  SREEDHAR Montez

## 2021-06-10 NOTE — PROGRESS NOTES
RESPIRATORY CARE NOTE     Patient Name: Keiko Guo  Today's Date: 8/7/2020       Pt continues to receive duo neb. BS are dem. Pt is on 50% of oxygen via BiPAP 12/5.  Changed settings to 14/7 due to WOB, SpO2 is 90%. Post treatment there is increased aeration. Pt also perceives improvement.  RT encouraged deep breathing and coughing techniques .  RT will continue to monitor and assess.     Kushal Noyola, WILFRIDT

## 2021-06-10 NOTE — PLAN OF CARE
Problem: Pain  Goal: Patient's pain/discomfort is manageable  8/27/2020 2222 by Telma Gray, RN  Outcome: Progressing     Pt receiving scheduled morphine. Pt has been resting comfortably all shift. No facial grimaces noted. Pt is non-verbal.      Problem: Psychosocial Needs  Goal: Collaborate with patient/family/caregiver to identify patient specific goals for this hospitalization  Outcome: Progressing   Nursing presence with daughter Telma. Daughter does not make eye contact and shows erratic behavior and thought process though is not angry. Nurse in throughout the night to talk with daughter and meet any needs needed.      Problem: Potential for Compromised Skin Integrity  Goal: Skin integrity is maintained or improved  8/27/2020 2222 by Telma Gray, RN  Outcome: Progressing  Pressure sore cleansed and powder applied. Pt check and changed and Q2 turns with bed alarms. Pt is resting confortably.      Problem: Impaired Gas Exchange  Goal: Demonstrate improved ventilation and adequate oxygenation of tissues as evidenced by absence of respiratory distress  Outcome: Progressing  Pt has needed increased O2 needs. Nurse tried to wean pt down to 10L. Pt currently on 13 L @ 89%.      Problem: Discharge Barriers  Goal: Patient's discharge needs are met  8/27/2020 2222 by Telma Gray, RN  Outcome: Progressing  8/27/2020 2218 by Telma Gray, RN  Outcome: Progressing  Pt unable to wean down to 10L. No air hunger noted. Scheduled morphine. Scheduled nebs with RT.  Possible mottling noted on feet and ankle. Fluid overload with edematous fluid. Arms elevated with kush pad underneath. Cyanotic around lips and mouth. Bruising on right forehead noted. Daughter has been with pt most of the night besides running to grab a meal.

## 2021-06-10 NOTE — PROGRESS NOTES
[facility] Palliative Care Social Work Note:    Supportive check in call with dtr Telma. Looking into options re TCU. She is coping better now that her mom is doing better. Discussed her feelings around short and long term needs for pt and family. She is concerned about pt's pain level and restlessness and was unclear re medications to address these concerns. Agreed to let Palliative Care Provider know. No other questions or concerns at end of conversation. Provided active listening, validation of feelings and emotional support. She expressed appreciation for call.     PC SW remains available to provide psychosocial support to pt's dtr if needs arise.     Melia Dennis, [facility]  Palliative Care   Office # [phone]

## 2021-06-10 NOTE — PLAN OF CARE
"  Problem: Pain  Goal: Patient's pain/discomfort is manageable  Outcome: Progressing     Problem: Potential for transmission of organism by Contact, Enteric, Droplet and/or Airborne routes  Goal: Prevent transmission of organisms  Outcome: Progressing     Problem: Potential for Falls  Goal: Patient will remain free of falls  Outcome: Progressing     Pt arrived to floor via stretcher and slide board was utilized for transfer. Patient is alert. Orientation fluctuates. Pt kept saying, \"I need to get out of here, I need to go to the bank\". CIWA this shift was 8 and gabapentin was administered for an anxiety score of 4. Re-assessed CIWA rated 5. Patient was incontinent x1 this shift. PureWick in place, functioning. Telemetry running sinus tachycardia. Bed alarm in place to promote safety. Repeat COVID pending.     Gema Yarbrough RN  8/3/2020    "

## 2021-06-10 NOTE — PLAN OF CARE
Pt was given scheduled nebulizers, BS diminished, remains on HFNC 40L 35%, RT following.    Problem: Impaired Gas Exchange  Goal: Demonstrate improved ventilation and adequate oxygenation of tissues as evidenced by absence of respiratory distress  Outcome: Progressing     Problem: Inadequate Gas Exchange  Goal: Patient will achieve/maintain normal respiratory rate/effort  Outcome: Progressing     Problem: Ineffective Airway Clearance  Goal: Maintain airway patency  Outcome: Progressing

## 2021-06-10 NOTE — PLAN OF CARE
Problem: Risk for injury  Goal: Experience no physical injury  Outcome: Progressing     Problem: Pain  Goal: Patient's pain/discomfort is manageable  Outcome: Progressing   Patient remains a Q2 hour turn to maintain skin integrity. Patient was straight cath'd due to not voiding on day shift. Patient eventually had an incontinent episode. Patient responds to her name and follows simple commands but remains confused to location, time, and situation. PICC line patent and draining.

## 2021-06-10 NOTE — PROGRESS NOTES
Speech Language/Pathology  Patient out of room for procedure upon attempt for dysphagia therapy.  Attempted to meet with daughter in room; however, busy with another discipline at that time.  Per chart review, patient with increased oxygen needs (I.e., 7L via Oxymizer vs 2L via nasal cannula yesterday); however, RN noted patient more alert today.  Will attempt to see on 8/25/20 to reassess diet tolerance and if appropriate for solid upgrade, and continue to consider repeat VFSS as appropriate.    Shannan Mcclellan M.S. CCC-SLP  No charge

## 2021-06-10 NOTE — PROGRESS NOTES
Pulmonary Progress Note  8/10/2020      Admit Date: 8/3/2020  Hospital Day: 7   CODE: No CPR- Do NOT Intubate    Reason for Consult: hypoxemia        Assessment/Plan:   60 year old female with a history of longstanding and active tobacco dependence, likely excessive alcohol use, bipolar disorder with psychotic features and prior overdose suicide attempt, mild obesity, untreated GÉNESIS, peripheral arterial disease, recent worsening dementia and imbalance with presumed diagnosis of Wernicke encephalopathy, extensive radiographic emphysema with presumed COPD (No PFTs on record), admitted with abdominal pain, lactic acidosis, hyperbilirubinemia, dyspnea, acute respiratory failure with hypoxia, abnormal chest CT, and acute encephalopathy. Clinically appears very tenuous with ongoing severe respiratory failure, delirium and encephalopathy. Ongoing hypoxemic resp failure likely due to underlying severe emphysema, vol overload/pulmonary edema and COPD exacerbation +/- aspirtation vs. Community acquired pneumonia.  COVID-19 negative x2      Recommendations:  - titrate FiO2 for goal O2 sat 88-92%, avoid hyperoxia  - cont BiPAP alternating with HHFNC as tolerated  - lasix 40 mg IV two times a day  - increase PPI to two times a day for possible aspiration component  - change methylpred IV 40mg q12 to prednisone 40 mg po daily, if tolerated after 3 days could taper to prednisone 30mg two times a day for 3 days, then 40mg daily for 3 days, then stop. if taking orals  - check CXR     Contraction alkalosis  - add metolazone       Interim Events:     O2 sats upper 90s on HHFNC 60 lpm & 63% FiO2. Hypernatremia improving on 1/2 NS      Medications:       furosemide  40 mg Intravenous BID - diuretic     ipratropium-albuteroL  3 mL Nebulization Q6H - RT     metOLazone  10 mg Oral BID - diuretic     nicotine  1 patch Transdermal DAILY     omeprazole  20 mg Oral BID     piperacillin-tazobactam  3.375 g Intravenous Q8H     predniSONE  40 mg  "Oral Daily with brkfst     QUEtiapine  300 mg Oral QHS     sodium chloride  10-30 mL Intravenous Q8H FIXED TIMES     thiamine (vitamin B1) IVPB  100 mg Intravenous DAILY         Exam/Data:   Vitals  /71 (Patient Position: Semi-hopper)   Pulse 89   Temp 98.5  F (36.9  C) (Axillary)   Resp 20   Ht 5' 2\" (1.575 m)   Wt 163 lb 14.4 oz (74.3 kg)   SpO2 90%   BMI 29.98 kg/m       I/O last 3 completed shifts:  In: 1320.8 [I.V.:870.8; IV Piggyback:450]  Out: 1950 [Urine:1950]  Weight change: -3 lb 6.4 oz (-1.542 kg)  Wt Readings from Last 3 Encounters:   08/10/20 163 lb 14.4 oz (74.3 kg)   02/28/20 183 lb (83 kg)   07/12/19 184 lb 8.4 oz (83.7 kg)     FiO2 (%):  [45 %-64 %] 45 %    EXAM:  /71 (Patient Position: Semi-hopper)   Pulse 89   Temp 98.5  F (36.9  C) (Axillary)   Resp 20   Ht 5' 2\" (1.575 m)   Wt 163 lb 14.4 oz (74.3 kg)   SpO2 90%   BMI 29.98 kg/m    General - Well nourished  Ears/Mouth - OP pink moist, no thrush  Neck - no cervical lymphadenopathy  Lungs - decreased lung sounds throughout  CVS - regular rhythm with no murmurs, rubs or gallups  Abdomen - soft, NT, ND, NABS  Ext - no cyanosis, clubbing or edema  Skin - no rash        DATA    IMAGING:   Ct Head Without Contrast    Result Date: 8/3/2020  EXAM: CT HEAD WO CONTRAST LOCATION: [facility] DATE/TIME: 8/3/2020 5:04 PM INDICATION: Head trauma, abnormal mental status (Age 19-64y) Fall, bruising to face COMPARISON: Head CT 12/20/2017 and MRI brain 07/23/2017. TECHNIQUE: Routine without IV contrast. Multiplanar reformats. Dose reduction techniques were used. FINDINGS: No evidence of acute intracranial hemorrhage or mass effect. 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 Brain attenuation morphology otherwise normal. The ventricles and sulci are normal for age. Normal gray-white " matter differentiation. The basilar cisterns are patent. The globes are unremarkable. The partially imaged paranasal sinuses demonstrate air-fluid level within the right maxillary sinus which could represent acute sinusitis in the appropriate setting. The partially imaged paranasal sinuses are otherwise unremarkable. The mastoid air cells and middle ear cavities are unremarkable. The visualized skull base and calvarium are unremarkable. The     1.  No evidence of acute intracranial hemorrhage or mass effect. 2.  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 3.  Air-fluid level within the right maxillary sinus which could represent acute sinusitis in the appropriate setting.    Cta Chest Pe Run    Result Date: 8/3/2020  EXAM: CTA CHEST PE RUN LOCATION: [facility] DATE/TIME: 8/3/2020 5:01 PM INDICATION: PE suspected, high pretest prob SOB, tachycardia COMPARISON: CTA chest, abdomen and pelvis 07/12/2019 TECHNIQUE: CT chest pulmonary angiogram during arterial phase injection of IV contrast. Multiplanar reformats and MIP reconstructions were performed. Dose reduction techniques were used. CONTRAST: Iohexol (Omni) 100 mL FINDINGS: ANGIOGRAM CHEST: The right and left main pulmonary arteries and the segmental vessels are all patent without evidence for emboli. Suboptimal opacification involving the subsegmental vessels to both lower lobes. Thoracic aorta is negative for dissection. No CT evidence of right heart strain. LUNGS AND PLEURA: Advanced centrilobular emphysema. Extensive airspace and groundglass opacities throughout both upper lobes, with additional interstitial opacities throughout the mid and lower lung fields, all of which are new since the previous study. Subsegmental atelectasis in the bases. No effusions. MEDIASTINUM/AXILLAE: Numerous borderline enlarged  mediastinal and hilar nodes with minimal change. UPPER ABDOMEN: Advanced atherosclerotic disease. Splenomegaly. Trace ascites adjacent to the liver. Collateral vessels along the lesser curvature the stomach MUSCULOSKELETAL: Hypertrophic changes thoracic spine.     1.  No evidence for central pulmonary emboli. The peripheral subsegmental vessels to both lower lobes are suboptimally opacified. 2.  Advanced centrilobular emphysema. 3.  Extensive groundglass and airspace infiltrates within both upper lobes concerning for pneumonia, including Covid 19. There are additional diffuse interstitial densities both lungs likely representing edema.     Ct Abdomen Pelvis Without Oral With Iv Contrast    Result Date: 8/3/2020  EXAM: CT ABDOMEN PELVIS WO ORAL W IV CONTRAST LOCATION: [facility] DATE/TIME: 8/3/2020 5:03 PM INDICATION: RLQ abdominal pain, appendicitis suspected (Age > 14y) Right sided abdominal pain COMPARISON: None. TECHNIQUE: CT scan of the abdomen and pelvis was performed following injection of IV contrast. Multiplanar reformats were obtained. Dose reduction techniques were used. CONTRAST: Iohexol (Omni) 100 mL FINDINGS: LOWER CHEST: Diffuse interstitial opacities throughout the visualized lower lung fields. HEPATOBILIARY: Trace ascites adjacent to the liver. Moderate distention of the gallbladder. PANCREAS: Normal. SPLEEN: Borderline splenomegaly is unchanged measuring 13 cm with trace adjacent fluid. ADRENAL GLANDS: Normal. KIDNEYS/BLADDER: Normal. BOWEL: The appendix is identified within the mid right pelvis and is of normal caliber containing a tiny amount of air. There is some minimal edema adjacent to the appendix and cecum. Slight wall thickening involving the cecum and ascending colon with a small amount of adjacent ascites. No evidence for bowel obstruction. LYMPH NODES: Normal. VASCULATURE: Advanced atherosclerotic disease abdominal aorta and iliac arteries with high-grade stenosis involving the  proximal right and left common iliac arteries. Prominent collateral vessels along the lesser curvature of the stomach are more distended compared to the prior study and could be secondary to portal venous hypertension. PELVIC ORGANS: Normal. MUSCULOSKELETAL: Small fat-containing ventral hernia.     1.  Appendix is of normal caliber. There is a small amount of ascites adjacent to the base of the cecum and ascending colon with slight wall thickening which could represent an acute colitis. No evidence for bowel obstruction. 2.  Trace ascites adjacent to the liver and spleen. 3.  Advanced atherosclerotic disease. 4.  Prominent collateral vessels/possible gastric varices lesser curvature of the stomach. 5.  Interstitial opacities throughout the visualized lower lung fields. Please refer to CTA chest report for further discussion.    Us Abdomen Limited    Result Date: 8/4/2020  EXAM: US ABDOMEN LIMITED LOCATION: [facility] DATE/TIME: 8/4/2020 5:59 PM INDICATION: Hyperbilirubinemia, sepsis, evaluate for cholecystitis, CBD dilation COMPARISON: CT 8/3/2020 . TECHNIQUE: Limited abdominal ultrasound. FINDINGS: GALLBLADDER: Isoechoic intraluminal tissue related to the anterior wall of the gallbladder measuring approximately 2.5 x 2.0 x 1.0 cm has no internal Doppler flow. No significant gallbladder wall thickening with gallbladder wall thickness approximately 3  mm. Gallbladder otherwise negative. Negative sonographic Ludwig's sign. BILE DUCTS: No intrahepatic biliary dilatation. The common duct measures 6 mm, upper normal. LIVER: Coarsened hepatic parenchymal echotexture and slight hepatic contour irregularity. No focal mass. RIGHT KIDNEY: No hydronephrosis. PANCREAS: The visualized portions are normal. Trace ascites.     1.  Isoechoic intraluminal tissue related to the anterior wall of the gallbladder measuring approximately 2.5 x 2.0 x 1.0 cm indeterminate for adherent sludge material versus polyp but favor sludge  "material given lack of internal Doppler flow. 2.  No gallbladder wall thickening. 3.  No intrahepatic biliary dilatation. The common duct measures 6 mm, upper normal. 4.  Coarsened hepatic parenchymal echotexture and slight hepatic contour irregularity suggestive of underlying hepatic fibrosis/cirrhosis. 5.  Trace ascites.    Poc Us 3cg Picc Placement Guidance    Result Date: 8/4/2020  Exam was performed as guidance for PICC line insertion.  Click \"PACS images\" hyperlink below to view any stored images.  For specific procedure details, view procedure note authored by PICC/Vascular Access Nurse.         Kar Perkins MD   Pulmonary Medicine      "

## 2021-06-10 NOTE — PLAN OF CARE
Goal: Patient s discharge needs are met.  Outcome: Care Progression reviewed with Hospitalist, Care Manager.  Discharge Disposition: Discussed and plan to discharge to: TCU vs hospice care pending progression and care needs  Planned Discharge Date: 8/19-8/20  Problem: Barriers to discharge include: IV abx, IV flagyl, IVF, monitor labs, clinical progression  Transportation needs/Ride Time: TBD

## 2021-06-10 NOTE — PROGRESS NOTES
Infectious Disease Progress Note    Assessment/Plan  Impression: Pneumonia and respiratory failure.     No pathogen identified, concern for COVID-19 although now with three negative PCR    Hospice/palliative involved.      Recommendations:   S/p azithromycin  S/p pip-tazo  Agree with fungal evaluation      Principal Problem:    COPD with acute exacerbation (H)  Active Problems:    Bipolar disorder (H)    Tobacco abuse    Acute respiratory failure with hypoxemia (H)    Goals of care, counseling/discussion    Abnormal chest CT    Acute pulmonary edema (H)    DNR (do not resuscitate)    Pneumonitis    Aspiration pneumonia due to gastric secretions (H)    Hypernatremia      Kym Moy MD  [phone]      Subjective  No events overnight. High flow O2.     Objective    Vital signs in last 24 hours  Temp:  [96.3  F (35.7  C)-99  F (37.2  C)] 99  F (37.2  C)  Heart Rate:  [] 92  Resp:  [18-24] 20  BP: (103-140)/(56-71) 115/56  FiO2 (%):  [40 %-55 %] 40 %  Weight:   163 lb 14.4 oz (74.3 kg)    Intake/Output last 3 shifts  I/O last 3 completed shifts:  In: 687.5 [I.V.:622.5; IV Piggyback:65]  Out: 2125 [Urine:2125]  Intake/Output this shift:  I/O this shift:  In: 55 [IV Piggyback:55]  Out: -     Review of Systems   Pertinent items are noted in HPI., otherwise negative.    Physical Exam  General appearance: sleepy appears older than stated age, and mildly obese  Head: Normocephalic, without obvious abnormality, Some bruises around the right eye.   Eyes: Extraocular muscles intact  Lungs: crackles bilaterally  Extremities: extremities normal, atraumatic, no cyanosis or edema  Skin: Bruises and discoloration noted.  Neurologic: Grossly normal    Pertinent Labs   Lab Results: personally reviewed.     Results from last 7 days   Lab Units 08/11/20  0429 08/10/20  0444 08/09/20  0417   LN-WHITE BLOOD CELL COUNT thou/uL 12.8* 6.9 6.6   LN-HEMOGLOBIN g/dL 12.2 10.2* 9.9*   LN-HEMATOCRIT % 37.4 31.4* 30.1*    LN-PLATELET COUNT thou/uL 76* 50* 53*        Results from last 7 days   Lab Units 08/11/20  0429 08/10/20  2035 08/10/20  1246 08/10/20  0444 08/10/20  0000   LN-SODIUM mmol/L 152* 149* 150* 150* 152*   LN-POTASSIUM mmol/L 4.0  --   --  4.0 4.1   LN-CHLORIDE mmol/L 89*  --   --  100 99   LN-CO2 mmol/L 47*  --   --  41* 42*   LN-BLOOD UREA NITROGEN mg/dL 35*  --   --  25* 23*   LN-CREATININE mg/dL 0.78  --   --  0.66 0.71   LN-CALCIUM mg/dL 8.7  --   --  7.8* 8.0*       Procedure  Component  Value  Units  Date/Time    Blood Culture from PERIPHERAL SITE (2nd One) [[id number]]   Collected: 08/04/20 1041    Order Status: Sent  Specimen: Blood from Vein, Peripheral  Updated: 08/04/20 1052      Results for MAISHA SUAZO (MRN [id number]) as of 8/7/2020 13:25   Ref. Range 8/3/2020 15:48 8/3/2020 22:07 8/6/2020 14:40   COVID-19 VIRUS SPECIMEN SOURCE Unknown Nares Nasopharyngeal Nasopharyngeal   SARS-CoV-2 Virus Specimen Source Unknown Nares Nasopharyngeal Nasopharyngeal   SARS-CoV-2 PCR Result Unknown NEGATIVE NEGATIVE NEGATIVE   SARS-COV-2 PCR COMMENT Unknown Testing was performed using the Xpert Xpress SARS-CoV-2 Assay on the Moove In Testing was performed using the Xpert Xpress SARS-CoV-2 Assay on the Mafengwoid Testing was performed using the Xpert Xpress SARS-CoV-2 Assay on the Moove In       Pertinent Radiology   Radiology Results: Personally reviewed image/s and Personally reviewed impression/s    No results found.

## 2021-06-10 NOTE — PROGRESS NOTES
Pt received neb treatment and tolerated well. BS; Diminished. SpO2 90 on 3L NC.  Will continue to monitor and assess pt.    WILFRID PickettT

## 2021-06-10 NOTE — PLAN OF CARE
Problem: Pain  Goal: Patient's pain/discomfort is manageable  Outcome: Progressing   Patient denies pain at rest, complains of left sided intermittent abdominal pain      Problem: Discharge Barriers  Goal: Patient's discharge needs are met  Outcome: Not Progressing   Patient is on 2 liters of oxygen to keep oxygen between 88-92%. Attempted to wean to room air but was 83% on room air. Encouraged patient to get up to chair and walk. Patient up to chair this morning but took herself back to bed. Patient ambulated with therapy in the room. Patient is very deconditioned and not eating or drinking much. Encouraged food intake. Heart rate in 110's. Patient is afebrile.

## 2021-06-10 NOTE — PROGRESS NOTES
Clinical Nutrition Therapy Follow Up Note    Current Nutrition Prescription:   Diet: Pureed / honey thick  Diet Supplements: Magic cup BID  IV dextrose or Fluids:sodium chloride 0.9%, Last Rate: 100 mL/hr (08/21/20 1922)      Current Nutrition Intake:  The patient's current meal intake is poor <25%     Anthropometrics:  Weight: 163 lb 12.8 oz (74.3 kg)     GI Status/Output:   GI symptoms include: WDL per nurse  Bowel Sounds  hypoactive per nurse  Last BM: 8/21 Incontinent stool documented per nurse    Skin/Wound:  No wound was noted.    Medications:  Medications reviewed.    Labs:  Labs: Results for AMISHA SUAZO (MRN [id number])    Ref. Range 8/20/2020 02:53   Calcium Latest Ref Range: 8.5 - 10.5 mg/dL 7.5 (L) Corrected calcium 9.5   Magnesium Latest Ref Range: 1.8 - 2.6 mg/dL 1.7 (L)   Glucose Latest Ref Range: 70 - 125 mg/dL 193 (H)     Malnutrition: Noted previously     Nutrition Risk Level: high risk     Nutrition dx:    Malnutrition r/t chronic illness as evidenced by wt loss, reduced po .    Difficulty swallowing r/t dysphagia AEB VFSS ~ Patient requires dysphagia diet     Goal Status:    Patient will tolerate oral diet (per SLP recommendation) without s&s of Aspiration - MET    Consume > 75% at meals-Not Met PO remains poor    Bowel function WDL-Not Met     Maintain weight while hospitalized-Met    (new) Glycemic control wnl Not Met -210 mg/dL    Intervention / Monitoring:    Provide dysphagia diet with honey thick liquids    Continue to provide Magic cup BID    Provide snacks on demand within diet prescription    Follow PO intake, weight, labs

## 2021-06-10 NOTE — PROGRESS NOTES
Patient on BiPAp 16/8 RR16  Fio2 40% Sao2 99% RR 20.  Tried patient off BiPAP to high flow 50lpm 50% Patient desated to 70's O2 increased to 80% patient still not tolerating. Sao2 85%. Total time approx. 10 min Placed patient back on BiPAP with above settings.  Kushal Noyola, LRT

## 2021-06-10 NOTE — PLAN OF CARE
Patient remain confused and oriented to self only. Patient having visual hallucinations at times. Difficult to redirect and reorient. Denies any pain. Had 1 BM this shift. Childress draining brownish urine. Provided with zackary-cares. Turned and repo Q2. Daughter updated with the plan of care.

## 2021-06-10 NOTE — PROGRESS NOTES
Speech Language/Pathology  Completed chart review and consulted with RN regarding plan of care.  Per RN, patient able to swallow pills crushed in puree this AM; however, immediately coughing with liquids.  RN deferred therapy on this date as patient continues to be inappropriate for oral diet due to medical status and high oxygen needs.  Patient may benefit from VFSS to further assess risk of intake due to s/s aspiration at bedside; however, this cannot be completed at this time with high oxygen needs.  Dietician consulted today- may need to consider alternative form of nutrition to meet intake needs.  Will continue to follow and see as appropriate if medical status improves or goals of care change.    Shannan Mcclellan M.S. CCC-SLP  No charge

## 2021-06-10 NOTE — PROGRESS NOTES
Speech Language/Pathology  Speech Therapy Daily Progress Note    Patient presents as lethargic and cooperative during this session.  An  was not applicable.    Objective  Patient seen for repeat bedside swallow study assessment with improved status.    Pt is off high-flow O2, has tolerated 4L O2 via nasal cannula for ~4 hours with good SATs per RN. More awake today, but was lethargic for my assessment.    RR during my assessment was 24 breaths per minute. O2 SATs % on 4L O2 via nasal cannula. No observed respiratory distress. More appropriate to trial po intake with current respiratory status.    Pt is edentulous. Grossly intact oral motor function but lethargic and minimally participated for commands. Bolus prep and oral control was mildly slow, did not observe with mastication due to swallow concerns. Anterior-Posterior transit was mildly slow, some oral holding of the bolus noted. No oral stasis with all textures.     Puree: Patient trialed x3 bites and presented with no s/s of aspiration. Initiation of swallow appeared mildly delayed.     Thin: Patient trialed 1 sip via straw and presented with delayed strong cough response. Initiation of swallow appeared mildly delayed.     Honey thick liquids: Patient trialed 1 oz via cup and presented with no s/s of aspiration. Initiation of swallow appeared mildly delayed.    *Delayed throat clears after po intake trial was done. Unsure if it was related to coughing/difficulty with thin liquids or potential more problems with other intake.     Hyolaryngeal movement observed via palpation at bedside.    Assessment  Patient with strong cough with thin liquids and throat clears after intake. Does not appear quite ready to start po diet, but improving.    Plan/Recommendations  Continue to recommend NPO except pills with pureed consistency. SLP team to check on patient tomorrow. If more alert, continues to be off high flow O2 and able to participate would likely  want to pursue Video Swallow Study. If less ideal status, may see at bedside if appropriate and see if ready for modified diet.    The ST Care Plan has been reviewed and current plan remains appropriate so extended goal.    26 dysphagia minutes     Stacey Reid

## 2021-06-10 NOTE — PROGRESS NOTES
Patient Active Problem List   Diagnosis     Pulmonary emphysema (H)     Depression     Neurosis, posttraumatic     Bipolar disorder (H)     Alcohol abuse     Hyperglyceridemia     Smoker     GERD (gastroesophageal reflux disease)     Alcoholic intoxication with complication (H)     Intentional drug overdose, initial encounter (H)     Acute respiratory failure with hypoxia (H)     Acidemia     Metabolic acidosis     Suicide attempt (H)     Relationship problem between caregiver and child     History of posttraumatic stress disorder (PTSD)     Overdose     Bipolar I disorder, most recent episode depressed, severe without psychotic features (H)     Neuroleptic consent was signed on October 8, 2018     Hypotension     Swallowing painful     Lactic acid acidosis     Odynophagia     QT prolongation     Fungal esophagitis     Adjustment disorder with mixed anxiety and depressed mood     COPD with acute exacerbation (H)     Abnormal chest CT     Right upper quadrant pain        08/06/20 0400   Non-Invasive    Pt Owned Device No   Device V 60   Mode ST   IPAP 12 cmH2O   EPAP 5 cmH2O   Resp Rate (Set) 16   Insp Time (sec) 1 sec   Insp Rise Time (%) 2 %   Monitoring   Resp Rate Observed 35   Tidal (Observed) 883 mL   Minute Ventilation (L/min) 34.9 L/min   PIP Observed (cm H2O) 13 cm H2O   Ti/Ttot 32   Pt Leak 39   Alarms   Insp Pressure High (cm H2O) 16 cm H2O   Insp Pressure Low (cm H2O) 5 cm H2O   Tidal Volume High 1000   Tidal Volume Low 200   Apnea Alarm Delay 20 Seconds   MV Low (L/min) 3 L/min   High Resp Rate 50   Low Resp Rate 10   Alarm Functional and On Yes   NPPV Other   SpO2 92 %   Heart Rate (!) 103   Skin Under Mask No breakdown

## 2021-06-10 NOTE — PLAN OF CARE
Problem: Speech Therapy  Goal: SLP Goals  Description: Goals updated on 8/15/2020 by Ramon Templeton MA, CCC-SLP to be met by 8/28/20:  1) Patient will tolerate 2 oz NDD2 textures without s/s aspiration    Initial Goals entered on 8/5/2020 by Shannan Mcclellan, SLP and extended on 8/12/20 by Stacey Reid, SLP to be met by 8/17/20.  Frequency: 5x per week  Patient Stated Goal(s): None verbalized  1. Patient will participate in further assessment with SLP as oxygen needs stabilize to monitor appropriateness to initiate oral diet vs VFSS.    Outcome: Completed     Discharge: Patient was seen for swallowing. See daily notes for details. Patient has transitioned to comfort cares and D/C ST orders placed per MD.  Will d/c at this time.

## 2021-06-10 NOTE — PLAN OF CARE
Pt was given scheduled nebulizers throughout the night, remained on HFNC 60L 45% no complications. ABG results on HFNC: 7.52/67/66/52.9/93%, RT following.      Problem: Impaired Gas Exchange  Goal: Demonstrate improved ventilation and adequate oxygenation of tissues as evidenced by absence of respiratory distress  Outcome: Progressing     Problem: Inadequate Gas Exchange  Goal: Patient will achieve/maintain normal respiratory rate/effort  Outcome: Progressing     Problem: Ineffective Airway Clearance  Goal: Maintain airway patency  Outcome: Progressing

## 2021-06-10 NOTE — PLAN OF CARE
Problem: Risk for ineffective breathing pattern  Goal: Maintain effective breathing pattern with respiratory rate within normal range, lungs clear; be free of cyanosis and other signs/symptoms of hypoxia  Outcome: Progressing     Problem: Safety  Goal: Patient will be injury free during hospitalization  Outcome: Progressing     Patient on bipap this shift, tolerating well. Scored 16 on CIWA protocol for anxiety and agitation, confused conversation. Medicated with ativan x 1. Scored 8 on next assessment. Bed alarm in use for safety. Turned and repositioned every 2 hours for comfort and to maintain skin integrity. Will continue to monitor.

## 2021-06-10 NOTE — PROGRESS NOTES
Pulmonary Progress Note  8/12/2020      Admit Date: 8/3/2020  Hospital Day: 9   CODE: No CPR- Do NOT Intubate    Reason for Consult:  Acute respiratory failure        Assessment/Plan:   60 year old female with a history of longstanding and active tobacco dependence, likely excessive alcohol use, bipolar disorder with psychotic features and prior overdose suicide attempt, mild obesity, untreated GÉNESIS, peripheral arterial disease, recent worsening dementia and imbalance with presumed diagnosis of Wernicke encephalopathy, extensive radiographic emphysema with presumed COPD (No PFTs on record), admitted with abdominal pain, lactic acidosis, hyperbilirubinemia, dyspnea, acute respiratory failure with hypoxia, abnormal chest CT, and acute encephalopathy. Clinically appears very tenuous with ongoing severe respiratory failure, delirium and encephalopathy. Ongoing hypoxemic resp failure likely due to underlying severe emphysema, vol overload/pulmonary edema and COPD exacerbation +/- aspirtation vs. Community acquired or atypical pneumonia.    COVID-19 negative x3  Bubble study negative for shunt  UE & LE vasc doppler U/S negative for DVTs     Recommendations:  - now weaned to NC O2, titrate for goal O2 sat 88-92%, avoid hyperoxia  - re-attempt SLP eval now that patient more alert/interactive and oxygen rate decreased  - d/c IVF to avoid fluid overload, now that diuretics d/c'd her Na should correct on own once patient taking po   - PT/OT  - completed abx course, d/c'd by ID  - has been on 21mg nicotine patch x1 week, taper to 14 mg  - f/u serum fungal markers, RT unable to obtain induced sputum   - cont PPI two times a day for possible aspiration component  - cont prednisone 40 mg po daily, if tolerated after 3 days could taper to prednisone 30mg two times a day for 3 days, then 40mg daily for 3 days, then stop.       Interim Events:     Weaned to NC O2 this AM, more alert & interactive, denies CP or shortness of breath  "    Medications:       enoxaparin ANTICOAGULANT  40 mg Subcutaneous DAILY     ipratropium-albuteroL  3 mL Nebulization QID - RT     nicotine  1 patch Transdermal DAILY     omeprazole  20 mg Oral BID     potassium chloride  10 mEq Intravenous Q1H     predniSONE  40 mg Oral Daily with brkfst     sodium chloride  10-30 mL Intravenous Q8H FIXED TIMES     thiamine  100 mg Oral DAILY         Exam/Data:   Vitals  /56 (Patient Position: Semi-hopper)   Pulse 93   Temp 97  F (36.1  C) (Axillary)   Resp 19   Ht 5' 2\" (1.575 m)   Wt 160 lb 4.8 oz (72.7 kg)   SpO2 93%   BMI 29.32 kg/m       I/O last 3 completed shifts:  In: 1847.5 [I.V.:1792.5; IV Piggyback:55]  Out: 300 [Urine:300]  Weight change:   Wt Readings from Last 3 Encounters:   08/12/20 160 lb 4.8 oz (72.7 kg)   02/28/20 183 lb (83 kg)   07/12/19 184 lb 8.4 oz (83.7 kg)     FiO2 (%):  [35 %-50 %] 35 %    EXAM:  /56 (Patient Position: Semi-hopper)   Pulse 93   Temp 97  F (36.1  C) (Axillary)   Resp 19   Ht 5' 2\" (1.575 m)   Wt 160 lb 4.8 oz (72.7 kg)   SpO2 93%   BMI 29.32 kg/m    General - A&O to person, place & year. No acute distress  Ears/Mouth - OP pink moist, no thrush  Neck - no cervical lymphadenopathy  Lungs - Clear to ausculation bilaterally   CVS - regular rhythm with no murmurs, rubs or gallups  Abdomen - soft, NT, ND, NABS  Ext - no cyanosis, clubbing or edema  Skin - no rash  Psychology - alert and oriented, answers appropriate        DATA    IMAGING:   Ct Head Without Contrast    Result Date: 8/3/2020  EXAM: CT HEAD WO CONTRAST LOCATION: [facility] DATE/TIME: 8/3/2020 5:04 PM INDICATION: Head trauma, abnormal mental status (Age 19-64y) Fall, bruising to face COMPARISON: Head CT 12/20/2017 and MRI brain 07/23/2017. TECHNIQUE: Routine without IV contrast. Multiplanar reformats. Dose reduction techniques were used. FINDINGS: No evidence of acute intracranial hemorrhage or mass effect. 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 Brain attenuation morphology otherwise normal. The ventricles and sulci are normal for age. Normal gray-white matter differentiation. The basilar cisterns are patent. The globes are unremarkable. The partially imaged paranasal sinuses demonstrate air-fluid level within the right maxillary sinus which could represent acute sinusitis in the appropriate setting. The partially imaged paranasal sinuses are otherwise unremarkable. The mastoid air cells and middle ear cavities are unremarkable. The visualized skull base and calvarium are unremarkable. The     1.  No evidence of acute intracranial hemorrhage or mass effect. 2.  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 3.  Air-fluid level within the right maxillary sinus which could represent acute sinusitis in the appropriate setting.    Cta Chest Pe Run    Result Date: 8/3/2020  EXAM: CTA CHEST PE RUN LOCATION: [facility] DATE/TIME: 8/3/2020 5:01 PM INDICATION: PE suspected, high pretest prob SOB, tachycardia COMPARISON: CTA chest, abdomen and pelvis 07/12/2019 TECHNIQUE: CT chest pulmonary angiogram during arterial phase injection of IV contrast. Multiplanar reformats and MIP reconstructions were performed. Dose reduction techniques were used. CONTRAST: Iohexol (Omni) 100 mL FINDINGS: ANGIOGRAM CHEST: The right and left main pulmonary arteries and the segmental vessels are all patent without evidence for emboli. Suboptimal opacification involving the subsegmental vessels to both lower lobes. Thoracic aorta is negative for dissection. No CT evidence of right heart strain. LUNGS AND PLEURA: Advanced centrilobular emphysema. Extensive airspace and groundglass  opacities throughout both upper lobes, with additional interstitial opacities throughout the mid and lower lung fields, all of which are new since the previous study. Subsegmental atelectasis in the bases. No effusions. MEDIASTINUM/AXILLAE: Numerous borderline enlarged mediastinal and hilar nodes with minimal change. UPPER ABDOMEN: Advanced atherosclerotic disease. Splenomegaly. Trace ascites adjacent to the liver. Collateral vessels along the lesser curvature the stomach MUSCULOSKELETAL: Hypertrophic changes thoracic spine.     1.  No evidence for central pulmonary emboli. The peripheral subsegmental vessels to both lower lobes are suboptimally opacified. 2.  Advanced centrilobular emphysema. 3.  Extensive groundglass and airspace infiltrates within both upper lobes concerning for pneumonia, including Covid 19. There are additional diffuse interstitial densities both lungs likely representing edema.     Ct Abdomen Pelvis Without Oral With Iv Contrast    Result Date: 8/3/2020  EXAM: CT ABDOMEN PELVIS WO ORAL W IV CONTRAST LOCATION: [facility] DATE/TIME: 8/3/2020 5:03 PM INDICATION: RLQ abdominal pain, appendicitis suspected (Age > 14y) Right sided abdominal pain COMPARISON: None. TECHNIQUE: CT scan of the abdomen and pelvis was performed following injection of IV contrast. Multiplanar reformats were obtained. Dose reduction techniques were used. CONTRAST: Iohexol (Omni) 100 mL FINDINGS: LOWER CHEST: Diffuse interstitial opacities throughout the visualized lower lung fields. HEPATOBILIARY: Trace ascites adjacent to the liver. Moderate distention of the gallbladder. PANCREAS: Normal. SPLEEN: Borderline splenomegaly is unchanged measuring 13 cm with trace adjacent fluid. ADRENAL GLANDS: Normal. KIDNEYS/BLADDER: Normal. BOWEL: The appendix is identified within the mid right pelvis and is of normal caliber containing a tiny amount of air. There is some minimal edema adjacent to the appendix and cecum. Slight wall  thickening involving the cecum and ascending colon with a small amount of adjacent ascites. No evidence for bowel obstruction. LYMPH NODES: Normal. VASCULATURE: Advanced atherosclerotic disease abdominal aorta and iliac arteries with high-grade stenosis involving the proximal right and left common iliac arteries. Prominent collateral vessels along the lesser curvature of the stomach are more distended compared to the prior study and could be secondary to portal venous hypertension. PELVIC ORGANS: Normal. MUSCULOSKELETAL: Small fat-containing ventral hernia.     1.  Appendix is of normal caliber. There is a small amount of ascites adjacent to the base of the cecum and ascending colon with slight wall thickening which could represent an acute colitis. No evidence for bowel obstruction. 2.  Trace ascites adjacent to the liver and spleen. 3.  Advanced atherosclerotic disease. 4.  Prominent collateral vessels/possible gastric varices lesser curvature of the stomach. 5.  Interstitial opacities throughout the visualized lower lung fields. Please refer to CTA chest report for further discussion.    Us Abdomen Limited    Result Date: 8/4/2020  EXAM: US ABDOMEN LIMITED LOCATION: [facility] DATE/TIME: 8/4/2020 5:59 PM INDICATION: Hyperbilirubinemia, sepsis, evaluate for cholecystitis, CBD dilation COMPARISON: CT 8/3/2020 . TECHNIQUE: Limited abdominal ultrasound. FINDINGS: GALLBLADDER: Isoechoic intraluminal tissue related to the anterior wall of the gallbladder measuring approximately 2.5 x 2.0 x 1.0 cm has no internal Doppler flow. No significant gallbladder wall thickening with gallbladder wall thickness approximately 3  mm. Gallbladder otherwise negative. Negative sonographic Ludwig's sign. BILE DUCTS: No intrahepatic biliary dilatation. The common duct measures 6 mm, upper normal. LIVER: Coarsened hepatic parenchymal echotexture and slight hepatic contour irregularity. No focal mass. RIGHT KIDNEY: No  "hydronephrosis. PANCREAS: The visualized portions are normal. Trace ascites.     1.  Isoechoic intraluminal tissue related to the anterior wall of the gallbladder measuring approximately 2.5 x 2.0 x 1.0 cm indeterminate for adherent sludge material versus polyp but favor sludge material given lack of internal Doppler flow. 2.  No gallbladder wall thickening. 3.  No intrahepatic biliary dilatation. The common duct measures 6 mm, upper normal. 4.  Coarsened hepatic parenchymal echotexture and slight hepatic contour irregularity suggestive of underlying hepatic fibrosis/cirrhosis. 5.  Trace ascites.    Poc Us 3cg Picc Placement Guidance    Result Date: 8/4/2020  Exam was performed as guidance for PICC line insertion.  Click \"PACS images\" hyperlink below to view any stored images.  For specific procedure details, view procedure note authored by PICC/Vascular Access Nurse.         Kar Perkins MD   Pulmonary Medicine      "

## 2021-06-10 NOTE — CONSULTS
INITIAL PSYCHIATRIC CONSULTATION  This note was created with the help of Dragon dictation system. Grammatical and typing errors are not intentional.                  REASON FOR REQUEST: confusion, bipolar disorder, delirium        ASSESSMENT/RECOMMENDATIONS/PLAN :     Metabolic/hepatic encephalopathy, mild intermittent in the context of a prolonged hospitalization, medical condition, COPD exacerbation, sepsis.  Cognitive and behavioral changes due to above.  Bipolar affective disorder type I by history; mixed without suicidal ideation intent or plan.  History of PTSD.  Sleep difficulties due to above.    Recommendations  QTc >490, thus judicious use of antipsychotics.  Consider Depakote 125 mg 3 times a day as a mood stabilizer when LFTs improve. Discussed with pharmacist who confirms that Depakote is not recommended in hepatic impairment.    Keppra 250 mg at bedtime for now .  Seroquel 25 mg at bedtime.  Continue to monitor QTc.  Melatonin 3 mg at bedtime.      MENTAL STATUS EXAMINATION:   Appearance: Stated age, tired, psychomotor slow.  Speech: Clear, slow.  Thought Process: Disorganized, disoriented and confused..  Racing thoughts: Absent  Acute juliana: Absent  Thought content: Does not appear to respond to internally generated stimuli, no acute visual or auditory hallucination; .  No manic symptoms, no paranoia no delusional thoughts.  Thought Formation: No loosening of association or flight of ideas.  Judgment: Depressed  Insight : Depressed  Attention : Adequate  Memory: Impaired  Fund Of Knowledge: Depressed  Affect: Neutral  Mood: Anxious and fatigued  Thoughts of self-harm: Absent  Alert and Oriented: Awake, orientation x2.  Comprehension: Depressed at present  Generative thought content: Reduced  Language: Intact  Gait and Ambulation: With assist of one.  Eating assist with ambulation.  Needing assist with ADLs        Vital Signs: /58 (Patient Position: Lying)   Pulse 98   Temp 97.2  F (36.2  " C) (Axillary)   Resp 17   Ht 5' 2\" (1.575 m)   Wt 155 lb 3.2 oz (70.4 kg)   SpO2 93%   BMI 28.39 kg/m      HISTORY OF PRESENT ILLNESS:   Presenting history to include: Per [facility]/Specialists:   Keiko Guo is a 60 y.o. old female with history of COPD, MD, PAD, alcohol abuse presented to ER with shortness of breath. Patient is very poor historian, she did not provide me any history except that she fell yesterday, she was moaning and coughing. According to ER physician's note, she was feeling shortness of breath and called EMS today. No more history available. Denies any fever, nausea, vomiting, diarrhea. She drinks alcohol daily.   In ER, she was afebrile, tachycardic, saturating well on room air, hypotensive. WBC with no leukocytosis. Lactic acid elevated at 8.3. CT chest with no PE showed advanced emphysema and extensive ground glass and airspace infiltrate concerning for PNA. She was resuscitated with IVF and received vancomycin, Zosyn and azithromycin and admitted to the floor She was complaining of abdominal pain in ER and had CT abdomen showed likely colitis but she did not have any abdominal pain in the floor and denies any diarrhea, nausea, vomiting     Upon assessment patient was noted to be seated upright in her bed after her shower and was reporting of feeling tired.  She was insisting on going back to bed but when she was asked to stay seated and eat breakfast she agreed.  Patient was seen with nursing staff in attendance.  She did not report of any hallucinations, delusions or paranoia.  Patient did not know how long she had been in hospital.  She had poor recollection of events precipitating this hospitalization.  She said that she [facility] to come to [facility]\" so she came.  She could not provide any information pertaining to her medications, diagnosis, duration of hospital stay.  Patient is known to me from previous encounters in 2019 however she did not recall nor could confirm her " "bipolar diagnosis.   Reviewed home medications to include but not limited to Seroquel 25 mg 3 times a day and 300 mg at bedtime.  Patient's history is significant for bipolar affective disorder, multiple psychiatric inpatient hospitalizations in the past, history of suicide attempt via overdose.  Patient has had history of acute juliana followed by severe depression and psychotic episodes needing psychiatric inpatient stabilization.  She has been on a mood stabilizer with good effect.  Seroquel is effective in targeting anxiety, and mood fluctuations.  Please refer to my notes in chart review for details.       Review of Systems:As per HPI. Remainders of 12 point review of systems negative.  Psychiatric ROS:  Patient is not a reliable historian at present due to poor cognition.             PFSH reviewed  and not pertinent to chief complaint/reason for visit  /58 (Patient Position: Lying)   Pulse 98   Temp 97.2  F (36.2  C) (Axillary)   Resp 17   Ht 5' 2\" (1.575 m)   Wt 155 lb 3.2 oz (70.4 kg)   SpO2 93%   BMI 28.39 kg/m    Amphetamines (no units)   Date Value   07/12/2019 Screen Negative     Phencyclidine (no units)   Date Value   07/12/2019 Screen Negative     Barbiturates (no units)   Date Value   07/12/2019 Screen Negative     Cocaine Metabolite (no units)   Date Value   07/12/2019 Screen Negative     Oxycodone (no units)   Date Value   07/12/2019 Screen Negative     Creatinine, Urine (mg/dL)   Date Value   07/12/2019 79.7     THC (no units)   Date Value   07/12/2019 Screen Negative     Alcohol, Blood (mg/dL)   Date Value   10/05/2018 175 (H)     Recent Results (from the past 24 hour(s))   ECG 12 lead MUSE   Result Value Ref Range    SYSTOLIC BLOOD PRESSURE      DIASTOLIC BLOOD PRESSURE      VENTRICULAR RATE 102 BPM    ATRIAL RATE 102 BPM    P-R INTERVAL 138 ms    QRS DURATION 78 ms    Q-T INTERVAL 382 ms    QTC CALCULATION (BEZET) 497 ms    P Axis 55 degrees    R AXIS 61 degrees    T AXIS 35 degrees "    MUSE DIAGNOSIS       Sinus tachycardia with Premature supraventricular complexes  Left atrial enlargement  Nonspecific ST abnormality  Abnormal ECG  When compared with ECG of 13-AUG-2020 09:07,  Premature ventricular complexes are no longer Present  Premature supraventricular complexes are now Present  Confirmed by SAW SOLOMON MD LOC: ([id number]) on 8/15/2020 9:08:46 PM     Potassium   Result Value Ref Range    Potassium 4.4 3.5 - 5.0 mmol/L   Comprehensive Metabolic Panel   Result Value Ref Range    Sodium 136 136 - 145 mmol/L    Potassium 3.4 (L) 3.5 - 5.0 mmol/L    Chloride 96 (L) 98 - 107 mmol/L    CO2 31 22 - 31 mmol/L    Anion Gap, Calculation 9 5 - 18 mmol/L    Glucose 103 70 - 125 mg/dL    BUN 18 8 - 22 mg/dL    Creatinine 0.83 0.60 - 1.10 mg/dL    GFR MDRD Af Amer >60 >60 mL/min/1.73m2    GFR MDRD Non Af Amer >60 >60 mL/min/1.73m2    Bilirubin, Total 5.3 (H) 0.0 - 1.0 mg/dL    Calcium 8.0 (L) 8.5 - 10.5 mg/dL    Protein, Total 5.6 (L) 6.0 - 8.0 g/dL    Albumin 1.7 (L) 3.5 - 5.0 g/dL    Alkaline Phosphatase 216 (H) 45 - 120 U/L    AST 93 (H) 0 - 40 U/L     (H) 0 - 45 U/L   Phosphorus Level > 2.4 no replacement required   Result Value Ref Range    Phosphorus 3.0 2.5 - 4.5 mg/dL   HM1 (CBC with Diff)   Result Value Ref Range    WBC 11.7 (H) 4.0 - 11.0 thou/uL    RBC 2.87 (L) 3.80 - 5.40 mill/uL    Hemoglobin 10.6 (L) 12.0 - 16.0 g/dL    Hematocrit 31.1 (L) 35.0 - 47.0 %     (H) 80 - 100 fL    MCH 36.9 (H) 27.0 - 34.0 pg    MCHC 34.1 32.0 - 36.0 g/dL    RDW 18.7 (H) 11.0 - 14.5 %    Platelets 33 (LL) 140 - 440 thou/uL    MPV 11.6 8.5 - 12.5 fL    Neutrophils % 66 50 - 70 %    Lymphocytes % 18 (L) 20 - 40 %    Monocytes % 12 (H) 2 - 10 %    Eosinophils % 3 0 - 6 %    Basophils % 0 0 - 2 %    Neutrophils Absolute 7.7 2.0 - 7.7 thou/uL    Lymphocytes Absolute 2.1 0.8 - 4.4 thou/uL    Monocytes Absolute 1.4 (H) 0.0 - 0.9 thou/uL    Eosinophils Absolute 0.4 0.0 - 0.4 thou/uL    Basophils Absolute  0.0 0.0 - 0.2 thou/uL     Recent Results (from the past 72 hour(s))   ECG 12 lead MUSE    Collection Time: 08/13/20  9:07 AM   Result Value Ref Range    SYSTOLIC BLOOD PRESSURE      DIASTOLIC BLOOD PRESSURE      VENTRICULAR RATE 102 BPM    ATRIAL RATE 102 BPM    P-R INTERVAL 136 ms    QRS DURATION 78 ms    Q-T INTERVAL 414 ms    QTC CALCULATION (BEZET) 539 ms    P Axis 47 degrees    R AXIS 54 degrees    T AXIS 31 degrees    MUSE DIAGNOSIS       Sinus tachycardia with occasional Premature ventricular complexes  Otherwise normal ECG  When compared with ECG of 04-AUG-2020 14:32,  Premature ventricular complexes are now Present  Nonspecific T wave abnormality has replaced inverted T waves in Anterior leads  Confirmed by LELA LOPEZ MD LOC: ([id number]) on 8/13/2020 2:31:53 PM     Sodium    Collection Time: 08/13/20 12:30 PM   Result Value Ref Range    Sodium 147 (H) 136 - 145 mmol/L   INR    Collection Time: 08/13/20 12:30 PM   Result Value Ref Range    INR 2.51 (H) 0.90 - 1.10   Vitamin B12    Collection Time: 08/13/20 12:30 PM   Result Value Ref Range    Vitamin B-12 >2,000 (H) 213 - 816 pg/mL   Iron and Transferrin Iron Binding Capacity    Collection Time: 08/13/20 12:30 PM   Result Value Ref Range    Iron 128 42 - 175 ug/dL    Transferrin 98 (L) 212 - 360 mg/dL    Transferrin Saturation, Calculated 104 (H) 20 - 50 %    Transferrin IBC, Calculated 123 (L) 313 - 563 ug/dL   Potassium    Collection Time: 08/13/20 12:32 PM   Result Value Ref Range    Potassium 3.5 3.5 - 5.0 mmol/L   Hepatitis Acute Evaluation    Collection Time: 08/13/20 12:32 PM   Result Value Ref Range    Hepatitis A Antibody, IgM Negative Negative    Hepatitis B Core Sury, IgM Positive (!) Negative    Hepatitis B Surface Ag Negative Negative    Hepatitis C Ab Negative Negative   Ferritin    Collection Time: 08/13/20 12:32 PM   Result Value Ref Range    Ferritin 2,893 (H) 10 - 130 ng/mL   Urinalysis-UC if Indicated    Collection Time: 08/13/20  1:06  PM   Result Value Ref Range    Color, UA [place] (!) Colorless, Yellow, Straw, Light Yellow    Clarity, UA Clear Clear    Glucose, UA Negative Negative    Bilirubin, UA Negative Negative    Ketones, UA Negative Negative    Specific Gravity, UA 1.005 1.001 - 1.030    Blood, UA Negative Negative    pH, UA 6.0 4.5 - 8.0    Protein, UA Negative Negative mg/dL    Urobilinogen, UA 2.0 E.U./dL <2.0 E.U./dL, 2.0 E.U./dL    Nitrite, UA Negative Negative    Leukocytes, UA Negative Negative   Anti-Smooth Muscle Antibody    Collection Time: 08/13/20  1:26 PM   Result Value Ref Range    F-Actin (Smooth Muscle) Ab, IgG by KAT 39 (H) 0 - 19 Units   Sodium    Collection Time: 08/13/20  8:46 PM   Result Value Ref Range    Sodium 142 136 - 145 mmol/L   Potassium    Collection Time: 08/14/20  2:56 AM   Result Value Ref Range    Potassium 5.3 (H) 3.5 - 5.0 mmol/L   Magnesium    Collection Time: 08/14/20  5:14 AM   Result Value Ref Range    Magnesium     Lactic Acid    Collection Time: 08/14/20  5:14 AM   Result Value Ref Range    Lactic Acid     Phosphorus    Collection Time: 08/14/20  5:14 AM   Result Value Ref Range    Phosphorus     HM1 (CBC with Diff)    Collection Time: 08/14/20  5:46 AM   Result Value Ref Range    WBC 13.1 (H) 4.0 - 11.0 thou/uL    RBC 2.95 (L) 3.80 - 5.40 mill/uL    Hemoglobin 11.0 (L) 12.0 - 16.0 g/dL    Hematocrit 33.1 (L) 35.0 - 47.0 %     (H) 80 - 100 fL    MCH 37.3 (H) 27.0 - 34.0 pg    MCHC 33.2 32.0 - 36.0 g/dL    RDW 18.4 (H) 11.0 - 14.5 %    Platelets 40 (LL) 140 - 440 thou/uL    MPV 13.2 (H) 8.5 - 12.5 fL    Neutrophils % 61 50 - 70 %    Lymphocytes % 22 20 - 40 %    Monocytes % 13 (H) 2 - 10 %    Eosinophils % 3 0 - 6 %    Basophils % 0 0 - 2 %    Neutrophils Absolute 8.0 (H) 2.0 - 7.7 thou/uL    Lymphocytes Absolute 2.9 0.8 - 4.4 thou/uL    Monocytes Absolute 1.7 (H) 0.0 - 0.9 thou/uL    Eosinophils Absolute 0.4 0.0 - 0.4 thou/uL    Basophils Absolute 0.0 0.0 - 0.2 thou/uL   Comprehensive  Metabolic Panel    Collection Time: 08/14/20  5:47 AM   Result Value Ref Range    Sodium 143 136 - 145 mmol/L    Potassium 4.1 3.5 - 5.0 mmol/L    Chloride 96 (L) 98 - 107 mmol/L    CO2 38 (H) 22 - 31 mmol/L    Anion Gap, Calculation 9 5 - 18 mmol/L    Glucose 136 (H) 70 - 125 mg/dL    BUN 24 (H) 8 - 22 mg/dL    Creatinine 0.84 0.60 - 1.10 mg/dL    GFR MDRD Af Amer >60 >60 mL/min/1.73m2    GFR MDRD Non Af Amer >60 >60 mL/min/1.73m2    Bilirubin, Total 6.3 (H) 0.0 - 1.0 mg/dL    Calcium 8.3 (L) 8.5 - 10.5 mg/dL    Protein, Total 6.2 6.0 - 8.0 g/dL    Albumin 2.0 (L) 3.5 - 5.0 g/dL    Alkaline Phosphatase 190 (H) 45 - 120 U/L     (H) 0 - 40 U/L     (H) 0 - 45 U/L   Magnesium    Collection Time: 08/14/20  5:47 AM   Result Value Ref Range    Magnesium 2.1 1.8 - 2.6 mg/dL   Phosphorus    Collection Time: 08/14/20  5:47 AM   Result Value Ref Range    Phosphorus 1.5 (L) 2.5 - 4.5 mg/dL   Lactic Acid    Collection Time: 08/14/20  6:26 AM   Result Value Ref Range    Lactic Acid 3.3 (H) 0.7 - 2.0 mmol/L   Sodium    Collection Time: 08/14/20 12:27 PM   Result Value Ref Range    Sodium 139 136 - 145 mmol/L   Phosphorus    Collection Time: 08/15/20  5:22 AM   Result Value Ref Range    Phosphorus 3.4 2.5 - 4.5 mg/dL   Comprehensive Metabolic Panel    Collection Time: 08/15/20  5:22 AM   Result Value Ref Range    Sodium 142 136 - 145 mmol/L    Potassium 3.4 (L) 3.5 - 5.0 mmol/L    Chloride 96 (L) 98 - 107 mmol/L    CO2 37 (H) 22 - 31 mmol/L    Anion Gap, Calculation 9 5 - 18 mmol/L    Glucose 99 70 - 125 mg/dL    BUN 19 8 - 22 mg/dL    Creatinine 0.79 0.60 - 1.10 mg/dL    GFR MDRD Af Amer >60 >60 mL/min/1.73m2    GFR MDRD Non Af Amer >60 >60 mL/min/1.73m2    Bilirubin, Total 6.1 (H) 0.0 - 1.0 mg/dL    Calcium 7.9 (L) 8.5 - 10.5 mg/dL    Protein, Total 5.7 (L) 6.0 - 8.0 g/dL    Albumin 1.8 (L) 3.5 - 5.0 g/dL    Alkaline Phosphatase 187 (H) 45 - 120 U/L     (H) 0 - 40 U/L     (H) 0 - 45 U/L   Magnesium     Collection Time: 08/15/20  5:22 AM   Result Value Ref Range    Magnesium 1.9 1.8 - 2.6 mg/dL   HM1 (CBC with Diff)    Collection Time: 08/15/20  6:07 AM   Result Value Ref Range    WBC 9.4 4.0 - 11.0 thou/uL    RBC 2.77 (L) 3.80 - 5.40 mill/uL    Hemoglobin 10.4 (L) 12.0 - 16.0 g/dL    Hematocrit 30.4 (L) 35.0 - 47.0 %     (H) 80 - 100 fL    MCH 37.5 (H) 27.0 - 34.0 pg    MCHC 34.2 32.0 - 36.0 g/dL    RDW 18.5 (H) 11.0 - 14.5 %    Platelets 30 (LL) 140 - 440 thou/uL    MPV 13.1 (H) 8.5 - 12.5 fL    Neutrophils % 61 50 - 70 %    Lymphocytes % 22 20 - 40 %    Monocytes % 13 (H) 2 - 10 %    Eosinophils % 4 0 - 6 %    Basophils % 0 0 - 2 %    Neutrophils Absolute 5.7 2.0 - 7.7 thou/uL    Lymphocytes Absolute 2.1 0.8 - 4.4 thou/uL    Monocytes Absolute 1.2 (H) 0.0 - 0.9 thou/uL    Eosinophils Absolute 0.4 0.0 - 0.4 thou/uL    Basophils Absolute 0.0 0.0 - 0.2 thou/uL   ECG 12 lead MUSE    Collection Time: 08/15/20 10:08 AM   Result Value Ref Range    SYSTOLIC BLOOD PRESSURE      DIASTOLIC BLOOD PRESSURE      VENTRICULAR RATE 102 BPM    ATRIAL RATE 102 BPM    P-R INTERVAL 138 ms    QRS DURATION 78 ms    Q-T INTERVAL 382 ms    QTC CALCULATION (BEZET) 497 ms    P Axis 55 degrees    R AXIS 61 degrees    T AXIS 35 degrees    MUSE DIAGNOSIS       Sinus tachycardia with Premature supraventricular complexes  Left atrial enlargement  Nonspecific ST abnormality  Abnormal ECG  When compared with ECG of 13-AUG-2020 09:07,  Premature ventricular complexes are no longer Present  Premature supraventricular complexes are now Present  Confirmed by SAW SOLOMON MD LOC:[facility] ([id number]) on 8/15/2020 9:08:46 PM     Potassium    Collection Time: 08/15/20  8:56 PM   Result Value Ref Range    Potassium 4.4 3.5 - 5.0 mmol/L   Comprehensive Metabolic Panel    Collection Time: 08/16/20  5:54 AM   Result Value Ref Range    Sodium 136 136 - 145 mmol/L    Potassium 3.4 (L) 3.5 - 5.0 mmol/L    Chloride 96 (L) 98 - 107 mmol/L    CO2 31 22 - 31 mmol/L     Anion Gap, Calculation 9 5 - 18 mmol/L    Glucose 103 70 - 125 mg/dL    BUN 18 8 - 22 mg/dL    Creatinine 0.83 0.60 - 1.10 mg/dL    GFR MDRD Af Amer >60 >60 mL/min/1.73m2    GFR MDRD Non Af Amer >60 >60 mL/min/1.73m2    Bilirubin, Total 5.3 (H) 0.0 - 1.0 mg/dL    Calcium 8.0 (L) 8.5 - 10.5 mg/dL    Protein, Total 5.6 (L) 6.0 - 8.0 g/dL    Albumin 1.7 (L) 3.5 - 5.0 g/dL    Alkaline Phosphatase 216 (H) 45 - 120 U/L    AST 93 (H) 0 - 40 U/L     (H) 0 - 45 U/L   Phosphorus Level > 2.4 no replacement required    Collection Time: 08/16/20  5:54 AM   Result Value Ref Range    Phosphorus 3.0 2.5 - 4.5 mg/dL   HM1 (CBC with Diff)    Collection Time: 08/16/20  6:54 AM   Result Value Ref Range    WBC 11.7 (H) 4.0 - 11.0 thou/uL    RBC 2.87 (L) 3.80 - 5.40 mill/uL    Hemoglobin 10.6 (L) 12.0 - 16.0 g/dL    Hematocrit 31.1 (L) 35.0 - 47.0 %     (H) 80 - 100 fL    MCH 36.9 (H) 27.0 - 34.0 pg    MCHC 34.1 32.0 - 36.0 g/dL    RDW 18.7 (H) 11.0 - 14.5 %    Platelets 33 (LL) 140 - 440 thou/uL    MPV 11.6 8.5 - 12.5 fL    Neutrophils % 66 50 - 70 %    Lymphocytes % 18 (L) 20 - 40 %    Monocytes % 12 (H) 2 - 10 %    Eosinophils % 3 0 - 6 %    Basophils % 0 0 - 2 %    Neutrophils Absolute 7.7 2.0 - 7.7 thou/uL    Lymphocytes Absolute 2.1 0.8 - 4.4 thou/uL    Monocytes Absolute 1.4 (H) 0.0 - 0.9 thou/uL    Eosinophils Absolute 0.4 0.0 - 0.4 thou/uL    Basophils Absolute 0.0 0.0 - 0.2 thou/uL       PMH:   Past Medical History:   Diagnosis Date     COPD (chronic obstructive pulmonary disease) (H)      PAD (peripheral artery disease) (H)      Sleep apnea            Current Medications:Scheduled Meds:    lactulose  20 g Oral BID     nicotine  1 patch Transdermal DAILY     omeprazole  20 mg Oral BID     potassium chloride  10 mEq Intravenous Q1H     predniSONE  20 mg Oral Daily with brkfst    Followed by     [START ON 8/19/2020] predniSONE  15 mg Oral Daily with brkfst    Followed by     [START ON 8/22/2020] predniSONE  10 mg  Oral Daily with brkfst    Followed by     [START ON 8/25/2020] predniSONE  5 mg Oral Daily with brkfst     sodium chloride  10-30 mL Intravenous Q8H FIXED TIMES     thiamine  100 mg Oral DAILY     Continuous Infusions:  PRN Meds:.albuterol **AND** Nebulizer treatment intermittent, ipratropium-albuteroL **AND** Nebulizer treatment intermittent, lidocaine (PF), LORazepam, melatonin, naloxone **OR** naloxone, QUEtiapine, sodium chloride bacteriostatic, sodium chloride, sodium chloride, sodium chloride                Family History: PERSONALLY REVIEWED.  Family History   Problem Relation Age of Onset     Depression Mother      Hypertension Mother      Diabetes Father      Hypertension Father      Schizophrenia Brother      Pertinent Family hx not pertinent to Chief Complaint or reason for visit.     Social History:  PERSONALLY REVIEWED.  Social History     Socioeconomic History     Marital status: Single     Spouse name: Not on file     Number of children: Not on file     Years of education: Not on file     Highest education level: Not on file   Occupational History     Not on file   Social Needs     Financial resource strain: Not on file     Food insecurity     Worry: Not on file     Inability: Not on file     Transportation needs     Medical: Not on file     Non-medical: Not on file   Tobacco Use     Smoking status: Current Every Day Smoker     Packs/day: 0.75     Types: Cigarettes     Smokeless tobacco: Never Used   Substance and Sexual Activity     Alcohol use: Yes     Comment: minimal      Drug use: No     Sexual activity: Not on file   Lifestyle     Physical activity     Days per week: Not on file     Minutes per session: Not on file     Stress: Not on file   Relationships     Social connections     Talks on phone: Not on file     Gets together: Not on file     Attends [employer] service: Not on file     Active member of club or organization: Not on file     Attends meetings of clubs or organizations: Not on file      Relationship status: Not on file     Intimate partner violence     Fear of current or ex partner: Not on file     Emotionally abused: Not on file     Physically abused: Not on file     Forced sexual activity: Not on file   Other Topics Concern     Not on file   Social History Narrative     Not on file    not pertinent to Chief Complaint or reason for visit.             Allergies as of 06/01/2014 Reviewed     Review of Systems:As per HPI. Remainders of 12 point review of systems negative.    Review of Pertinent Laboratory:      PERSONALLY REVIEWED.    Physical Exam: Temp:  [97.2  F (36.2  C)-98.5  F (36.9  C)] 97.2  F (36.2  C)  Heart Rate:  [91-98] 98  Resp:  [17-18] 17  BP: (104-114)/(53-58) 104/58   Vitals: reviewed in chart     Physical exam as per medical team: reviewed in chart      diagnoses, risk and benefits of medications discussed with staff. Care coordination with care management team.   Thank you for this consultation.       Ambar Nazario; NP  [facility] Addiction [facility]        This note was created with the help of Dragon dictation system. Grammatical and typing errors are not intentional.

## 2021-06-10 NOTE — PROGRESS NOTES
Spiritual Health Services Note:     Spiritual Assessment:   I saw that they patient was a PUI yesterday and spoke with her nurse. Her nurse let me know the patient was not very alert and that her daughter might appreciate a call. I prayed for the patient outside of her room and called the daughter the morning. Her daughter did not have any needs or concerns at this time but expressed appreciation for the call, the visit, and continued support.    Care Provided:  I responded to the Epic Consult, reviewed the patient's chart, initiated relationship of care and support, provided prayer, and called the patient's daughter to check on her as well.    Plan of Care:  I will make additional visits as requested by the patient, their family or other visitors, and as referred by staff.    Louis Velasquez MDiv, [facility]  Manager/   [phone]

## 2021-06-10 NOTE — PROGRESS NOTES
INFECTIOUS DISEASE FOLLOW UP NOTE    Date: 8/24/2020    ASSESSMENT:  Keiko Guo is a 60 y.o. female with   1. Leukocytosis, elevated lactate, abdominal pain. Etiology is not clear. Concern for acute cholecystitis with high alk phos and bili. Scheduled to have a HIDA today. According to Dr. Brower's summary, if HIDA is positive, then plan to proceed with cholecystostomy drain. Could also have ischemic colitis. On IV Meropenem, first dose on 8/18/2020. WBC at 14.7 on 8/24, up from a few days back.   2. Recent acute on chronic hypoxic respiratory failure, thought secondary to combination of Severe COPD plus volume overload and possible PNA.Treated with antibiotic. Now requiring more oxygen. Has wheezes on exam. Pulmonary edema? COPD exacerbation. CXR with finding suggestive of pulmonary edema.  3. Positive Hep B core Ab suggestive of recent infection. Hep B s Ag negative indicating lack of active disease.   4. Alcohol use, cirrhosis  5. Thrombocytopenia.  Severe.  6. Wernicke Encephalopathy/dementia.   7. COVID-19 adequately ruled out by testing.   8. Goals of care: she states that she does not want further testing or procedures, however she has encephalopathy. Her daughter who is POA wants to pursue evaluation for reversible causes of symptoms and is not ready to have her in hospice.     PLAN:  Meropenem for now.  HIDA scan today.  If positive, cholecystostomy tube placement.  Diuretics per primary team.  Ethics team following.    Discussed with nursing staff, and the patient.    ID will follow.    35 minutes of total care with greater than 50% coordinating care.             FERNANDO Mcmahan MD  [facility]  [phone] [facility] paging    ______________________________________________________________________    SUBJECTIVE / INTERVAL HISTORY:   New to me today.  Extensive chart review.  On IV meropenem since 8/18/2024 leukocytosis of unclear etiology.  On the differential  diagnosis ischemic colitis and cholecystitis.  Awaiting to get HIDA scan today.  Worsening respiratory status now requiring 7 L.  Chest x-ray reviewed as above.    ROS: deconditioned. All other systems negative except as listed above.    The following is a nice summary by Dr. Brower adopted for good understanding of the case.  Keiko Guo is a 60 y.o. female with a past medical history of tobacco use, alcohol use disorder, bipolar disorder, previous suicide attempt with overdose, GÉNESIS, PAD, worsening dementia and imbalance (possible Warnicke encephalopathy), and radiographic emphysema (presumed COPD) who was admitted on 8/3/2020 for abdominal pain, lactic acidosis, hyperbilirubinemia, acute respiratory failure with hypoxia, and acute encephalopathy. Hospital course was notable for severe hypoxia requiring up to 60 L HF NC and 55% FiO2, guarded prognosis, and ongoing encephalopathy.  Hypoxic respiratory failure likely secondary to combination of severe emphysema, volume overload/pulmonary edema, possible COPD exacerbation, and/or CAP VS aspiration.  She was treated with antibiotics, diuresis.     Her course has been complicated by agitation, confusion. Psychiatry consulted to assist. She is also medically complex with liver failure, known Wernicke's encephalopathy complicated by hepatic encephalopathy, and acute respiratory failure. Unfortunately, on 8/17, she developed sepsis with rising WBC, tachycardia, elevated lactic acid and guarding to palpation in the RUQ; she was found to have developed severe sepsis 2/2 colitis; ID was consulted on 8/18 for further assistance and did further broaden to meropenem. Prognosis remains guarded, hospice has been in-touch with the patient's daughter Telma.      On 8/20/2020, we had family care conference directly on floor with 4-way - Palliative Care KAROLYN Hart, MYRNA Manuel, myself the attending MD, and patient's daughter/POA Telma; Telma was fully updated and all medical questions  "she had were answered several times and repeatedly. Conversation was tangential, circular, repetitive, and ultimately Telma decided, as current POA, to re-insert the PICC that Gali had removed earlier that day and to proceed with abdominal US and hepatic duplex and continue all current cares and continue current diet restrictions. She also stated her wishes for staff to not take the patient's comments, such as \"just let me die\" or \"just let me go home\" or \"just let me drink water please\" literally or seriously as Telma feels those comments are all made in an altered sensorium and not representative of her wishes as proxy/POA.      On 8/21, HIDA scan ordered to evaluated for acalculus cholecystitis, which she is certainly at risk for given her prolonged and severe illness with rising hyperbilirubinemia. Unfortunately she was not cooperative once physically inside the nuclear medicine radiology suite and HIDA scan attempt was discontinued. Order is still active and patient has agreed to it for tomorrow, Monday 8/24. Our team has been in touch with Ethics team to review this case given several ongoing, daily concerns from RN, unit charge RN, several MD providers across different specialties. Patient herself has stated she wishes to discontinue current aggressive treatment and to let her eat what she wants and to go home; however, again POA daughter Telma currently has MDM and wishes for aggressive work-up and care. OT was re-consulted to perform updated cognitive testing on 8/21/2020 and SLUMS was 11/30. IR informed me that they will perform HIDA again on Monday if she remains cooperative. HIDA scan planned for tomorrow, Monday 8/24. If HIDA scan positive, she would then require percutaneous cholecystostomy tube. Ethics team review ongoing/in-process.         OBJECTIVE:  Vitals:    08/24/20 0817   BP:    Pulse:    Resp:    Temp:    SpO2: 90%     FiO2 (%): 35 %    Vital Signs  Temp: 98.6  F (37  C)  Temp Source: Oral  Heart " Rate: (!) 127  Heart Rate Source: Machine/Monitor  Resp: 22  BP: 110/62  MAP (mmHg) (Calculated): 78  BP Location: Right arm  BP Method: Machine/Monitor  Patient Position: Sitting    Temp (24hrs), Av.8  F (36.6  C), Min:97.2  F (36.2  C), Max:98.6  F (37  C)      GENERAL: chronically ill. Oriented to [employer], knows it is , cannot come up with the month.   EYES: icterus  HEAD, EARS, NOSE, MOUTH, AND THROAT: edentulous  RESPIRATORY: Wheezes and crackles bilaterally.  CARDIOVASCULAR: Regular rate and rhythm, normal S1 and S2, no murmurs, rubs, or gallops.  ABDOMEN: Soft, no tenderness today.  Normal bowel sounds.  MUSCULOSKELETAL: No synovitis.  LYMPHATIC: No cervical, supraclavicular, axillary, or inguinal lymphadenopathy.  SKIN/HAIR/NAILS: jaundice. Some bruising on forehead.   NEUROLOGIC: Grossly intact. Generally weak.  PICC R UE        Antibiotics:  Meropenem -    cipro   Flagyl   Pip/tazo 8/3-9  Azithromycin 8/3-7  Ctx /3  Vancomycin 8/3    Pertinent labs:    Results from last 7 days   Lab Units 20  0826 20  0253 20  0630   LN-WHITE BLOOD CELL COUNT thou/uL 14.7* 12.3* 15.2*   LN-HEMOGLOBIN g/dL 10.6* 11.9* 11.0*   LN-HEMATOCRIT % 31.0* 35.5 32.3*   LN-PLATELET COUNT thou/uL 9* 18* 15*        Results from last 7 days   Lab Units 20  0826 20  0618 20  0600  20  0253 20  0630   LN-SODIUM mmol/L 130*  --   --   --  137 137   LN-POTASSIUM mmol/L 4.2 3.8 4.2   < > 3.7 3.9   LN-CHLORIDE mmol/L 107  --   --   --  108* 107   LN-CO2 mmol/L 14*  --   --   --  22 24   LN-BLOOD UREA NITROGEN mg/dL 15  --   --   --  16 16   LN-CREATININE mg/dL 0.62  --   --   --  0.70 0.74   LN-CALCIUM mg/dL 7.3*  --   --   --  7.5* 7.4*    < > = values in this interval not displayed.      No results found for: CRP   No results found for: SEDRATE    Lab Results   Component Value Date    ALT 79 (H) 2020    AST 90 (H) 2020    ALKPHOS 336 (H) 2020     BILITOT 6.7 (H) 08/24/2020         MICROBIOLOGY DATA:  8/17 blood no growth to date   8/4 blood negative     Hep B core IgM positive    RADIOLOGY:  Xr Chest 1 View Portable    Result Date: 8/24/2020  EXAM: XR CHEST 1 VIEW PORTABLE [facility] DATE/TIME: 8/24/2020 9:04 AM INDICATION: worsening hypoxia COMPARISON: Chest CT 08/17/2020 and older studies, chest x-ray 08/10/2020     Right upper extremity PICC line catheter has been removed and there is a nail left sided PICC line catheter with its tip along the right lateral wall of the innominate vein confluence. Low lung volumes. Diffuse, fine reticular opacities seem more prominent due to the low lung volumes but are likely unchanged. No pneumothorax. Heart is enlarged and unchanged.    Xr Chest 1 View Portable    Result Date: 8/10/2020  EXAM: XR CHEST 1 VIEW PORTABLE [facility] DATE/TIME: 8/10/2020 10:45 AM INDICATION: persistent hypoxia COMPARISON: CT chest 8/3/2020     Stable diffuse reticulation and groundglass opacification likely in part part related to chronic fibrotic and emphysematous changes seen on comparison CT. Cannot exclude superimposed edema or atypical pneumonia. No focal consolidation or pleural effusion. Stable heart size.    Ct Head Without Contrast    Result Date: 8/3/2020  EXAM: CT HEAD WO CONTRAST [facility] DATE/TIME: 8/3/2020 5:04 PM INDICATION: Head trauma, abnormal mental status (Age 19-64y) Fall, bruising to face COMPARISON: Head CT 12/20/2017 and MRI brain 07/23/2017. TECHNIQUE: Routine without IV contrast. Multiplanar reformats. Dose reduction techniques were used. FINDINGS: No evidence of acute intracranial hemorrhage or mass effect. 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 Brain attenuation morphology otherwise normal. The ventricles and  sulci are normal for age. Normal gray-white matter differentiation. The basilar cisterns are patent. The globes are unremarkable. The partially imaged paranasal sinuses demonstrate air-fluid level within the right maxillary sinus which could represent acute sinusitis in the appropriate setting. The partially imaged paranasal sinuses are otherwise unremarkable. The mastoid air cells and middle ear cavities are unremarkable. The visualized skull base and calvarium are unremarkable. The     1.  No evidence of acute intracranial hemorrhage or mass effect. 2.  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 3.  Air-fluid level within the right maxillary sinus which could represent acute sinusitis in the appropriate setting.    Ct Chest Without Contrast    Result Date: 8/11/2020  EXAM: CT CHEST WO CONTRAST LOCATION: [facility] DATE/TIME: 8/11/2020 8:11 PM INDICATION: Respiratory illness, acute (Age > 40y) Shortness of breath persistent hypoxemia despite max diuresis. re-eval opacities COMPARISON: CT chest 08/03/2020, 07/12/2019 TECHNIQUE: CT chest without IV contrast. Multiplanar reformats were obtained. Dose reduction techniques were used. CONTRAST: None. FINDINGS: LUNGS AND PLEURA: Mild to moderate tracheal secretions. Moderate to severe emphysema with associated interlobular septal thickening. Mosaic lung attenuation. No focal consolidation or pleural effusion. No mass or definite suspicious nodule. MEDIASTINUM/AXILLAE: Upper normal heart size. No pericardial effusion. Moderate to severe coronary artery calcifications. UPPER ABDOMEN: Stable soft tissue density nodules adjacent to the left adrenal gland. MUSCULOSKELETAL: Spinal degenerative changes.     1.  Moderate to severe emphysema. 2.  Interlobular septal thickening and groundglass pulmonary opacities likely  reflecting pulmonary edema, increased since the CT from 08/03/2020. No pneumonic consolidation or pleural effusion. 3.  Mild to moderate tracheal secretions.     Mr Brain With Without Contrast    Result Date: 7/23/2020  EXAM: MR BRAIN W WO CONTRAST LOCATION: [facility] DATE/TIME: 7/23/2020 5:51 PM INDICATION: WORSENING ATAXIA-MENTAL STATUS COMPARISON: MRI head 10/08/2016 from [facility]. CT head 12/20/2017 from [facility]. CONTRAST: Gadavist 8 TECHNIQUE: Routine multiplanar multisequence head MRI without and with intravenous contrast. FINDINGS: INTRACRANIAL CONTENTS: No acute or subacute infarct. Again seen is a shunting type moderate-sized arteriovenous malformation in the parasagittal right parietal region superiorly and posteriorly. It measures at least 3.5 cm AP x 2 cm transverse x 4 cm craniocaudad. It has a very similar appearance to the MRI 10/08/2016 from [facility]. No associated new hemorrhage, edema, or mass effect. Scattered nonspecific T2/FLAIR hyperintensities within the cerebral white matter most consistent with mild chronic microvascular ischemic change. Mild generalized cerebral atrophy. No hydrocephalus. Normal position of the cerebellar tonsils. No pathologic contrast enhancement of the brain parenchyma or meninges allowing for some vascular enhancement with the right parietal AVM. SELLA: No abnormality accounting for technique. OSSEOUS STRUCTURES/SOFT TISSUES: Normal marrow signal. The major intracranial vascular flow voids are maintained. ORBITS: No abnormality accounting for technique. SINUSES/MASTOIDS: No paranasal sinus mucosal disease. No middle ear or mastoid effusion.     1.  Again seen is a shunting type moderate-sized arteriovenous malformation in the parasagittal right parietal area superiorly and posteriorly measuring approximately 3.5 cm x 2 cm x 4 cm. It is not changed significantly when compared to MRI head 10/08/2016 from [facility]. No new  hemorrhage, edema or mass effect. Follow-up conventional angiography could be considered. 2.  No evidence for new infarct, hemorrhage elsewhere, hydrocephalus or intracranial mass. 3.  Mild age-related changes.     Xr Swallow Study W Speech    Result Date: 8/13/2020  EXAM: XR SWALLOW STUDY W SPEECH OR OT LOCATION: [facility] DATE/TIME: 8/13/2020 2:47 PM INDICATION: Difficulty swallowing. COMPARISON: None. TECHNIQUE: Routine. FINDINGS: FLUOROSCOPIC TIME: 2.7 minutes NUMBER OF IMAGES: 0 Swallow study with Speech Pathology using multiple barium thicknesses. Mendez aspiration with nectar consistency that is silent. Thin liquids not tried. With honey consistency and pudding there is premature spill to the vallecula but no penetration or aspiration and otherwise normal swallowing reflex.     1.  High risk for aspiration with nectar consistency and likely thin. 2.  Patient should build tolerate honey and pudding consistency.     Cta Chest Pe Run    Result Date: 8/3/2020  EXAM: CTA CHEST PE RUN LOCATION: [facility] DATE/TIME: 8/3/2020 5:01 PM INDICATION: PE suspected, high pretest prob SOB, tachycardia COMPARISON: CTA chest, abdomen and pelvis 07/12/2019 TECHNIQUE: CT chest pulmonary angiogram during arterial phase injection of IV contrast. Multiplanar reformats and MIP reconstructions were performed. Dose reduction techniques were used. CONTRAST: Iohexol (Omni) 100 mL FINDINGS: ANGIOGRAM CHEST: The right and left main pulmonary arteries and the segmental vessels are all patent without evidence for emboli. Suboptimal opacification involving the subsegmental vessels to both lower lobes. Thoracic aorta is negative for dissection. No CT evidence of right heart strain. LUNGS AND PLEURA: Advanced centrilobular emphysema. Extensive airspace and groundglass opacities throughout both upper lobes, with additional interstitial opacities throughout the mid and lower lung fields, all of which are new since the previous  study. Subsegmental atelectasis in the bases. No effusions. MEDIASTINUM/AXILLAE: Numerous borderline enlarged mediastinal and hilar nodes with minimal change. UPPER ABDOMEN: Advanced atherosclerotic disease. Splenomegaly. Trace ascites adjacent to the liver. Collateral vessels along the lesser curvature the stomach MUSCULOSKELETAL: Hypertrophic changes thoracic spine.     1.  No evidence for central pulmonary emboli. The peripheral subsegmental vessels to both lower lobes are suboptimally opacified. 2.  Advanced centrilobular emphysema. 3.  Extensive groundglass and airspace infiltrates within both upper lobes concerning for pneumonia, including Covid 19. There are additional diffuse interstitial densities both lungs likely representing edema.     Us Venous Legs Bilateral    Result Date: 8/11/2020  EXAM: US VENOUS LEGS BILATERAL LOCATION: [facility] DATE/TIME: 8/11/2020 12:31 PM INDICATION: hypoxia, eval for DVT COMPARISON: None. TECHNIQUE: Venous Duplex ultrasound of bilateral lower extremities with and without compression, augmentation and duplex. Color flow and spectral Doppler with waveform analysis performed. FINDINGS: Exam includes the common femoral, femoral, popliteal veins as well as segmentally visualized deep calf veins and greater saphenous vein. RIGHT: No deep vein thrombosis. No superficial thrombophlebitis. No popliteal cyst. LEFT: No deep vein thrombosis. No superficial thrombophlebitis. No popliteal cyst.     1.  No deep venous thrombosis in the bilateral lower extremities.    Ct Abdomen Pelvis Without Oral With Iv Contrast    Result Date: 8/3/2020  EXAM: CT ABDOMEN PELVIS WO ORAL W IV CONTRAST LOCATION: [facility] DATE/TIME: 8/3/2020 5:03 PM INDICATION: RLQ abdominal pain, appendicitis suspected (Age > 14y) Right sided abdominal pain COMPARISON: None. TECHNIQUE: CT scan of the abdomen and pelvis was performed following injection of IV contrast. Multiplanar reformats were obtained.  Dose reduction techniques were used. CONTRAST: Iohexol (Omni) 100 mL FINDINGS: LOWER CHEST: Diffuse interstitial opacities throughout the visualized lower lung fields. HEPATOBILIARY: Trace ascites adjacent to the liver. Moderate distention of the gallbladder. PANCREAS: Normal. SPLEEN: Borderline splenomegaly is unchanged measuring 13 cm with trace adjacent fluid. ADRENAL GLANDS: Normal. KIDNEYS/BLADDER: Normal. BOWEL: The appendix is identified within the mid right pelvis and is of normal caliber containing a tiny amount of air. There is some minimal edema adjacent to the appendix and cecum. Slight wall thickening involving the cecum and ascending colon with a small amount of adjacent ascites. No evidence for bowel obstruction. LYMPH NODES: Normal. VASCULATURE: Advanced atherosclerotic disease abdominal aorta and iliac arteries with high-grade stenosis involving the proximal right and left common iliac arteries. Prominent collateral vessels along the lesser curvature of the stomach are more distended compared to the prior study and could be secondary to portal venous hypertension. PELVIC ORGANS: Normal. MUSCULOSKELETAL: Small fat-containing ventral hernia.     1.  Appendix is of normal caliber. There is a small amount of ascites adjacent to the base of the cecum and ascending colon with slight wall thickening which could represent an acute colitis. No evidence for bowel obstruction. 2.  Trace ascites adjacent to the liver and spleen. 3.  Advanced atherosclerotic disease. 4.  Prominent collateral vessels/possible gastric varices lesser curvature of the stomach. 5.  Interstitial opacities throughout the visualized lower lung fields. Please refer to CTA chest report for further discussion.    Us Abdomen Limited    Result Date: 8/20/2020  EXAM: US ABDOMEN LIMITED LOCATION: [facility] DATE/TIME: 8/20/2020 5:07 PM INDICATION: Ascites. Hyperbilirubinemia. COMPARISON: CT of the abdomen and pelvis 08/17/2020;  abdominal ultrasound 08/13/2020 TECHNIQUE: Limited abdominal ultrasound. FINDINGS: GALLBLADDER: Nondistended gallbladder. Diffuse gallbladder wall thickening measuring up to 8 mm. No echogenic calculi or billary sludge are detected. There is mild pericholecystic fluid. BILE DUCTS: No biliary dilatation. The common duct measures 7 mm. LIVER: Coarse parenchymal echogenicity with nodular border consistent with cirrhosis. No focal mass. RIGHT KIDNEY: No hydronephrosis. PANCREAS: The visualized portions are normal. Moderate ascites in the right upper quadrant.     1.  Diffuse wall thickening, accentuated by decompression. New mild pericholecystic fluid. No cholelithiasis. Findings could relate to third spacing and underlying liver disease. Acalculous cholecystitis is in the differential diagnosis in setting of sepsis from colitis. Consider HIDA scan if patient is able. 2.  Cirrhosis. 3.  Moderate right upper quadrant abdominal ascites.    Us Abdomen Limited    Result Date: 8/13/2020  EXAM: US ABDOMEN LIMITED LOCATION: [facility] DATE/TIME: 8/13/2020 8:02 AM INDICATION: worsening LFT's right upper quad pain, eval for galbladder infection, or stones in the ducts COMPARISON: None. TECHNIQUE: Limited abdominal ultrasound. FINDINGS: GALLBLADDER: Normal. No gallstones, wall thickening, or pericholecystic fluid. Negative sonographic Ludwig's sign. BILE DUCTS: No biliary dilatation. The common duct measures 4 mm. LIVER: Coarsened hepatic parenchymal echotexture. Appearance suggestive of underlying hepatic fibrosis. No focal mass. RIGHT KIDNEY: No hydronephrosis. PANCREAS: The visualized portions are normal. No ascites.     1.  Coarsened hepatic parenchymal echotexture concerning for underlying hepatic fibrosis. 2.  No other significant findings.    Us Abdomen Limited    Result Date: 8/4/2020  EXAM: US ABDOMEN LIMITED LOCATION: [facility] DATE/TIME: 8/4/2020 5:59 PM INDICATION: Hyperbilirubinemia, sepsis, evaluate  for cholecystitis, CBD dilation COMPARISON: CT 8/3/2020 . TECHNIQUE: Limited abdominal ultrasound. FINDINGS: GALLBLADDER: Isoechoic intraluminal tissue related to the anterior wall of the gallbladder measuring approximately 2.5 x 2.0 x 1.0 cm has no internal Doppler flow. No significant gallbladder wall thickening with gallbladder wall thickness approximately 3  mm. Gallbladder otherwise negative. Negative sonographic Ludwig's sign. BILE DUCTS: No intrahepatic biliary dilatation. The common duct measures 6 mm, upper normal. LIVER: Coarsened hepatic parenchymal echotexture and slight hepatic contour irregularity. No focal mass. RIGHT KIDNEY: No hydronephrosis. PANCREAS: The visualized portions are normal. Trace ascites.     1.  Isoechoic intraluminal tissue related to the anterior wall of the gallbladder measuring approximately 2.5 x 2.0 x 1.0 cm indeterminate for adherent sludge material versus polyp but favor sludge material given lack of internal Doppler flow. 2.  No gallbladder wall thickening. 3.  No intrahepatic biliary dilatation. The common duct measures 6 mm, upper normal. 4.  Coarsened hepatic parenchymal echotexture and slight hepatic contour irregularity suggestive of underlying hepatic fibrosis/cirrhosis. 5.  Trace ascites.    Us Venous Arms Bilateral    Result Date: 8/11/2020  EXAM: US VENOUS ARMS BILATERAL LOCATION: [facility] DATE/TIME: 8/11/2020 12:31 PM INDICATION: hypoxia, eval for DVT COMPARISON: None. TECHNIQUE: Venous Duplex ultrasound of both upper extremities with (when possible) and without compression, augmentation, and duplex. Color flow and spectral Doppler with waveform analysis performed. FINDINGS: Ultrasound includes evaluation of the internal jugular veins, innominate veins, subclavian veins, axillary veins, and brachial veins. The superficial cephalic and basilic veins were also evaluated where seen. RIGHT: No deep venous thrombosis. No superficial thrombophlebitis. LEFT: No  "deep venous thrombosis. No superficial thrombophlebitis.     1.  No deep venous thrombosis in the bilateral upper extremities.    Us Abdomen Duplex Hepatic And Portal Vasculature Complete    Result Date: 8/21/2020  EXAM: US ABDOMEN HEPATIC AND PORTAL VASCULATURE COMPLETE LOCATION: [facility] DATE/TIME: 8/20/2020 5:16 PM INDICATION: Evaluate for hepatic vein thrombus / Budd-Chiari /venous thromboembolism COMPARISON: Abdominal ultrasound performed the same day. TECHNIQUE: Complete abdominal ultrasound. Color flow with spectral Doppler and waveform analysis performed.  FINDINGS: ABDOMINAL DUPLEX: The main, right and left portal veins are patent with hepatopedal fugal/reversed flow. The hepatic veins, IVC and splenic vein are patent with flow in the normal direction. The hepatic artery appears patent. Again noted is a cirrhotic configuration of the liver with perihepatic ascites.     1.  Patent main, right and left portal veins with hepatofugal/reversed flow. 2.  Patent inferior vena cava, hepatic veins and splenic vein with normal flow direction. 3.  Cirrhotic configuration of the liver with perihepatic ascites.    Poc Us 3cg Picc Placement Guidance    Result Date: 8/20/2020  Exam was performed as guidance for PICC line insertion.  Click \"PACS images\" hyperlink below to view any stored images.  For specific procedure details, view procedure note authored by PICC/Vascular Access Nurse.    Poc Us 3cg Picc Placement Guidance    Result Date: 8/4/2020  Exam was performed as guidance for PICC line insertion.  Click \"PACS images\" hyperlink below to view any stored images.  For specific procedure details, view procedure note authored by PICC/Vascular Access Nurse.    Ct Chest Abdomen Pelvis Without Oral With Iv Contrast    Result Date: 8/17/2020  EXAM: CT CHEST ABDOMEN PELVIS WO ORAL W IV CONTRAST LOCATION: [facility] DATE/TIME: 8/17/2020 12:22 PM INDICATION: Sepsis of uncertain etiology. COMPARISON: Abdominal " ultrasound 8/13/2020, CT chest 11/20/2020 and CT abdomen pelvis 8/3/2020 TECHNIQUE: CT scan of the chest, abdomen, and pelvis was performed following injection of IV contrast. Multiplanar reformats were obtained. Dose reduction techniques were used. CONTRAST: 100 mL Omnipaque 350 FINDINGS: LUNGS AND PLEURA: Extensive upper lung predominant emphysema. Subsegmental atelectasis within the right lower lobe. Small amount of endobronchial airway secretions in the lower lobes, greater on the right. Tiny focus of consolidation within the medial left lower lobe image 114. No Pleural effusion. MEDIASTINUM/AXILLAE: Right upper extremity PICC. No mass/adenopathy nor pericardial effusion. Moderate coronary artery calcifications. HEPATOBILIARY: Cirrhotic appearing liver without focal mass. Recanalized paraumbilical vein. Gallbladder wall thickening could be secondary to chronic liver disease. No bile duct dilatation. PANCREAS: No significant mass, duct dilatation, or inflammatory change. SPLEEN: Stable borderline splenomegaly measuring 12.8 cm. Splenic vein is patent. ADRENAL GLANDS: No significant nodules. KIDNEYS/BLADDER: No significant mass, stone, or hydronephrosis. BOWEL: New right-sided colitis extending from the cecum to the proximal transverse colon could be infectious or ischemic. No pneumatosis. New mild ascites extending from right upper quadrant to the pelvis. LYMPH NODES: No lymphadenopathy. VASCULATURE: Prominent calcified esophageal varices. Extensive aortoiliac atherosclerosis. PELVIC ORGANS: Normal-appearing uterus and ovaries. MUSCULOSKELETAL: No suspicious osseous lesions.     1.  Left-sided colitis extending from cecum to proximal transverse colon. Differential considerations would include both infectious and ischemic etiologies. New mild ascites in the right abdomen with no evidence of pneumatosis, perforation nor abscess. 2.  Extensive emphysema. Bibasilar subsegmental atelectasis with no focal pneumonia nor  pleural effusion. 3.  Cirrhotic appearing liver with gastroesophageal varices and stable borderline splenomegaly.

## 2021-06-10 NOTE — PLAN OF CARE
Care Plan-Care Management  Care Management Goals of the Day: progression of care    Care Progression Reviewed With: Charge RN, RNCM, CMSW, MD BRENDAN  Barriers to Discharge: COPD and Clinical Progression   Discharge Disposition: Home   Expected Discharge Date: 2 days   Transportation: Family if able     Care Coordination Narrative: Daughter met with MD yesterday.  Pt lives with family.  CM will continue to follow for discharge planning and recommendations.     2:58 PM  08/13/20  SREEDHAR Montez called and spoke to daughter Telma about TCU recommendations.  Daughter agrees that Pt would benefit from TCU. Telma requested that TCU list be left in Pt's room as she is coming to the hospital soon and she will make choices.      HECTOR met and introduced self and CM services to Pt. Pt knows her name.  HECTOR explained TCU recommendations.  Pt was asking about a car.  Pt not always making sense.      CM will follow-up with Telma tomorrow on TCU choices.   Pt/family given the Medicare Compare list of with associated star ratings to assist with choice for referrals/discharge planning:     3:21 PM  08/13/20  SREEDHAR Montez

## 2021-06-10 NOTE — PROGRESS NOTES
Renal progress note  CC:Hypernatremia    Assessment and Plan:  60 y.o. female the past medical history of COPD GÉNESIS PAD history of tobacco use and alcohol use disorder BPD admitted with abdominal pain lactic acidosis and acute respiratory failure with encephalopathy hospital course complicated by severe hypoxemia attributed to a combination of severe emphysema and volume overload with pulmonary edema versus aspiration on 8/3/2020.  Has been COVID negative x3 chest CT on 8/11/2020 shows groundglass opacities bilaterally possibly attributed to pulmonary edema secondary to severe hyponatremia nephrology was consulted off diuretics for last 24 hours she has completed antibiotic treatments and has been on a prednisone taper     Hypernatremia  Likely secondary to excess free water losses  Sodium currently in 150s stable,   Noted that with holding diuretics her urine output has decreased significantly  Currently her volume status appears pretty even although her CT scan is significant for pulmonary infiltrates  --We will keep her on D5 50 mL/h for the next 24 hours follow sodium on tomorrow morning's labs, added the potassium 40meq to the D5   --Okay to use Lasix 40 mg IV 1 time if any evidence of breathing difficulty with concerns for hypervolemia  -- Elevated bicarbonate with significant metabolic alkalosis which seems to be contraction alkalosis may benefit from trial with acetazolamide.  For the next 24 hours again we will hold all diuretics and gently hydrate her with D5 and follow in response, hopefully with correcting K aggressively met alkalosis will correct as well  --Patient is currently n.p.o. so cannot take p.o. free water, once enteral feeding is started free water can be given with tube feeds     Hypokalemia  Replete per nursing protocol  Follow on labs daily  -- started on D5 with 40K @50ml/hr, if K is low <3.5 at 2pm check , increase rate to 100ml/hr     Hypervolemia with pulmonary edema  Acute respiratory  failure  Possible pneumonia versus COPD exacerbation/pulmonary edema  -- follow daily weight since UO is hard to monitor   -- hold diuresis,although this may not be a long term solution , will consider a low dose thiazide for diuresis and natriuresis if this needed     Metabolic alkalosis , with additional respiratory acidosis likely secondary to COPD  Blood gases noted PH 7.52  --We will continue to monitor blood bicarb noted a small downtrend especially with some volume replacement with NS, aggressive potassium replacement will help with met alkalosis too  -- will monitor for now     Malnutrition  Currently n.p.o. concerns for aspiration  Possibly considering tube feeds, with a tenuous volume problem poor urine output off of diuretics in pulmonary edema managing volume status with TPN will be very hard  If continuing care with artificial feed is considered possibly a PEG with tube feeding might be a better option  --We will defer to primary team for this decision     History of chronic liver disease  Transaminases elevated,   Thrombocytopenia  History of EtOH use  Imaging with possible scarring     Chronic EtOH dependence  Wernicke's encephalopathy  Acute on chronic encephalopathy  On thiamine and folic supplement  Ammonia acceptable     History of mood disorders and bipolar disorder  No meds currently     Thank you for the consultation  We will follow  Thank you for the consultation we will follow  Iva Figueroa MD  [facility]  [phone]      Subjective  Appears more alert today although still confused   No new events    Objective    Vital signs in last 24 hours  Temp:  [97  F (36.1  C)-98.2  F (36.8  C)] 97.9  F (36.6  C)  Heart Rate:  [] 93  Resp:  [13-24] 24  BP: (110-130)/(54-62) 110/54  Weight:   163 lb (73.9 kg)    Intake/Output last 3 shifts  I/O last 3 completed shifts:  In: 756.7 [I.V.:456.7; IV Piggyback:300]  Out: 0   Intake/Output this shift:  I/O this shift:  In: 50 [IV  Piggyback:50]  Out: -     Physical Exam  Alert awake, NAD  CV: RRR without murmur or rub  Lung: clear and equal; no extra sounds, decreased aeration  Ab: soft and NT; not distended; normal bs  Ext: no edema and well perfused  Skin; no rash    Pertinent Labs   Lab Results   Component Value Date    WBC 12.0 (H) 08/13/2020    HGB 11.9 (L) 08/13/2020    HCT 36.6 08/13/2020     (H) 08/13/2020    PLT 51 (L) 08/13/2020     Lab Results   Component Value Date    CREATININE 0.79 08/13/2020    CREATININE 0.82 08/13/2020    BUN 30 (H) 08/13/2020    BUN 31 (H) 08/13/2020     (H) 08/13/2020     (H) 08/13/2020    K 3.2 (L) 08/13/2020    K 3.1 (L) 08/13/2020    CL 97 (L) 08/13/2020    CL 97 (L) 08/13/2020    CO2 43 (HH) 08/13/2020    CO2 44 (HH) 08/13/2020       Lab Results   Component Value Date    ALBUMIN 2.2 (L) 08/13/2020    ALBUMIN 2.2 (L) 08/13/2020     Lab Results   Component Value Date    CALCIUM 8.6 08/13/2020    CALCIUM 8.6 08/13/2020    PHOS 1.7 (L) 08/13/2020     I reviewed all lab results  Iva Figueroa

## 2021-06-10 NOTE — PLAN OF CARE
Problem: Pain  Goal: Patient's pain/discomfort is manageable  Outcome: Progressing   Scoring 0 on adult non-verbal pain scale  Problem: Safety  Goal: Patient will be injury free during hospitalization  Outcome: Progressing   Bed alarm in place. Room near nursing station

## 2021-06-10 NOTE — PLAN OF CARE
Problem: Pain  Goal: Patient's pain/discomfort is manageable  Outcome: Progressing   Patient denies pain    Problem: Discharge Barriers  Goal: Patient's discharge needs are met  Outcome: Progressing   Patient has gotten up to the chair several times and was able to ambulate in to the bathroom with walker and gait belt. Patient tolerated small amounts of food and fluid. Encouraged food intake. Patient is frustrated that she is in the hospital and mentioned in the morning that we were trying to kill her. Patient was redirectable and did calm down and became cooperative. Patient is alert to person and place, orientation fluctuates on situation. Patient remains on 2 liters of oxygen to keep oxygen within specified range. Patient has non productive cough. Encouraged activity.

## 2021-06-10 NOTE — PLAN OF CARE
Problem: Potential for Compromised Skin Integrity  Goal: Skin integrity is maintained or improved  Outcome: Progressing  Patient is able to turn self in bed. Area on coccyx is improving.     Problem: Impaired Gas Exchange  Goal: Demonstrate improved ventilation and adequate oxygenation of tissues as evidenced by absence of respiratory distress  Outcome: Progressing   Patient will not keep nasal cannula in place, O2 saturation drops into the mid 70's very quickly. Takes a couple of minutes to recover.

## 2021-06-10 NOTE — PLAN OF CARE
Problem: Risk for ineffective breathing pattern  Goal: Maintain effective breathing pattern with respiratory rate within normal range, lungs clear; be free of cyanosis and other signs/symptoms of hypoxia  Outcome: Not Progressing     Problem: Potential for transmission of organism by Contact, Enteric, Droplet and/or Airborne routes  Goal: Prevent transmission of organisms  Outcome: Not Progressing     Problem: Decreased Mental Status Causing Increased Need for Safety  Goal: Provide a safe environment for patient (Implement appropriate elements in plan of care)  Outcome: Not Progressing     Patient disoriented x4 through night. Highly agitated and anxious. PRN gabapentin given x1. Unable to give initially d/t bipap.     On bipap at 50% FiO2 most of night. Tachypneic up to 30. Now on HFNC at 50L and appearing more comfortable and less air hungry than on biPap. Would benefit from some anxiolytic medications if continued need for bipap.     CIWA 8-9 but potential for higher scores d/t AMS, anxiety and restlessness, not redirectable.    Moving self in bed. Purewick in place. Draining genesis colored urine. NS infusing at 100. Lactic acid labs improved. RT following. Would benefit from hospice/comfort care. CTM.

## 2021-06-10 NOTE — PLAN OF CARE
Problem: Impaired Gas Exchange  Goal: Demonstrate improved ventilation and adequate oxygenation of tissues as evidenced by absence of respiratory distress  Outcome: Progressing     Problem: Inadequate Gas Exchange  Goal: Patient will achieve/maintain normal respiratory rate/effort  Outcome: Progressing     ELAINE Dan

## 2021-06-10 NOTE — ED PROVIDER NOTES
EMERGENCY DEPARTMENT ENCOUNTER      NAME: Keiko Guo  AGE: 60 y.o. female  YOB: 1960  MRN: [id number]  EVALUATION DATE & TIME: 8/3/2020  3:22 PM    PCP: Provider, [facility]    ED PROVIDER: Isabel Arita DO      Chief Complaint   Patient presents with     Shortness of Breath         FINAL IMPRESSION:  1. SOB (shortness of breath)    2. Abdominal pain, right upper quadrant    3. Tachycardia    4. Elevated bilirubin          ED COURSE & MEDICAL DECISION MAKING:    3:24 PM Met with patient for initial interview and exam. Discussed initial plan for care for their stay in the emergency department. I was wearing PPE including an N95, surgical mask, eye protection, face shield, surgical gown, and gloves.   4:30 PM Patient signed out to Dr. Hunt, pending imaging and admission.  BP currently 115 systolic    The patient was interviewed and examined.  History in the chart was reviewed.  60 y.o. female presents to the Emergency Department for evaluation of multiple complaints including shortness of breath, abdominal pain, concerns for UTI.  Patient with a history of pulmonary emphysema, alcohol abuse, depression and bipolar disorder.  She was admitted around 1 year ago for hypotension.  Noted to have esophagitis on EGD.  She reports she has been using her breathing treatments at home without relief.  She also notes a new right-sided abdominal pain.  Patient reports she had an MRI of the brain 1 week ago after she had a fall.  She does have old appearing bruising to her frontal bone and inferior orbits, however this MRI was ordered back in June.  Unclear as to why it was ordered, however unlikely to be secondary to a fall.  Her EKG is without acute ischemic findings.  She is tachycardic on arrival and did drop her blood pressures to the 70 systolic.  This improved with IV fluids.  Venous blood gas unremarkable.  No leukocytosis on exam.  She does have mild lymphopenia.  Given her belly pain,  shortness of breath with hypotension and tachycardia, will obtain a CT of the chest to rule out pulmonary embolism, pneumonia.  I will obtain a CT of the head to rule out signs of fracture or bleed and given her abdominal pain, will obtain a CT of the abdomen and pelvis.  Bilirubin today is 6.0, 3.0 in June.  AST twice elevated than ALT, consistent with her alcohol use.  Alk phos around baseline.  Lipase negative.  Pending imaging and admission at time of signout to Dr. Hunt at 1630    At the conclusion of the encounter I discussed  the results of all of the tests and the disposition with the patient.   All questions were answered.  The patient acknowledged understanding and was involved in the decision making regarding the overall care plan.      MEDICATIONS GIVEN IN THE EMERGENCY:  Medications   albuterol inhaler 2 puff (PROAIR HFA;PROVENTIL HFA;VENTOLIN HFA) (2 puffs Inhalation Given 8/3/20 1611)   sodium chloride 0.9% 1,000 mL (1,000 mL Intravenous New Bag 8/3/20 1609)       NEW PRESCRIPTIONS STARTED AT TODAY'S ER VISIT  Current Discharge Medication List      CONTINUE these medications which have NOT CHANGED    Details   acetaminophen (TYLENOL) 650 MG CR tablet Take 650 mg by mouth every 8 (eight) hours as needed for pain.      albuterol (PROAIR HFA;PROVENTIL HFA;VENTOLIN HFA) 90 mcg/actuation inhaler Inhale 2 puffs 4 (four) times a day as needed for shortness of breath.      !! hydrOXYzine HCl (ATARAX) 25 MG tablet Take 25-50 mg by mouth every 6 (six) hours as needed for anxiety.      !! hydroxyzine HCL (ATARAX) 50 MG tablet TK 1 T PO QID PRN      magnesium oxide (MAG-OX) 400 mg (241.3 mg magnesium) tablet Take 1 tablet by mouth daily.      polyethylene glycol (MIRALAX) 17 gram packet Take 17 g by mouth daily. In the afternoon around 4 pm      !! QUEtiapine (SEROQUEL) 25 MG tablet Take 25 mg by mouth every morning. Different dose at bedtime            !! QUEtiapine (SEROQUEL) 25 MG tablet Take 25 mg by mouth  "2 (two) times a day as needed.      !! QUEtiapine (SEROQUEL) 300 MG tablet Take 300 mg by mouth at bedtime. Different dose in the morning      SPIRIVA RESPIMAT 1.25 mcg/actuation Mist INL 2 PFS PO QD       !! - Potential duplicate medications found. Please discuss with provider.             =================================================================    HPI    Patient information was obtained from: Patient    Use of : N/A      Keiko Guo is a 60 y.o. female with a pertinent medical history of COPD, PE, PAD, sleep apnea, and alcohol abuse who presents via EMS for evaluation of shortness of breath.     Patient reports shortness of breath which has been bothering her for \"quite some time\". She became extremely short of breath today, leading her to call for EMS. She has been using her inhalers at home without relief. Denies fever or cough. Patient also reports right sided abdominal pain, rated as a 5/10 in severity during triage, but denies recent emesis. She states she had fallen ~1 week ago and had an MRI brain which was negative. Patient has no other medical concerns at this time. She states she last drank alcohol 3 days ago and last smoked cigarettes 2 days ago.       REVIEW OF SYSTEMS   Review of Systems   Constitutional: Negative for chills, fatigue and fever.   HENT: Negative for sore throat and trouble swallowing.    Eyes: Negative for visual disturbance.   Respiratory: Positive for shortness of breath. Negative for cough.    Cardiovascular: Negative for chest pain.   Gastrointestinal: Positive for abdominal pain. Negative for diarrhea, nausea and vomiting.   Genitourinary: Negative for difficulty urinating and dysuria.   Musculoskeletal: Negative for arthralgias.   Skin: Negative for rash.   Neurological: Negative for light-headedness, numbness and headaches.   All other systems reviewed and are negative.       PAST MEDICAL HISTORY:  Past Medical History:   Diagnosis Date     COPD " (chronic obstructive pulmonary disease) (H)      PAD (peripheral artery disease) (H)      Sleep apnea        PAST SURGICAL HISTORY:  Past Surgical History:   Procedure Laterality Date     APPENDECTOMY      rupture with sepsis      SECTION       osteomyelitis       KS ESOPHAGOGASTRODUODENOSCOPY TRANSORAL DIAGNOSTIC N/A 2019    Procedure: ESOPHAGOGASTRODUODENOSCOPY (EGD) with biopsies;  Surgeon: Mario Beatty MD;  Location: [facility];  Service: Gastroenterology           CURRENT MEDICATIONS:    No current facility-administered medications on file prior to encounter.      Current Outpatient Medications on File Prior to Encounter   Medication Sig     acetaminophen (TYLENOL) 650 MG CR tablet Take 650 mg by mouth every 8 (eight) hours as needed for pain.     albuterol (PROAIR HFA;PROVENTIL HFA;VENTOLIN HFA) 90 mcg/actuation inhaler Inhale 2 puffs 4 (four) times a day as needed for shortness of breath.     hydrOXYzine HCl (ATARAX) 25 MG tablet Take 25-50 mg by mouth every 6 (six) hours as needed for anxiety.     hydroxyzine HCL (ATARAX) 50 MG tablet TK 1 T PO QID PRN     magnesium oxide (MAG-OX) 400 mg (241.3 mg magnesium) tablet Take 1 tablet by mouth daily.     polyethylene glycol (MIRALAX) 17 gram packet Take 17 g by mouth daily. In the afternoon around 4 pm     QUEtiapine (SEROQUEL) 25 MG tablet Take 25 mg by mouth every morning. Different dose at bedtime           QUEtiapine (SEROQUEL) 25 MG tablet Take 25 mg by mouth 2 (two) times a day as needed.     QUEtiapine (SEROQUEL) 300 MG tablet Take 300 mg by mouth at bedtime. Different dose in the morning     SPIRIVA RESPIMAT 1.25 mcg/actuation Mist INL 2 PFS PO QD       ALLERGIES:  No Known Allergies    FAMILY HISTORY:  Family History   Problem Relation Age of Onset     Depression Mother      Hypertension Mother      Diabetes Father      Hypertension Father      Schizophrenia Brother        SOCIAL HISTORY:   Social History     Socioeconomic  "History     Marital status: Single     Spouse name: None     Number of children: None     Years of education: None     Highest education level: None   Occupational History     None   Social Needs     Financial resource strain: None     Food insecurity     Worry: None     Inability: None     Transportation needs     Medical: None     Non-medical: None   Tobacco Use     Smoking status: Current Every Day Smoker     Packs/day: 0.75     Types: Cigarettes     Smokeless tobacco: Never Used   Substance and Sexual Activity     Alcohol use: Yes     Comment: minimal      Drug use: No     Sexual activity: None   Lifestyle     Physical activity     Days per week: None     Minutes per session: None     Stress: None   Relationships     Social connections     Talks on phone: None     Gets together: None     Attends [employer] service: None     Active member of club or organization: None     Attends meetings of clubs or organizations: None     Relationship status: None     Intimate partner violence     Fear of current or ex partner: None     Emotionally abused: None     Physically abused: None     Forced sexual activity: None   Other Topics Concern     None   Social History Narrative     None         PHYSICAL EXAM    VITALS  BP 99/69   Pulse (!) 114   Temp 98.2  F (36.8  C) (Oral)   Resp (!) 31   Ht 5' 2\" (1.575 m)   Wt 160 lb (72.6 kg)   SpO2 94%   BMI 29.26 kg/m    Physical Exam  Vitals signs and nursing note reviewed.   Constitutional:       General: She is not in acute distress.     Appearance: She is not diaphoretic.      Comments: Chronically ill-appearing.   HENT:      Head:      Comments: Bruising to right frontal bone and to bilateral orbits.   Eyes:      Conjunctiva/sclera: Conjunctivae normal.      Pupils: Pupils are equal, round, and reactive to light.   Neck:      Musculoskeletal: Neck supple.      Trachea: No tracheal deviation.   Cardiovascular:      Rate and Rhythm: Regular rhythm. Tachycardia present.      " Heart sounds: Normal heart sounds.   Pulmonary:      Effort: Tachypnea present. No respiratory distress.      Breath sounds: Decreased breath sounds present.   Abdominal:      General: Bowel sounds are normal. There is no distension.      Palpations: Abdomen is soft.      Tenderness: There is abdominal tenderness in the right upper quadrant.   Musculoskeletal: Normal range of motion.   Skin:     General: Skin is warm and dry.   Neurological:      Mental Status: She is alert.          LAB:  All pertinent labs reviewed and interpreted.  Results for orders placed or performed during the hospital encounter of 08/03/20   Basic Metabolic Panel   Result Value Ref Range    Sodium 135 (L) 136 - 145 mmol/L    Potassium 3.6 3.5 - 5.0 mmol/L    Chloride 101 98 - 107 mmol/L    CO2 20 (L) 22 - 31 mmol/L    Anion Gap, Calculation 14 5 - 18 mmol/L    Glucose 154 (H) 70 - 125 mg/dL    Calcium 8.3 (L) 8.5 - 10.5 mg/dL    BUN 12 8 - 22 mg/dL    Creatinine 0.76 0.60 - 1.10 mg/dL    GFR MDRD Af Amer >60 >60 mL/min/1.73m2    GFR MDRD Non Af Amer >60 >60 mL/min/1.73m2   Hepatic Profile   Result Value Ref Range    Bilirubin, Total 6.0 (H) 0.0 - 1.0 mg/dL    Bilirubin, Direct 3.4 (H) <=0.5 mg/dL    Protein, Total 7.4 6.0 - 8.0 g/dL    Albumin 1.8 (L) 3.5 - 5.0 g/dL    Alkaline Phosphatase 254 (H) 45 - 120 U/L    AST 57 (H) 0 - 40 U/L    ALT 30 0 - 45 U/L   Lipase   Result Value Ref Range    Lipase 38 0 - 52 U/L   Troponin I   Result Value Ref Range    Troponin I <0.01 0.00 - 0.29 ng/mL   INR   Result Value Ref Range    INR 1.59 (H) 0.90 - 1.10   Blood Gases, Venous   Result Value Ref Range    pH, Venous 7.34 (L) 7.35 - 7.45    pCO2, Venous 42 35 - 50 mm Hg    pO2, Bo 19 (L) 25 - 47 mm Hg    Base Excess, Venous -3.6 mmol/L    HCO3, Venous 20.3 (L) 24.0 - 30.0 mmol/L    Oxyhemoglobin 21.5 (L) 70.0 - 75.0 %    O2 Sat, Venous 22.1 (L) 70.0 - 75.0 %   HM1 (CBC with Diff)   Result Value Ref Range    WBC 6.3 4.0 - 11.0 thou/uL    RBC 2.85 (L)  3.80 - 5.40 mill/uL    Hemoglobin 10.5 (L) 12.0 - 16.0 g/dL    Hematocrit 30.8 (L) 35.0 - 47.0 %     (H) 80 - 100 fL    MCH 36.8 (H) 27.0 - 34.0 pg    MCHC 34.1 32.0 - 36.0 g/dL    RDW 17.9 (H) 11.0 - 14.5 %    Platelets 85 (L) 140 - 440 thou/uL    MPV 10.4 8.5 - 12.5 fL    Neutrophils % 68 50 - 70 %    Lymphocytes % 19 (L) 20 - 40 %    Monocytes % 10 2 - 10 %    Eosinophils % 2 0 - 6 %    Basophils % 1 0 - 2 %    Neutrophils Absolute 4.3 2.0 - 7.7 thou/uL    Lymphocytes Absolute 1.2 0.8 - 4.4 thou/uL    Monocytes Absolute 0.6 0.0 - 0.9 thou/uL    Eosinophils Absolute 0.1 0.0 - 0.4 thou/uL    Basophils Absolute 0.0 0.0 - 0.2 thou/uL       RADIOLOGY:  Reviewed all pertinent imaging. Please see official radiology report.  No results found.    EKG:    Performed at: 3:25 PM    Impression: Sinus tachycardia, nonspecific ST abnormality, abnormal EKG.    Rate: 117 BPM  Rhythm: Sinus tachycardia  Axis: 29   55    11  NV: 140 ms  QRS: 80 ms  QTC: 479 ms    Comparison: Compared to previous from 7/13/2019: Nonspecific T wave abnormality now evident in inferior leads.     I have independently review and interpreted the EKG(s)      I, Ziyad Chavez, am serving as a scribe to document services personally performed by Dr. Arita based on my observation and the provider's statements to me. IIsabel, DO attest that Ziyad Chavez is acting in a scribe capacity, has observed my performance of the services and has documented them in accordance with my direction.    Isabel Arita DO  Emergency Medicine  [facility] EMERGENCY DEPARTMENT  [address] [facility] [address]  Dept: [phone]  Loc: [phone]     Carla Arita DO  08/03/20 9033

## 2021-06-10 NOTE — PROGRESS NOTES
[facility] MEDICINE PROGRESS NOTE      Identification/Summary: Keiko Guo is a 60 y.o. female with a past medical history of COPD, PAD, alcohol abuse, tobacco use, untreated GÉNESIS, dementia, bipolar disorder, balance problems secondary to Warnicke encephalopathy,  Came to the emergency department with short of breath, abdominal pain, on 8/3/2020 found to have tachycardia, elevated lactic acid started on IV Zosyn and completed the course of Zithromax.  Chest CT scan revealed severe emphysema and groundglass opacity.  COVID-19×3 are negative.  Requiring BiPAP continuously.  On bronchodilator, systemic steroid for COPD exacerbation.  Getting IV diuresis for volume overload.  Ongoing hypoxemic respiratory failure, severe emphysema, volume overload, acute pulmonary edema, COPD exacerbation, pneumonia aspiration versus community-acquired.  Abdominal CT scan revealed distended gallbladder.  Ultrasound revealed gallbladder sludge without wall thickening.  Not a surgical candidate.  Her prognosis is extremely guarded.  Met relative care.  Hospice consult on Monday.  Daughter Telma is very much involved in her care.     Assessment and Plan:  Acute hypoxemic respiratory failure  COPD exacerbation  Acute pulmonary edema  Pneumonia aspiration versus community-acquired pneumonia  Sepsis  Bronchodilators/DuoNeb 4 times a day  IV systemic steroid methylprednisone 40 mg every 8 hours  IV Lasix 40 mg every 8 hours for diuresis  Requiring continuous BiPAP , on high flow oxygen  Resume IV Zosyn.  Completed IV Zithromax    Abdominal pain  Abdomen CT 8/3- slight wall thickening of ascending colon, no obstruction, distended gallbladder  Abdominal ultrasound- no gallbladder wall thickening or biliary dilatation   Not a surgical candidate  Continue IV Zosyn    Alcohol abuse/dependence  Warnicke encephalopathy  Getting thiamine and folic acid supplement  Unstable gait secondary to Warnicke encephalopathy  Getting  thiamine    Acute metabolic encephalopathy   Multifactorial including delirium from recent illness, hypoxemia    Moderate malnutrition, secondary to poor oral intake    Hypernatremia, due to diuresis, recheck in am  Hypokalemiia, resume replacement    Thrombocytopenia, secondary to alcohol use  Tobacco use    History of anxiety, depression, bipolar disorder  Patient is confused, extremely weak  Not on medications, will monitor    DVT prophylaxis  No subcu heparin due to thrombocytopenia    Code DNR  Prognosis is extremely guarded.  Met palliative care.  Will meet hospice on Monday.    Barrier to discharge. Acutely sick And is anticipated discharge in few days    Interval History/Subjective:  Laying on bed, on BiPAP.  Getting dyspneic easily.  Drowsy.  Voiding urine.  On high flow oxygen.    Review of system  Dyspneic easily.  Persistent hypoxemia.  Prognosis extremely guarded.    Physical Exam/Objective:  Vitals I/O   Temp:  [98.1  F (36.7  C)-98.9  F (37.2  C)] 98.3  F (36.8  C)  Heart Rate:  [] 91  Resp:  [20-22] 22  BP: (120-171)/(57-92) 131/60  SpO2:  [88 %-100 %] 94 %  FiO2 (%):  [40 %-89 %] 40 %  I/O last 3 completed shifts:  In: 400 [IV Piggyback:400]  Out: 4220 [Urine:4220]   173 lb 8 oz (78.7 kg)  Body mass index is 31.73 kg/m .     GENERAL:  Alert,   in no acute distress whie on BIPAP, dyspneic easily  , confused   HEAD:  Normocephalic, without obvious abnormality, atraumatic   EYES:  PERRL, conjunctiva  clear, no scleral icterus, EOM's intact   NOSE: Nares normal,  mucosa normal, no drainage   THROAT: Lips, mucosa,  gums normal, mouth moist   NECK: Supple, symmetrical, trachea midline   BACK:   Symmetric, no curvature, ROM normal   LUNGS:   Coarse breath sounds bilaterally    CHEST WALL:  No tenderness or deformity   HEART:  Regular rate and rhythm, S1 and S2 normal, no murmur, rub, or gallop    ABDOMEN:   Soft, non-tender, bowel sounds active , no masses, no organomegaly, no rebound or guarding    EXTREMITIES: BLE edema    SKIN: No rashes   NEURO: Alert, oriented x3, moves all four extremities freely, non-focal   PSYCH: Cooperative, behavior is appropriate      Medications:   Personally Reviewed.    furosemide  40 mg Intravenous Q8H     ipratropium-albuteroL  3 mL Nebulization Q6H - RT     methylPREDNISolone sodium succinate  40 mg Intravenous Q8H     multivitamin therapeutic  1 tablet Oral DAILY     nicotine  1 patch Transdermal DAILY     omeprazole  20 mg Oral QAM (0630)     piperacillin-tazobactam  3.375 g Intravenous Q8H     QUEtiapine  300 mg Oral QHS     sodium chloride  10-30 mL Intravenous Q8H FIXED TIMES     thiamine (vitamin B1) IVPB  100 mg Intravenous DAILY       Data reviewed today: I personally reviewed all new medications, labs, imaging/diagnostics reports over the past 24 hours.      Labs:  Results from last 7 days   Lab Units 08/08/20 0447 08/07/20  0503 08/06/20  0353   LN-WHITE BLOOD CELL COUNT thou/uL 6.7 6.9 6.8   LN-HEMOGLOBIN g/dL 9.0* 8.9* 9.1*   LN-HEMATOCRIT % 26.4* 26.2* 26.2*   LN-MEAN CORPUSCULAR VOLUME fL 109* 107* 107*   LN-PLATELET COUNT thou/uL 52* 57* 64*    and   Results from last 7 days   Lab Units 08/08/20 0447 08/07/20  0503 08/06/20  0353   LN-SODIUM mmol/L 146* 144 140   LN-POTASSIUM mmol/L 2.7* 3.6 4.1   LN-CHLORIDE mmol/L 96* 104 107   LN-CO2 mmol/L 37* 30 25   LN-BLOOD UREA NITROGEN mg/dL 21 23* 21   LN-CREATININE mg/dL 0.66 0.65 0.63   LN-CALCIUM mg/dL 7.3* 7.5* 7.5*   LN-ALBUMIN g/dL 1.8* 1.7* 1.6*   LN-PROTEIN TOTAL g/dL 6.4 6.6 6.6   LN-BILIRUBIN TOTAL mg/dL 3.7* 3.5* 3.6*   LN-ALKALINE PHOSPHATASE U/L 180* 177* 182*   LN-ALT (SGPT) U/L 43 39 33   LN-AST (SGOT) U/L 75* 80* 80*        Imaging:   Chest and abdomen ct  1.  Appendix is of normal caliber. There is a small amount of ascites adjacent to the base of the cecum and ascending colon with slight wall thickening which could represent an acute colitis. No evidence for bowel obstruction.  2.  Trace ascites  adjacent to the liver and spleen.  3.  Advanced atherosclerotic disease.  4.  Prominent collateral vessels/possible gastric varices lesser curvature of the stomach.  5.  Interstitial opacities throughout the visualized lower lung fields. Please refer to CTA chest report for further discussion.    Bernice Stapleton  Hospitalist  [facility]

## 2021-06-10 NOTE — PLAN OF CARE
Goal: Patient s discharge needs are met.  Outcome: Care Progression reviewed with Hospitalist, Care Manager.  Discharge Disposition: Discussed and plan to discharge to: home with daughter anticipated  Planned Discharge Date: 1-2 days ?  Problem: Barriers to discharge include: telemetry. CIWA, IV abx, IV steroids, oxygen requirements, Pulmonary consult  Transportation needs/Ride Time:  daughter    Chart reviewed.  Care management following care progression for possible discharge needs.    Mercedez Cruz RN, BSN,  Care Manager  8/4/2020   9:55 AM

## 2021-06-10 NOTE — PLAN OF CARE
Goal: Patient s discharge needs are met.  Outcome: Care Progression reviewed with Hospitalist, Care Manager.  Discharge Disposition: Discussed and plan to discharge to: home with daughter  Planned Discharge Date: TBD  Problem: Barriers to discharge include: ID following, IV antibiotics, medical progression  Transportation needs/Ride Time:  daughter    CM continuing to follow related to discharge planning & chemical dependency assessment. Patient not appropriate to attempt chemical dependency assessment with on this date. CM will continue to follow and reach out to patient when more medically stable related to chemical dependency assessment.     MISHA Bills,   Care Manager  8/6/2020   10:54 AM

## 2021-06-10 NOTE — PROGRESS NOTES
Speech Language/Pathology  Speech Therapy Daily Progress Note    Patient presents as alert and reluctant during this session.  An  was not applicable.  Assisted nursing in repositioning patient for po intake  Upper dentures in place but ill-fitting.     Objective  PO trial for safe po intake of observed food/liquids without s/s aspiration  Patient was given 8  bites ice chips and presented with wet vocal quality. Notable for decreased mastication.    Patient was given 1  ounces NDD2 and presented with delayed cough x2. Notable for prolonged mastication.     Assessment  Demonstrates mildly improved mastication and oral manipulation but still insufficient to safely tolerate NDD2 textures. Patient indicated she would talk with daughter about brining in denture adhesive.     Plan/Recommendations  Continue per plan. May consider repeat video swallow study early next week.     The ST Care Plan has been reviewed and current plan remains appropriate.    25 dysphagia minutes     Ramon Templeton MA, CCC-SLP

## 2021-06-10 NOTE — PROGRESS NOTES
Occupational Therapy        08/12/20 1400   Visit Specifics   Eval Type Inital eval   Inital OT Consult  08/12/20   Bed/Tabs/Pad Alarm Applied Yes   General   Onset date 08/03/20   Additional Pertinent History ETOH, COPD, Bipolar   Chart Reviewed Yes   PT/OT Patient/Caregiver Stated Goals Not able to state   Family/Caregiver Present No   Precautions   Weight Bearing Status WBAT   General Precautions Bed alarm/Tab alarm   Home Living   Additional Comments Pt unable to answer d/t lethargy   Prior Status   Comments See PT note, Pt unable to answer   Activity Tolerance   Endurance Poor   Activity Tolerance Comments Pt extremely lethargic    Balance   Sitting Balance Mod assist;Assist x2   Standing Balance Mod assist;Assist x2   Neuro Re-Ed   Neuro Re-Ed Static Standing   Neuro Static Standing   Static Standing Mod A;Time;Patient Response;Comment   Mod A On EOB;With walker;Assist of 2   Time (min) 1 min   Patient Response Fatigues quickly   Comment x2 trials, Pt slowly sits back down on EOB without warning   Bed Mobility   Supine to Sit Mod assist;Assist of 2;HOB elevated   Bed mobility comments Pt reporting pain with all bed mobility. Continuous verbal and tactile cues for sequencing.   Functional Transfers   Functional Transfers Bed Transfers;Chair Transfers   Bed Transfers Max assist;Assist of 2   Chair Transfers Max assist;Assist of 2   Transfer Cues Technique;Safety;Sequencing   Transfer Deficits Fatigue;Pain;Increased effort;Increased time;Increased processing time;Decreased cognition   Comments Arm in arm pivot trsfr to chair as Pt unable to follow commands to ambulate to chair   Ambulation   Location In room;To chair   Assistance Max Assist;Assist of 2   Device Hand hold assist   Verbal Cues Sequencing;Safety;Posture;Attention to task;Attention to direction   Weight Bearing Status WBAT   Comments Arm in arm trsfr to chair as Pt unable to ambulate to chair with FWW   Cognition   Overall Cognitive Status Impaired    Arousal/Alertness Lethargic   Attention Span <1 min   Memory Impaired   Orientation Level Unable to assess   Following Commands Inconsistently;Follows one step commands;With increased time;With repetition   Safety Judgment Unable to assess   Awareness of Errors Decreased awareness of errors   Deficits Not aware of deficits   Problem Solving Unable to assess   Comments Pt having difficulty keeping eyes open during session. Pt requiring tactile cues to follow all commands as Pt is extremely impulsive.    Behavior    Behavior During Session Cooperative;Impulsive   Vision-Basic Assessment   Glasses on During Eval/Cognitive Testing No   RUE Assessment   RUE Assessment WFL   LUE Assessment   LUE Assessment WFL   Hand Function   Gross Grasp Functional   Sensation   Light Touch No apparent deficits   Assessment   Assessment Decreased ADL status;Decreased funtional mobility;Decreased cognition;Decreased Safe judgement during ADL;Decreased endurance   Prognosis Fair   Treatment/Interventions ADL training;Functional transfer training;Cognitive training   OT Frequency Daily   Goal Formulation Patient unable   Recommendations   OT Discharge Recommendation TCU;24 hour assist   Treatment Suggestions for Next Session Goals 8/19, trsfrs Ax2, activity tolerance, SLUMS when appropriate   OT Care Plan REVIEWED DAILY Yes, goals remain appropriate       GABRIELE Cervantes/L on 8/12/2020

## 2021-06-10 NOTE — PROGRESS NOTES
"I was called for \"some type of powder for excoriated bottom\"    Patient had been here for 20 days    Per RN - has been receiving some skin barrier already but seems not helping     Plans -   1. Will put wound consult  2. For now, off load bottom and recommend to lay on her side   "

## 2021-06-10 NOTE — PLAN OF CARE
Problem: Inadequate Gas Exchange  Goal: Patient will achieve/maintain normal respiratory rate/effort  Outcome: Progressing  Patient remains on high flow nasal cannula (60 L and 45% fiO2) to keep sats 88-92%.       Problem: Decreased Mental Status Causing Increased Need for Safety  Goal: Provide a safe environment for patient (Implement appropriate elements in plan of care)  Outcome: Progressing  Patient is on bed alarms for safety.       Problem: Daily Care  Goal: Daily care needs are met  Outcome: Progressing  Patient has been drowsy this shift and nonverbal. Repositioned q 2 hours and purewick in place. Patient is on telemetry monitoring, reading normal sinus rhythm. On potassium, magnesium, and phosphorus protocols. Sodium checks q 8 hours.

## 2021-06-10 NOTE — PLAN OF CARE
Pt was given scheduled nebulizer, BS faint crackles/diminished, currently on 12L oxymask, pt resting comortably, RT following.      Problem: Impaired Gas Exchange  Goal: Demonstrate improved ventilation and adequate oxygenation of tissues as evidenced by absence of respiratory distress  Outcome: Progressing     Problem: Inadequate Gas Exchange  Goal: Patient will achieve/maintain normal respiratory rate/effort  Outcome: Progressing     Problem: Ineffective Airway Clearance  Goal: Maintain airway patency  Outcome: Progressing     Problem: Excessive Fluid Volume  Goal: Patient will achieve/maintain normal respiratory rate/effort  Outcome: Progressing

## 2021-06-10 NOTE — PLAN OF CARE
"  Problem: Pain  Goal: Patient's pain/discomfort is manageable  Outcome: Progressing   Patient denies pain throughout the shift.   Problem: Decreased Mental Status Causing Increased Need for Safety  Goal: Provide a safe environment for patient (Implement appropriate elements in plan of care)  Outcome: Progressing   Patient alert to self. Very drowsy/lethargic throughout the shift. Stated, \"I'm just tired and want to sleep\". Speech is slow/delayed. 1:1 sitter for safety. Patient incontinent of bowel and bladder, had a large incontinent bowel movement this shift. Patient turned and repositioned every two hours and as needed, patient does turn herself at times. Patient has not made any attempts to get out of bed this shift, when awake patient fidgeting with blankets and gown.   Problem: Discharge Barriers  Goal: Patient's discharge needs are met  Outcome: Progressing   Patient requiring 2L of oxygen via nasal cannula to keep oxygen saturations greater than 90%.  Placement.  Brianna Mendoza RN  8/16/2020    "

## 2021-06-10 NOTE — PLAN OF CARE
Problem: Pain  Goal: Patient's pain/discomfort is manageable  Outcome: Progressing     Problem: Safety  Goal: Patient will be injury free during hospitalization  Outcome: Progressing     Problem: Psychosocial Needs  Goal: Demonstrates ability to cope with hospitalization/illness  Outcome: Progressing     Pt denies pain or nausea. Childress in place, low output, draining dark brown urine. IV ABX and IVMF running via picc line to left arm. Pt more cooperative today compared to yesterday. Was able to feed self for breakfast and lunch. Still having some delusions, thinking there is man hanging out in the room, easily reassured that there is not.     Skin and sclera yellow. Continues to prefer to lay on right side, able to reposition, although quickly returns to right side.

## 2021-06-10 NOTE — PROGRESS NOTES
[facility] MEDICINE PROGRESS NOTE      Identification/Summary: Keiko Guo is a 60 y.o. female with a past medical history of tobacco use, alcohol use disorder, bipolar disorder, previous suicide attempt with overdose, GÉNESIS, PAD, worsening dementia and imbalance (possible Warnicke encephalopathy), and radiographic emphysema (presumed COPD) who was admitted on 8/3/2020 for abdominal pain, lactic acidosis, hyperbilirubinemia, acute respiratory failure with hypoxia, and acute encephalopathy. Hospital course was notable for severe hypoxia requiring up to 60 L HF NC and 55% FiO2, guarded prognosis, and ongoing encephalopathy.  Hypoxic respiratory failure likely secondary to combination of severe emphysema, volume overload/pulmonary edema, possible COPD exacerbation, and/or CAP VS aspiration.  She was treated with antibiotics, diuresis.    Her course has been complicated by agitation, confusion. Psychiatry consulted to assist. She is also medically complex with liver failure, possible hepatic encephalopathy, acute respiratory failure. Unfortunately, on 8/17, she developed sepsis with rising WBC, tachycardia, elevated lactic acid and guarding to palpation in the RUQ; she was found to have developed severe sepsis 2/2 colitis, with lactic acid peaked to 5.6 at 19:49 on 8/17; IVF and ciprofloxacin/metronidazole started and lactic acid has downtrended to < 3.0 at this point; ID was consulted on 8/18 for further assistance and did further broaden to meropenem. Prognosis remains guarded, hospice has been in-touch with the patient's daughter Telma. Telemetry started given sepsis which we will continue for at least another 24-hours. Hyperbilirubinemia noted in mid 5's, and noted for the entirety of her hospitalization has mostly been between 5-6 and no recent or acute changes.     Given her known Wernicke's encephalopathy as well as hepatic encephalopathy, and certainly with hospital-associated delirium superimposed,  "I'm not convinced she has capacity to make meaningful medical decisions that would have been consistent with her pre-sick values and thoughts. Her daughter Telma would be proxy as POA.     Today had family care conference directly on floor with 4-way - Palliative Care KAROLYN Hart, MYRNA Manuel, myself attending MD, and patient's daughter/POA Telma, from 12:30-1:00 PM; Telma was fully updated and all medical questions she had were answered several times and repeatedly. Conversation was tangential, circular, repetitive, and ultimately Telma decided, as current POA, to re-insert the PICC that Gali had removed and to proceed with abdominal US and hepatic duplex and continue all current cares and continue current diet restrictions. She often would state that she \"understands\" one aspect/topic and then will make a statement immediately thereafter or shortly thereafter that would directly contradict that. She also stated her wishes for staff to not take the patient's comments, such as \"just let me die\" or \"just let me go home\" or \"just let me drink water please\" literally or seriously as Telma feels those comments are all made in an altered sensorium and not representative of her wishes as proxy/POA.     Assessment and Plan:  Ischemic Colitis on CT, 8/17  Severe sepsis 2/2 colitis  Lactic Acidosis to 5.6 (8/17), improving  RUQ pain  -More severe pain on 8/17, guarding to palpation  -Not a good surgical candidate  -Has had multiple evals by GI, surgery, multiple RUQ ultrasound.  -On 8/17 developed sepsis with tachy with elevated WBC, elevated lactic acid; a STAT CT was ordered, which demonstrated new colitis and IV ciprofloxacin and IV metronidazole started.  -Severe sepsis by definition with lactic acid > 4.0. Peaked to 5.6 overnight at 19:49.  -Lactic acid has downtrended to < 3.0 at this point; ID was consulted on 8/18 for further assistance and did further broaden to meropenem.  -Stabilizing on meropenem; ID recommends for \"up to one " "week\" duration of treatment  -After extensive family care conference 8/20, POA/daughter Telma decided, as current POA, to re-insert the PICC that Gali had removed and to proceed with abdominal US and hepatic duplex and continue all current cares, and continue current diet restrictions.     Acute Hypoxic Respiratory Failure, multifactorial - ongoing, now up to 2 L of O2   Severe Emphysema with COPD Acute Exacerbation  Aspiration PNA vs CAP, SARS-CoV-2 negative-completed course of antibiotics  Pulmonary Edema, Volume Overload  -She is clinically improved from a respiratory standpoint at this time  -Required HFNC 60 L in ICU initially; improved to room air transiently.  -Now back up to 2 L of O2, 4 L transiently overnight, weaning down to goal 88-92%.  -Currently on prednisone taper  -Seems like events leading to presentation were: Abdominal pain or back pain, taking opiates at home leading to somnolence, respiratory acidosis, possible aspiration  -At baseline her health is very poor, she has a history of alcohol abuse  -Her pulmonary status may be at baseline at this time  -I've personally checked the MAR - she already completed a 5 day course of azithromycin and 7-day course of Zosyn to cover CAP as well as aspiration PNA organism.    Alcohol abuse/dependence  Wernicke encephalopathy  Acute metabolic encephalopathy, questionable hepatic encephalopathy  Bipolar disorder versus anxiety/depression at baseline-on Seroquel at home  -Alert now but still very confused  -Has asterixis  -Continue thiamine supplement  -Trying low-dose lactulose to see if this improves some of her confusion  --Psychiatry consulted, appreciate their recommendations  Per note scheduled Depakote 3 times daily, Keppra at bedtime, melatonin at bedtime   -Clinically very consistent with Wernicke's  -Would consider starting hospice soon as patient's POA would agree to this.    Abdominal pain  Acutely elevated liver enzymes, acute on chronic " "thrombocytopenia  Recent Hep B infection  Jaundice, hepatic fibrosis  Likely acute liver failure in setting of alcoholic cirrhosis  Hyperbilirubinemia  -Appreciate GI recs  -Ordered abdominal US to evaluate ascites as well as GB and liver.   -Ordered hepatic duplex to evaluate for hepatic vein thrombosis / Budd-Chiari / other VTE.    Moderate to severe malnutrition  -Now able to tolerate oral intake, less alert today  -Albumin very low at 1.7  -Appreciate dietitian recommendations    Hypernatremia, resolved  -Oral intake improved, fluids stopped    Advanced Care Planning  Goals of Care  Family Care Conference 8/20  -She is DNR and is a hospice candidate if pt/family are interested at dc  -Not comfort care at this time   -Appreciate hospice and palliative care help with goals/options at dc  -Given her known Wernicke's encephalopathy as well as hepatic encephalopathy, and certainly with hospital-associated delirium superimposed, I'm not convinced she has capacity to make meaningful medical decisions that would have been consistent with her pre-sick values and thoughts. Her daughter Telma would be proxy as POA. Will discuss with psychiatry regarding capacity and proxy decision making.   -Today had family care conference directly on floor with 4-way - Palliative Care KAROLYN Hart, MYRNA Manuel, myself attending MD, and patient's daughter/POA Telma, from 12:30-1:00 PM; Telma was fully updated and all medical questions she had were answered several times and repeatedly. Conversation was tangential, circular, repetitive, and ultimately Telma decided, as current POA, to re-insert the PICC that Gali had removed and to proceed with abdominal US and hepatic duplex and continue all current cares and continue current diet restrictions. She often would state that she \"understands\" one aspect/topic and then will make a statement immediately thereafter or shortly thereafter that would directly contradict that. She also stated her wishes for " "staff to not take the patient's comments, such as \"just let me die\" or \"just let me go home\" or \"just let me drink water please\" literally or to let it impact the patient's care, as Telma feels those comments are all made in an altered sensorium and not representative of her wishes as proxy/POA.     Prolonged QTC  - on last check  - avoid QT prolonging meds as able, replace mag and potassium prn    Diet: Diet Dysphagia I (Pureed), 2 Gm Na; Honey thick liquids LIQUIDS BY SPOON  Dietary nutrition supplements Magic cup; BID with meals  DVT Prophylaxis:  VTE Prophylaxis contraindicated due to thrombocytopenia  Code Status: No CPR- Do NOT Intubate  Disposition: TBD.    Interval History/Subjective:  NAEO.     This AM she appears withdrawn, states \"just let me drink water please\" several times with ongoing dietary restrictions given dysphagia and aspiration risks. She is refusing much of the nursing care while in room. Denies new pain. States abdominal pain is better.     Later, she pulled out her PICC.     Later, held Family care conference directly on floor with 4-way - Palliative Care KAROLYN Hart, MYRNA Manuel, myself attending MD, and patient's daughter/POA Telma, from 12:30-1:00 PM; Telma was fully updated and all medical questions she had were answered several times and repeatedly.    Physical Exam/Objective:  Vitals I/O   Temp:  [97.7  F (36.5  C)-98.3  F (36.8  C)] 97.8  F (36.6  C)  Heart Rate:  [109-118] 112  Resp:  [16-20] 19  BP: ()/(51-72) 124/63  SpO2:  [92 %-99 %] 93 %  I/O last 3 completed shifts:  In: 3475 [P.O.:880; I.V.:2495; IV Piggyback:100]  Out: 575 [Urine:575]   163 lb 12.8 oz (74.3 kg)  Body mass index is 29.96 kg/m .    Physical Exam:    GENERAL:  Alert, appears comfortable, in no acute distress, appears stated age, states \"just give me water\" and told RN \"just let me die\"   HEAD:  Normocephalic, without obvious abnormality, atraumatic   EYES:  PERRL, conjunctiva/corneas clear, no scleral " icterus, EOM's intact   NOSE: Nares normal, septum midline, mucosa normal, no drainage   THROAT: Lips, mucosa, and tongue normal; teeth and gums normal, mouth moist   NECK: Supple, symmetrical, trachea midline   BACK:   Symmetric, no curvature, ROM normal   LUNGS:   Clear to auscultation bilaterally, no rales, rhonchi, or wheezing, symmetric chest rise on inhalation, respirations unlabored   CHEST WALL:  No tenderness or deformity   HEART:  Regular rate and rhythm, S1 and S2 normal, no murmur, rub, or gallop    ABDOMEN:   Soft, nontender today, no peritoneal finding, palpable ascites moderate volume, no involuntary guarding, bowel sounds normoactive all four quadrants, no masses, no organomegaly, no rebound or guarding   EXTREMITIES: Extremities normal, atraumatic, no cyanosis or edema    SKIN: Dry to touch, no exanthems in the visualized areas   NEURO: Alert, oriented to self/name and building name only but not to context, year, month, date, time moves all four extremities freely   PSYCH: For most part is cooperative, behavior is appropriate given context and medical history     Medications:   Personally Reviewed.    levETIRAcetam  250 mg Oral QHS     melatonin  3 mg Oral QHS     meropenem  1 g Intravenous Q8H     nicotine  1 patch Transdermal DAILY     omeprazole  20 mg Oral BID     potassium chloride  10 mEq Intravenous Q1H     predniSONE  15 mg Oral Daily with brkfst    Followed by     [START ON 8/22/2020] predniSONE  10 mg Oral Daily with brkfst    Followed by     [START ON 8/25/2020] predniSONE  5 mg Oral Daily with brkfst     sodium chloride  10-30 mL Intravenous Q8H FIXED TIMES     sodium chloride  3 mL Intravenous Line Care     thiamine  100 mg Oral DAILY       Floor time / at bedside ~ 8:30-9:00 AM, 12:15 PM - 1:35 PM with direct patient care and including in that timeframe the family care conference.     >135 mins with 50% of the floor time (for inpatient hospital services) spent providing counseling or  coordination of care (C/CC).  This includes advanced care planning, goals of care discussion and counseling to patient/POA regarding current active issues including ischemic colitis, infectious colitis, sepsis and multi-organ systems failure, liver disease and cirrhosis, family care conference, and formulating the appropriate care plan for today.      Dalton Greenberg MD  Internal Medicine  Hospitalist  [facility]  Phone: #[phone]

## 2021-06-10 NOTE — PROGRESS NOTES
RESPIRATORY CARE NOTE     Patient Name: Keiko Guo  Today's Date: 8/11/2020     Sputum induction completed. BS were diminished t/o pre and post hypertonic saline neb. Pt tolerated neb well. Pt declined to cough post induction. RT unable to collect sputum sample at this time. Sputum trap in the room and patient is aware we need a sample. RT will continue to follow as ordered.         Maite Hayes RRT

## 2021-06-10 NOTE — PROGRESS NOTES
Renal progress note-new to me chart reviewed.   CC:Hypernatremia    Assessment and Plan:  60 y.o. female the past medical history of COPD GÉNESIS PAD history of tobacco use and alcohol use disorder BPD admitted with abdominal pain lactic acidosis and acute respiratory failure with encephalopathy hospital course complicated by severe hypoxemia attributed to a combination of severe emphysema and volume overload with pulmonary edema versus aspiration on 8/3/2020.  Has been COVID negative x3 chest CT on 8/11/2020 shows groundglass opacities bilaterally possibly attributed to pulmonary edema secondary to severe hyponatremia nephrology was consulted off diuretics for last 24 hours she has completed antibiotic treatments and has been on a prednisone taper     Hypernatremia  Likely secondary to excess free water losses  Sodium stable now off ivf for 24 hours.   Currently her volume status appears pretty even though CT still shows infiltrates       Hypokalemia resolved.        Hypervolemia with pulmonary edema  Acute respiratory failure  Possible pneumonia versus COPD exacerbation/pulmonary edema  Seems better now off diuretic     Metabolic alkalosis , with additional respiratory acidosis likely secondary to COPD  Blood gases noted PH 7.52  Improved bicarb, noted with improved K and holding diuresis      Malnutrition  Now cleared for po     History of chronic liver disease  Transaminases elevated,   Thrombocytopenia  History of EtOH use  Imaging with possible scarring  -- GI consult noted     Chronic EtOH dependence  Wernicke's encephalopathy  Acute on chronic encephalopathy  On thiamine and folic supplement  Ammonia acceptable     History of mood disorders and bipolar disorder  No meds currently    Will sign off, call if ??     Val Jennings MD  [facility]  [phone]        Subjective  Asleep and did not awaken.       Objective    Vital signs in last 24 hours  Temp:  [97.2  F (36.2  C)-98.5  F (36.9   C)] 98.1  F (36.7  C)  Heart Rate:  [70-98] 70  Resp:  [17-18] 18  BP: ()/(53-58) 90/53  Weight:   155 lb 3.2 oz (70.4 kg)    Intake/Output last 3 shifts  I/O last 3 completed shifts:  In: 1090 [P.O.:1090]  Out: -   Intake/Output this shift:  I/O this shift:  In: 180 [P.O.:180]  Out: -     Physical Exam  Asleep, NAD facial bruising.  CV: RRR without murmur or rub  Lung: clear and equal ant   Ab: soft and NT   Ext: no edema and well perfused  Skin; no rash    Pertinent Labs   Lab Results   Component Value Date    WBC 11.7 (H) 08/16/2020    HGB 10.6 (L) 08/16/2020    HCT 31.1 (L) 08/16/2020     (H) 08/16/2020    PLT 33 (LL) 08/16/2020     Lab Results   Component Value Date    CREATININE 0.83 08/16/2020    BUN 18 08/16/2020     08/16/2020    K 4.5 08/16/2020    CL 96 (L) 08/16/2020    CO2 31 08/16/2020       Lab Results   Component Value Date    ALBUMIN 1.7 (L) 08/16/2020     Lab Results   Component Value Date    CALCIUM 8.0 (L) 08/16/2020    PHOS 3.0 08/16/2020     I reviewed all lab results  Val Jennings

## 2021-06-10 NOTE — TREATMENT PLAN
RCAT Treatment Plan      Patient Score: 14  Patient Acutiy: 3    Clinical Indication for Therapy: COPD HX    Therapy Ordered: change order from Q6 to  Duonebs four times a day and PRN    Assessment Summary: Pt BBS clear in upper lobes and diminished in lower. Pt grunting, unclear if for breathing or pain at this time. Pt is symptomatic for COVID and would suggest placing pt in a negative pressure room if test is postive or change her to MDI's . Pt on 2 l/m nc        Pre-Treatment Pulse 104   Pre-Treatment Respirations 24   Pre-Treatment Sp02 94   Breath Sounds Pre-Treatment Right Clear   Breath Sounds Pre-Treatment Left Clear   Post-Treatment Pulse 110   Post-Treatment Respirations 24   Post-Treatment Sp02 94   Breath Sounds Post-Treatment Right Clear         Rachel Adame, LRT

## 2021-06-10 NOTE — PLAN OF CARE
Goal: Patient s discharge needs are met.  Outcome: Care Progression reviewed with Hospitalist, Care Manager.  Discharge Disposition: Discussed and plan to discharge to: TBD  Planned Discharge Date: several days  Problem: Barriers to discharge include:  respiratory status, high oxygen needs, IV abx, IV steroids, IV Lasix, ID, Pulmonary, SLP, and Palliative following  Transportation needs/Ride Time:  TBD    CHart reviewed and updates with care team.  CM will continue to follow care progression and is available as needed.  Social work needs for CD to be addressed when medically appropriate.    Mercedez Cruz RN, BSN,  Care Manager  8/7/2020   3:12 PM

## 2021-06-10 NOTE — PLAN OF CARE
Patient remain confused and disoriented; able to make needs known at times.  Still having visual hallucinations at times. Difficult to redirect and reorient. Denies any pain. Had 1 BM this shift. Childress draining brownish urine. Provided with zackary-cares. Turned and repo Q2. Daughter updated with the plan of care on the phone. Will continue with plan of care.

## 2021-06-10 NOTE — PLAN OF CARE
Problem: Impaired Gas Exchange  Goal: Demonstrate improved ventilation and adequate oxygenation of tissues as evidenced by absence of respiratory distress  Outcome: Progressing

## 2021-06-10 NOTE — PROGRESS NOTES
Informed of Na and CO2 level    Rising Na to 151 now -- I suspect this is a result of dehydration from overdiuresis -- Last dose of lasix was 1A --- next is 8AM. Will discuss with AM doc to revise diuresis. Will repeat Na at 8A.     For this shift alone from 7P to 5A -- , she put out 1350 ml     Rising CO2 - I suspect contraction alkalosis from dehydration as above OR from COPD/steroid use itself -- may need to revise diuresis/steroid    Significant dropping K ---- overdiuresis- related -- will follow up electrolyte protocol    Plans - Repeat BMP at 8A, for now - will do 0.45 NSS at 50 cc/hr x6 hours and re-eval Na and also replace K per protocol.

## 2021-06-10 NOTE — PROGRESS NOTES
Patient pulled off tele and pulled out PICC line.  Unable to give AM dose of antibiotic.  [facility] updated.  Do not reinsert PICC until [facility] notifies me after telephone care conference with palliative, daughter and MD.

## 2021-06-10 NOTE — PROGRESS NOTES
"Pt currently refusing blood draws. Explained blood draw would be from pre-existing picc line, comtinues to refuse stating \"I'm done. I don't want anything else done There is nothing else anyone can do for me.\"  "

## 2021-06-10 NOTE — PLAN OF CARE
Care Plan-Care Management  Care Management Goals of the Day: progression of care    Care Progression Reviewed With: Charge RN, RNCM, CMSW, BRENDAN, MD  Barriers to Discharge: Respiratory Progression, Weakness,   Discharge Disposition: TBD   Expected Discharge Date: 1-2 days   Transportation: Family if able     Care Coordination Narrative: Pt's daughterTelma has meeting scheduled by nurse with MD for 2:30 pm today to discuss goals of care. CM will follow up on recommendations.     2:28 PM  08/12/20  SREEDHAR Montez

## 2021-06-10 NOTE — PLAN OF CARE
Problem: Pain  Goal: Patient's pain/discomfort is manageable  Outcome: Progressing   Patient has been denying having any pain.     Problem: Daily Care  Goal: Daily care needs are met  Outcome: Progressing   Patient is on thickened liquids. Patient has also been requesting a sponge to clean her mouth. Patient has been saying that she is anxious so PRN Ativan was given. Normal saline running through PICC. Both lumens patent. Patient has a bowel movement in brief that got on linens. Bed was changed and patient cleaned up. Patient is turned every two hours.

## 2021-06-10 NOTE — PROGRESS NOTES
Patient off BiPAP this AM and on HFNC @ 50LPM / 51%, SpO2 89%. Increased flow a bit to achieve 60% FiO2 and SpO2 increased to 91%. About 1206 RN placed patient on BiPAP as she wouldn't leave HFNC in nose. Remains on BiPAP 12/5, RR 16, 35% currently. Tolerated nebs well. Continue as ordered, monitor and support as needed.      RESPIRATORY CARE NOTE     Patient Name: Keiko Guo  Today's Date: 8/6/2020     Problem List  Patient Active Problem List   Diagnosis     Pulmonary emphysema (H)     Depression     Neurosis, posttraumatic     Bipolar disorder (H)     Alcohol abuse     Hyperglyceridemia     Tobacco abuse     GERD (gastroesophageal reflux disease)     Alcoholic intoxication with complication (H)     Intentional drug overdose, initial encounter (H)     Acute respiratory failure with hypoxemia (H)     Acidemia     Metabolic acidosis     Suicide attempt (H)     Relationship problem between caregiver and child     History of posttraumatic stress disorder (PTSD)     Overdose     Bipolar I disorder, most recent episode depressed, severe without psychotic features (H)     Neuroleptic consent was signed on October 8, 2018     Hypotension     Swallowing painful     Lactic acid acidosis     Odynophagia     QT prolongation     Fungal esophagitis     Adjustment disorder with mixed anxiety and depressed mood     COPD with acute exacerbation (H)     Abnormal chest CT     Right upper quadrant pain     Acute pulmonary edema (H)        08/06/20 1415   Non-Invasive    Pt Owned Device No   Device V 60   Device Serial Number [id number]   Pt. Interface Over the nose   Over the nose Size Medium   Mode ST   IPAP 12 cmH2O   EPAP 5 cmH2O   Resp Rate (Set) 16   Insp Time (sec) 1 sec   FiO2 (%) 35 %   Insp Rise Time (%) 2 %   Monitoring   Resp Rate Observed 26   Tidal (Observed) 556 mL   Minute Ventilation (L/min) 15.4 L/min   PIP Observed (cm H2O) 13 cm H2O   Ti/Ttot 35   Pt Leak 20   Alarms   Insp Pressure High (cm H2O) 16 cm H2O    Insp Pressure Low (cm H2O) 5 cm H2O   Tidal Volume High 1000   Tidal Volume Low 200   Apnea Alarm Delay 20 Seconds   MV Low (L/min) 3 L/min   High Resp Rate 50   Low Resp Rate 10   Alarm Functional and On Yes   NPPV Other   SpO2 93 %   Heart Rate 98   Skin Under Mask No breakdown         Radha Silva, RRT

## 2021-06-10 NOTE — PLAN OF CARE
Problem: Risk for ineffective breathing pattern  Goal: Maintain effective breathing pattern with respiratory rate within normal range, lungs clear; be free of cyanosis and other signs/symptoms of hypoxia  Outcome: Patient had increasing 02 needs through out morning shift (see flow sheet) Pulm consulted. Patient placed on titrating hi-flow 02. Started on IV steroids and nebs and transferred to negative pressure room pending ID review of two negative covid-19 tests.  Patient alert and able to let needs known during increased respiratory increased needs.  Poor po intake through out shift. Sips of water only.    Problem: Potential for Compromised Skin Integrity  Goal: Skin integrity is maintained or improved  Outcome: Pure wick in place: zackary cares and turn q 2 hours as tolerated by patient.

## 2021-06-10 NOTE — PROGRESS NOTES
Progress Note    Assessment/Plan:  1. Multiple end stage problems; obtunded. Code DNR; hospice pending today; see problem list  2 meds adjusted CIWA d/c; change to hypotonic fluids- decrease lasix.   3 DNR hospice may switch to comfort care will see how the conference goes    Active Hospital Problems    DNR (do not resuscitate)      Pneumonitis      Acute respiratory failure with hypoxemia (H)      Acute pulmonary edema (H)      Abnormal chest CT      *COPD with acute exacerbation (H)      Goals of care, counseling/discussion      Tobacco abuse      Bipolar disorder (H)        Subjective:  On BiPap- twitchy. No response    Objective:    Vital signs in last 24 hours  Temp:  [97  F (36.1  C)-98.6  F (37  C)] 97  F (36.1  C)  Heart Rate:  [80-96] 96  Resp:  [20-24] 24  BP: (121-135)/(58-86) 130/60  FiO2 (%):  [40 %-83 %] 83 %  Weight:   167 lb 4.8 oz (75.9 kg)    Intake/Output last 3 shifts  I/O last 3 completed shifts:  In: 450 [IV Piggyback:450]  Out: 2325 [Urine:2325]  Intake/Output this shift:  I/O this shift:  In: -   Out: 250 [Urine:250]    ROS:   compete interval 10 system review otherwise negative except as noted (see H&P) and above    Physical Exam  General: obtunded/no communication  VS-see above  HEENT:  Lungs:  Cor:  ABD:  Ext :  Neuro:    Pertinent Labs   Lab Results   Component Value Date    WBC 6.6 08/09/2020    HGB 9.9 (L) 08/09/2020    HCT 30.1 (L) 08/09/2020     (H) 08/09/2020    PLT 53 (L) 08/09/2020     Lab Results   Component Value Date    CREATININE 0.73 08/09/2020    BUN 25 (H) 08/09/2020     (HH) 08/09/2020    K 2.9 (L) 08/09/2020    CL 95 (L) 08/09/2020    CO2 42 (HH) 08/09/2020         Melvin Tavares MD.     Total time 35 min     15   ' Review new labs/new chart notes/interval updates/update and edit problem list   5   ' In room examine/discuss with pt and or family     15 ' Review in person/on phone with consultants,  care mgr/ancillary services/RN

## 2021-06-10 NOTE — PLAN OF CARE
Problem: Pain  Goal: Patient's pain/discomfort is manageable  Outcome: Progressing  Using the nonverbal scale no pain this afternoon.  Patient very restless at start of shift; pulling at lines and wires.  PRN ativan given with relief.    Problem: Potential for Falls  Goal: Patient will remain free of falls  Outcome: Progressing   Alarms in place.  Repositioning q2h    Problem: Discharge Barriers  Goal: Patient's discharge needs are met  Outcome: Progressing   DC planning, IVF, IV antibiotics.  Tele sinus tach with prolonged QT.  Speech therapy evaluated patient.  No void this shift.  Bladder scanned x2 and both times over 400.  Straight cathed for 250cc + some that spilled.  No more urine would flow and after catheter removed RN saw mucous plugging the opening, which occluded flow.  Urine very genesis in color.  2 incontinent stools this shift.

## 2021-06-10 NOTE — PLAN OF CARE
Goal: Patient s discharge needs are met.  Outcome: Care Progression reviewed with Hospitalist, Care Manager.  Discharge Disposition: Discussed and plan to discharge to: TCU vs Hospice  Planned Discharge Date: TBD  Problem: Barriers to discharge include: worsening respiratory failure, 7L 02, medical progression  Transportation needs/Ride Time:  TBD    MISHA Bills,   Care Manager  8/24/2020   10:44 AM

## 2021-06-10 NOTE — PROGRESS NOTES
Pt remains stable on BiPAP with current settings; 14/8 rate of 16 and 50% FiO2. Pt received neb treatment and tolerated well. BS; Diminished,SpO2 90-95%. Will continue to monitor and assess pt.    ELAINE Pickett

## 2021-06-10 NOTE — PROGRESS NOTES
Pharmacy Note - Admission Medication History    Pertinent Provider Information:   - The patient was recently prescribed oxycodone. Per the patient's daughter, this is new and she has not been on this recently. It is prescribed as 5 mg every 6 hours x 7 days, but the patient's daughter has only been administering the medication prn. She states her mother has had some excessive sedation with the medication. May want to consider another opioid (or lower dose of oxycodone) if a pain medication is needed.  - The patient's daughter states the patient has had bad alcohol withdrawal in the past. She states her mother has not been drinking as much as she was in the past when her withdrawal was bad. Last alcoholic drink was around 2 days ago.  - Her daughter states the patient had been on hydroxyzine (prescribed as 50 mg 4 times daily prn, but would take all 4 tabs- 200 mg all at once at bedtime), but this was stopped abruptly about 2 weeks ago when her provider thought this medication may have contributed to a recent fall.     ______________________________________________________________________    Prior To Admission (PTA) med list completed and updated in EMR.       PTA Med List   Medication Sig Note Last Dose     acetaminophen (TYLENOL) 650 MG CR tablet Take 650 mg by mouth every 8 (eight) hours as needed for pain.  8/3/2020 at 0900     albuterol (PROAIR HFA;PROVENTIL HFA;VENTOLIN HFA) 90 mcg/actuation inhaler Inhale 2 puffs 4 (four) times a day as needed for shortness of breath.  8/3/2020 at not with     melatonin 5 mg Tab tablet Take 15 mg by mouth at bedtime as needed.  7/31/2020     nicotine (NICODERM CQ) 21 mg/24 hr Place 1 patch on the skin daily as needed for smoking cessation.  More than a month at Unknown time     nystatin (MYCOSTATIN) 100,000 unit/mL suspension Take 500,000 Units by mouth 4 (four) times a day as needed (thrush).   More than a month at Unknown time     omeprazole (PRILOSEC) 20 MG capsule Take 20  mg by mouth daily as needed.  Past Week at Unknown time     oxyCODONE (ROXICODONE) 5 MG immediate release tablet Take 5 mg by mouth every 6 (six) hours as needed for pain. 8/3/2020: Medication is prescribed 5 mg every 6 hours x 7 days, but daughter has only been giving as prn. States patient appears too sedated with medication. 8/3/2020 at 0900     polyethylene glycol (MIRALAX) 17 gram packet Take 17 g by mouth daily as needed.   Past Week at Unknown time     QUEtiapine (SEROQUEL) 25 MG tablet Take 25 mg by mouth 3 (three) times a day as needed.  8/3/2020: Patient usually takes all 3 prn doses (75 mg) at bedtime along with her 300 mg bedtime dose. Past Week at Unknown time     QUEtiapine (SEROQUEL) 300 MG tablet Take 300 mg by mouth at bedtime as needed.  8/3/2020: Typically takes every night. Hasn't been taking the past couple nights due to sedation per daughter 7/31/2020     SPIRIVA RESPIMAT 1.25 mcg/actuation Mist Inhale 2 Inhalation daily.   8/3/2020 at not with     thiamine 100 MG tablet Take 50 mg by mouth daily.  8/1/2020       Information source(s): Family member and CareEverywhere/SureScripts. Spoke with daughter Telma    Summary of Changes to PTA Med List  New: melatonin, omeprazole, nicotine patch, nystatin, thiamine, oxycodone  Discontinued: hydroxyzine, magnesium oxide, quetiapine prn (duplicate)  Changed: quetiapine (prn), Miralax (prn)    Patient was asked about OTC/herbal products specifically.  PTA med list reflects this.    Based on the pharmacist s assessment, the PTA med list information appears reliable    Patient appears adherent: Partially: reason  Patient decision    Allergies were reviewed, assessed, and updated with the patient.      Patient did not bring any medications to the hospital and can't retrieve from home. No multi-dose medications are available for use during hospital stay.     Thank you for the opportunity to participate in the care of this patient.    Eve Lopez,  PharmD  8/3/2020 5:35 PM

## 2021-06-10 NOTE — PROGRESS NOTES
INFECTIOUS DISEASE FOLLOW UP NOTE    Date: 8/26/2020    ASSESSMENT:  Keiko Guo is a 60 y.o. female with   1. Leukocytosis, elevated lactate, abdominal pain. Etiology is not clear. Concern for acute cholecystitis with high alk phos and bili. HIDA on 8/24/2020 with no evidence of obstructed cystic duct. Could also have ischemic colitis. On IV Meropenem, first dose on 8/18/2020. WBC at 14.7 on 8/24, normal on 8/25/2020.  2. Recent acute on chronic hypoxic respiratory failure, thought secondary to combination of Severe COPD plus volume overload and possible PNA.Treated with antibiotic. Now requiring more oxygen, 7 L. Had wheezes on exam. Pulmonary edema? COPD exacerbation. CXR with finding suggestive of pulmonary edema.  We have now normal WBC and no fever, I see no need for coverage of MRSA. Recent fungal serologies on 8/11 negative. At high risk for pulmonary aspergillosis given severe COPD. NMTB infections also on the differential diagnosis. The diagnosis of any of these entities will require a bronchoscopy with BAL. Given her respiratory status, I do not think she can have a bronc without needing to be intubated and I am afraid once intubated she won't come off the vent. With all this, will start on suspension Itraconazole and observe/   3. Positive Hep B core Ab suggestive of recent infection. Hep B s Ag negative indicating lack of active disease.   4. Alcohol use, cirrhosis  5. Thrombocytopenia.  Severe.  6. Wernicke Encephalopathy/dementia.   7. COVID-19 adequately ruled out by testing.   8. Goals of care: she states that she does not want further testing or procedures, however she has encephalopathy. Her daughter who is POA wants to pursue evaluation for reversible causes of symptoms and is not ready to have her in hospice.     PLAN:  Meropenem for now.  Diuretics per primary team.  Ethics team following.  Repeat CXR today.   Start Itraconazole suspension 200 mg three times a day for 2 days. Will  reassess need to continue beyond 48 hours depending on goal of care discussion.   Overall prognosis is poor given what appears to be end stage COPD.     Discussed with nursing staff, and the patient.    ID will follow.        FERNANDO Mcmahan MD  [facility]  [phone] [facility] p[facility]    ______________________________________________________________________    SUBJECTIVE / INTERVAL HISTORY: Respiratory status worsening.  Now on CPAP/BiPAP.    ROS: deconditioned. All other systems negative except as listed above.    The following is a nice summary by Dr. Brower adopted for good understanding of the case.  Keiko Guo is a 60 y.o. female with a past medical history of tobacco use, alcohol use disorder, bipolar disorder, previous suicide attempt with overdose, GÉNESIS, PAD, worsening dementia and imbalance (possible Warnicke encephalopathy), and radiographic emphysema (presumed COPD) who was admitted on 8/3/2020 for abdominal pain, lactic acidosis, hyperbilirubinemia, acute respiratory failure with hypoxia, and acute encephalopathy. Hospital course was notable for severe hypoxia requiring up to 60 L HF NC and 55% FiO2, guarded prognosis, and ongoing encephalopathy.  Hypoxic respiratory failure likely secondary to combination of severe emphysema, volume overload/pulmonary edema, possible COPD exacerbation, and/or CAP VS aspiration.  She was treated with antibiotics, diuresis.     Her course has been complicated by agitation, confusion. Psychiatry consulted to assist. She is also medically complex with liver failure, known Wernicke's encephalopathy complicated by hepatic encephalopathy, and acute respiratory failure. Unfortunately, on 8/17, she developed sepsis with rising WBC, tachycardia, elevated lactic acid and guarding to palpation in the RUQ; she was found to have developed severe sepsis 2/2 colitis; ID was consulted on 8/18 for further assistance and did further broaden to  "meropenem. Prognosis remains guarded, hospice has been in-touch with the patient's daughter Telma.      On 8/20/2020, we had family care conference directly on floor with 4-way - Palliative Care KAROLYN Hart, MYRNA Manuel, myself the attending MD, and patient's daughter/POA Telma; Telma was fully updated and all medical questions she had were answered several times and repeatedly. Conversation was tangential, circular, repetitive, and ultimately Telma decided, as current POA, to re-insert the PICC that Gali had removed earlier that day and to proceed with abdominal US and hepatic duplex and continue all current cares and continue current diet restrictions. She also stated her wishes for staff to not take the patient's comments, such as \"just let me die\" or \"just let me go home\" or \"just let me drink water please\" literally or seriously as Telma feels those comments are all made in an altered sensorium and not representative of her wishes as proxy/POA.      On 8/21, HIDA scan ordered to evaluated for acalculus cholecystitis, which she is certainly at risk for given her prolonged and severe illness with rising hyperbilirubinemia. Unfortunately she was not cooperative once physically inside the nuclear medicine radiology suite and HIDA scan attempt was discontinued. Order is still active and patient has agreed to it for tomorrow, Monday 8/24. Our team has been in touch with Ethics team to review this case given several ongoing, daily concerns from RN, unit charge RN, several MD providers across different specialties. Patient herself has stated she wishes to discontinue current aggressive treatment and to let her eat what she wants and to go home; however, again POA daughter eTlma currently has MDM and wishes for aggressive work-up and care. OT was re-consulted to perform updated cognitive testing on 8/21/2020 and SLUMS was 11/30. IR informed me that they will perform HIDA again on Monday if she remains cooperative. HIDA scan planned for " tomorrow, . If HIDA scan positive, she would then require percutaneous cholecystostomy tube. Ethics team review ongoing/in-process.         OBJECTIVE:  Vitals:    20 0943   BP:    Pulse:    Resp: 22   Temp:    SpO2: 90%     FiO2 (%): 35 %    Vital Signs  Temp: 97.7  F (36.5  C)  Temp Source: Oral  Heart Rate: (!) 120  Heart Rate Source: Machine/Monitor  Resp: 22  BP: 100/55  MAP (mmHg) (Calculated): 70  BP Location: Left forearm  BP Method: Machine/Monitor  Patient Position: Lying    Temp (24hrs), Av.7  F (36.5  C), Min:97.7  F (36.5  C), Max:97.7  F (36.5  C)      GENERAL: chronically ill. Oriented to woodwinds, knows it is , cannot come up with the month.   EYES: icterus  HEAD, EARS, NOSE, MOUTH, AND THROAT: edentulous  RESPIRATORY: Wheezes and crackles bilaterally.  CARDIOVASCULAR: Regular rate and rhythm, normal S1 and S2, no murmurs, rubs, or gallops.  ABDOMEN: Soft, no tenderness today.  Normal bowel sounds.  MUSCULOSKELETAL: No synovitis.  LYMPHATIC: No cervical, supraclavicular, axillary, or inguinal lymphadenopathy.  SKIN/HAIR/NAILS: jaundice. Some bruising on forehead.   NEUROLOGIC: Grossly intact. Generally weak.  PICC R UE        Antibiotics:  Meropenem -    cipro -  Flagyl -  Pip/tazo 8/3-9  Azithromycin 8/3-7  Ctx 8/3  Vancomycin 8/3    Pertinent labs:    Results from last 7 days   Lab Units 20  0502 20  0615 20  1801 20  0826   LN-WHITE BLOOD CELL COUNT thou/uL 11.2* 10.1  --  14.7*   LN-HEMOGLOBIN g/dL 9.7* 9.6* 10.4* 10.6*   LN-HEMATOCRIT % 28.5* 28.0*  --  31.0*   LN-PLATELET COUNT thou/uL 16* 23*  --  9*        Results from last 7 days   Lab Units 20  0502 20  0615 20  0826   LN-SODIUM mmol/L 135* 134* 130*   LN-POTASSIUM mmol/L 3.2* 4.2 4.2   LN-CHLORIDE mmol/L 107 110* 107   LN-CO2 mmol/L 21* 19* 14*   LN-BLOOD UREA NITROGEN mg/dL 17 18 15   LN-CREATININE mg/dL 0.62 0.62 0.62   LN-CALCIUM mg/dL 7.6* 7.8* 7.3*       No results found for: CRP   No results found for: SEDRATE    Lab Results   Component Value Date    ALT 72 (H) 08/26/2020    AST 99 (H) 08/26/2020    ALKPHOS 340 (H) 08/26/2020    BILITOT 6.0 (H) 08/26/2020         MICROBIOLOGY DATA:  8/17 blood no growth to date   8/4 blood negative     Hep B core IgM positive    RADIOLOGY:  Xr Chest 1 View Portable    Result Date: 8/24/2020  EXAM: XR CHEST 1 VIEW PORTABLE LOCATION: [facility] DATE/TIME: 8/24/2020 9:04 AM INDICATION: worsening hypoxia COMPARISON: Chest CT 08/17/2020 and older studies, chest x-ray 08/10/2020     Right upper extremity PICC line catheter has been removed and there is a nail left sided PICC line catheter with its tip along the right lateral wall of the innominate vein confluence. Low lung volumes. Diffuse, fine reticular opacities seem more prominent due to the low lung volumes but are likely unchanged. No pneumothorax. Heart is enlarged and unchanged.    Xr Chest 1 View Portable    Result Date: 8/10/2020  EXAM: XR CHEST 1 VIEW PORTABLE LOCATION: [facility] DATE/TIME: 8/10/2020 10:45 AM INDICATION: persistent hypoxia COMPARISON: CT chest 8/3/2020     Stable diffuse reticulation and groundglass opacification likely in part part related to chronic fibrotic and emphysematous changes seen on comparison CT. Cannot exclude superimposed edema or atypical pneumonia. No focal consolidation or pleural effusion. Stable heart size.    Ct Head Without Contrast    Result Date: 8/3/2020  EXAM: CT HEAD WO CONTRAST LOCATION: [facility] DATE/TIME: 8/3/2020 5:04 PM INDICATION: Head trauma, abnormal mental status (Age 19-64y) Fall, bruising to face COMPARISON: Head CT 12/20/2017 and MRI brain 07/23/2017. TECHNIQUE: Routine without IV contrast. Multiplanar reformats. Dose reduction techniques were used. FINDINGS: No evidence of acute intracranial hemorrhage or mass effect. 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 Brain attenuation morphology otherwise normal. The ventricles and sulci are normal for age. Normal gray-white matter differentiation. The basilar cisterns are patent. The globes are unremarkable. The partially imaged paranasal sinuses demonstrate air-fluid level within the right maxillary sinus which could represent acute sinusitis in the appropriate setting. The partially imaged paranasal sinuses are otherwise unremarkable. The mastoid air cells and middle ear cavities are unremarkable. The visualized skull base and calvarium are unremarkable. The     1.  No evidence of acute intracranial hemorrhage or mass effect. 2.  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 3.  Air-fluid level within the right maxillary sinus which could represent acute sinusitis in the appropriate setting.    Ct Chest Without Contrast    Result Date: 8/11/2020  EXAM: CT CHEST WO CONTRAST LOCATION: [facility] DATE/TIME: 8/11/2020 8:11 PM INDICATION: Respiratory illness, acute (Age > 40y) Shortness of breath persistent hypoxemia despite max diuresis. re-eval opacities COMPARISON: CT chest 08/03/2020, 07/12/2019 TECHNIQUE: CT chest without IV contrast. Multiplanar reformats were obtained. Dose reduction techniques were used. CONTRAST: None. FINDINGS: LUNGS AND PLEURA: Mild to moderate tracheal secretions. Moderate to severe emphysema with associated interlobular septal thickening. Mosaic lung attenuation. No focal consolidation or pleural effusion. No mass or definite suspicious nodule. MEDIASTINUM/AXILLAE: Upper normal heart size. No pericardial effusion. Moderate to severe coronary artery calcifications. UPPER ABDOMEN: Stable soft tissue density nodules adjacent to the left  adrenal gland. MUSCULOSKELETAL: Spinal degenerative changes.     1.  Moderate to severe emphysema. 2.  Interlobular septal thickening and groundglass pulmonary opacities likely reflecting pulmonary edema, increased since the CT from 08/03/2020. No pneumonic consolidation or pleural effusion. 3.  Mild to moderate tracheal secretions.     Mr Brain With Without Contrast    Result Date: 7/23/2020  EXAM: MR BRAIN W WO CONTRAST LOCATION: [facility] DATE/TIME: 7/23/2020 5:51 PM INDICATION: WORSENING ATAXIA-MENTAL STATUS COMPARISON: MRI head 10/08/2016 from [facility]. CT head 12/20/2017 from [facility]. CONTRAST: Gadavist 8 TECHNIQUE: Routine multiplanar multisequence head MRI without and with intravenous contrast. FINDINGS: INTRACRANIAL CONTENTS: No acute or subacute infarct. Again seen is a shunting type moderate-sized arteriovenous malformation in the parasagittal right parietal region superiorly and posteriorly. It measures at least 3.5 cm AP x 2 cm transverse x 4 cm craniocaudad. It has a very similar appearance to the MRI 10/08/2016 from [facility]. No associated new hemorrhage, edema, or mass effect. Scattered nonspecific T2/FLAIR hyperintensities within the cerebral white matter most consistent with mild chronic microvascular ischemic change. Mild generalized cerebral atrophy. No hydrocephalus. Normal position of the cerebellar tonsils. No pathologic contrast enhancement of the brain parenchyma or meninges allowing for some vascular enhancement with the right parietal AVM. SELLA: No abnormality accounting for technique. OSSEOUS STRUCTURES/SOFT TISSUES: Normal marrow signal. The major intracranial vascular flow voids are maintained. ORBITS: No abnormality accounting for technique. SINUSES/MASTOIDS: No paranasal sinus mucosal disease. No middle ear or mastoid effusion.     1.  Again seen is a shunting type moderate-sized arteriovenous malformation in the parasagittal right parietal area  superiorly and posteriorly measuring approximately 3.5 cm x 2 cm x 4 cm. It is not changed significantly when compared to MRI head 10/08/2016 from [facility]. No new hemorrhage, edema or mass effect. Follow-up conventional angiography could be considered. 2.  No evidence for new infarct, hemorrhage elsewhere, hydrocephalus or intracranial mass. 3.  Mild age-related changes.     Xr Swallow Study W Speech    Result Date: 8/13/2020  EXAM: XR SWALLOW STUDY W SPEECH OR OT LOCATION: [facility] DATE/TIME: 8/13/2020 2:47 PM INDICATION: Difficulty swallowing. COMPARISON: None. TECHNIQUE: Routine. FINDINGS: FLUOROSCOPIC TIME: 2.7 minutes NUMBER OF IMAGES: 0 Swallow study with Speech Pathology using multiple barium thicknesses. Mendez aspiration with nectar consistency that is silent. Thin liquids not tried. With honey consistency and pudding there is premature spill to the vallecula but no penetration or aspiration and otherwise normal swallowing reflex.     1.  High risk for aspiration with nectar consistency and likely thin. 2.  Patient should build tolerate honey and pudding consistency.     Cta Chest Pe Run    Result Date: 8/3/2020  EXAM: CTA CHEST PE RUN LOCATION: [facility] DATE/TIME: 8/3/2020 5:01 PM INDICATION: PE suspected, high pretest prob SOB, tachycardia COMPARISON: CTA chest, abdomen and pelvis 07/12/2019 TECHNIQUE: CT chest pulmonary angiogram during arterial phase injection of IV contrast. Multiplanar reformats and MIP reconstructions were performed. Dose reduction techniques were used. CONTRAST: Iohexol (Omni) 100 mL FINDINGS: ANGIOGRAM CHEST: The right and left main pulmonary arteries and the segmental vessels are all patent without evidence for emboli. Suboptimal opacification involving the subsegmental vessels to both lower lobes. Thoracic aorta is negative for dissection. No CT evidence of right heart strain. LUNGS AND PLEURA: Advanced centrilobular emphysema. Extensive airspace and  groundglass opacities throughout both upper lobes, with additional interstitial opacities throughout the mid and lower lung fields, all of which are new since the previous study. Subsegmental atelectasis in the bases. No effusions. MEDIASTINUM/AXILLAE: Numerous borderline enlarged mediastinal and hilar nodes with minimal change. UPPER ABDOMEN: Advanced atherosclerotic disease. Splenomegaly. Trace ascites adjacent to the liver. Collateral vessels along the lesser curvature the stomach MUSCULOSKELETAL: Hypertrophic changes thoracic spine.     1.  No evidence for central pulmonary emboli. The peripheral subsegmental vessels to both lower lobes are suboptimally opacified. 2.  Advanced centrilobular emphysema. 3.  Extensive groundglass and airspace infiltrates within both upper lobes concerning for pneumonia, including Covid 19. There are additional diffuse interstitial densities both lungs likely representing edema.     Us Venous Legs Bilateral    Result Date: 8/11/2020  EXAM: US VENOUS LEGS BILATERAL LOCATION: [facility] DATE/TIME: 8/11/2020 12:31 PM INDICATION: hypoxia, eval for DVT COMPARISON: None. TECHNIQUE: Venous Duplex ultrasound of bilateral lower extremities with and without compression, augmentation and duplex. Color flow and spectral Doppler with waveform analysis performed. FINDINGS: Exam includes the common femoral, femoral, popliteal veins as well as segmentally visualized deep calf veins and greater saphenous vein. RIGHT: No deep vein thrombosis. No superficial thrombophlebitis. No popliteal cyst. LEFT: No deep vein thrombosis. No superficial thrombophlebitis. No popliteal cyst.     1.  No deep venous thrombosis in the bilateral lower extremities.    Ct Abdomen Pelvis Without Oral With Iv Contrast    Result Date: 8/3/2020  EXAM: CT ABDOMEN PELVIS WO ORAL W IV CONTRAST LOCATION: [facility] DATE/TIME: 8/3/2020 5:03 PM INDICATION: RLQ abdominal pain, appendicitis suspected (Age > 14y) Right  sided abdominal pain COMPARISON: None. TECHNIQUE: CT scan of the abdomen and pelvis was performed following injection of IV contrast. Multiplanar reformats were obtained. Dose reduction techniques were used. CONTRAST: Iohexol (Omni) 100 mL FINDINGS: LOWER CHEST: Diffuse interstitial opacities throughout the visualized lower lung fields. HEPATOBILIARY: Trace ascites adjacent to the liver. Moderate distention of the gallbladder. PANCREAS: Normal. SPLEEN: Borderline splenomegaly is unchanged measuring 13 cm with trace adjacent fluid. ADRENAL GLANDS: Normal. KIDNEYS/BLADDER: Normal. BOWEL: The appendix is identified within the mid right pelvis and is of normal caliber containing a tiny amount of air. There is some minimal edema adjacent to the appendix and cecum. Slight wall thickening involving the cecum and ascending colon with a small amount of adjacent ascites. No evidence for bowel obstruction. LYMPH NODES: Normal. VASCULATURE: Advanced atherosclerotic disease abdominal aorta and iliac arteries with high-grade stenosis involving the proximal right and left common iliac arteries. Prominent collateral vessels along the lesser curvature of the stomach are more distended compared to the prior study and could be secondary to portal venous hypertension. PELVIC ORGANS: Normal. MUSCULOSKELETAL: Small fat-containing ventral hernia.     1.  Appendix is of normal caliber. There is a small amount of ascites adjacent to the base of the cecum and ascending colon with slight wall thickening which could represent an acute colitis. No evidence for bowel obstruction. 2.  Trace ascites adjacent to the liver and spleen. 3.  Advanced atherosclerotic disease. 4.  Prominent collateral vessels/possible gastric varices lesser curvature of the stomach. 5.  Interstitial opacities throughout the visualized lower lung fields. Please refer to CTA chest report for further discussion.    Nm Hepatobiliary Wo Ef Or Pharm    Result Date:  8/24/2020  EXAM: NM HEPATOBILIARY WO EF OR PHARM LOCATION: [facility] DATE/TIME: 8/24/2020 4:39 PM INDICATION: Cholelithiasis Pericholecystic fluid. Has alcoholic liver disease, elevated bilirubin. COMPARISON: 08/20/2020 ultrasound, 08/17/2020 CT TECHNIQUE: 7.7 mCi of Tc-99m mebrofenin, IV fluid anterior planar imaging of the abdomen for 60 minutes. Equivocal gallbladder visualization therefore morphine 3.8 mg IV administered followed by anterior planar imaging for 30 minutes. FINDINGS: Normal radionuclide activity in the gallbladder, bile ducts and small bowel with no evidence of cystic or common duct obstruction. Abnormal persistent hepatic parenchymal and blood pool activity consistent with impaired hepatocellular uptake and excretion.     1.  Normal radionuclide activity in the gallbladder, bile ducts and small bowel with no evidence of cystic or common duct obstruction. 2.  Abnormal persistent hepatic parenchymal and blood pool activity consistent with impaired hepatocellular uptake and excretion.    Us Abdomen Limited    Result Date: 8/20/2020  EXAM: US ABDOMEN LIMITED LOCATION: [facility] DATE/TIME: 8/20/2020 5:07 PM INDICATION: Ascites. Hyperbilirubinemia. COMPARISON: CT of the abdomen and pelvis 08/17/2020; abdominal ultrasound 08/13/2020 TECHNIQUE: Limited abdominal ultrasound. FINDINGS: GALLBLADDER: Nondistended gallbladder. Diffuse gallbladder wall thickening measuring up to 8 mm. No echogenic calculi or billary sludge are detected. There is mild pericholecystic fluid. BILE DUCTS: No biliary dilatation. The common duct measures 7 mm. LIVER: Coarse parenchymal echogenicity with nodular border consistent with cirrhosis. No focal mass. RIGHT KIDNEY: No hydronephrosis. PANCREAS: The visualized portions are normal. Moderate ascites in the right upper quadrant.     1.  Diffuse wall thickening, accentuated by decompression. New mild pericholecystic fluid. No cholelithiasis. Findings could relate  to third spacing and underlying liver disease. Acalculous cholecystitis is in the differential diagnosis in setting of sepsis from colitis. Consider HIDA scan if patient is able. 2.  Cirrhosis. 3.  Moderate right upper quadrant abdominal ascites.    Us Abdomen Limited    Result Date: 8/13/2020  EXAM: US ABDOMEN LIMITED LOCATION: [facility] DATE/TIME: 8/13/2020 8:02 AM INDICATION: worsening LFT's right upper quad pain, eval for galbladder infection, or stones in the ducts COMPARISON: None. TECHNIQUE: Limited abdominal ultrasound. FINDINGS: GALLBLADDER: Normal. No gallstones, wall thickening, or pericholecystic fluid. Negative sonographic Ludwig's sign. BILE DUCTS: No biliary dilatation. The common duct measures 4 mm. LIVER: Coarsened hepatic parenchymal echotexture. Appearance suggestive of underlying hepatic fibrosis. No focal mass. RIGHT KIDNEY: No hydronephrosis. PANCREAS: The visualized portions are normal. No ascites.     1.  Coarsened hepatic parenchymal echotexture concerning for underlying hepatic fibrosis. 2.  No other significant findings.    Us Abdomen Limited    Result Date: 8/4/2020  EXAM: US ABDOMEN LIMITED LOCATION: [facility] DATE/TIME: 8/4/2020 5:59 PM INDICATION: Hyperbilirubinemia, sepsis, evaluate for cholecystitis, CBD dilation COMPARISON: CT 8/3/2020 . TECHNIQUE: Limited abdominal ultrasound. FINDINGS: GALLBLADDER: Isoechoic intraluminal tissue related to the anterior wall of the gallbladder measuring approximately 2.5 x 2.0 x 1.0 cm has no internal Doppler flow. No significant gallbladder wall thickening with gallbladder wall thickness approximately 3  mm. Gallbladder otherwise negative. Negative sonographic Ludwig's sign. BILE DUCTS: No intrahepatic biliary dilatation. The common duct measures 6 mm, upper normal. LIVER: Coarsened hepatic parenchymal echotexture and slight hepatic contour irregularity. No focal mass. RIGHT KIDNEY: No hydronephrosis. PANCREAS: The visualized  portions are normal. Trace ascites.     1.  Isoechoic intraluminal tissue related to the anterior wall of the gallbladder measuring approximately 2.5 x 2.0 x 1.0 cm indeterminate for adherent sludge material versus polyp but favor sludge material given lack of internal Doppler flow. 2.  No gallbladder wall thickening. 3.  No intrahepatic biliary dilatation. The common duct measures 6 mm, upper normal. 4.  Coarsened hepatic parenchymal echotexture and slight hepatic contour irregularity suggestive of underlying hepatic fibrosis/cirrhosis. 5.  Trace ascites.    Us Venous Arms Bilateral    Result Date: 8/11/2020  EXAM: US VENOUS ARMS BILATERAL LOCATION: [facility] DATE/TIME: 8/11/2020 12:31 PM INDICATION: hypoxia, eval for DVT COMPARISON: None. TECHNIQUE: Venous Duplex ultrasound of both upper extremities with (when possible) and without compression, augmentation, and duplex. Color flow and spectral Doppler with waveform analysis performed. FINDINGS: Ultrasound includes evaluation of the internal jugular veins, innominate veins, subclavian veins, axillary veins, and brachial veins. The superficial cephalic and basilic veins were also evaluated where seen. RIGHT: No deep venous thrombosis. No superficial thrombophlebitis. LEFT: No deep venous thrombosis. No superficial thrombophlebitis.     1.  No deep venous thrombosis in the bilateral upper extremities.    Us Abdomen Duplex Hepatic And Portal Vasculature Complete    Result Date: 8/21/2020  EXAM: US ABDOMEN HEPATIC AND PORTAL VASCULATURE COMPLETE LOCATION: [facility] DATE/TIME: 8/20/2020 5:16 PM INDICATION: Evaluate for hepatic vein thrombus / Budd-Chiari /venous thromboembolism COMPARISON: Abdominal ultrasound performed the same day. TECHNIQUE: Complete abdominal ultrasound. Color flow with spectral Doppler and waveform analysis performed.  FINDINGS: ABDOMINAL DUPLEX: The main, right and left portal veins are patent with hepatopedal fugal/reversed  "flow. The hepatic veins, IVC and splenic vein are patent with flow in the normal direction. The hepatic artery appears patent. Again noted is a cirrhotic configuration of the liver with perihepatic ascites.     1.  Patent main, right and left portal veins with hepatofugal/reversed flow. 2.  Patent inferior vena cava, hepatic veins and splenic vein with normal flow direction. 3.  Cirrhotic configuration of the liver with perihepatic ascites.    Poc Us 3cg Picc Placement Guidance    Result Date: 8/20/2020  Exam was performed as guidance for PICC line insertion.  Click \"PACS images\" hyperlink below to view any stored images.  For specific procedure details, view procedure note authored by PICC/Vascular Access Nurse.    Poc Us 3cg Picc Placement Guidance    Result Date: 8/4/2020  Exam was performed as guidance for PICC line insertion.  Click \"PACS images\" hyperlink below to view any stored images.  For specific procedure details, view procedure note authored by PICC/Vascular Access Nurse.    Ct Chest Abdomen Pelvis Without Oral With Iv Contrast    Result Date: 8/17/2020  EXAM: CT CHEST ABDOMEN PELVIS WO ORAL W IV CONTRAST LOCATION: [facility] DATE/TIME: 8/17/2020 12:22 PM INDICATION: Sepsis of uncertain etiology. COMPARISON: Abdominal ultrasound 8/13/2020, CT chest 11/20/2020 and CT abdomen pelvis 8/3/2020 TECHNIQUE: CT scan of the chest, abdomen, and pelvis was performed following injection of IV contrast. Multiplanar reformats were obtained. Dose reduction techniques were used. CONTRAST: 100 mL Omnipaque 350 FINDINGS: LUNGS AND PLEURA: Extensive upper lung predominant emphysema. Subsegmental atelectasis within the right lower lobe. Small amount of endobronchial airway secretions in the lower lobes, greater on the right. Tiny focus of consolidation within the medial left lower lobe image 114. No Pleural effusion. MEDIASTINUM/AXILLAE: Right upper extremity PICC. No mass/adenopathy nor pericardial effusion. " Moderate coronary artery calcifications. HEPATOBILIARY: Cirrhotic appearing liver without focal mass. Recanalized paraumbilical vein. Gallbladder wall thickening could be secondary to chronic liver disease. No bile duct dilatation. PANCREAS: No significant mass, duct dilatation, or inflammatory change. SPLEEN: Stable borderline splenomegaly measuring 12.8 cm. Splenic vein is patent. ADRENAL GLANDS: No significant nodules. KIDNEYS/BLADDER: No significant mass, stone, or hydronephrosis. BOWEL: New right-sided colitis extending from the cecum to the proximal transverse colon could be infectious or ischemic. No pneumatosis. New mild ascites extending from right upper quadrant to the pelvis. LYMPH NODES: No lymphadenopathy. VASCULATURE: Prominent calcified esophageal varices. Extensive aortoiliac atherosclerosis. PELVIC ORGANS: Normal-appearing uterus and ovaries. MUSCULOSKELETAL: No suspicious osseous lesions.     1.  Left-sided colitis extending from cecum to proximal transverse colon. Differential considerations would include both infectious and ischemic etiologies. New mild ascites in the right abdomen with no evidence of pneumatosis, perforation nor abscess. 2.  Extensive emphysema. Bibasilar subsegmental atelectasis with no focal pneumonia nor pleural effusion. 3.  Cirrhotic appearing liver with gastroesophageal varices and stable borderline splenomegaly.

## 2021-06-10 NOTE — TELEPHONE ENCOUNTER
----- Message from Jennifer Duran RN sent at 8/6/2020  1:17 PM CDT -----  Regarding: FW: hospital d/c follow up with [facility]    ----- Message -----  From: Marcos Younger MD  Sent: 8/6/2020   9:33 AM CDT  To: [facility]  Subject: hospital d/c follow up with [facility] Spear  4-6 weeks. Can be phone visit if needed  Needs CT chest (will order)  Thx  AS

## 2021-06-10 NOTE — PLAN OF CARE
Problem: Pain  Goal: Patient's pain/discomfort is manageable  Outcome: Progressing   Patient denies pain this shift.  Repositioning q2 hours for comfort and skin care.    Problem: Potential for Falls  Goal: Patient will remain free of falls  Outcome: Progressing   PT and OT working with patient.  Assist x2 with transfers and walker.    Problem: Discharge Barriers  Goal: Patient's discharge needs are met  Outcome: Progressing   IVF stopped, IV lasix, O2 needs increased today to 7L oxymyzer, Chest xray and hida scan.  ID following.  Brother Andrea called and seemed very concerned about his sister.  With her permission his phone call was transferred into her and she said it was ok to tell him how long she had been hospitalized and her admitting dx.

## 2021-06-10 NOTE — PROGRESS NOTES
US abdomen result discussed with radiologist, stated that with these imaging he can not say for sure that she has cholecystitis. She has no gall bladder wall thickening, CBD is UNL 6 mm.     Her WBC trending down,   Her Alk ph is elevated but it is not a new finding.   Lactic acid decreased from 10.5 yesterday to 2.7  Will continue IV antibiotic and monitor her symptoms     Ella Bond MD  [facility]  Internal Medicine

## 2021-06-10 NOTE — PROGRESS NOTES
Psychiatric Progress Note  Metabolic/hepatic encephalopathy, mild intermittent in the context of a prolonged hospitalization, medical condition, COPD exacerbation, sepsis.  Cognitive and behavioral changes due to above.  Bipolar affective disorder type I by history; mixed without suicidal ideation intent or plan.  History of PTSD.  Sleep difficulties due to above.      PLAN/RECOMMENDATIONS:  Keppra 250 mg at bedtime.  Patient is doing well currently on Keppra, continue to monitor.    SUBJECTIVE:  Continues to feel tired and fatigued.  Needing assist with ADLs.  She denies any racing thoughts, mood fluctuations or juliana.  Denies any thoughts of dying.    MENTAL STATUS EXAMINATION:   Speech is slow, hypophonic.  Thought processes with increased latency.  Thought content does not show hallucinations, delusions or paranoia.  No juliana or hypomania.  Language is intact.  Judgment, insight, memory, attention, comprehension, fund of knowledge are depressed but improving.  Awake, orientation x2.  Gait and ambulation with assist, psychomotor slow, no clear EPS.  Affect is neutral mood congruent.  Suicidality: Absent  Vital signs in last 24 hours  Temp:  [97.6  F (36.4  C)-98.9  F (37.2  C)] 98.9  F (37.2  C)  Heart Rate:  [108-120] 115  Resp:  [20] 20  BP: (108-158)/(53-81) 117/81  Weight:   159 lb (72.1 kg)

## 2021-06-10 NOTE — PROGRESS NOTES
[facility] MEDICINE PROGRESS NOTE      Identification/Summary: Keiko Guo is a 60 y.o. female with a past medical history of tobacco use, alcohol use disorder, bipolar disorder, previous suicide attempt with overdose, GÉNESIS, PAD, worsening dementia and imbalance (possible Warnicke encephalopathy), and radiographic emphysema (presumed COPD) who was admitted on 8/3/2020 for abdominal pain, lactic acidosis, hyperbilirubinemia, acute respiratory failure with hypoxia, and acute encephalopathy. Hospital course was notable for severe hypoxia requiring up to 60 L HF NC and 55% FiO2, guarded prognosis, and ongoing encephalopathy.  Hypoxic respiratory failure likely secondary to combination of severe emphysema, volume overload/pulmonary edema, possible COPD exacerbation, and/or CAP VS aspiration.  She was treated with antibiotics, diuresis.    Her course has been complicated by agitation, confusion. Psychiatry consulted to assist. She is also medically complex with liver failure, possible hepatic encephalopathy, acute respiratory failure. Unfortunately, on 8/17, she developed sepsis with rising WBC, tachycardia, elevated lactic acid and guarding to palpation in the RUQ; she was found to have developed severe sepsis 2/2 colitis, with lactic acid peaked to 5.6 at 19:49 on 8/17; IVF and ciprofloxacin/metronidazole started and lactic acid has downtrended to < 3.0 at this point; ID was consulted on 8/18 for further assistance and did further broaden to meropenem. Prognosis remains guarded, hospice has been in-touch with the patient's daughter Telma. Telemetry started given sepsis which we will continue for at least another 24-hours. Hyperbilirubinemia noted in mid 5's, and noted for the entirety of her hospitalization has mostly been between 5-6 and no recent or acute changes.     Given her known Wernicke's encephalopathy as well as hepatic encephalopathy, and certainly with hospital-associated delirium superimposed,  I'm not convinced she has capacity to make meaningful medical decisions that would have been consistent with her pre-sick values and thoughts. Her daughter Telma would be proxy as POA. Will discuss with psychiatry regarding capacity and proxy decision making. Will plan to call and update her daughter/POA Telma later today as well.     Assessment and Plan:  Ischemic Colitis on CT, 8/17  Severe sepsis 2/2 colitis  Lactic Acidosis to 5.6 (8/17), improving  RUQ pain  -More severe pain on 8/17, guarding to palpation  -Not a good surgical candidate  -Has had multiple evals by GI, surgery, multiple RUQ ultrasound  -On 8/17 developed sepsis with tachy with elevated WBC, elevated lactic acid; a STAT CT was ordered, which demonstrated new colitis and IV ciprofloxacin and IV metronidazole started.  -Severe sepsis by definition with lactic acid > 4.0. Peaked to 5.6 overnight at 19:49.  -Lactic acid has downtrended to < 3.0 at this point; ID was consulted on 8/18 for further assistance and did further broaden to meropenem.    Acute Hypoxic Respiratory Failure, multifactorial - ongoing, now up to 2 L of O2   Severe Emphysema with COPD Acute Exacerbation  Aspiration PNA vs CAP, SARS-CoV-2 negative-completed course of antibiotics  Pulmonary Edema, Volume Overload  -She is clinically improved from a respiratory standpoint at this time  -Required HFNC 60 L in ICU initially; improved to room air transiently.  -Now back up to 2 L of O2, 4 L transiently overnight, weaning down to goal 88-92%.  -Currently on prednisone taper  -Seems like events leading to presentation were: Abdominal pain or back pain, taking opiates at home leading to somnolence, respiratory acidosis, possible aspiration  -At baseline her health is very poor, she has a history of alcohol abuse  -Her pulmonary status may be at baseline at this time  -I've personally checked the MAR - she already completed a 5 day course of azithromycin and 7-day course of Zosyn to cover CAP  as well as aspiration PNA organism.    Alcohol abuse/dependence  Wernicke encephalopathy  Acute metabolic encephalopathy, questionable hepatic encephalopathy  Bipolar disorder versus anxiety/depression at baseline-on Seroquel at home  -Alert now but still very confused  -Has asterixis  -Continue thiamine supplement  -Trying low-dose lactulose to see if this improves some of her confusion  --Psychiatry consulted, appreciate their recommendations  Per note scheduled Depakote 3 times daily, Keppra at bedtime, melatonin at bedtime   -Clinically very consistent with Wernicke's  -Agree that Hospice at a facility would be appropriate    Abdominal pain  Acutely elevated liver enzymes, acute on chronic thrombocytopenia  Recent Hep B infection  Jaundice, hepatic fibrosis  Likely acute liver failure in setting of alcoholic cirrhosis  -Appreciate GI recs    Moderate to severe malnutrition  -Now able to tolerate oral intake, less alert today  -Albumin very low at 1.7  -Appreciate dietitian recommendations    Hypernatremia, resolved  -Oral intake improved, fluids stopped    Advanced Care Planning  -She is DNR and is a hospice candidate if pt/family are interested at dc  -Not comfort care at this time   -Appreciate hospice and palliative care help with goals/options at dc  -Given her known Wernicke's encephalopathy as well as hepatic encephalopathy, and certainly with hospital-associated delirium superimposed, I'm not convinced she has capacity to make meaningful medical decisions that would have been consistent with her pre-sick values and thoughts. Her daughter Telma would be proxy as POA. Will discuss with psychiatry regarding capacity and proxy decision making.     Prolonged QTC  - on last check  - avoid QT prolonging meds as able, replace mag and potassium prn    Diet: Diet Dysphagia I (Pureed), 2 Gm Na; Honey thick liquids LIQUIDS BY SPOON  Dietary nutrition supplements Magic cup; BID with meals  DVT Prophylaxis:  VTE  "Prophylaxis contraindicated due to thrombocytopenia  Code Status: No CPR- Do NOT Intubate  Disposition: Stay today as medications are being adjusted.  Needs to be off of one-to-one, able to tolerate oral intake or be comfort care/hospice to discharge.    Interval History/Subjective:  BRIDGETT     Appears to have relatively improved with meropenem and fluids. Overall context is still concerning and prognosis remains guarded. While she is able to state her name and location as [place], she does not know the month, year, date, context, or location ([facility]). She states her abdominal pain is improved, still endorses dry mouth. She states she \"want to drink water\" and overnight told the RN that \"just let me die.\" This morning she does not state that but is clearly upset she cannot drink regular water and has diet restrictions.     Physical Exam/Objective:  Vitals I/O   Temp:  [96.9  F (36.1  C)-98.2  F (36.8  C)] 97.5  F (36.4  C)  Heart Rate:  [114] 114  Resp:  [18-20] 18  BP: (123-137)/(64-68) 123/65  SpO2:  [83 %-95 %] 95 %  I/O last 3 completed shifts:  In: 1489 [P.O.:220; I.V.:1219; IV Piggyback:50]  Out: -    159 lb (72.1 kg)  Body mass index is 29.08 kg/m .    Physical Exam:    GENERAL:  Alert, appears comfortable, in no acute distress, appears stated age   HEAD:  Normocephalic, without obvious abnormality, atraumatic   EYES:  PERRL, conjunctiva/corneas clear, no scleral icterus, EOM's intact   NOSE: Nares normal, septum midline, mucosa normal, no drainage   THROAT: Lips, mucosa, and tongue normal; teeth and gums normal, mouth moist   NECK: Supple, symmetrical, trachea midline   BACK:   Symmetric, no curvature, ROM normal   LUNGS:   Clear to auscultation bilaterally, no rales, rhonchi, or wheezing, symmetric chest rise on inhalation, respirations unlabored   CHEST WALL:  No tenderness or deformity   HEART:  Regular rate and rhythm, S1 and S2 normal, no murmur, rub, or gallop    ABDOMEN:   Soft, TTP diffusely and worse " in right-side compared to left, no peritoneal finding, no involuntary guarding, bowel sounds hyperactive all four quadrants, no masses, no organomegaly, no rebound or guarding   EXTREMITIES: Extremities normal, atraumatic, no cyanosis or edema    SKIN: Dry to touch, no exanthems in the visualized areas   NEURO: Alert, oriented to self/name and building name only but not to context, year, month, date, time moves all four extremities freely   PSYCH: Mostly cooperative, behavior is appropriate          Medications:   Personally Reviewed.    levETIRAcetam  250 mg Oral QHS     melatonin  3 mg Oral QHS     meropenem  1 g Intravenous Q8H     nicotine  1 patch Transdermal DAILY     omeprazole  20 mg Oral BID     predniSONE  15 mg Oral Daily with brkfst    Followed by     [START ON 8/22/2020] predniSONE  10 mg Oral Daily with brkfst    Followed by     [START ON 8/25/2020] predniSONE  5 mg Oral Daily with brkfst     sodium chloride  10-30 mL Intravenous Q8H FIXED TIMES     sodium chloride  3 mL Intravenous Line Care     thiamine  100 mg Oral DAILY           Dalton Greenberg MD  Internal Medicine  Hospitalist  [facility]  Phone: #[phone]

## 2021-06-10 NOTE — PLAN OF CARE
Problem: Pain  Goal: Patient's pain/discomfort is manageable  Outcome: Progressing     Problem: Safety  Goal: Patient will be injury free during hospitalization  Outcome: Progressing     Problem: Daily Care  Goal: Daily care needs are met  Outcome: Progressing    Pt denies pain at this time, will continue to monitor. Pt on bed alarms for safety. Pt is being turned every two hours. IV fluids running. Pt is using oxygen via nasal cannula. IV antibiotics given. Pills crushed with pudding. Lactic acid has been critical, MD notified x2, orders to give 2 fluids boluses on this shift, will recheck lactic. Need stool sample. Enteric precautions.

## 2021-06-10 NOTE — PLAN OF CARE
Care Plan-Care Management  Care Management Goals of the Day: progression of care    Care Progression Reviewed With: Charge RN, RNCM, CMSW, BRENDAN, MD  Barriers to Discharge: Clinical Progression, Palliative and Hospice consulting  Discharge Disposition: TBD   Expected Discharge Date: TBD   Transportation: TBD     Care Coordination Narrative: A care conference was held today with MD, Hospice and Palliative care.  See notes for information. CM will follow-up on  any recommendations.     3:14 PM  08/20/20  SREEDHAR Montez

## 2021-06-10 NOTE — PROGRESS NOTES
INFECTIOUS DISEASE FOLLOW UP NOTE    Date: 8/25/2020    ASSESSMENT:  Keiko Guo is a 60 y.o. female with   1. Leukocytosis, elevated lactate, abdominal pain. Etiology is not clear. Concern for acute cholecystitis with high alk phos and bili. HIDA on 8/24/2020 with no evidence of obstructed cystic duct. Could also have ischemic colitis. On IV Meropenem, first dose on 8/18/2020. WBC at 14.7 on 8/24, normal on 8/25/2020.  2. Recent acute on chronic hypoxic respiratory failure, thought secondary to combination of Severe COPD plus volume overload and possible PNA.Treated with antibiotic. Now requiring more oxygen, 7 L. Has wheezes on exam. Pulmonary edema? COPD exacerbation. CXR with finding suggestive of pulmonary edema.  We have now normal WBC and no fever, I see no need for coverage of MRSA.  3. Positive Hep B core Ab suggestive of recent infection. Hep B s Ag negative indicating lack of active disease.   4. Alcohol use, cirrhosis  5. Thrombocytopenia.  Severe.  6. Wernicke Encephalopathy/dementia.   7. COVID-19 adequately ruled out by testing.   8. Goals of care: she states that she does not want further testing or procedures, however she has encephalopathy. Her daughter who is POA wants to pursue evaluation for reversible causes of symptoms and is not ready to have her in hospice.     PLAN:  Meropenem for now.  Diuretics per primary team.  Ethics team following.    Discussed with nursing staff, and the patient.    ID will follow.        FERNANDO Mcmahan MD  [facility]  [phone] [facility] paging    ______________________________________________________________________    SUBJECTIVE / INTERVAL HISTORY: Doing about the same respiratory status wise.  Remains on 7 L of oxygen.  WBC normalized.  HIDA scan negative.    ROS: deconditioned. All other systems negative except as listed above.    The following is a nice summary by Dr. Brower adopted for good understanding of the  case.  Keiko Guo is a 60 y.o. female with a past medical history of tobacco use, alcohol use disorder, bipolar disorder, previous suicide attempt with overdose, GÉNESIS, PAD, worsening dementia and imbalance (possible Warnicke encephalopathy), and radiographic emphysema (presumed COPD) who was admitted on 8/3/2020 for abdominal pain, lactic acidosis, hyperbilirubinemia, acute respiratory failure with hypoxia, and acute encephalopathy. Hospital course was notable for severe hypoxia requiring up to 60 L HF NC and 55% FiO2, guarded prognosis, and ongoing encephalopathy.  Hypoxic respiratory failure likely secondary to combination of severe emphysema, volume overload/pulmonary edema, possible COPD exacerbation, and/or CAP VS aspiration.  She was treated with antibiotics, diuresis.     Her course has been complicated by agitation, confusion. Psychiatry consulted to assist. She is also medically complex with liver failure, known Wernicke's encephalopathy complicated by hepatic encephalopathy, and acute respiratory failure. Unfortunately, on 8/17, she developed sepsis with rising WBC, tachycardia, elevated lactic acid and guarding to palpation in the RUQ; she was found to have developed severe sepsis 2/2 colitis; ID was consulted on 8/18 for further assistance and did further broaden to meropenem. Prognosis remains guarded, hospice has been in-touch with the patient's daughter Telma.      On 8/20/2020, we had family care conference directly on floor with 4-way - Palliative Care KAROLYN Hart, MYRNA Manuel, myself the attending MD, and patient's daughter/POA Telma; Telma was fully updated and all medical questions she had were answered several times and repeatedly. Conversation was tangential, circular, repetitive, and ultimately Telma decided, as current POA, to re-insert the PICC that Gali had removed earlier that day and to proceed with abdominal US and hepatic duplex and continue all current cares and continue current diet  "restrictions. She also stated her wishes for staff to not take the patient's comments, such as \"just let me die\" or \"just let me go home\" or \"just let me drink water please\" literally or seriously as Telma feels those comments are all made in an altered sensorium and not representative of her wishes as proxy/POA.      On , HIDA scan ordered to evaluated for acalculus cholecystitis, which she is certainly at risk for given her prolonged and severe illness with rising hyperbilirubinemia. Unfortunately she was not cooperative once physically inside the nuclear medicine radiology suite and HIDA scan attempt was discontinued. Order is still active and patient has agreed to it for tomorrow, . Our team has been in touch with Ethics team to review this case given several ongoing, daily concerns from RN, unit charge RN, several MD providers across different specialties. Patient herself has stated she wishes to discontinue current aggressive treatment and to let her eat what she wants and to go home; however, again POA daughter Telma currently has MDM and wishes for aggressive work-up and care. OT was re-consulted to perform updated cognitive testing on 2020 and SLUMS was . IR informed me that they will perform HIDA again on Monday if she remains cooperative. HIDA scan planned for tomorrow, . If HIDA scan positive, she would then require percutaneous cholecystostomy tube. Ethics team review ongoing/in-process.         OBJECTIVE:  Vitals:    20 0900   BP:    Pulse:    Resp:    Temp:    SpO2: (!) 85%     FiO2 (%): 35 %    Vital Signs  Temp: 98.3  F (36.8  C)  Temp Source: Oral  Heart Rate: (!) 111  Heart Rate Source: Machine/Monitor  Resp: 18  BP: 109/59  MAP (mmHg) (Calculated): 76  BP Location: Right arm  BP Method: Machine/Monitor  Patient Position: Semi-hopper    Temp (24hrs), Av.7  F (36.5  C), Min:97.2  F (36.2  C), Max:98.3  F (36.8  C)      GENERAL: chronically ill. Oriented to " diane, knows it is 2020, cannot come up with the month.   EYES: icterus  HEAD, EARS, NOSE, MOUTH, AND THROAT: edentulous  RESPIRATORY: Wheezes and crackles bilaterally.  CARDIOVASCULAR: Regular rate and rhythm, normal S1 and S2, no murmurs, rubs, or gallops.  ABDOMEN: Soft, no tenderness today.  Normal bowel sounds.  MUSCULOSKELETAL: No synovitis.  LYMPHATIC: No cervical, supraclavicular, axillary, or inguinal lymphadenopathy.  SKIN/HAIR/NAILS: jaundice. Some bruising on forehead.   NEUROLOGIC: Grossly intact. Generally weak.  PICC R UE        Antibiotics:  Meropenem 8/18-    cipro 8/17-18  Flagyl 8/17-18  Pip/tazo 8/3-9  Azithromycin 8/3-7  Ctx 8/3  Vancomycin 8/3    Pertinent labs:    Results from last 7 days   Lab Units 08/25/20  0615 08/24/20  1801 08/24/20  0826 08/20/20  0253   LN-WHITE BLOOD CELL COUNT thou/uL 10.1  --  14.7* 12.3*   LN-HEMOGLOBIN g/dL 9.6* 10.4* 10.6* 11.9*   LN-HEMATOCRIT % 28.0*  --  31.0* 35.5   LN-PLATELET COUNT thou/uL 23*  --  9* 18*        Results from last 7 days   Lab Units 08/25/20  0615 08/24/20  0826 08/23/20  0618  08/20/20  0253   LN-SODIUM mmol/L 134* 130*  --   --  137   LN-POTASSIUM mmol/L 4.2 4.2 3.8   < > 3.7   LN-CHLORIDE mmol/L 110* 107  --   --  108*   LN-CO2 mmol/L 19* 14*  --   --  22   LN-BLOOD UREA NITROGEN mg/dL 18 15  --   --  16   LN-CREATININE mg/dL 0.62 0.62  --   --  0.70   LN-CALCIUM mg/dL 7.8* 7.3*  --   --  7.5*    < > = values in this interval not displayed.      No results found for: CRP   No results found for: SEDRATE    Lab Results   Component Value Date    ALT 79 (H) 08/24/2020    AST 90 (H) 08/24/2020    ALKPHOS 336 (H) 08/24/2020    BILITOT 6.7 (H) 08/24/2020         MICROBIOLOGY DATA:  8/17 blood no growth to date   8/4 blood negative     Hep B core IgM positive    RADIOLOGY:  Xr Chest 1 View Portable    Result Date: 8/24/2020  EXAM: XR CHEST 1 VIEW PORTABLE LOCATION: [facility] DATE/TIME: 8/24/2020 9:04 AM INDICATION: worsening hypoxia  COMPARISON: Chest CT 08/17/2020 and older studies, chest x-ray 08/10/2020     Right upper extremity PICC line catheter has been removed and there is a nail left sided PICC line catheter with its tip along the right lateral wall of the innominate vein confluence. Low lung volumes. Diffuse, fine reticular opacities seem more prominent due to the low lung volumes but are likely unchanged. No pneumothorax. Heart is enlarged and unchanged.    Xr Chest 1 View Portable    Result Date: 8/10/2020  EXAM: XR CHEST 1 VIEW PORTABLE LOCATION: [facility] DATE/TIME: 8/10/2020 10:45 AM INDICATION: persistent hypoxia COMPARISON: CT chest 8/3/2020     Stable diffuse reticulation and groundglass opacification likely in part part related to chronic fibrotic and emphysematous changes seen on comparison CT. Cannot exclude superimposed edema or atypical pneumonia. No focal consolidation or pleural effusion. Stable heart size.    Ct Head Without Contrast    Result Date: 8/3/2020  EXAM: CT HEAD WO CONTRAST LOCATION: [facility] DATE/TIME: 8/3/2020 5:04 PM INDICATION: Head trauma, abnormal mental status (Age 19-64y) Fall, bruising to face COMPARISON: Head CT 12/20/2017 and MRI brain 07/23/2017. TECHNIQUE: Routine without IV contrast. Multiplanar reformats. Dose reduction techniques were used. FINDINGS: No evidence of acute intracranial hemorrhage or mass effect. 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 Brain attenuation morphology otherwise normal. The ventricles and sulci are normal for age. Normal gray-white matter differentiation. The basilar cisterns are patent. The globes are unremarkable. The partially imaged paranasal sinuses demonstrate air-fluid level within the right maxillary sinus which could represent acute sinusitis in the appropriate setting. The partially imaged  paranasal sinuses are otherwise unremarkable. The mastoid air cells and middle ear cavities are unremarkable. The visualized skull base and calvarium are unremarkable. The     1.  No evidence of acute intracranial hemorrhage or mass effect. 2.  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 3.  Air-fluid level within the right maxillary sinus which could represent acute sinusitis in the appropriate setting.    Ct Chest Without Contrast    Result Date: 8/11/2020  EXAM: CT CHEST WO CONTRAST LOCATION: [facility] DATE/TIME: 8/11/2020 8:11 PM INDICATION: Respiratory illness, acute (Age > 40y) Shortness of breath persistent hypoxemia despite max diuresis. re-eval opacities COMPARISON: CT chest 08/03/2020, 07/12/2019 TECHNIQUE: CT chest without IV contrast. Multiplanar reformats were obtained. Dose reduction techniques were used. CONTRAST: None. FINDINGS: LUNGS AND PLEURA: Mild to moderate tracheal secretions. Moderate to severe emphysema with associated interlobular septal thickening. Mosaic lung attenuation. No focal consolidation or pleural effusion. No mass or definite suspicious nodule. MEDIASTINUM/AXILLAE: Upper normal heart size. No pericardial effusion. Moderate to severe coronary artery calcifications. UPPER ABDOMEN: Stable soft tissue density nodules adjacent to the left adrenal gland. MUSCULOSKELETAL: Spinal degenerative changes.     1.  Moderate to severe emphysema. 2.  Interlobular septal thickening and groundglass pulmonary opacities likely reflecting pulmonary edema, increased since the CT from 08/03/2020. No pneumonic consolidation or pleural effusion. 3.  Mild to moderate tracheal secretions.     Mr Brain With Without Contrast    Result Date: 7/23/2020  EXAM: MR BRAIN W WO CONTRAST LOCATION: [facility] DATE/TIME: 7/23/2020 5:51 PM INDICATION:  WORSENING ATAXIA-MENTAL STATUS COMPARISON: MRI head 10/08/2016 from [facility]. CT head 12/20/2017 from [facility]. CONTRAST: Gadavist 8 TECHNIQUE: Routine multiplanar multisequence head MRI without and with intravenous contrast. FINDINGS: INTRACRANIAL CONTENTS: No acute or subacute infarct. Again seen is a shunting type moderate-sized arteriovenous malformation in the parasagittal right parietal region superiorly and posteriorly. It measures at least 3.5 cm AP x 2 cm transverse x 4 cm craniocaudad. It has a very similar appearance to the MRI 10/08/2016 from [facility]. No associated new hemorrhage, edema, or mass effect. Scattered nonspecific T2/FLAIR hyperintensities within the cerebral white matter most consistent with mild chronic microvascular ischemic change. Mild generalized cerebral atrophy. No hydrocephalus. Normal position of the cerebellar tonsils. No pathologic contrast enhancement of the brain parenchyma or meninges allowing for some vascular enhancement with the right parietal AVM. SELLA: No abnormality accounting for technique. OSSEOUS STRUCTURES/SOFT TISSUES: Normal marrow signal. The major intracranial vascular flow voids are maintained. ORBITS: No abnormality accounting for technique. SINUSES/MASTOIDS: No paranasal sinus mucosal disease. No middle ear or mastoid effusion.     1.  Again seen is a shunting type moderate-sized arteriovenous malformation in the parasagittal right parietal area superiorly and posteriorly measuring approximately 3.5 cm x 2 cm x 4 cm. It is not changed significantly when compared to MRI head 10/08/2016 from [facility]. No new hemorrhage, edema or mass effect. Follow-up conventional angiography could be considered. 2.  No evidence for new infarct, hemorrhage elsewhere, hydrocephalus or intracranial mass. 3.  Mild age-related changes.     Xr Swallow Study W Speech    Result Date: 8/13/2020  EXAM: XR SWALLOW STUDY W SPEECH OR OT LOCATION: [facility] DATE/TIME: 8/13/2020 2:47 PM INDICATION: Difficulty swallowing. COMPARISON: None. TECHNIQUE: Routine. FINDINGS: FLUOROSCOPIC TIME: 2.7 minutes NUMBER OF IMAGES: 0 Swallow study with Speech Pathology using multiple barium thicknesses. Mendez aspiration with nectar consistency that is silent. Thin liquids not tried. With honey consistency and pudding there is premature spill to the vallecula but no penetration or aspiration and otherwise normal swallowing reflex.     1.  High risk for aspiration with nectar consistency and likely thin. 2.  Patient should build tolerate honey and pudding consistency.     Cta Chest Pe Run    Result Date: 8/3/2020  EXAM: CTA CHEST PE RUN LOCATION: [facility] DATE/TIME: 8/3/2020 5:01 PM INDICATION: PE suspected, high pretest prob SOB, tachycardia COMPARISON: CTA chest, abdomen and pelvis 07/12/2019 TECHNIQUE: CT chest pulmonary angiogram during arterial phase injection of IV contrast. Multiplanar reformats and MIP reconstructions were performed. Dose reduction techniques were used. CONTRAST: Iohexol (Omni) 100 mL FINDINGS: ANGIOGRAM CHEST: The right and left main pulmonary arteries and the segmental vessels are all patent without evidence for emboli. Suboptimal opacification involving the subsegmental vessels to both lower lobes. Thoracic aorta is negative for dissection. No CT evidence of right heart strain. LUNGS AND PLEURA: Advanced centrilobular emphysema. Extensive airspace and groundglass opacities throughout both upper lobes, with additional interstitial opacities throughout the mid and lower lung fields, all of which are new since the previous study. Subsegmental atelectasis in the bases. No effusions. MEDIASTINUM/AXILLAE: Numerous borderline enlarged mediastinal and hilar nodes with minimal change. UPPER ABDOMEN: Advanced atherosclerotic disease. Splenomegaly. Trace ascites adjacent to the liver. Collateral vessels along the lesser curvature the stomach  MUSCULOSKELETAL: Hypertrophic changes thoracic spine.     1.  No evidence for central pulmonary emboli. The peripheral subsegmental vessels to both lower lobes are suboptimally opacified. 2.  Advanced centrilobular emphysema. 3.  Extensive groundglass and airspace infiltrates within both upper lobes concerning for pneumonia, including Covid 19. There are additional diffuse interstitial densities both lungs likely representing edema.     Us Venous Legs Bilateral    Result Date: 8/11/2020  EXAM: US VENOUS LEGS BILATERAL LOCATION: [facility] DATE/TIME: 8/11/2020 12:31 PM INDICATION: hypoxia, eval for DVT COMPARISON: None. TECHNIQUE: Venous Duplex ultrasound of bilateral lower extremities with and without compression, augmentation and duplex. Color flow and spectral Doppler with waveform analysis performed. FINDINGS: Exam includes the common femoral, femoral, popliteal veins as well as segmentally visualized deep calf veins and greater saphenous vein. RIGHT: No deep vein thrombosis. No superficial thrombophlebitis. No popliteal cyst. LEFT: No deep vein thrombosis. No superficial thrombophlebitis. No popliteal cyst.     1.  No deep venous thrombosis in the bilateral lower extremities.    Ct Abdomen Pelvis Without Oral With Iv Contrast    Result Date: 8/3/2020  EXAM: CT ABDOMEN PELVIS WO ORAL W IV CONTRAST LOCATION: [facility] DATE/TIME: 8/3/2020 5:03 PM INDICATION: RLQ abdominal pain, appendicitis suspected (Age > 14y) Right sided abdominal pain COMPARISON: None. TECHNIQUE: CT scan of the abdomen and pelvis was performed following injection of IV contrast. Multiplanar reformats were obtained. Dose reduction techniques were used. CONTRAST: Iohexol (Omni) 100 mL FINDINGS: LOWER CHEST: Diffuse interstitial opacities throughout the visualized lower lung fields. HEPATOBILIARY: Trace ascites adjacent to the liver. Moderate distention of the gallbladder. PANCREAS: Normal. SPLEEN: Borderline splenomegaly is  unchanged measuring 13 cm with trace adjacent fluid. ADRENAL GLANDS: Normal. KIDNEYS/BLADDER: Normal. BOWEL: The appendix is identified within the mid right pelvis and is of normal caliber containing a tiny amount of air. There is some minimal edema adjacent to the appendix and cecum. Slight wall thickening involving the cecum and ascending colon with a small amount of adjacent ascites. No evidence for bowel obstruction. LYMPH NODES: Normal. VASCULATURE: Advanced atherosclerotic disease abdominal aorta and iliac arteries with high-grade stenosis involving the proximal right and left common iliac arteries. Prominent collateral vessels along the lesser curvature of the stomach are more distended compared to the prior study and could be secondary to portal venous hypertension. PELVIC ORGANS: Normal. MUSCULOSKELETAL: Small fat-containing ventral hernia.     1.  Appendix is of normal caliber. There is a small amount of ascites adjacent to the base of the cecum and ascending colon with slight wall thickening which could represent an acute colitis. No evidence for bowel obstruction. 2.  Trace ascites adjacent to the liver and spleen. 3.  Advanced atherosclerotic disease. 4.  Prominent collateral vessels/possible gastric varices lesser curvature of the stomach. 5.  Interstitial opacities throughout the visualized lower lung fields. Please refer to CTA chest report for further discussion.    Nm Hepatobiliary Wo Ef Or Pharm    Result Date: 8/24/2020  EXAM: NM HEPATOBILIARY WO EF OR PHARM LOCATION: [facility] DATE/TIME: 8/24/2020 4:39 PM INDICATION: Cholelithiasis Pericholecystic fluid. Has alcoholic liver disease, elevated bilirubin. COMPARISON: 08/20/2020 ultrasound, 08/17/2020 CT TECHNIQUE: 7.7 mCi of Tc-99m mebrofenin, IV fluid anterior planar imaging of the abdomen for 60 minutes. Equivocal gallbladder visualization therefore morphine 3.8 mg IV administered followed by anterior planar imaging for 30 minutes.  FINDINGS: Normal radionuclide activity in the gallbladder, bile ducts and small bowel with no evidence of cystic or common duct obstruction. Abnormal persistent hepatic parenchymal and blood pool activity consistent with impaired hepatocellular uptake and excretion.     1.  Normal radionuclide activity in the gallbladder, bile ducts and small bowel with no evidence of cystic or common duct obstruction. 2.  Abnormal persistent hepatic parenchymal and blood pool activity consistent with impaired hepatocellular uptake and excretion.    Us Abdomen Limited    Result Date: 8/20/2020  EXAM: US ABDOMEN LIMITED LOCATION: [facility] DATE/TIME: 8/20/2020 5:07 PM INDICATION: Ascites. Hyperbilirubinemia. COMPARISON: CT of the abdomen and pelvis 08/17/2020; abdominal ultrasound 08/13/2020 TECHNIQUE: Limited abdominal ultrasound. FINDINGS: GALLBLADDER: Nondistended gallbladder. Diffuse gallbladder wall thickening measuring up to 8 mm. No echogenic calculi or billary sludge are detected. There is mild pericholecystic fluid. BILE DUCTS: No biliary dilatation. The common duct measures 7 mm. LIVER: Coarse parenchymal echogenicity with nodular border consistent with cirrhosis. No focal mass. RIGHT KIDNEY: No hydronephrosis. PANCREAS: The visualized portions are normal. Moderate ascites in the right upper quadrant.     1.  Diffuse wall thickening, accentuated by decompression. New mild pericholecystic fluid. No cholelithiasis. Findings could relate to third spacing and underlying liver disease. Acalculous cholecystitis is in the differential diagnosis in setting of sepsis from colitis. Consider HIDA scan if patient is able. 2.  Cirrhosis. 3.  Moderate right upper quadrant abdominal ascites.    Us Abdomen Limited    Result Date: 8/13/2020  EXAM: US ABDOMEN LIMITED LOCATION: [facility] DATE/TIME: 8/13/2020 8:02 AM INDICATION: worsening LFT's right upper quad pain, eval for galbladder infection, or stones in the ducts  COMPARISON: None. TECHNIQUE: Limited abdominal ultrasound. FINDINGS: GALLBLADDER: Normal. No gallstones, wall thickening, or pericholecystic fluid. Negative sonographic Ludwig's sign. BILE DUCTS: No biliary dilatation. The common duct measures 4 mm. LIVER: Coarsened hepatic parenchymal echotexture. Appearance suggestive of underlying hepatic fibrosis. No focal mass. RIGHT KIDNEY: No hydronephrosis. PANCREAS: The visualized portions are normal. No ascites.     1.  Coarsened hepatic parenchymal echotexture concerning for underlying hepatic fibrosis. 2.  No other significant findings.    Us Abdomen Limited    Result Date: 8/4/2020  EXAM: US ABDOMEN LIMITED LOCATION: [facility] DATE/TIME: 8/4/2020 5:59 PM INDICATION: Hyperbilirubinemia, sepsis, evaluate for cholecystitis, CBD dilation COMPARISON: CT 8/3/2020 . TECHNIQUE: Limited abdominal ultrasound. FINDINGS: GALLBLADDER: Isoechoic intraluminal tissue related to the anterior wall of the gallbladder measuring approximately 2.5 x 2.0 x 1.0 cm has no internal Doppler flow. No significant gallbladder wall thickening with gallbladder wall thickness approximately 3  mm. Gallbladder otherwise negative. Negative sonographic Ludwig's sign. BILE DUCTS: No intrahepatic biliary dilatation. The common duct measures 6 mm, upper normal. LIVER: Coarsened hepatic parenchymal echotexture and slight hepatic contour irregularity. No focal mass. RIGHT KIDNEY: No hydronephrosis. PANCREAS: The visualized portions are normal. Trace ascites.     1.  Isoechoic intraluminal tissue related to the anterior wall of the gallbladder measuring approximately 2.5 x 2.0 x 1.0 cm indeterminate for adherent sludge material versus polyp but favor sludge material given lack of internal Doppler flow. 2.  No gallbladder wall thickening. 3.  No intrahepatic biliary dilatation. The common duct measures 6 mm, upper normal. 4.  Coarsened hepatic parenchymal echotexture and slight hepatic contour  irregularity suggestive of underlying hepatic fibrosis/cirrhosis. 5.  Trace ascites.    Us Venous Arms Bilateral    Result Date: 8/11/2020  EXAM: US VENOUS ARMS BILATERAL LOCATION: [facility] DATE/TIME: 8/11/2020 12:31 PM INDICATION: hypoxia, eval for DVT COMPARISON: None. TECHNIQUE: Venous Duplex ultrasound of both upper extremities with (when possible) and without compression, augmentation, and duplex. Color flow and spectral Doppler with waveform analysis performed. FINDINGS: Ultrasound includes evaluation of the internal jugular veins, innominate veins, subclavian veins, axillary veins, and brachial veins. The superficial cephalic and basilic veins were also evaluated where seen. RIGHT: No deep venous thrombosis. No superficial thrombophlebitis. LEFT: No deep venous thrombosis. No superficial thrombophlebitis.     1.  No deep venous thrombosis in the bilateral upper extremities.    Us Abdomen Duplex Hepatic And Portal Vasculature Complete    Result Date: 8/21/2020  EXAM: US ABDOMEN HEPATIC AND PORTAL VASCULATURE COMPLETE LOCATION: [facility] DATE/TIME: 8/20/2020 5:16 PM INDICATION: Evaluate for hepatic vein thrombus / Budd-Chiari /venous thromboembolism COMPARISON: Abdominal ultrasound performed the same day. TECHNIQUE: Complete abdominal ultrasound. Color flow with spectral Doppler and waveform analysis performed.  FINDINGS: ABDOMINAL DUPLEX: The main, right and left portal veins are patent with hepatopedal fugal/reversed flow. The hepatic veins, IVC and splenic vein are patent with flow in the normal direction. The hepatic artery appears patent. Again noted is a cirrhotic configuration of the liver with perihepatic ascites.     1.  Patent main, right and left portal veins with hepatofugal/reversed flow. 2.  Patent inferior vena cava, hepatic veins and splenic vein with normal flow direction. 3.  Cirrhotic configuration of the liver with perihepatic ascites.    Poc Us 3cg Picc Placement  "Guidance    Result Date: 8/20/2020  Exam was performed as guidance for PICC line insertion.  Click \"PACS images\" hyperlink below to view any stored images.  For specific procedure details, view procedure note authored by PICC/Vascular Access Nurse.    Poc Us 3cg Picc Placement Guidance    Result Date: 8/4/2020  Exam was performed as guidance for PICC line insertion.  Click \"PACS images\" hyperlink below to view any stored images.  For specific procedure details, view procedure note authored by PICC/Vascular Access Nurse.    Ct Chest Abdomen Pelvis Without Oral With Iv Contrast    Result Date: 8/17/2020  EXAM: CT CHEST ABDOMEN PELVIS WO ORAL W IV CONTRAST LOCATION: [facility] DATE/TIME: 8/17/2020 12:22 PM INDICATION: Sepsis of uncertain etiology. COMPARISON: Abdominal ultrasound 8/13/2020, CT chest 11/20/2020 and CT abdomen pelvis 8/3/2020 TECHNIQUE: CT scan of the chest, abdomen, and pelvis was performed following injection of IV contrast. Multiplanar reformats were obtained. Dose reduction techniques were used. CONTRAST: 100 mL Omnipaque 350 FINDINGS: LUNGS AND PLEURA: Extensive upper lung predominant emphysema. Subsegmental atelectasis within the right lower lobe. Small amount of endobronchial airway secretions in the lower lobes, greater on the right. Tiny focus of consolidation within the medial left lower lobe image 114. No Pleural effusion. MEDIASTINUM/AXILLAE: Right upper extremity PICC. No mass/adenopathy nor pericardial effusion. Moderate coronary artery calcifications. HEPATOBILIARY: Cirrhotic appearing liver without focal mass. Recanalized paraumbilical vein. Gallbladder wall thickening could be secondary to chronic liver disease. No bile duct dilatation. PANCREAS: No significant mass, duct dilatation, or inflammatory change. SPLEEN: Stable borderline splenomegaly measuring 12.8 cm. Splenic vein is patent. ADRENAL GLANDS: No significant nodules. KIDNEYS/BLADDER: No significant mass, stone, or " hydronephrosis. BOWEL: New right-sided colitis extending from the cecum to the proximal transverse colon could be infectious or ischemic. No pneumatosis. New mild ascites extending from right upper quadrant to the pelvis. LYMPH NODES: No lymphadenopathy. VASCULATURE: Prominent calcified esophageal varices. Extensive aortoiliac atherosclerosis. PELVIC ORGANS: Normal-appearing uterus and ovaries. MUSCULOSKELETAL: No suspicious osseous lesions.     1.  Left-sided colitis extending from cecum to proximal transverse colon. Differential considerations would include both infectious and ischemic etiologies. New mild ascites in the right abdomen with no evidence of pneumatosis, perforation nor abscess. 2.  Extensive emphysema. Bibasilar subsegmental atelectasis with no focal pneumonia nor pleural effusion. 3.  Cirrhotic appearing liver with gastroesophageal varices and stable borderline splenomegaly.

## 2021-06-10 NOTE — PLAN OF CARE
Problem: Risk for ineffective breathing pattern  Goal: Maintain effective breathing pattern with respiratory rate within normal range, lungs clear; be free of cyanosis and other signs/symptoms of hypoxia  Outcome: Progressing

## 2021-06-10 NOTE — PLAN OF CARE
Problem: Potential for Compromised Skin Integrity  Goal: Nutritional status is improving  Outcome: Progressing     Problem: Malnutrition  (NI-5.2)  Goal: Food and/or nutrient delivery  Outcome: Not Progressing    Considering TF nutrition support

## 2021-06-10 NOTE — PROGRESS NOTES
Informed of AM labs    Worsening LFTs - 8/4 has abnormal abdominal US -- will repeat. WBC 12 thousand (inc from 9 thousand).      Component      Latest Ref Rng & Units 8/10/2020 8/10/2020 8/10/2020 8/10/2020          12:00 AM  4:44 AM 12:46 PM  8:35 PM   Alkaline Phosphatase      45 - 120 U/L  169 (H)     AST      0 - 40 U/L  91 (H)     ALT      0 - 45 U/L  72 (H)       Component      Latest Ref Rng & Units 8/11/2020 8/11/2020 8/11/2020 8/12/2020           4:29 AM 12:48 PM  8:23 PM  4:56 AM   Alkaline Phosphatase      45 - 120 U/L 185 (H)   187 (H)   AST      0 - 40 U/L 128 (H)   122 (H)   ALT      0 - 45 U/L 109 (H)   122 (H)     Component      Latest Ref Rng & Units 8/12/2020 8/12/2020 8/13/2020           1:25 PM  8:13 PM    Alkaline Phosphatase      45 - 120 U/L   186 (H)   AST      0 - 40 U/L   127 (H)   ALT      0 - 45 U/L   135 (H)         Also informed of NSVTs -- last EKG showed QTc of >500 ---- will discuss with AM doc to consider stopping QTc prolonging drugs

## 2021-06-10 NOTE — PLAN OF CARE
Problem: Potential for Falls  Goal: Patient will remain free of falls  Outcome: Progressing   Remains free from falls, bed alarm, room close to nursing station with door open  Problem: Potential for Compromised Skin Integrity  Goal: Skin integrity is maintained or improved  Outcome: Progressing   Turning Q2h. Able to turn self side to side. Prefers to lay on right side.

## 2021-06-10 NOTE — PROGRESS NOTES
RESPIRATORY CARE NOTE     Patient Name: Keiko Guo  Today's Date: 8/8/2020       Pt continues to receive duo neb. BS are dem. Pt is on 50% of oxygen via BiPAP 14/8 RR16 , SpO2 is 95%. Post treatment there is increased aeration. Pt also perceives improvement.  RT encouraged deep breathing and coughing techniques .  RT will continue to monitor and assess.     Kushal Noyola, WILFRIDT

## 2021-06-10 NOTE — PLAN OF CARE
Problem: Impaired Gas Exchange  Goal: Demonstrate improved ventilation and adequate oxygenation of tissues as evidenced by absence of respiratory distress  Outcome: Progressing     Problem: Inadequate Gas Exchange  Goal: Patient will achieve/maintain normal respiratory rate/effort  Outcome: Progressing     Problem: Ineffective Airway Clearance  Goal: Maintain airway patency  Outcome: Progressing     Oswaldo Garcia, LRT

## 2021-06-10 NOTE — PROGRESS NOTES
Pulmonary Progress Note  8/7/2020      Admit Date: 8/3/2020  CODE: No CPR- Do NOT Intubate    Reason for Consult: hypoxemic resp failure, emphysema, CAP, AECPOD    Assessment/Plan:   60 year old female with a history of longstanding and active tobacco dependence, likely excessive alcohol use per her daughter, bipolar disorder with psychotic features and prior overdose suicide attempt, obesity, untreated GÉNESIS, peripheral arterial disease, recent worsening dementia and imbalance with presumed diagnosis of Wernicke encephalopathy, extensive radiographic emphysema with presumed COPD (No PFTs on record), admitted with abdominal pain, lactic acidosis, hyperbilirubinemia, dyspnea, acute respiratory failure with hypoxia, abnormal chest CT, and acute encephalopathy. Slight worsening oxygenation requiring increased NIPPV use, otherwise stable. diuresing well. Remains very tenuous with ongoing severe respiratory failure, delirium and encephalopathy. As noted yesterday, ongoing hypoxemic resp failure likely due to V/Q mismatching and shunting due to underlying severe emphysema, vol overload/pulmonary edema and COPD exacerbation +/- aspirtation vs. Community acquired pneumonia.   I spoke with her dtr Telma on the phone yesterday and discussed that her prognosis is extremely guarded.    Recommendations:  - titrate FiO2 for goal O2 sat 88-92%, avoid hyperoxia  - cont azithromycin for 5 days total; additional abx per ID  - cont BiPAP alternating with HHFNC as tolerated, settings now 14/6 FiO2 0.5, will increase EPAP to 8 given body habitus  - methylpred IV 40mg q8h  - TTE today.   - increase Lasix to 40mg IV q8h, renal function, lytes OK seems to be tolerating diuresis well.   - scheduled duonebs q6h, albuterol nebs prn  - etoh withdrawal/CIWA treatment per hospitalist  - DNR/DNI code status noted.  - appreciate palliative care consult.    Prognosis extremely guarded. Would recommend transition to comfort measures if  "oxygenation/clinical status continues to deteriorate. Her dtr Telma would like to be notified if this is the case.    Marcos (Shawn) MD Aren  [facility]  Pager [phone]  Clinic [phone]      Subjective/Interim Events:   Less confused, looks more awake this AM  Oxygenation definitely worse, now on BiPAP 14/6, 50% FiO2 with O2 sat 89-91%  Diuresed well to two times a day Lasix yesterday.  Unable to get further ROS.   Got some ativan this morning per CIWA protocol.       Wt down to 176 from 183 unclear if accurate, admission weight on 8/3 was 160 (likely not accurate)  In/out -1.8L last 24 hours, -550cc for admission, likely now accurate as does not have indwelling no    covid-19 PCR now negative x3.    Medications:       azithromycin  500 mg Intravenous Q24H     furosemide  40 mg Intravenous Q8H     ipratropium-albuteroL  3 mL Nebulization Q6H - RT     methylPREDNISolone sodium succinate  40 mg Intravenous Q8H     multivitamin therapeutic  1 tablet Oral DAILY     nicotine  1 patch Transdermal DAILY     omeprazole  20 mg Oral QAM (0630)     piperacillin-tazobactam  3.375 g Intravenous Q8H     QUEtiapine  300 mg Oral QHS     sodium chloride  10-30 mL Intravenous Q8H FIXED TIMES     thiamine (vitamin B1) IVPB  100 mg Intravenous DAILY         Exam/Data:   Vitals  BP (!) 126/93 (Patient Position: Semi-hopper) Comment: RN Aware  Pulse 95   Temp 98.7  F (37.1  C) (Axillary)   Resp 22   Ht 5' 2\" (1.575 m)   Wt 176 lb 12.8 oz (80.2 kg)   SpO2 90%   BMI 32.34 kg/m       I/O last 3 completed shifts:  In: 200 [IV Piggyback:200]  Out: 2025 [Urine:2025]  Weight change: -6 lb 4.8 oz (-2.858 kg)  Wt Readings from Last 3 Encounters:   08/07/20 176 lb 12.8 oz (80.2 kg)   02/28/20 183 lb (83 kg)   07/12/19 184 lb 8.4 oz (83.7 kg)     FiO2 (%):  [35 %-60 %] 50 %    EXAM:  Physical Exam  Gen: awake, alert, oriented, moderate distress  HEENT: NT, no BENNY  CV: RRR, no " m/g/r  Resp: CTAB  Abd: soft, nontender, BS+  Skin: no rashes or lesions  Ext: no edema  Neuro: PERRL, nonfocal exam    ROS:  A 10-system review was obtained and is negative with the exception of the symptoms noted above.    DATA:    PFT DATA   None available      Micro  PCT 2.84  Blood pending  No sputum or urine cx date    IMAGING:   Ct Head Without Contrast    Result Date: 8/3/2020  EXAM: CT HEAD WO CONTRAST LOCATION: [facility] DATE/TIME: 8/3/2020 5:04 PM INDICATION: Head trauma, abnormal mental status (Age 19-64y) Fall, bruising to face COMPARISON: Head CT 12/20/2017 and MRI brain 07/23/2017. TECHNIQUE: Routine without IV contrast. Multiplanar reformats. Dose reduction techniques were used. FINDINGS: No evidence of acute intracranial hemorrhage or mass effect. 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 Brain attenuation morphology otherwise normal. The ventricles and sulci are normal for age. Normal gray-white matter differentiation. The basilar cisterns are patent. The globes are unremarkable. The partially imaged paranasal sinuses demonstrate air-fluid level within the right maxillary sinus which could represent acute sinusitis in the appropriate setting. The partially imaged paranasal sinuses are otherwise unremarkable. The mastoid air cells and middle ear cavities are unremarkable. The visualized skull base and calvarium are unremarkable. The     1.  No evidence of acute intracranial hemorrhage or mass effect. 2.  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 3.  Air-fluid level within the right maxillary sinus which could represent acute sinusitis in the appropriate setting.    Cta Chest Pe Run    Result  Date: 8/3/2020  EXAM: CTA CHEST PE RUN LOCATION: [facility] DATE/TIME: 8/3/2020 5:01 PM INDICATION: PE suspected, high pretest prob SOB, tachycardia COMPARISON: CTA chest, abdomen and pelvis 07/12/2019 TECHNIQUE: CT chest pulmonary angiogram during arterial phase injection of IV contrast. Multiplanar reformats and MIP reconstructions were performed. Dose reduction techniques were used. CONTRAST: Iohexol (Omni) 100 mL FINDINGS: ANGIOGRAM CHEST: The right and left main pulmonary arteries and the segmental vessels are all patent without evidence for emboli. Suboptimal opacification involving the subsegmental vessels to both lower lobes. Thoracic aorta is negative for dissection. No CT evidence of right heart strain. LUNGS AND PLEURA: Advanced centrilobular emphysema. Extensive airspace and groundglass opacities throughout both upper lobes, with additional interstitial opacities throughout the mid and lower lung fields, all of which are new since the previous study. Subsegmental atelectasis in the bases. No effusions. MEDIASTINUM/AXILLAE: Numerous borderline enlarged mediastinal and hilar nodes with minimal change. UPPER ABDOMEN: Advanced atherosclerotic disease. Splenomegaly. Trace ascites adjacent to the liver. Collateral vessels along the lesser curvature the stomach MUSCULOSKELETAL: Hypertrophic changes thoracic spine.     1.  No evidence for central pulmonary emboli. The peripheral subsegmental vessels to both lower lobes are suboptimally opacified. 2.  Advanced centrilobular emphysema. 3.  Extensive groundglass and airspace infiltrates within both upper lobes concerning for pneumonia, including Covid 19. There are additional diffuse interstitial densities both lungs likely representing edema.     Ct Abdomen Pelvis Without Oral With Iv Contrast    Result Date: 8/3/2020  EXAM: CT ABDOMEN PELVIS WO ORAL W IV CONTRAST LOCATION: [facility] DATE/TIME: 8/3/2020 5:03 PM INDICATION: RLQ abdominal pain,  appendicitis suspected (Age > 14y) Right sided abdominal pain COMPARISON: None. TECHNIQUE: CT scan of the abdomen and pelvis was performed following injection of IV contrast. Multiplanar reformats were obtained. Dose reduction techniques were used. CONTRAST: Iohexol (Omni) 100 mL FINDINGS: LOWER CHEST: Diffuse interstitial opacities throughout the visualized lower lung fields. HEPATOBILIARY: Trace ascites adjacent to the liver. Moderate distention of the gallbladder. PANCREAS: Normal. SPLEEN: Borderline splenomegaly is unchanged measuring 13 cm with trace adjacent fluid. ADRENAL GLANDS: Normal. KIDNEYS/BLADDER: Normal. BOWEL: The appendix is identified within the mid right pelvis and is of normal caliber containing a tiny amount of air. There is some minimal edema adjacent to the appendix and cecum. Slight wall thickening involving the cecum and ascending colon with a small amount of adjacent ascites. No evidence for bowel obstruction. LYMPH NODES: Normal. VASCULATURE: Advanced atherosclerotic disease abdominal aorta and iliac arteries with high-grade stenosis involving the proximal right and left common iliac arteries. Prominent collateral vessels along the lesser curvature of the stomach are more distended compared to the prior study and could be secondary to portal venous hypertension. PELVIC ORGANS: Normal. MUSCULOSKELETAL: Small fat-containing ventral hernia.     1.  Appendix is of normal caliber. There is a small amount of ascites adjacent to the base of the cecum and ascending colon with slight wall thickening which could represent an acute colitis. No evidence for bowel obstruction. 2.  Trace ascites adjacent to the liver and spleen. 3.  Advanced atherosclerotic disease. 4.  Prominent collateral vessels/possible gastric varices lesser curvature of the stomach. 5.  Interstitial opacities throughout the visualized lower lung fields. Please refer to CTA chest report for further discussion.    Us Abdomen  "Limited    Result Date: 8/4/2020  EXAM: US ABDOMEN LIMITED LOCATION: [facility] DATE/TIME: 8/4/2020 5:59 PM INDICATION: Hyperbilirubinemia, sepsis, evaluate for cholecystitis, CBD dilation COMPARISON: CT 8/3/2020 . TECHNIQUE: Limited abdominal ultrasound. FINDINGS: GALLBLADDER: Isoechoic intraluminal tissue related to the anterior wall of the gallbladder measuring approximately 2.5 x 2.0 x 1.0 cm has no internal Doppler flow. No significant gallbladder wall thickening with gallbladder wall thickness approximately 3  mm. Gallbladder otherwise negative. Negative sonographic Ludwig's sign. BILE DUCTS: No intrahepatic biliary dilatation. The common duct measures 6 mm, upper normal. LIVER: Coarsened hepatic parenchymal echotexture and slight hepatic contour irregularity. No focal mass. RIGHT KIDNEY: No hydronephrosis. PANCREAS: The visualized portions are normal. Trace ascites.     1.  Isoechoic intraluminal tissue related to the anterior wall of the gallbladder measuring approximately 2.5 x 2.0 x 1.0 cm indeterminate for adherent sludge material versus polyp but favor sludge material given lack of internal Doppler flow. 2.  No gallbladder wall thickening. 3.  No intrahepatic biliary dilatation. The common duct measures 6 mm, upper normal. 4.  Coarsened hepatic parenchymal echotexture and slight hepatic contour irregularity suggestive of underlying hepatic fibrosis/cirrhosis. 5.  Trace ascites.    Poc Us 3cg Picc Placement Guidance    Result Date: 8/4/2020  Exam was performed as guidance for PICC line insertion.  Click \"PACS images\" hyperlink below to view any stored images.  For specific procedure details, view procedure note authored by PICC/Vascular Access Nurse.      "

## 2021-06-10 NOTE — PROGRESS NOTES
Renal progress note  CC:Hypernatremia    Assessment and Plan:  60 y.o. female the past medical history of COPD GÉNESIS PAD history of tobacco use and alcohol use disorder BPD admitted with abdominal pain lactic acidosis and acute respiratory failure with encephalopathy hospital course complicated by severe hypoxemia attributed to a combination of severe emphysema and volume overload with pulmonary edema versus aspiration on 8/3/2020.  Has been COVID negative x3 chest CT on 8/11/2020 shows groundglass opacities bilaterally possibly attributed to pulmonary edema secondary to severe hyponatremia nephrology was consulted off diuretics for last 24 hours she has completed antibiotic treatments and has been on a prednisone taper     Hypernatremia  Likely secondary to excess free water losses  Sodium stable now  Currently her volume status appears pretty even although her CT scan is significant for pulmonary infiltrates  --We will keep her on low dose maintenance fluids until PO is established, 1/2 NS with 20K @ 50ml/hr, DC IVF once able to take PO  --use PRN diuretics, lasix as needed if appears in trouble with breathing or edema  --Patient is currently n.p.o. so cannot take p.o. free water, once enteral feeding is started free water can be given with tube feeds     Hypokalemia  Replete per nursing protocol  Follow on labs daily  Will keep on maintenance fluids     Hypervolemia with pulmonary edema  Acute respiratory failure  Possible pneumonia versus COPD exacerbation/pulmonary edema  -- follow daily weight since UO is hard to monitor   -- use PRN lasix for breathing issues or edema     Metabolic alkalosis , with additional respiratory acidosis likely secondary to COPD  Blood gases noted PH 7.52  Improved bicarb, noted with improved K and holding diuresis with gentle hydration  -- will monitor for now     Malnutrition  Currently n.p.o. concerns for aspiration  Tube feeds will be ideal, with a tenuous volume problem poor urine  output off of diuretics in pulmonary edema managing volume status with TPN will be very hard  --We will defer to primary team for this decision, video swallow pending, if NGT or PEG feed is not possible , TPN is the only option , will recommend readdressing [facility] with this information with her daughter again     History of chronic liver disease  Transaminases elevated,   Thrombocytopenia  History of EtOH use  Imaging with possible scarring  -- GI consult noted     Chronic EtOH dependence  Wernicke's encephalopathy  Acute on chronic encephalopathy  On thiamine and folic supplement  Ammonia acceptable     History of mood disorders and bipolar disorder  No meds currently     Thank you for the consultation  We will follow  Thank you for the consultation we will follow  Iva Figueroa MD  [facility]  [phone]      Subjective  Appears more alert today although still confused   No new events  Seen just before her breakfast , appears more alert but still confused speech    Objective    Vital signs in last 24 hours  Temp:  [97.4  F (36.3  C)-98.4  F (36.9  C)] 98.3  F (36.8  C)  Heart Rate:  [] 94  Resp:  [18-21] 20  BP: (107-123)/(48-59) 107/53  Weight:   163 lb (73.9 kg)    Intake/Output last 3 shifts  I/O last 3 completed shifts:  In: 884.2 [P.O.:10; I.V.:524.2; IV Piggyback:350]  Out: 250 [Urine:250]  Intake/Output this shift:  No intake/output data recorded.    Physical Exam  Alert awake, NAD  CV: RRR without murmur or rub  Lung: clear and equal; no extra sounds, decreased aeration  Ab: soft and NT; not distended; normal bs  Ext: no edema and well perfused  Skin; no rash    Pertinent Labs   Lab Results   Component Value Date    WBC 13.1 (H) 08/14/2020    HGB 11.0 (L) 08/14/2020    HCT 33.1 (L) 08/14/2020     (H) 08/14/2020    PLT 40 (LL) 08/14/2020     Lab Results   Component Value Date    CREATININE 0.84 08/14/2020    BUN 24 (H) 08/14/2020     08/14/2020    K 4.1 08/14/2020     CL 96 (L) 08/14/2020    CO2 38 (H) 08/14/2020       Lab Results   Component Value Date    ALBUMIN 2.0 (L) 08/14/2020     Lab Results   Component Value Date    CALCIUM 8.3 (L) 08/14/2020    PHOS 1.5 (L) 08/14/2020     I reviewed all lab results  Iva Figueroa

## 2021-06-10 NOTE — PLAN OF CARE
Problem: Safety  Goal: Patient will be injury free during hospitalization  Outcome: Progressing   Bed alarm utilized to promote safety.     Problem: Decreased Mental Status Causing Increased Need for Safety  Goal: Provide a safe environment for patient (Implement appropriate elements in plan of care)  Outcome: Progressing   Restless throughout night, pulling at O2, PICC, and oximetry probe. Melatonin given without relief. Incontinent of urine & stool x1. Oriented to self, disoriented to time and situation.     Problem: Risk for ineffective breathing pattern  Goal: Maintain effective breathing pattern with respiratory rate within normal range, lungs clear; be free of cyanosis and other signs/symptoms of hypoxia  Outcome: Progressing   On 4L NC.

## 2021-06-10 NOTE — PLAN OF CARE
Pt giving no signs of pain. Restless intermittently. Weaning O2. Currently at 2L. VSS. Was cleared to have honey thick liquids and pureed food.
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
## 2021-06-10 NOTE — PLAN OF CARE
Problem: Occupational Therapy  Goal: OT Goals  Description: Goals review. Goals not met due to slow progress/ decreased cognition but remains appropriate. Patient will demonstrate the following by 8-28-20, in order to maximize independence with ADL/IADL performance:     8/21/2020 Elizabeth Jermaine OTR/L    Patient will demonstrate the following by 8/19/2020, in order to maximize independence with ADL/IADL performance:    -Demonstrate safety with Bed/Chair/Toilet transfer with Min Ax1   -Participate in grooming/hygiene tasks with CGA standing    -Patient will participate in further cognitive testing as needed in order to assist with safe discharge planning    Goals entered on 8/12/2020 by Shabnam Cooper OTR/L      Outcome: Completed   Occupational Therapy Discharge Summary    Date of OT Discharge: 8/27/2020    Refer to daily doc flowsheet for equipment issued and current functional status.  Discharge Destination: Remains in hospital  Discharge Comments: Comfort cares. Will d/c OT      8/27/2020 by Mary T Reyes, OT  8/27/2020

## 2021-06-10 NOTE — PLAN OF CARE
Problem: Discharge Barriers  Goal: Patient's discharge needs are met  Outcome: Progressing     Pt currently on comfort cares.  Disoriented x 3. Lethargic within the shift.  Air hunger x1. Dilaudid SL given at 0540 with effective results noted. On oxymask at 10-15L  Daughter in room within the shift.  Repositioning every 2hours.  Prayer shoal given.

## 2021-06-10 NOTE — ED NOTES
Patient states comfort, seems less anxious, able to take ice chips without complaint. VSS.  Antibiotics infusing.  Awaiting admission bed.

## 2021-06-10 NOTE — PROGRESS NOTES
Pulmonary Progress Note  8/9/2020      Admit Date: 8/3/2020  CODE: No CPR- Do NOT Intubate    Reason for Consult: hypoxemic resp failure, emphysema, CAP, AECPOD    Assessment/Plan:   60 year old female with a history of longstanding and active tobacco dependence, likely excessive alcohol use, bipolar disorder with psychotic features and prior overdose suicide attempt, mild obesity, untreated GÉNESIS, peripheral arterial disease, recent worsening dementia and imbalance with presumed diagnosis of Wernicke encephalopathy, extensive radiographic emphysema with presumed COPD (No PFTs on record), admitted with abdominal pain, lactic acidosis, hyperbilirubinemia, dyspnea, acute respiratory failure with hypoxia, abnormal chest CT, and acute encephalopathy. Clinically appears very tenuous with ongoing severe respiratory failure, delirium and encephalopathy. Ongoing hypoxemic resp failure likely due to underlying severe emphysema, vol overload/pulmonary edema and COPD exacerbation +/- aspirtation vs. Community acquired pneumonia.  COVID-19 negative x2     Recommendations:  - titrate FiO2 for goal O2 sat 88-92%, avoid hyperoxia  - cont BiPAP alternating with HHFNC as tolerated  - methylpred IV 40mg q8h, will taper to 40mg q12 today 8/9  - could taper to prednisone 30mg two times a day for 3 days, then 40mg daily for 3 days, then stop. if taking orals    Contraction alkalosis  - could add metolazone     She is in conversations with palliative care and hospice.       Subjective/Interim Events:     Spoke with the daughter at bedside who is having a hard time understanding how she got so sick so fast given they live together and she was getting along okay at home before this    Medications:       furosemide  40 mg Intravenous Q12H     ipratropium-albuteroL  3 mL Nebulization Q6H - RT     methylPREDNISolone sodium succinate  40 mg Intravenous Q8H     nicotine  1 patch Transdermal DAILY     omeprazole  20 mg Oral QAM (0630)      "piperacillin-tazobactam  3.375 g Intravenous Q8H     QUEtiapine  300 mg Oral QHS     sodium chloride  10-30 mL Intravenous Q8H FIXED TIMES     thiamine (vitamin B1) IVPB  100 mg Intravenous DAILY         Exam/Data:   Vitals  /62 (Patient Position: Semi-hopper)   Pulse 85   Temp 98.7  F (37.1  C) (Oral)   Resp 18   Ht 5' 2\" (1.575 m)   Wt 167 lb 4.8 oz (75.9 kg)   SpO2 92%   BMI 30.60 kg/m       I/O last 3 completed shifts:  In: 450 [IV Piggyback:450]  Out: 2325 [Urine:2325]  Weight change: -6 lb 3.2 oz (-2.812 kg)  Wt Readings from Last 3 Encounters:   08/09/20 167 lb 4.8 oz (75.9 kg)   02/28/20 183 lb (83 kg)   07/12/19 184 lb 8.4 oz (83.7 kg)     FiO2 (%):  [50 %-83 %] 60 %    EXAM:  Physical Exam  Gen: awake, no distress  HEENT: HFNC in place, facial bruising  CV: RRR, no m/g/r  Resp: CTA kristine, shallow breath sounds  Abd: soft, nontender   Skin: no rashes or lesions  Ext: no edema  Neuro: PERRL, nonfocal exam    ROS:  A 10-system review was obtained and is negative with the exception of the symptoms noted above.    DATA:    PFT DATA   None available      Micro  PCT 2.84  Blood pending  No sputum or urine cx date    IMAGING:   Ct Head Without Contrast    Result Date: 8/3/2020  EXAM: CT HEAD WO CONTRAST LOCATION: [facility] DATE/TIME: 8/3/2020 5:04 PM INDICATION: Head trauma, abnormal mental status (Age 19-64y) Fall, bruising to face COMPARISON: Head CT 12/20/2017 and MRI brain 07/23/2017. TECHNIQUE: Routine without IV contrast. Multiplanar reformats. Dose reduction techniques were used. FINDINGS: No evidence of acute intracranial hemorrhage or mass effect. 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 Brain attenuation morphology otherwise normal. The ventricles and sulci are normal for age. Normal gray-white matter differentiation. The basilar " cisterns are patent. The globes are unremarkable. The partially imaged paranasal sinuses demonstrate air-fluid level within the right maxillary sinus which could represent acute sinusitis in the appropriate setting. The partially imaged paranasal sinuses are otherwise unremarkable. The mastoid air cells and middle ear cavities are unremarkable. The visualized skull base and calvarium are unremarkable. The     1.  No evidence of acute intracranial hemorrhage or mass effect. 2.  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 3.  Air-fluid level within the right maxillary sinus which could represent acute sinusitis in the appropriate setting.    Cta Chest Pe Run    Result Date: 8/3/2020  EXAM: CTA CHEST PE RUN LOCATION: [facility] DATE/TIME: 8/3/2020 5:01 PM INDICATION: PE suspected, high pretest prob SOB, tachycardia COMPARISON: CTA chest, abdomen and pelvis 07/12/2019 TECHNIQUE: CT chest pulmonary angiogram during arterial phase injection of IV contrast. Multiplanar reformats and MIP reconstructions were performed. Dose reduction techniques were used. CONTRAST: Iohexol (Omni) 100 mL FINDINGS: ANGIOGRAM CHEST: The right and left main pulmonary arteries and the segmental vessels are all patent without evidence for emboli. Suboptimal opacification involving the subsegmental vessels to both lower lobes. Thoracic aorta is negative for dissection. No CT evidence of right heart strain. LUNGS AND PLEURA: Advanced centrilobular emphysema. Extensive airspace and groundglass opacities throughout both upper lobes, with additional interstitial opacities throughout the mid and lower lung fields, all of which are new since the previous study. Subsegmental atelectasis in the bases. No effusions. MEDIASTINUM/AXILLAE: Numerous borderline enlarged mediastinal and hilar nodes with  minimal change. UPPER ABDOMEN: Advanced atherosclerotic disease. Splenomegaly. Trace ascites adjacent to the liver. Collateral vessels along the lesser curvature the stomach MUSCULOSKELETAL: Hypertrophic changes thoracic spine.     1.  No evidence for central pulmonary emboli. The peripheral subsegmental vessels to both lower lobes are suboptimally opacified. 2.  Advanced centrilobular emphysema. 3.  Extensive groundglass and airspace infiltrates within both upper lobes concerning for pneumonia, including Covid 19. There are additional diffuse interstitial densities both lungs likely representing edema.     Ct Abdomen Pelvis Without Oral With Iv Contrast    Result Date: 8/3/2020  EXAM: CT ABDOMEN PELVIS WO ORAL W IV CONTRAST LOCATION: [facility] DATE/TIME: 8/3/2020 5:03 PM INDICATION: RLQ abdominal pain, appendicitis suspected (Age > 14y) Right sided abdominal pain COMPARISON: None. TECHNIQUE: CT scan of the abdomen and pelvis was performed following injection of IV contrast. Multiplanar reformats were obtained. Dose reduction techniques were used. CONTRAST: Iohexol (Omni) 100 mL FINDINGS: LOWER CHEST: Diffuse interstitial opacities throughout the visualized lower lung fields. HEPATOBILIARY: Trace ascites adjacent to the liver. Moderate distention of the gallbladder. PANCREAS: Normal. SPLEEN: Borderline splenomegaly is unchanged measuring 13 cm with trace adjacent fluid. ADRENAL GLANDS: Normal. KIDNEYS/BLADDER: Normal. BOWEL: The appendix is identified within the mid right pelvis and is of normal caliber containing a tiny amount of air. There is some minimal edema adjacent to the appendix and cecum. Slight wall thickening involving the cecum and ascending colon with a small amount of adjacent ascites. No evidence for bowel obstruction. LYMPH NODES: Normal. VASCULATURE: Advanced atherosclerotic disease abdominal aorta and iliac arteries with high-grade stenosis involving the proximal right and left common  shoulder iliac arteries. Prominent collateral vessels along the lesser curvature of the stomach are more distended compared to the prior study and could be secondary to portal venous hypertension. PELVIC ORGANS: Normal. MUSCULOSKELETAL: Small fat-containing ventral hernia.     1.  Appendix is of normal caliber. There is a small amount of ascites adjacent to the base of the cecum and ascending colon with slight wall thickening which could represent an acute colitis. No evidence for bowel obstruction. 2.  Trace ascites adjacent to the liver and spleen. 3.  Advanced atherosclerotic disease. 4.  Prominent collateral vessels/possible gastric varices lesser curvature of the stomach. 5.  Interstitial opacities throughout the visualized lower lung fields. Please refer to CTA chest report for further discussion.    Us Abdomen Limited    Result Date: 8/4/2020  EXAM: US ABDOMEN LIMITED LOCATION: [facility] DATE/TIME: 8/4/2020 5:59 PM INDICATION: Hyperbilirubinemia, sepsis, evaluate for cholecystitis, CBD dilation COMPARISON: CT 8/3/2020 . TECHNIQUE: Limited abdominal ultrasound. FINDINGS: GALLBLADDER: Isoechoic intraluminal tissue related to the anterior wall of the gallbladder measuring approximately 2.5 x 2.0 x 1.0 cm has no internal Doppler flow. No significant gallbladder wall thickening with gallbladder wall thickness approximately 3  mm. Gallbladder otherwise negative. Negative sonographic Ludwig's sign. BILE DUCTS: No intrahepatic biliary dilatation. The common duct measures 6 mm, upper normal. LIVER: Coarsened hepatic parenchymal echotexture and slight hepatic contour irregularity. No focal mass. RIGHT KIDNEY: No hydronephrosis. PANCREAS: The visualized portions are normal. Trace ascites.     1.  Isoechoic intraluminal tissue related to the anterior wall of the gallbladder measuring approximately 2.5 x 2.0 x 1.0 cm indeterminate for adherent sludge material versus polyp but favor sludge material given lack of internal  "Doppler flow. 2.  No gallbladder wall thickening. 3.  No intrahepatic biliary dilatation. The common duct measures 6 mm, upper normal. 4.  Coarsened hepatic parenchymal echotexture and slight hepatic contour irregularity suggestive of underlying hepatic fibrosis/cirrhosis. 5.  Trace ascites.    Poc Us 3cg Picc Placement Guidance    Result Date: 8/4/2020  Exam was performed as guidance for PICC line insertion.  Click \"PACS images\" hyperlink below to view any stored images.  For specific procedure details, view procedure note authored by PICC/Vascular Access Nurse.      "

## 2021-06-10 NOTE — CONSULTS
Palliative Care New Consult  NAME: Keiko Guo, : 1960,   MRN: [id number] Date: 2020    Palliative Medicine Consult Service:  Consulted by Dr. Bond to assist with symptom management, clarification of goals of care, and development of plan of care.     Palliative Care Assessment and Plan:  Keiko Guo, 60 y.o., female with a medical history COPD, PAD, alcohol abuse, cigarette smoking, untreated GÉNESIS, bipolar disorder with psychotic features and prior overdose suicide attempt, dementia presented to ER with shortness of breath and right sided abdominal pain on 8/3/2020. She was tachycardic on admission, had elevated lactic acid. CT chest with severe emphysema and ground glass opacity. She started on IV antibiotic. Resuscitated with IVF. She is complaining of abdominal pain and CT abdomen showed distended gall bladder, US showed gallbladder sludge and now wall thickening. On alternating BiPAP and high flow oxygen. On Zosyn and azithromycin to cover for possible cholecystitis and PNA. Pulmonary consulted. On lasix, methylprednisolone. Prognosis guarded.     Physical Symptoms:   Dyspnea: due to ongoing hypoxemic respiratory failure secondary to underlying emphysema, volume overload/pulmonary edema and COPD exacerbation +/- aspiration vs CAP. Covid-19 neg x 2, still on isolation per ID. On Azithromycin, Zosyn, methylprednisolone. On diuresis with lasix. On alternating Bipap/HFNC.  -Hospitalist primary.   -Pulmonary and ID following  -Discussed with Dr. Bond--consider starting morphine 2.5 mg soln SL every 8 hours once patient is off CIWA protocol and not receiving lorazepam. Would monitor closely for sedation and start bowel regimen with Senna 1 tab two times a day if started on morphine.    Weakness: due to acute illness and issues above  -SLP consulted  -Reposition for comfort    Psychosocial Issues: Unable to assess due to AMS. History of bipolar disorder/depression, alcohol and tobacco use  "as above.     Spiritual Issues: Unable to assess due to AMS.    Decision making capacity: None at this time.   - Advance directive on file: No. Daughter Telma is surrogate decision-maker.     Estimated length of life: Prognosis guarded.    Goals of Care: Restorative. Telma is still hopeful her mother will \"pull through\" this, although she understands her mother is critically ill at this time. Did discuss that we could transition to comfort care if mother declines despite support we are able to give her. She would like to be contacted if her mother is declining and wants to visit if mother is actively dying.    Code Status: No CPR- Do NOT Intubate    ==============================================================================  HPI  Keiko SUE Crisdeniz, 60 y.o., female with a medical history as noted above admitted on 8/3/2020 for acute hypoxic respiratory failure. Currently is on HFNC. Due to COVID-19 restrictions and PPE conservation, did not enter room or examine patient. Viewed through room window. Per notes, patient is confused.    Goals of care/Advance Directives (discussion): Introduced palliative care to patient's daughter Telma, our role in pt's care, current medical status, and proposed treatment plans.     Called and spoke with Telma who states she was \"blind-sided\" by all this. She states her mother was living with her and \"not doing all that bad\". She fell and thought she bruised a rib a couple weeks ago but started having trouble breathing so came to hospital. She knows her mother has COPD. She understands she is \"not doing well\" and \"things don't look good\".  Daughter states she has \"pulled through things like this before\", having been hospitalized for alcohol withdrawal, ARDS after ruptured appendix. Has not attempted suicide since 2016. Was intubated at that time but had to be re-intubated. Telma states her mother has no HCD. When asked about any conversations in the past regarding what her mother would want " in terms of her healthcare, Telma states her mother would want her body donated to the [employer]. She knows her mother would not want to be dependent on life support. She states her mother has been doing ok recently in terms of mood. She is worried that her mother is in pain and not receiving pain medications. She wishes she could visit her mother but understands that she is still on isolation precautions. She would like to visit as soon as precautions are lifted. Discussed iPad videoconferencing and gave instructions.      Psychosocial/Spiritual Aspects of Care: History of bipolar disorder/depression, alcohol and tobacco use as above.   Spiritual Assessment:   Asmita: raised Faith but not practicing  Daughter requests a  visit.   Living situation: Lives with ex  and daughter.  Marital status:   Children: One daughter, no grandchildren.  Occupation: Disabled, former     Current PPS% (100% normal, 0% death): 10-20%    Baseline PPS% (2 weeks prior to admit): 60-70%    Current Palliative Medications (If any):   See MAR    Palliative Care ROS: =  Unable to obtain due to AMS    -----------------------------------------------------------------------------------------------------------------  I have reviewed and supplemented the documentation in this patient's medical record listed below regarding past medical history, social history, active medical problems, allergies and medications.     Current Problem List:   Principal Problem:    COPD with acute exacerbation (H)  Active Problems:    Tobacco abuse    Acute respiratory failure with hypoxemia (H)    Abnormal chest CT    Right upper quadrant pain    Acute pulmonary edema (H)      Medical/Surgical History :  Past Medical History:   Diagnosis Date     COPD (chronic obstructive pulmonary disease) (H)      PAD (peripheral artery disease) (H)      Sleep apnea      Past Surgical History:   Procedure Laterality Date     APPENDECTOMY  1999     rupture with sepsis      SECTION       osteomyelitis       NH ESOPHAGOGASTRODUODENOSCOPY TRANSORAL DIAGNOSTIC N/A 2019    Procedure: ESOPHAGOGASTRODUODENOSCOPY (EGD) with biopsies;  Surgeon: Mario Beatty MD;  Location: [facility];  Service: Gastroenterology     Relevant Family History  Family History   Problem Relation Age of Onset     Depression Mother      Hypertension Mother      Diabetes Father      Hypertension Father      Schizophrenia Brother      Family Status   Relation Name Status     Mother       Father       Sister  Alive        2     Brother  Alive     Social History:    she  reports that she has been smoking cigarettes. She has been smoking about 0.75 packs per day. She has never used smokeless tobacco. She reports current alcohol use. She reports that she does not use drugs.  Medication History:  Medications Prior to Admission   Medication Sig Dispense Refill Last Dose     acetaminophen (TYLENOL) 650 MG CR tablet Take 650 mg by mouth every 8 (eight) hours as needed for pain.   8/3/2020 at 0900     albuterol (PROAIR HFA;PROVENTIL HFA;VENTOLIN HFA) 90 mcg/actuation inhaler Inhale 2 puffs 4 (four) times a day as needed for shortness of breath.   8/3/2020 at not with     melatonin 5 mg Tab tablet Take 15 mg by mouth at bedtime as needed.   2020     nicotine (NICODERM CQ) 21 mg/24 hr Place 1 patch on the skin daily as needed for smoking cessation.   More than a month at Unknown time     nystatin (MYCOSTATIN) 100,000 unit/mL suspension Take 500,000 Units by mouth 4 (four) times a day as needed (thrush).    More than a month at Unknown time     omeprazole (PRILOSEC) 20 MG capsule Take 20 mg by mouth daily as needed.   Past Week at Unknown time     oxyCODONE (ROXICODONE) 5 MG immediate release tablet Take 5 mg by mouth every 6 (six) hours as needed for pain.   8/3/2020 at 0900     polyethylene glycol (MIRALAX) 17 gram packet Take 17 g by mouth daily as needed.   "  Past Week at Unknown time     QUEtiapine (SEROQUEL) 25 MG tablet Take 25 mg by mouth 3 (three) times a day as needed.    Past Week at Unknown time     QUEtiapine (SEROQUEL) 300 MG tablet Take 300 mg by mouth at bedtime as needed.    7/31/2020     SPIRIVA RESPIMAT 1.25 mcg/actuation Mist Inhale 2 Inhalation daily.    8/3/2020 at not with     thiamine 100 MG tablet Take 50 mg by mouth daily.   8/1/2020     Allergies:  No Known Allergies  Emergency Contact Info (Pulled from chart)  Extended Emergency Contact Information  Primary Emergency Contact: Telma Guo   [facility] [place]  Home Phone: [phone]  Mobile Phone: [phone]  Relation: Child  Secondary Emergency Contact: PT, PER   [place]  Relation: Declined    PERTINENT PHYSICAL EXAMINATION:  Vital Signs: Blood pressure 119/82, pulse 97, temperature 97.5  F (36.4  C), temperature source Axillary, resp. rate 24, height 5' 2\" (1.575 m), weight 183 lb 1.6 oz (83.1 kg), SpO2 93 %.   GENERAL: Lying in bed, in no acute distress.   Resp: on HFNC, tachypneic  Due to COVID-19 restrictions and PPE conservation, did not enter room or examine patient. Viewed through room window.      I have reviewed the following labs and imaging:  Lab Results   Component Value Date     08/06/2020     08/05/2020     08/04/2020    K 4.1 08/06/2020    K 4.2 08/05/2020    K 4.3 08/04/2020    CO2 25 08/06/2020    CO2 25 08/05/2020    CO2 22 08/04/2020    BUN 21 08/06/2020    BUN 14 08/05/2020    BUN 10 08/04/2020    CREATININE 0.63 08/06/2020    CREATININE 0.60 08/05/2020    CREATININE 0.52 (L) 08/04/2020    CALCIUM 7.5 (L) 08/06/2020    CALCIUM 7.5 (L) 08/05/2020    CALCIUM 7.1 (L) 08/04/2020     Lab Results   Component Value Date    WBC 6.8 08/06/2020    WBC 8.3 08/05/2020    WBC 9.0 08/04/2020    HGB 9.1 (L) 08/06/2020    HGB 9.0 (L) 08/05/2020    HGB 8.9 (L) 08/04/2020    HCT 26.2 (L) 08/06/2020    HCT 25.6 (L) 08/05/2020    HCT 25.3 (L) " 08/04/2020     (H) 08/06/2020     (H) 08/05/2020     (H) 08/04/2020    PLT 64 (L) 08/06/2020    PLT 69 (L) 08/05/2020    PLT 64 (L) 08/04/2020       EXAM: US ABDOMEN LIMITED  LOCATION: [facility]  DATE/TIME: 8/4/2020 5:59 PM     INDICATION: Hyperbilirubinemia, sepsis, evaluate for cholecystitis, CBD dilation  COMPARISON: CT 8/3/2020 .  TECHNIQUE: Limited abdominal ultrasound.     FINDINGS:     GALLBLADDER: Isoechoic intraluminal tissue related to the anterior wall of the gallbladder measuring approximately 2.5 x 2.0 x 1.0 cm has no internal Doppler flow. No significant gallbladder wall thickening with gallbladder wall thickness approximately 3   mm. Gallbladder otherwise negative. Negative sonographic Ludwig's sign.     BILE DUCTS: No intrahepatic biliary dilatation. The common duct measures 6 mm, upper normal.     LIVER: Coarsened hepatic parenchymal echotexture and slight hepatic contour irregularity. No focal mass.     RIGHT KIDNEY: No hydronephrosis.     PANCREAS: The visualized portions are normal.     Trace ascites.     IMPRESSION:   1.  Isoechoic intraluminal tissue related to the anterior wall of the gallbladder measuring approximately 2.5 x 2.0 x 1.0 cm indeterminate for adherent sludge material versus polyp but favor sludge material given lack of internal Doppler flow.  2.  No gallbladder wall thickening.  3.  No intrahepatic biliary dilatation. The common duct measures 6 mm, upper normal.  4.  Coarsened hepatic parenchymal echotexture and slight hepatic contour irregularity suggestive of underlying hepatic fibrosis/cirrhosis.  5.  Trace ascites.    EXAM: CT HEAD WO CONTRAST  LOCATION: [facility]  DATE/TIME: 8/3/2020 5:04 PM     INDICATION: Head trauma, abnormal mental status (Age 19-64y) Fall, bruising to face  COMPARISON: Head CT 12/20/2017 and MRI brain 07/23/2017.  TECHNIQUE: Routine without IV contrast. Multiplanar reformats. Dose reduction techniques were  used.     FINDINGS:  No evidence of acute intracranial hemorrhage or mass effect. 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   Brain attenuation morphology otherwise normal. The ventricles and sulci are normal for age. Normal gray-white matter differentiation. The basilar cisterns are patent.     The globes are unremarkable. The partially imaged paranasal sinuses demonstrate air-fluid level within the right maxillary sinus which could represent acute sinusitis in the appropriate setting. The partially imaged paranasal sinuses are otherwise   unremarkable. The mastoid air cells and middle ear cavities are unremarkable. The visualized skull base and calvarium are unremarkable. The     IMPRESSION:   1.  No evidence of acute intracranial hemorrhage or mass effect.  2.  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  3.  Air-fluid level within the right maxillary sinus which could represent acute sinusitis in the appropriate setting.    EXAM: CT ABDOMEN PELVIS WO ORAL W IV CONTRAST  LOCATION: [facility]  DATE/TIME: 8/3/2020 5:03 PM     INDICATION: RLQ abdominal pain, appendicitis suspected (Age > 14y) Right sided abdominal pain  COMPARISON: None.  TECHNIQUE: CT scan of the abdomen and pelvis was performed following injection of IV contrast. Multiplanar reformats were obtained. Dose reduction techniques were used.  CONTRAST: Iohexol (Omni) 100 mL     FINDINGS:   LOWER CHEST: Diffuse interstitial opacities throughout the visualized lower lung fields.     HEPATOBILIARY: Trace ascites adjacent to the liver. Moderate distention of the gallbladder.     PANCREAS: Normal.     SPLEEN: Borderline splenomegaly is unchanged measuring 13  cm with trace adjacent fluid.      ADRENAL GLANDS: Normal.     KIDNEYS/BLADDER: Normal.     BOWEL: The appendix is identified within the mid right pelvis and is of normal caliber containing a tiny amount of air. There is some minimal edema adjacent to the appendix and cecum. Slight wall thickening involving the cecum and ascending colon with a   small amount of adjacent ascites. No evidence for bowel obstruction.     LYMPH NODES: Normal.     VASCULATURE: Advanced atherosclerotic disease abdominal aorta and iliac arteries with high-grade stenosis involving the proximal right and left common iliac arteries. Prominent collateral vessels along the lesser curvature of the stomach are more   distended compared to the prior study and could be secondary to portal venous hypertension.      PELVIC ORGANS: Normal.     MUSCULOSKELETAL: Small fat-containing ventral hernia.     IMPRESSION:   1.  Appendix is of normal caliber. There is a small amount of ascites adjacent to the base of the cecum and ascending colon with slight wall thickening which could represent an acute colitis. No evidence for bowel obstruction.  2.  Trace ascites adjacent to the liver and spleen.  3.  Advanced atherosclerotic disease.  4.  Prominent collateral vessels/possible gastric varices lesser curvature of the stomach.  5.  Interstitial opacities throughout the visualized lower lung fields. Please refer to CTA chest report for further discussion    EXAM: CTA CHEST PE RUN  LOCATION: [facility]  DATE/TIME: 8/3/2020 5:01 PM     INDICATION: PE suspected, high pretest prob SOB, tachycardia  COMPARISON: CTA chest, abdomen and pelvis 07/12/2019  TECHNIQUE: CT chest pulmonary angiogram during arterial phase injection of IV contrast. Multiplanar reformats and MIP reconstructions were performed. Dose reduction techniques were used.   CONTRAST: Iohexol (Omni) 100 mL     FINDINGS:  ANGIOGRAM CHEST: The right and left main pulmonary arteries and the segmental  vessels are all patent without evidence for emboli. Suboptimal opacification involving the subsegmental vessels to both lower lobes. Thoracic aorta is negative for dissection.   No CT evidence of right heart strain.     LUNGS AND PLEURA: Advanced centrilobular emphysema. Extensive airspace and groundglass opacities throughout both upper lobes, with additional interstitial opacities throughout the mid and lower lung fields, all of which are new since the previous study.   Subsegmental atelectasis in the bases. No effusions.     MEDIASTINUM/AXILLAE: Numerous borderline enlarged mediastinal and hilar nodes with minimal change.     UPPER ABDOMEN: Advanced atherosclerotic disease. Splenomegaly. Trace ascites adjacent to the liver. Collateral vessels along the lesser curvature the stomach     MUSCULOSKELETAL: Hypertrophic changes thoracic spine.     IMPRESSION:   1.  No evidence for central pulmonary emboli. The peripheral subsegmental vessels to both lower lobes are suboptimally opacified.  2.  Advanced centrilobular emphysema.   3.  Extensive groundglass and airspace infiltrates within both upper lobes concerning for pneumonia, including Covid 19. There are additional diffuse interstitial densities both lungs likely representing edema.     ====================================================  TT: I have personally spent a total of 55 minutes on the unit in review of medical record, consultation with the medical providers and assessment of patient today, with more than 50% of this time spent in counseling, coordination of care, and discussion with patient's daughter re:diagnostic results, prognosis, symptom management, risks and benefits of management options, and development of plan of care.  ====================================================    ELLA [facility]  Palliative Medicine  Office: [phone]

## 2021-06-10 NOTE — PROGRESS NOTES
[facility] Medicine PROGRESS NOTE      Identification/Summary: Keiko Guo is a 60 y.o. female with a past medical history of COPD, PAD, alcohol abuse, cigarette smoking, GÉNESIS, dementia presented to ER with shortness of breath on 8/3/2020. She was tachycardic on admission, had elevated lactic acid. CT chest with severe emphysema and ground glass opacity. She started on IV antibiotic. Resuscitated with IVF. She is complaining of abdominal pain and CT abdomen showed distended gall bladder, US showed gallbladder sludge and now wall thickening. She was on BiPAP overnight and now she is on high flow oxygen. On Zosyn and azithromycin to cover for possible cholecystitis and PNA     Assessment and Plan:      COPD exacerbation  Acute hypoxic respiratory failure   PNA (aspiration?)  Sepsis,    CT chest showed advanced emphysema and extensive ground glass and airspace infiltrate in upper lung concerning for PNA and diffuse interstitial densities likely edema in mid to lower lungs.  On Solumedrol IV  Duoneb scheduled and prn   On Zosyn and azithromycin     On Covid isolation precaution, had two negative Covid tests ,      She has pulmonary edema in CT chest. Received two doses of Lasix 20 mg IV yesterday, will give her another dose, monitor I/O and serum Cr     Elevated liver enzymes  RUQ tenderness, cholecystitis?  AST and Alk phos elevated but its been a chronic issue   US finding now very supportive for the diagnosis of cholecystitis   Continue with Zosyn for now and monitor the patent      Alcohol abuse/dependence   Did not have any withdrawal symptoms   Thiamine and folic acid     Fall  She has bruise around her right eye and right knee  CT head negative      Thrombocytopenia  Likely 2/2 ETOH  Monitor Plt count         DVT prophylaxis  Plt is 64, avoid heparin   SCD     Bipolar disorder  Continue home medications     Diet: NPO for now until swallow evaluation   DVT Prophylaxis: SCD  Code Status: No CPR- Do  NOT Intubate    Anticipated possible discharge in few days     Interval History/Subjective:  On high flow currently, she was on BiPAP overnight        Physical Exam/Objective:  Vitals I/O   Temp:  [96.5  F (35.8  C)-98.1  F (36.7  C)] 98  F (36.7  C)  Heart Rate:  [] 95  Resp:  [24-28] 24  BP: (109-132)/(54-66) 132/66  SpO2:  [76 %-97 %] 91 %  FiO2 (%):  [40 %-50 %] 40 %  I/O last 3 completed shifts:  In: 1402.5 [P.O.:440; I.V.:912.5; IV Piggyback:50]  Out: 1601 [Urine:1600; Stool:1]   171 lb 6.4 oz (77.7 kg)  Body mass index is 31.35 kg/m .      GENERAL:  Somnolent, on high flow oxygen   HEAD:  Normocephalic, without obvious abnormality, atraumatic   EYES:  PERRL, conjunctiva/corneas clear   NECK: Supple, symmetrical, trachea midline   LUNGS:   Bilateral crackles    HEART:  Regular rate and rhythm, S1 and S2 normal, no murmur   ABDOMEN:   Soft, tender in RUQ, bowel sounds active, no organomegaly detected, no rebound or guarding   EXTREMITIES: no cyanosis or edema    SKIN: Dry to touch, no exanthems in the visualized areas   NEURO: moves all four extremities freely, non-focal       Medications:   Personally Reviewed.    azithromycin  500 mg Intravenous Q24H     ipratropium-albuteroL  3 mL Nebulization QID - RT     methylPREDNISolone sodium succinate  60 mg Intravenous Q12H     multivitamin therapeutic  1 tablet Oral DAILY     nicotine  1 patch Transdermal DAILY     omeprazole  20 mg Oral QAM (0630)     piperacillin-tazobactam  3.375 g Intravenous Q8H     QUEtiapine  300 mg Oral QHS     sodium chloride  10-30 mL Intravenous Q8H FIXED TIMES     thiamine (vitamin B1) IVPB  100 mg Intravenous DAILY       Labs:  Lab Results   Component Value Date    WBC 8.3 08/05/2020    HGB 9.0 (L) 08/05/2020    HCT 25.6 (L) 08/05/2020     (H) 08/05/2020    PLT 69 (L) 08/05/2020     Lab Results   Component Value Date    CREATININE 0.60 08/05/2020    BUN 14 08/05/2020     08/05/2020    K 4.2 08/05/2020      08/05/2020    CO2 25 08/05/2020        Imaging:  Ct Head Without Contrast  Result Date: 8/3/2020  1.  No evidence of acute intracranial hemorrhage or mass effect. 2.  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 3.  Air-fluid level within the right maxillary sinus which could represent acute sinusitis in the appropriate setting.        Result Date: 8/3/2020  1.  No evidence for central pulmonary emboli. The peripheral subsegmental vessels to both lower lobes are suboptimally opacified. 2.  Advanced centrilobular emphysema. 3.  Extensive groundglass and airspace infiltrates within both upper lobes concerning for pneumonia, including Covid 19. There are additional diffuse interstitial densities both lungs likely representing edema.     Ct Abdomen Pelvis Without Oral With Iv Contrast  Result Date: 8/3/2020  1.  Appendix is of normal caliber. There is a small amount of ascites adjacent to the base of the cecum and ascending colon with slight wall thickening which could represent an acute colitis. No evidence for bowel obstruction. 2.  Trace ascites adjacent to the liver and spleen. 3.  Advanced atherosclerotic disease. 4.  Prominent collateral vessels/possible gastric varices lesser curvature of the stomach. 5.  Interstitial opacities throughout the visualized lower lung fields. Please refer to CTA chest report for further discussion.      Ellabelle Bond  [facility]  [facility]

## 2021-06-10 NOTE — CONSULTS
Consultation - Infectious Disease  Keiko Guo,  1960, MRN [id number]    Admitting Dx: Abdominal pain, right upper quadrant [R10.11]  SOB (shortness of breath) [R06.02]  Tachycardia [R00.0]  Elevated bilirubin [R17]    PCP: Provider, No Primary Care, [phone]   Code status:  No CPR- Do NOT Intubate       Extended Emergency Contact Information  Primary Emergency Contact: BrantTelma   [facility]  Home Phone: [phone]  Mobile Phone: [phone]  Relation: Child  Secondary Emergency Contact: PT, PER   [place]  Relation: Declined       ASSESSMENT   1. Leukocytosis, elevated lactate, abdominal pain -- likely ischemic colitis. Initial CT also showed mild colitis. Ischemic colitis generally treated with supportive care IVF plus antibiotics. No clear response to cipro/flagyl yet so will broaden spectrum. c diff negative.   2. Severe COPD  3. Positive Hep B core Ab suggestive of recent infection. Hep B s Ag negative indicating lack of active disease.   4. Alcohol use, cirrhosis  5. Thrombocytopenia.   6. Completed treatment for possible pneumonia.   7. COVID-19 adequately ruled out by testing.   Principal Problem:    COPD with acute exacerbation (H)  Active Problems:    Bipolar disorder (H)    Tobacco abuse    Acute respiratory failure with hypoxemia (H)    Goals of care, counseling/discussion    Abnormal chest CT    Acute pulmonary edema (H)    DNR (do not resuscitate)    Pneumonitis    Aspiration pneumonia due to gastric secretions (H)    Hypernatremia    Hepatic fibrosis (H)    Metabolic encephalopathy    Cognitive and behavioral changes    Sleep difficulties    Colitis    Severe sepsis with acute organ dysfunction (H)    Lactic acidosis       PLAN   Plan meropenem and monitor response  Stool culture  Prognosis guarded -- hospice has been discussed  Will follow.     Efe García MD  [facility]  [phone] [facility]  paging  ______________________________________________________________________        Reason For Consult: Leukocytosis, colitis     HPI    We have been requested by Dr. Greenberg to evaluate Keiko Guo who is a 60 y.o. year old female for the above. She has history of etoh abuse, probable Wernicke's encephalopathy and dementia, COPD, GÉNESIS, PAD, history of tobacco use admitted 8/3 with abdominal pain, lactic acidosis and acute respiratory failure with encephalopathy. Hospital course complicated by severe hypoxemia attributed to a combination of severe emphysema and volume overload with pulmonary edema versus aspiration on 8/3/2020.  Has been COVID negative x3 by PCR plus negative IgG on 8/11.  She has completed antibiotic treatments and has been on a prednisone taper.    WBC rising past 3 days, has abdominal pain and CT shows colitis infectious vs ischemic. There was also mild colitis suggested on initial CT 8/3.    She tells me she doesn't have any pain. She is very weak. Difficult to obtain history.         Medical History  Active Ambulatory (Non-Hospital) Problems    Diagnosis     Bipolar affective disorder, currently manic, severe, with psychotic features (H)     Chronic pain     Fungal esophagitis     Adjustment disorder with mixed anxiety and depressed mood     Hypotension     Swallowing painful     Lactic acid acidosis     QT prolongation     Odynophagia     Bipolar I disorder, most recent episode depressed, severe without psychotic features (H)     Overdose     Suicide attempt (H)     Relationship problem between caregiver and child     History of posttraumatic stress disorder (PTSD)     Alcoholic intoxication with complication (H)     Intentional drug overdose, initial encounter (H)     Acidemia     Metabolic acidosis     Alcohol abuse     Hyperglyceridemia     GERD (gastroesophageal reflux disease)     Alcohol use disorder, severe, dependence (H)     Alcohol withdrawal syndrome (H)     Pulmonary  emphysema (H)     Depression     Neurosis, posttraumatic     Past Medical History:   Diagnosis Date     COPD (chronic obstructive pulmonary disease) (H)      PAD (peripheral artery disease) (H)      Sleep apnea     Surgical History  She  has a past surgical history that includes Appendectomy (); osteomyelitis ();  section; and pr esophagogastroduodenoscopy transoral diagnostic (N/A, 2019).   Social History  Reviewed, and she  reports that she has been smoking cigarettes. She has been smoking about 0.75 packs per day. She has never used smokeless tobacco. She reports current alcohol use. She reports that she does not use drugs.   Allergies  No Known Allergies Family History  family history includes Depression in her mother; Diabetes in her father; Hypertension in her father and mother; Schizophrenia in her brother.      Psychosocial Needs  Social History     Social History Narrative     Not on file     Additional psychosocial needs reviewed per nursing assessment.       Prior to Admission Medications   Medications Prior to Admission   Medication Sig Dispense Refill Last Dose     acetaminophen (TYLENOL) 650 MG CR tablet Take 650 mg by mouth every 8 (eight) hours as needed for pain.   8/3/2020 at 0900     albuterol (PROAIR HFA;PROVENTIL HFA;VENTOLIN HFA) 90 mcg/actuation inhaler Inhale 2 puffs 4 (four) times a day as needed for shortness of breath.   8/3/2020 at not with     melatonin 5 mg Tab tablet Take 15 mg by mouth at bedtime as needed.   2020     nicotine (NICODERM CQ) 21 mg/24 hr Place 1 patch on the skin daily as needed for smoking cessation.   More than a month at Unknown time     nystatin (MYCOSTATIN) 100,000 unit/mL suspension Take 500,000 Units by mouth 4 (four) times a day as needed (thrush).    More than a month at Unknown time     omeprazole (PRILOSEC) 20 MG capsule Take 20 mg by mouth daily as needed.   Past Week at Unknown time     oxyCODONE (ROXICODONE) 5 MG immediate release  tablet Take 5 mg by mouth every 6 (six) hours as needed for pain.   8/3/2020 at 0900     polyethylene glycol (MIRALAX) 17 gram packet Take 17 g by mouth daily as needed.    Past Week at Unknown time     QUEtiapine (SEROQUEL) 25 MG tablet Take 25 mg by mouth 3 (three) times a day as needed.    Past Week at Unknown time     QUEtiapine (SEROQUEL) 300 MG tablet Take 300 mg by mouth at bedtime as needed.    7/31/2020     SPIRIVA RESPIMAT 1.25 mcg/actuation Mist Inhale 2 Inhalation daily.    8/3/2020 at not with     thiamine 100 MG tablet Take 50 mg by mouth daily.   8/1/2020          Anti-infectives: cipro 8/17-  Flagyl 8/17-  Pip/tazo 8/3-9  Azithromycin 8/3-7  Ctx 8/3  Vancomycin 8/3  Cultures: 8/17 blood pending  8/4 blood negative    Hep B core IgM positive    Imaging: reviewed images          Review of Systems:  All other systems negative in detail except what is noted above. Physical Exam:  Temp:  [97.6  F (36.4  C)-98.9  F (37.2  C)] 98.4  F (36.9  C)  Heart Rate:  [108-115] 113  Resp:  [20] 20  BP: (117-158)/(67-81) 132/72    GENERAL: lying in bed, appears weak. Can come up with [facility], when asked year she again states [employer]. States she lives in [place]   EYES: No conjunctival injection, pupils equally reactive to light, extra-ocular movement intact  HEAD, EARS, NOSE, MOUTH, AND THROAT: Nontraumatic, mouth without oral ulcers.   RESPIRATORY: Clear anterior.  CARDIOVASCULAR: Regular rate and rhythm, normal S1 and S2, no murmurs, rubs, or gallops.  ABDOMEN: Soft, tender with some guarding.  Normal bowel sounds.  MUSCULOSKELETAL: No synovitis.  LYMPHATIC: No cervical, supraclavicular, axillary, or inguinal lymphadenopathy.  SKIN/HAIR/NAILS: jaundice. Some bruising on forehead.   NEUROLOGIC: Grossly intact. Generally weak.  PICC R UE       Pertinent Labs    Results from last 7 days   Lab Units 08/18/20  0602 08/17/20  0620 08/16/20  0654   LN-WHITE BLOOD CELL COUNT thou/uL 22.5* 18.8* 11.7*    LN-HEMOGLOBIN g/dL 11.7* 12.7 10.6*   LN-HEMATOCRIT % 35.8 36.9 31.1*   LN-PLATELET COUNT thou/uL 29* 35* 33*            Results from last 7 days   Lab Units 08/18/20  0602 08/17/20  1754 08/17/20  0620  08/16/20  0554   LN-SODIUM mmol/L 138  --  138  --  136   LN-POTASSIUM mmol/L 4.3  4.3 3.5 3.4*   < > 3.4*   LN-CHLORIDE mmol/L 104  --  98  --  96*   LN-CO2 mmol/L 28  --  29  --  31   LN-BLOOD UREA NITROGEN mg/dL 16  --  19  --  18   LN-CREATININE mg/dL 0.82  --  0.88  --  0.83   LN-CALCIUM mg/dL 7.7*  --  8.4*  --  8.0*    < > = values in this interval not displayed.       Lab Results   Component Value Date     (H) 08/18/2020    AST 59 (H) 08/18/2020    ALKPHOS 233 (H) 08/18/2020    BILITOT 5.5 (H) 08/18/2020        No results found for: CRP   No results found for: SEDRATE     Pertinent Radiology  Radiology Results: Reviewed  Xr Chest 1 View Portable    Result Date: 8/10/2020  EXAM: XR CHEST 1 VIEW PORTABLE LOCATION: [facility] DATE/TIME: 8/10/2020 10:45 AM INDICATION: persistent hypoxia COMPARISON: CT chest 8/3/2020     Stable diffuse reticulation and groundglass opacification likely in part part related to chronic fibrotic and emphysematous changes seen on comparison CT. Cannot exclude superimposed edema or atypical pneumonia. No focal consolidation or pleural effusion. Stable heart size.    Ct Head Without Contrast    Result Date: 8/3/2020  EXAM: CT HEAD WO CONTRAST LOCATION: [facility] DATE/TIME: 8/3/2020 5:04 PM INDICATION: Head trauma, abnormal mental status (Age 19-64y) Fall, bruising to face COMPARISON: Head CT 12/20/2017 and MRI brain 07/23/2017. TECHNIQUE: Routine without IV contrast. Multiplanar reformats. Dose reduction techniques were used. FINDINGS: No evidence of acute intracranial hemorrhage or mass effect. Partially calcified lesion within the right posterior parasagittal frontal lobe measuring 2.2 x 2.3 x 2.9 cm (AP x TV x CC), corresponding to the patient's known  arterial venous malformation. Brain attenuation morphology otherwise normal. The ventricles and sulci are normal for age. Normal gray-white matter differentiation. The basilar cisterns are patent. The globes are unremarkable. The partially imaged paranasal sinuses demonstrate air-fluid level within the right maxillary sinus which could represent acute sinusitis in the appropriate setting. The partially imaged paranasal sinuses are otherwise unremarkable. The mastoid air cells and middle ear cavities are unremarkable. The visualized skull base and calvarium are unremarkable. The     1.  No evidence of acute intracranial hemorrhage or mass effect. 2.  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 3.  Air-fluid level within the right maxillary sinus which could represent acute sinusitis in the appropriate setting.    Ct Chest Without Contrast    Result Date: 8/11/2020  EXAM: CT CHEST WO CONTRAST LOCATION: [facility] DATE/TIME: 8/11/2020 8:11 PM INDICATION: Respiratory illness, acute (Age > 40y) Shortness of breath persistent hypoxemia despite max diuresis. re-eval opacities COMPARISON: CT chest 08/03/2020, 07/12/2019 TECHNIQUE: CT chest without IV contrast. Multiplanar reformats were obtained. Dose reduction techniques were used. CONTRAST: None. FINDINGS: LUNGS AND PLEURA: Mild to moderate tracheal secretions. Moderate to severe emphysema with associated interlobular septal thickening. Mosaic lung attenuation. No focal consolidation or pleural effusion. No mass or definite suspicious nodule. MEDIASTINUM/AXILLAE: Upper normal heart size. No pericardial effusion. Moderate to severe coronary artery calcifications. UPPER ABDOMEN: Stable soft tissue density nodules adjacent to the left adrenal gland. MUSCULOSKELETAL: Spinal degenerative changes.     1.  Moderate to severe  emphysema. 2.  Interlobular septal thickening and groundglass pulmonary opacities likely reflecting pulmonary edema, increased since the CT from 08/03/2020. No pneumonic consolidation or pleural effusion. 3.  Mild to moderate tracheal secretions.     Mr Brain With Without Contrast    Result Date: 7/23/2020  EXAM: MR BRAIN W WO CONTRAST LOCATION: [facility] DATE/TIME: 7/23/2020 5:51 PM INDICATION: WORSENING ATAXIA-MENTAL STATUS COMPARISON: MRI head 10/08/2016 from [facility]. CT head 12/20/2017 from [facility]. CONTRAST: Gadavist 8 TECHNIQUE: Routine multiplanar multisequence head MRI without and with intravenous contrast. FINDINGS: INTRACRANIAL CONTENTS: No acute or subacute infarct. Again seen is a shunting type moderate-sized arteriovenous malformation in the parasagittal right parietal region superiorly and posteriorly. It measures at least 3.5 cm AP x 2 cm transverse x 4 cm craniocaudad. It has a very similar appearance to the MRI 10/08/2016 from [facility]. No associated new hemorrhage, edema, or mass effect. Scattered nonspecific T2/FLAIR hyperintensities within the cerebral white matter most consistent with mild chronic microvascular ischemic change. Mild generalized cerebral atrophy. No hydrocephalus. Normal position of the cerebellar tonsils. No pathologic contrast enhancement of the brain parenchyma or meninges allowing for some vascular enhancement with the right parietal AVM. SELLA: No abnormality accounting for technique. OSSEOUS STRUCTURES/SOFT TISSUES: Normal marrow signal. The major intracranial vascular flow voids are maintained. ORBITS: No abnormality accounting for technique. SINUSES/MASTOIDS: No paranasal sinus mucosal disease. No middle ear or mastoid effusion.     1.  Again seen is a shunting type moderate-sized arteriovenous malformation in the parasagittal right parietal area superiorly and posteriorly measuring approximately 3.5 cm x 2 cm x 4 cm. It is not changed  significantly when compared to MRI head 10/08/2016 from [facility]. No new hemorrhage, edema or mass effect. Follow-up conventional angiography could be considered. 2.  No evidence for new infarct, hemorrhage elsewhere, hydrocephalus or intracranial mass. 3.  Mild age-related changes.     Xr Swallow Study W Speech    Result Date: 8/13/2020  EXAM: XR SWALLOW STUDY W SPEECH OR OT LOCATION: [facility] DATE/TIME: 8/13/2020 2:47 PM INDICATION: Difficulty swallowing. COMPARISON: None. TECHNIQUE: Routine. FINDINGS: FLUOROSCOPIC TIME: 2.7 minutes NUMBER OF IMAGES: 0 Swallow study with Speech Pathology using multiple barium thicknesses. Mendez aspiration with nectar consistency that is silent. Thin liquids not tried. With honey consistency and pudding there is premature spill to the vallecula but no penetration or aspiration and otherwise normal swallowing reflex.     1.  High risk for aspiration with nectar consistency and likely thin. 2.  Patient should build tolerate honey and pudding consistency.     Cta Chest Pe Run    Result Date: 8/3/2020  EXAM: CTA CHEST PE RUN LOCATION: [facility] DATE/TIME: 8/3/2020 5:01 PM INDICATION: PE suspected, high pretest prob SOB, tachycardia COMPARISON: CTA chest, abdomen and pelvis 07/12/2019 TECHNIQUE: CT chest pulmonary angiogram during arterial phase injection of IV contrast. Multiplanar reformats and MIP reconstructions were performed. Dose reduction techniques were used. CONTRAST: Iohexol (Omni) 100 mL FINDINGS: ANGIOGRAM CHEST: The right and left main pulmonary arteries and the segmental vessels are all patent without evidence for emboli. Suboptimal opacification involving the subsegmental vessels to both lower lobes. Thoracic aorta is negative for dissection. No CT evidence of right heart strain. LUNGS AND PLEURA: Advanced centrilobular emphysema. Extensive airspace and groundglass opacities throughout both upper lobes, with additional interstitial opacities  throughout the mid and lower lung fields, all of which are new since the previous study. Subsegmental atelectasis in the bases. No effusions. MEDIASTINUM/AXILLAE: Numerous borderline enlarged mediastinal and hilar nodes with minimal change. UPPER ABDOMEN: Advanced atherosclerotic disease. Splenomegaly. Trace ascites adjacent to the liver. Collateral vessels along the lesser curvature the stomach MUSCULOSKELETAL: Hypertrophic changes thoracic spine.     1.  No evidence for central pulmonary emboli. The peripheral subsegmental vessels to both lower lobes are suboptimally opacified. 2.  Advanced centrilobular emphysema. 3.  Extensive groundglass and airspace infiltrates within both upper lobes concerning for pneumonia, including Covid 19. There are additional diffuse interstitial densities both lungs likely representing edema.     Us Venous Legs Bilateral    Result Date: 8/11/2020  EXAM: US VENOUS LEGS BILATERAL LOCATION: [facility] DATE/TIME: 8/11/2020 12:31 PM INDICATION: hypoxia, eval for DVT COMPARISON: None. TECHNIQUE: Venous Duplex ultrasound of bilateral lower extremities with and without compression, augmentation and duplex. Color flow and spectral Doppler with waveform analysis performed. FINDINGS: Exam includes the common femoral, femoral, popliteal veins as well as segmentally visualized deep calf veins and greater saphenous vein. RIGHT: No deep vein thrombosis. No superficial thrombophlebitis. No popliteal cyst. LEFT: No deep vein thrombosis. No superficial thrombophlebitis. No popliteal cyst.     1.  No deep venous thrombosis in the bilateral lower extremities.    Ct Abdomen Pelvis Without Oral With Iv Contrast    Result Date: 8/3/2020  EXAM: CT ABDOMEN PELVIS WO ORAL W IV CONTRAST LOCATION: [facility] DATE/TIME: 8/3/2020 5:03 PM INDICATION: RLQ abdominal pain, appendicitis suspected (Age > 14y) Right sided abdominal pain COMPARISON: None. TECHNIQUE: CT scan of the abdomen and pelvis was  performed following injection of IV contrast. Multiplanar reformats were obtained. Dose reduction techniques were used. CONTRAST: Iohexol (Omni) 100 mL FINDINGS: LOWER CHEST: Diffuse interstitial opacities throughout the visualized lower lung fields. HEPATOBILIARY: Trace ascites adjacent to the liver. Moderate distention of the gallbladder. PANCREAS: Normal. SPLEEN: Borderline splenomegaly is unchanged measuring 13 cm with trace adjacent fluid. ADRENAL GLANDS: Normal. KIDNEYS/BLADDER: Normal. BOWEL: The appendix is identified within the mid right pelvis and is of normal caliber containing a tiny amount of air. There is some minimal edema adjacent to the appendix and cecum. Slight wall thickening involving the cecum and ascending colon with a small amount of adjacent ascites. No evidence for bowel obstruction. LYMPH NODES: Normal. VASCULATURE: Advanced atherosclerotic disease abdominal aorta and iliac arteries with high-grade stenosis involving the proximal right and left common iliac arteries. Prominent collateral vessels along the lesser curvature of the stomach are more distended compared to the prior study and could be secondary to portal venous hypertension. PELVIC ORGANS: Normal. MUSCULOSKELETAL: Small fat-containing ventral hernia.     1.  Appendix is of normal caliber. There is a small amount of ascites adjacent to the base of the cecum and ascending colon with slight wall thickening which could represent an acute colitis. No evidence for bowel obstruction. 2.  Trace ascites adjacent to the liver and spleen. 3.  Advanced atherosclerotic disease. 4.  Prominent collateral vessels/possible gastric varices lesser curvature of the stomach. 5.  Interstitial opacities throughout the visualized lower lung fields. Please refer to CTA chest report for further discussion.    Us Abdomen Limited    Result Date: 8/13/2020  EXAM: US ABDOMEN LIMITED LOCATION: [facility] DATE/TIME: 8/13/2020 8:02 AM INDICATION:  worsening LFT's right upper quad pain, eval for galbladder infection, or stones in the ducts COMPARISON: None. TECHNIQUE: Limited abdominal ultrasound. FINDINGS: GALLBLADDER: Normal. No gallstones, wall thickening, or pericholecystic fluid. Negative sonographic Ludwig's sign. BILE DUCTS: No biliary dilatation. The common duct measures 4 mm. LIVER: Coarsened hepatic parenchymal echotexture. Appearance suggestive of underlying hepatic fibrosis. No focal mass. RIGHT KIDNEY: No hydronephrosis. PANCREAS: The visualized portions are normal. No ascites.     1.  Coarsened hepatic parenchymal echotexture concerning for underlying hepatic fibrosis. 2.  No other significant findings.    Us Abdomen Limited    Result Date: 8/4/2020  EXAM: US ABDOMEN LIMITED LOCATION: [facility] DATE/TIME: 8/4/2020 5:59 PM INDICATION: Hyperbilirubinemia, sepsis, evaluate for cholecystitis, CBD dilation COMPARISON: CT 8/3/2020 . TECHNIQUE: Limited abdominal ultrasound. FINDINGS: GALLBLADDER: Isoechoic intraluminal tissue related to the anterior wall of the gallbladder measuring approximately 2.5 x 2.0 x 1.0 cm has no internal Doppler flow. No significant gallbladder wall thickening with gallbladder wall thickness approximately 3  mm. Gallbladder otherwise negative. Negative sonographic Ludwig's sign. BILE DUCTS: No intrahepatic biliary dilatation. The common duct measures 6 mm, upper normal. LIVER: Coarsened hepatic parenchymal echotexture and slight hepatic contour irregularity. No focal mass. RIGHT KIDNEY: No hydronephrosis. PANCREAS: The visualized portions are normal. Trace ascites.     1.  Isoechoic intraluminal tissue related to the anterior wall of the gallbladder measuring approximately 2.5 x 2.0 x 1.0 cm indeterminate for adherent sludge material versus polyp but favor sludge material given lack of internal Doppler flow. 2.  No gallbladder wall thickening. 3.  No intrahepatic biliary dilatation. The common duct measures 6 mm,  "upper normal. 4.  Coarsened hepatic parenchymal echotexture and slight hepatic contour irregularity suggestive of underlying hepatic fibrosis/cirrhosis. 5.  Trace ascites.    Us Venous Arms Bilateral    Result Date: 8/11/2020  EXAM: US VENOUS ARMS BILATERAL LOCATION: [facility] DATE/TIME: 8/11/2020 12:31 PM INDICATION: hypoxia, eval for DVT COMPARISON: None. TECHNIQUE: Venous Duplex ultrasound of both upper extremities with (when possible) and without compression, augmentation, and duplex. Color flow and spectral Doppler with waveform analysis performed. FINDINGS: Ultrasound includes evaluation of the internal jugular veins, innominate veins, subclavian veins, axillary veins, and brachial veins. The superficial cephalic and basilic veins were also evaluated where seen. RIGHT: No deep venous thrombosis. No superficial thrombophlebitis. LEFT: No deep venous thrombosis. No superficial thrombophlebitis.     1.  No deep venous thrombosis in the bilateral upper extremities.    Poc Us 3cg Picc Placement Guidance    Result Date: 8/4/2020  Exam was performed as guidance for PICC line insertion.  Click \"PACS images\" hyperlink below to view any stored images.  For specific procedure details, view procedure note authored by PICC/Vascular Access Nurse.    Ct Chest Abdomen Pelvis Without Oral With Iv Contrast    Result Date: 8/17/2020  EXAM: CT CHEST ABDOMEN PELVIS WO ORAL W IV CONTRAST LOCATION: [facility] DATE/TIME: 8/17/2020 12:22 PM INDICATION: Sepsis of uncertain etiology. COMPARISON: Abdominal ultrasound 8/13/2020, CT chest 11/20/2020 and CT abdomen pelvis 8/3/2020 TECHNIQUE: CT scan of the chest, abdomen, and pelvis was performed following injection of IV contrast. Multiplanar reformats were obtained. Dose reduction techniques were used. CONTRAST: 100 mL Omnipaque [id number] FINDINGS: LUNGS AND PLEURA: Extensive upper lung predominant emphysema. Subsegmental atelectasis within the right lower lobe. Small amount of " endobronchial airway secretions in the lower lobes, greater on the right. Tiny focus of consolidation within the medial left lower lobe image 114. No Pleural effusion. MEDIASTINUM/AXILLAE: Right upper extremity PICC. No mass/adenopathy nor pericardial effusion. Moderate coronary artery calcifications. HEPATOBILIARY: Cirrhotic appearing liver without focal mass. Recanalized paraumbilical vein. Gallbladder wall thickening could be secondary to chronic liver disease. No bile duct dilatation. PANCREAS: No significant mass, duct dilatation, or inflammatory change. SPLEEN: Stable borderline splenomegaly measuring 12.8 cm. Splenic vein is patent. ADRENAL GLANDS: No significant nodules. KIDNEYS/BLADDER: No significant mass, stone, or hydronephrosis. BOWEL: New right-sided colitis extending from the cecum to the proximal transverse colon could be infectious or ischemic. No pneumatosis. New mild ascites extending from right upper quadrant to the pelvis. LYMPH NODES: No lymphadenopathy. VASCULATURE: Prominent calcified esophageal varices. Extensive aortoiliac atherosclerosis. PELVIC ORGANS: Normal-appearing uterus and ovaries. MUSCULOSKELETAL: No suspicious osseous lesions.     1.  Left-sided colitis extending from cecum to proximal transverse colon. Differential considerations would include both infectious and ischemic etiologies. New mild ascites in the right abdomen with no evidence of pneumatosis, perforation nor abscess. 2.  Extensive emphysema. Bibasilar subsegmental atelectasis with no focal pneumonia nor pleural effusion. 3.  Cirrhotic appearing liver with gastroesophageal varices and stable borderline splenomegaly.

## 2021-06-10 NOTE — CONSULTS
General surgery consult         ASSESSMENT     1. SOB (shortness of breath)    2. Abdominal pain, right upper quadrant    3. Tachycardia    4. Elevated bilirubin         Elevated alkaline phosphatase and bilirubin that is >50% direct without signs of cholelithiasis or choledocholithiasis on imaging; there is sludge versus a polyp noted on US. No leukocytosis or gallbladder wall thickening to suggest cholecystitis.  Was not having much abdominal pain at the current time. This is not specific to the RUQ or epigastric region and could be indicative of a biliary process but is also common in pneumonia. Patient has signs of pneumonia on her CT. Covid test negative.    With the patient's poor baseline respiratory function, general anesthesia would likely lead to prolonged intubation which the patient for discussion per do not think she has cholecystitis or would not recommend removing her gallbladder at this time.  Dr. Spear has voiced opposition to.  If there is persisting concern that the gallbladder may be contributing to her issues, a cholecystostomy tube is the extent of which I would recommend interventional procedures.    PLAN      - if gallbladder is suspected to be the cause of her sepsis, she will require cholecystecotmy tube; I do not think this is warranted at this time  - no surgical intervention at this time.  We will sign off at this point given no evidence of cholecystitis, please contact us if questions or concerns arise.       CHIEF COMPLAINT     Chief Complaint   Patient presents with     Shortness of Breath         HPI     Keiko Guo is a 60 y.o. female who we are consulted to see for acute cholecystitis. Patient has altered level of consciousness so history is taken from chart. PMH significant for COPD, RI, PAD, and ETOH abuse. Admitted with COPD exacerbation, lactic acidosis and elevated procalcitonin. She fell yesterday, but specifics are unclear.     Surgery consulted due to elevated  bilirubin and alk phos with patient report in ED of abdominal pain. She denies nausea or vomiting in ED.     She has had 2 negative covid tests but continues to worsen from a respiratory standpoint.         PAST MEDICAL HISTORY SURGICAL HISTORY     Past Medical History:   Diagnosis Date     COPD (chronic obstructive pulmonary disease) (H)      PAD (peripheral artery disease) (H)      Sleep apnea     Past Surgical History:   Procedure Laterality Date     APPENDECTOMY  1999    rupture with sepsis      SECTION       osteomyelitis       HI ESOPHAGOGASTRODUODENOSCOPY TRANSORAL DIAGNOSTIC N/A 2019    Procedure: ESOPHAGOGASTRODUODENOSCOPY (EGD) with biopsies;  Surgeon: Mario Beatty MD;  Location: [facility];  Service: Gastroenterology          CURRENT MEDICATIONS ALLERGIES       Current Facility-Administered Medications:      aluminum-magnesium hydroxide-simethicone 200-200-20 mg/5 mL suspension 15 mL (MAALOX ADVANCED), 15 mL, Oral, Q6H PRN, Ella Bond MD     azithromycin in D5W 500 mg IVPB 250 mL, 500 mg, Intravenous, Q24H, Balwinder Spear MD, 500 mg at 20 1858     folic acid 1 mg, thiamine 100 mg, multiple vitamin 3,300 unit- 150 mcg/10 mL 10 mL in sodium chloride 0.9% 1,000 mL, , Intravenous, Daily PRN, Ella Bond MD     gabapentin capsule 100-300 mg (NEURONTIN), 100-300 mg, Oral, Q6H PRN, Ella Bond MD, 200 mg at 20 2134     ipratropium-albuteroL 0.5-2.5 mg/3 mL nebulizer solution 3 mL (DUO-NEB), 3 mL, Nebulization, QID - RT, 3 mL at 20 0740 **AND** Nebulizer treatment intermittent, , , Q4H PRN, Ella Bond MD     lidocaine (PF) 10 mg/mL (1 %) injection 1-5 mL (XYLOCAINE-MPF), 1-5 mL, Intradermal, Once PRN, Ella Bond MD     melatonin tablet 15 mg, 15 mg, Oral, Bedtime PRN, Ella Bond MD     methylPREDNISolone sod suc(PF) injection 60 mg (Solu-MEDROL), 60 mg, Intravenous, Q12H, Balwinder Spear MD, 60 mg at 20 0839     metoprolol tartrate tablet 25  mg (LOPRESSOR), 25 mg, Oral, Q12H PRN, Ella Bond MD     multivitamin therapeutic tablet 1 tablet, 1 tablet, Oral, DAILY, Ella Bond MD     nicotine 21 mg/24 hr 1 patch (NICODERM CQ), 1 patch, Transdermal, DAILY, Balwinder Spear MD, 1 patch at 08/05/20 0839     omeprazole capsule 20 mg (PriLOSEC), 20 mg, Oral, QAM (0630), Ella Bond MD     [COMPLETED] piperacillin-tazobactam 3.375 g in NaCl 0.9 % 50 mL (MINI-BAG Plus) (ZOSYN), 3.375 g, Intravenous, Once, 3.375 g at 08/04/20 1420 **AND** piperacillin-tazobactam 3.375 g in NaCl 0.9 % 50 mL (MINI-BAG Plus) (ZOSYN), 3.375 g, Intravenous, Q8H, Ella Bond MD, Last Rate: 12.5 mL/hr at 08/05/20 1116, 3.375 g at 08/05/20 1116     QUEtiapine tablet 300 mg (SEROquel), 300 mg, Oral, QHS, Ella Bond MD, 300 mg at 08/04/20 2154     QUEtiapine tablet 75 mg (SEROquel), 75 mg, Oral, Bedtime PRN, Ella Bond MD     sodium chloride bacteriostatic 0.9 % injection 1-5 mL, 1-5 mL, Intradermal, Once PRN, Ella Bond MD     sodium chloride flush 10-20 mL (NS), 10-20 mL, Intravenous, PRN, Ella Bond MD, 10 mL at 08/05/20 0016     sodium chloride flush 10-30 mL (NS), 10-30 mL, Intravenous, PRN, Ella Bond MD, 10 mL at 08/04/20 2155     sodium chloride flush 10-30 mL (NS), 10-30 mL, Intravenous, Q8H FIXED TIMES, Ella Bond MD, 10 mL at 08/05/20 0839     sodium chloride flush 20 mL (NS), 20 mL, Intravenous, PRN, Ella Bond MD     thiamine 100 mg in sodium chloride 0.9% 50 mL (vitamin B1), 100 mg, Intravenous, DAILY, Balwinder Spear MD, Last Rate: 100 mL/hr at 08/05/20 0934, 100 mg at 08/05/20 0934 No Known Allergies       FAMILY HISTORY     Family History   Problem Relation Age of Onset     Depression Mother      Hypertension Mother      Diabetes Father      Hypertension Father      Schizophrenia Brother          SOCIAL HISTORY     Social History     Socioeconomic History     Marital status: Single     Spouse name: None     Number of children: None     Years  "of education: None     Highest education level: None   Occupational History     None   Social Needs     Financial resource strain: None     Food insecurity     Worry: None     Inability: None     Transportation needs     Medical: None     Non-medical: None   Tobacco Use     Smoking status: Current Every Day Smoker     Packs/day: 0.75     Types: Cigarettes     Smokeless tobacco: Never Used   Substance and Sexual Activity     Alcohol use: Yes     Comment: minimal      Drug use: No     Sexual activity: None   Lifestyle     Physical activity     Days per week: None     Minutes per session: None     Stress: None   Relationships     Social connections     Talks on phone: None     Gets together: None     Attends Rastafarian service: None     Active member of club or organization: None     Attends meetings of clubs or organizations: None     Relationship status: None     Intimate partner violence     Fear of current or ex partner: None     Emotionally abused: None     Physically abused: None     Forced sexual activity: None   Other Topics Concern     None   Social History Narrative     None         REVIEW OF SYSTEMS      Review of Systems - 12 system review negative except as noted in HPI    PHYSICAL EXAM     VITAL SIGNS: /66 (Patient Position: Semi-hopper)   Pulse 95   Temp 98  F (36.7  C) (Axillary)   Resp 24   Ht 5' 2\" (1.575 m)   Wt 171 lb 6.4 oz (77.7 kg)   SpO2 91%   BMI 31.35 kg/m     General : Obtunded, obese  Skin: Facial bruising over right eye    Head: Normocephalic, without obvious abnormality   HEENT: PERRL, conjunctiva/corneas clear, EOM's intact; no scleral edema or icterus noted   Throat: Lips, mucosa, and tongue dry  Neck: Supple, no obvious adenopathy   Respiratory: Clear to auscultation bilaterally but diminished; tachypneic; no rales, rhonchi or wheezes noted  Chest Wall: Atraumatic, no tenderness or deformity noted  Heart: Regular rate and rhythm; S1 S2 normal; no murmur, rub or gallop " noted  Cardiovascular: Pulses 2+ and symmetric, capillary refill <3 seconds; lower extremity edema bilaterally   GI: Abdomen is soft with diffuse tenderness with deep palpation in the upper abdomen; no guarding or distention  : Patient is eliminating bladder via void with adequate output noted   Musculoskeletal: Stable gait; no obvious swelling, bruising or deformity  Neurologic: Cranial Nerves II-XII intact, moves all extremities with equal strength; no focal findings      RADIOLOGY      US Abdomen Limited   Final Result   1.  Isoechoic intraluminal tissue related to the anterior wall of the gallbladder measuring approximately 2.5 x 2.0 x 1.0 cm indeterminate for adherent sludge material versus polyp but favor sludge material given lack of internal Doppler flow.   2.  No gallbladder wall thickening.   3.  No intrahepatic biliary dilatation. The common duct measures 6 mm, upper normal.   4.  Coarsened hepatic parenchymal echotexture and slight hepatic contour irregularity suggestive of underlying hepatic fibrosis/cirrhosis.   5.  Trace ascites.      POC US 3CG PICC Placement Guidance   Final Result      CT Head Without Contrast   Final Result   1.  No evidence of acute intracranial hemorrhage or mass effect.   2.  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   3.  Air-fluid level within the right maxillary sinus which could represent acute sinusitis in the appropriate setting.      CT Abdomen Pelvis Without Oral With IV Contrast   Final Result   1.  Appendix is of normal caliber. There is a small amount of ascites adjacent to the base of the cecum and ascending colon with slight wall thickening which could represent an acute colitis. No evidence for bowel obstruction.   2.  Trace ascites adjacent to the liver and spleen.   3.  Advanced atherosclerotic disease.   4.   Prominent collateral vessels/possible gastric varices lesser curvature of the stomach.   5.  Interstitial opacities throughout the visualized lower lung fields. Please refer to CTA chest report for further discussion.      CTA Chest PE Run   Final Result   1.  No evidence for central pulmonary emboli. The peripheral subsegmental vessels to both lower lobes are suboptimally opacified.   2.  Advanced centrilobular emphysema.    3.  Extensive groundglass and airspace infiltrates within both upper lobes concerning for pneumonia, including Covid 19. There are additional diffuse interstitial densities both lungs likely representing edema.             EKG     Reviewed        LABS     Results for orders placed or performed during the hospital encounter of 08/03/20   Basic Metabolic Panel   Result Value Ref Range    Sodium 135 (L) 136 - 145 mmol/L    Potassium 3.6 3.5 - 5.0 mmol/L    Chloride 101 98 - 107 mmol/L    CO2 20 (L) 22 - 31 mmol/L    Anion Gap, Calculation 14 5 - 18 mmol/L    Glucose 154 (H) 70 - 125 mg/dL    Calcium 8.3 (L) 8.5 - 10.5 mg/dL    BUN 12 8 - 22 mg/dL    Creatinine 0.76 0.60 - 1.10 mg/dL    GFR MDRD Af Amer >60 >60 mL/min/1.73m2    GFR MDRD Non Af Amer >60 >60 mL/min/1.73m2   Hepatic Profile   Result Value Ref Range    Bilirubin, Total 6.0 (H) 0.0 - 1.0 mg/dL    Bilirubin, Direct 3.4 (H) <=0.5 mg/dL    Protein, Total 7.4 6.0 - 8.0 g/dL    Albumin 1.8 (L) 3.5 - 5.0 g/dL    Alkaline Phosphatase 254 (H) 45 - 120 U/L    AST 57 (H) 0 - 40 U/L    ALT 30 0 - 45 U/L   Lipase   Result Value Ref Range    Lipase 38 0 - 52 U/L   Troponin I   Result Value Ref Range    Troponin I <0.01 0.00 - 0.29 ng/mL   INR   Result Value Ref Range    INR 1.59 (H) 0.90 - 1.10   BNP(B-type Natriuretic Peptide)   Result Value Ref Range     (H) 0 - 98 pg/mL   Urinalysis-UC if Indicated   Result Value Ref Range    Color, UA Orange (!) Colorless, Yellow, Straw, Light Yellow    Clarity, UA Clear Clear    Glucose, UA Negative  Negative    Bilirubin, UA Moderate (!) Negative    Ketones, UA Negative Negative    Specific Gravity, UA 1.015 1.001 - 1.030    Blood, UA Negative Negative    pH, UA 6.0 4.5 - 8.0    Protein, UA 30 mg/dL (!) Negative mg/dL    Urobilinogen, UA 8.0 E.U./dL (!) <2.0 E.U./dL, 2.0 E.U./dL    Nitrite, UA Negative Negative    Leukocytes, UA Negative Negative    Bacteria, UA None Seen None Seen hpf    RBC, UA 0-2 None Seen, 0-2 hpf    WBC, UA 0-5 None Seen, 0-5 hpf    Squam Epithel, UA 25-50 (!) None Seen, 0-5 lpf    Mucus, UA Few (!) None Seen lpf   Blood Gases, Venous   Result Value Ref Range    pH, Venous 7.34 (L) 7.35 - 7.45    pCO2, Venous 42 35 - 50 mm Hg    pO2, Bo 19 (L) 25 - 47 mm Hg    Base Excess, Venous -3.6 mmol/L    HCO3, Venous 20.3 (L) 24.0 - 30.0 mmol/L    Oxyhemoglobin 21.5 (L) 70.0 - 75.0 %    O2 Sat, Venous 22.1 (L) 70.0 - 75.0 %   HM1 (CBC with Diff)   Result Value Ref Range    WBC 6.3 4.0 - 11.0 thou/uL    RBC 2.85 (L) 3.80 - 5.40 mill/uL    Hemoglobin 10.5 (L) 12.0 - 16.0 g/dL    Hematocrit 30.8 (L) 35.0 - 47.0 %     (H) 80 - 100 fL    MCH 36.8 (H) 27.0 - 34.0 pg    MCHC 34.1 32.0 - 36.0 g/dL    RDW 17.9 (H) 11.0 - 14.5 %    Platelets 85 (L) 140 - 440 thou/uL    MPV 10.4 8.5 - 12.5 fL    Neutrophils % 68 50 - 70 %    Lymphocytes % 19 (L) 20 - 40 %    Monocytes % 10 2 - 10 %    Eosinophils % 2 0 - 6 %    Basophils % 1 0 - 2 %    Neutrophils Absolute 4.3 2.0 - 7.7 thou/uL    Lymphocytes Absolute 1.2 0.8 - 4.4 thou/uL    Monocytes Absolute 0.6 0.0 - 0.9 thou/uL    Eosinophils Absolute 0.1 0.0 - 0.4 thou/uL    Basophils Absolute 0.0 0.0 - 0.2 thou/uL   COVID-19 Virus PCR MRF    Specimen: Nasal Swab; Respiratory   Result Value Ref Range    COVID-19 VIRUS SPECIMEN SOURCE Nares     2019-nCOV       Test received-See reflex to IDDL test SARS CoV2 (COVID-19) Virus RT-PCR   Lactic Acid   Result Value Ref Range    Lactic Acid 8.3 (HH) 0.7 - 2.0 mmol/L   SARS-CoV-2 (COVID-19) RT-PCR-IDDL    Specimen: Nasal  Swab; Respiratory   Result Value Ref Range    SARS-CoV-2 Virus Specimen Source Nares     SARS-CoV-2 PCR Result NEGATIVE     SARS-COV-2 PCR COMMENT       Testing was performed using the Xpert Xpress SARS-CoV-2 Assay on the "[employer]"   Lactic Acid   Result Value Ref Range    Lactic Acid 10.5 (HH) 0.7 - 2.0 mmol/L   Lactic Acid   Result Value Ref Range    Lactic Acid 9.0 (HH) 0.7 - 2.0 mmol/L   COVID-19 Virus PCR MRF    Specimen: Respiratory   Result Value Ref Range    COVID-19 VIRUS SPECIMEN SOURCE Nasopharyngeal     2019-nCOV       Test received-See reflex to IDDL test SARS CoV2 (COVID-19) Virus RT-PCR   CK Total   Result Value Ref Range    CK, Total 64 30 - 190 U/L   SARS-CoV-2 (COVID-19) RT-PCR-IDDL    Specimen: Respiratory   Result Value Ref Range    SARS-CoV-2 Virus Specimen Source Nasopharyngeal     SARS-CoV-2 PCR Result NEGATIVE     SARS-COV-2 PCR COMMENT       Testing was performed using the Xpert Xpress SARS-CoV-2 Assay on the "[employer]"   Lactic Acid   Result Value Ref Range    Lactic Acid 2.7 (H) 0.7 - 2.0 mmol/L   Procalcitonin   Result Value Ref Range    Procalcitonin 2.84 (H) 0.00 - 0.49 ng/mL   Blood Gases, Venous   Result Value Ref Range    pH, Venous 7.44 7.35 - 7.45    pCO2, Venous 36 35 - 50 mm Hg    pO2, Bo 30 25 - 47 mm Hg    Base Excess, Venous 0.7 mmol/L    HCO3, Venous 24.5 24.0 - 30.0 mmol/L    Oxyhemoglobin 54.8 (L) 70.0 - 75.0 %    O2 Sat, Venous 56.0 (L) 70.0 - 75.0 %   Comprehensive Metabolic Panel   Result Value Ref Range    Sodium 138 136 - 145 mmol/L    Potassium 4.3 3.5 - 5.0 mmol/L    Chloride 108 (H) 98 - 107 mmol/L    CO2 22 22 - 31 mmol/L    Anion Gap, Calculation 8 5 - 18 mmol/L    Glucose 121 70 - 125 mg/dL    BUN 10 8 - 22 mg/dL    Creatinine 0.52 (L) 0.60 - 1.10 mg/dL    GFR MDRD Af Amer >60 >60 mL/min/1.73m2    GFR MDRD Non Af Amer >60 >60 mL/min/1.73m2    Bilirubin, Total 3.6 (H) 0.0 - 1.0 mg/dL    Calcium 7.1 (L) 8.5 - 10.5 mg/dL    Protein, Total 6.3 6.0 - 8.0 g/dL    Albumin  1.5 (L) 3.5 - 5.0 g/dL    Alkaline Phosphatase 187 (H) 45 - 120 U/L    AST 49 (H) 0 - 40 U/L    ALT 22 0 - 45 U/L   HM2(CBC W/O DIFF)   Result Value Ref Range    WBC 9.0 4.0 - 11.0 thou/uL    RBC 2.40 (L) 3.80 - 5.40 mill/uL    Hemoglobin 8.9 (L) 12.0 - 16.0 g/dL    Hematocrit 25.3 (L) 35.0 - 47.0 %     (H) 80 - 100 fL    MCH 37.1 (H) 27.0 - 34.0 pg    MCHC 35.2 32.0 - 36.0 g/dL    RDW 17.7 (H) 11.0 - 14.5 %    Platelets 64 (L) 140 - 440 thou/uL    MPV 10.5 8.5 - 12.5 fL   Magnesium   Result Value Ref Range    Magnesium 1.9 1.8 - 2.6 mg/dL   Comprehensive Metabolic Panel   Result Value Ref Range    Sodium 138 136 - 145 mmol/L    Potassium 4.2 3.5 - 5.0 mmol/L    Chloride 107 98 - 107 mmol/L    CO2 25 22 - 31 mmol/L    Anion Gap, Calculation 6 5 - 18 mmol/L    Glucose 138 (H) 70 - 125 mg/dL    BUN 14 8 - 22 mg/dL    Creatinine 0.60 0.60 - 1.10 mg/dL    GFR MDRD Af Amer >60 >60 mL/min/1.73m2    GFR MDRD Non Af Amer >60 >60 mL/min/1.73m2    Bilirubin, Total 3.3 (H) 0.0 - 1.0 mg/dL    Calcium 7.5 (L) 8.5 - 10.5 mg/dL    Protein, Total 6.2 6.0 - 8.0 g/dL    Albumin 1.5 (L) 3.5 - 5.0 g/dL    Alkaline Phosphatase 184 (H) 45 - 120 U/L    AST 55 (H) 0 - 40 U/L    ALT 25 0 - 45 U/L   HM1 (CBC with Diff)   Result Value Ref Range    WBC 8.3 4.0 - 11.0 thou/uL    RBC 2.42 (L) 3.80 - 5.40 mill/uL    Hemoglobin 9.0 (L) 12.0 - 16.0 g/dL    Hematocrit 25.6 (L) 35.0 - 47.0 %     (H) 80 - 100 fL    MCH 37.2 (H) 27.0 - 34.0 pg    MCHC 35.2 32.0 - 36.0 g/dL    RDW 18.0 (H) 11.0 - 14.5 %    Platelets 69 (L) 140 - 440 thou/uL    MPV 10.4 8.5 - 12.5 fL    Neutrophils % 80 (H) 50 - 70 %    Lymphocytes % 13 (L) 20 - 40 %    Monocytes % 6 2 - 10 %    Eosinophils % 0 0 - 6 %    Basophils % 0 0 - 2 %    Neutrophils Absolute 6.6 2.0 - 7.7 thou/uL    Lymphocytes Absolute 1.1 0.8 - 4.4 thou/uL    Monocytes Absolute 0.5 0.0 - 0.9 thou/uL    Eosinophils Absolute 0.0 0.0 - 0.4 thou/uL    Basophils Absolute 0.0 0.0 - 0.2 thou/uL   ECG 12  lead MUSE   Result Value Ref Range    SYSTOLIC BLOOD PRESSURE      DIASTOLIC BLOOD PRESSURE      VENTRICULAR RATE 99 BPM    ATRIAL RATE 99 BPM    P-R INTERVAL 160 ms    QRS DURATION 80 ms    Q-T INTERVAL 392 ms    QTC CALCULATION (BEZET) 503 ms    P Axis 66 degrees    R AXIS 74 degrees    T AXIS 6 degrees    MUSE DIAGNOSIS       Normal sinus rhythm  Low voltage QRS  RSR' or QR pattern in V1 suggests right ventricular conduction delay  Nonspecific ST and T wave abnormality  Prolonged QT  Abnormal ECG  When compared with ECG of 03-AUG-2020 15:25,  T wave inversion now evident in Anterior leads  Confirmed by ANGELA CAZARES, LES LOC:WW ([id number]) on 8/4/2020 4:36:06 PM           Thank you for the consult and allowing us to be involved in the care of this patient.    Elvira Welch, CNP  [phone]  [facility] Surgery    I have seen and reviewed the patient's imaging, laboratory work, and consulting physician notes, and agree with or have addended the assessment and plan as outlined above.    Ranjit Armas MD  [phone]  [facility]

## 2021-06-10 NOTE — PROGRESS NOTES
Spoke to Mar RN with [facility] regarding update for dc. Barriers to dc are financial resources as well as patient's O2 needs. Per Mar pt's breathing labored and in 30s. Will dc prn oxycodone and schedule sublingual morphine 5-10 mg Q 4 hours and Q 1 hours prn.     Will f/u tomorrow when on site.     Hailey MORIN, NP-C  Palliative Care   To contact me via Text Page, click here or Palliative Care Service Line at [phone]

## 2021-06-10 NOTE — PROGRESS NOTES
Speech Language/Pathology  Speech Therapy Daily Progress Note    Orders for VFSS requested d/t questionable pneumonia on current diet. Per nurse, pt not appropriate to complete this study today d/t medical status. Currently, daughter is meeting with Palliative/Hospice.     PLAN: follow up with nurse re: appropriateness of VFSS or ST involvement as it appears that patient may be going on Hospice.      Maritza Urban MS, CCC-SLP

## 2021-06-10 NOTE — PROGRESS NOTES
"[facility] Palliative Care Social Work Note:    Telephone call with pt's dtr Hailey Hollis, Palliative NP and Dr. Greenberg.  Medical update given by providers. Telma has many questions and seeks to know \"why\" her mom is in this situation. She wants additional tests done to gather more information. She understands her mom is very ill and most likely won't recover but wants to remain hopeful.  She was tearful at end of visit so continued conversation with her after MD and NP hung up.    She shared how difficult it is to see her mom like this. She worries she is afriad of dying after a life time of depression and not wanting to live. Telma wants to feel confident in the choices she makes and asked, \"How do I know when to give up?\"  Acknowledged how difficult and complex the situation is given their relationship and how hard it is to let go. She shared \"I don't want to be selfish, but I also want to do what can be done.\" Telma cried on and off throughout the conversation. At the end she shared that \"I know my mom is dying and it is hard for me to accept.\" Encouraged sharing of feelings and experiences. Provided active listening, validation of feeling and emotional support.     PC SW will continue to provide emotional support to dtr during this difficult time.     Melia Dennis, [facility]  Palliative Care   Office # [phone]  "

## 2021-06-10 NOTE — PROGRESS NOTES
Speech Language/Pathology  Speech Therapy Daily Progress Note    Patient presents as confused during this session.  An  was not applicable.    Objective  Much encouragement needed to trial a few bites of soft solid. She states she's too tired, too full.  She did take bites of soft bread w/deli turkey. Poor mastication and control. Upper dentures started to fall from their place which further caused difficulty. She stated she swallowed it when she actually had not.   Lot's of movement in her chair, sit up then back, leaning forward. Poor attention to task.  I had her spit out solid bolus as she did not masticate or swallow it.    Assessment  Tolerating puree and honey thick. Poor mastication and control of soft solid, worsened by loose dentures. Although she had difficulty prior to dentures coming loose.    Plan/Recommendations  Continue with current diet, not yet able to safely or effectively tolerate upgraded texture.    The ST Care Plan has been reviewed and current plan remains appropriate.    10 dysphagia minutes     Pablo Petty MA, CCC-SLP

## 2021-06-10 NOTE — PLAN OF CARE
Problem: Pain  Goal: Patient's pain/discomfort is manageable  Outcome: Progressing  Denied pain during shift.    Problem: Potential for Compromised Skin Integrity  Goal: Skin integrity is maintained or improved  Outcome: Progressing  Barrier cream applied to sacrum. Had small incontinent BM.    Problem: Daily Care  Goal: Daily care needs are met  Outcome: Progressing  Childress catheter patent and draining; 150 mL dark genesis output during shift. Continues with IV fluids @ 100 and pureed, honey-thickened liquids.    Problem: Cognitive Impairment or Disorientation  Goal: Patient will maintain or return to normal baseline cognitive function  Outcome: Progressing  Level of orientation fluctuates. Benefits by frequent reorientation and redirection.    Problem: Discharge Barriers  Goal: Patient's discharge needs are met  Outcome: Progressing  May reattempt HIDA scan. Potassium protocol.    Nandini Sparrow RN

## 2021-06-10 NOTE — PLAN OF CARE
Problem: Pain  Goal: Patient's pain/discomfort is manageable  Outcome: Progressing   Comfortable throughout shift.     Problem: Psychosocial Needs  Goal: Collaborate with patient/family/caregiver to identify patient specific goals for this hospitalization  Outcome: Progressing   Daughter visited in afternoon.     Problem: Inadequate Gas Exchange  Goal: Patient will achieve/maintain normal respiratory rate/effort  Outcome: Progressing   On Hi-Klaus, titrated to keep SpO2 between 88-92%.

## 2021-06-10 NOTE — CONSULTS
GI CONSULT NOTE      Name: Keiko Guo    Medical Record #: [id number]    YOB: 1960    Date/Time: 8/13/2020/9:10 AM    Reason for Consultation    HPI: Keiko Guo is a 60 y.o. female who we were asked to see for acute hepatitis, cirrhosis and postprandial n/v. pts history notable for issues with odynophagia in 2019 and egd at that time revealed candida esophagitis.   Patient's past medical history notable for etoh abuse, probable Wernicke's encephalopathy and dementia, COPD GÉNESIS PAD history of tobacco use  admitted with abdominal pain lactic acidosis and acute respiratory failure with encephalopathy. Hospital course complicated by severe hypoxemia attributed to a combination of severe emphysema and volume overload with pulmonary edema versus aspiration on 8/3/2020.  Has been COVID negative x3 chest CT on 8/11/2020 shows groundglass opacities bilaterally possibly attributed to pulmonary edema secondary to severe hyponatremia.  She has completed antibiotic treatments and has been on a prednisone taper.    Pt is npo and has video swallow evaluation today.    Pt denies pain but says she is shortness of breath, on O2NC   Pt DNR/DNI.  Hospice has been advised, but family not ready to make that decision at this time.         Past medical history  Past Medical History:   Diagnosis Date     COPD (chronic obstructive pulmonary disease) (H)      PAD (peripheral artery disease) (H)      Sleep apnea         Family history  Family History   Problem Relation Age of Onset     Depression Mother      Hypertension Mother      Diabetes Father      Hypertension Father      Schizophrenia Brother        Social history  Social History     Socioeconomic History     Marital status: Single     Spouse name: Not on file     Number of children: Not on file     Years of education: Not on file     Highest education level: Not on file   Occupational History     Not on file   Social Needs     Financial resource strain:  Not on file     Food insecurity     Worry: Not on file     Inability: Not on file     Transportation needs     Medical: Not on file     Non-medical: Not on file   Tobacco Use     Smoking status: Current Every Day Smoker     Packs/day: 0.75     Types: Cigarettes     Smokeless tobacco: Never Used   Substance and Sexual Activity     Alcohol use: Yes     Comment: minimal      Drug use: No     Sexual activity: Not on file   Lifestyle     Physical activity     Days per week: Not on file     Minutes per session: Not on file     Stress: Not on file   Relationships     Social connections     Talks on phone: Not on file     Gets together: Not on file     Attends Jain service: Not on file     Active member of club or organization: Not on file     Attends meetings of clubs or organizations: Not on file     Relationship status: Not on file     Intimate partner violence     Fear of current or ex partner: Not on file     Emotionally abused: Not on file     Physically abused: Not on file     Forced sexual activity: Not on file   Other Topics Concern     Not on file   Social History Narrative     Not on file       Medications:   Current Facility-Administered Medications   Medication Dose Route Frequency Provider Last Rate Last Dose     albuterol nebulizer solution 2.5 mg (PROVENTIL)  2.5 mg Nebulization Q4H PRN Marcos Younger MD         dextrose 5% 1,000 mL with potassium chloride 40 mEq infusion   Intravenous Continuous Iva Figueroa MD         enoxaparin ANTICOAGULANT syringe 40 mg (LOVENOX)  40 mg Subcutaneous DAILY Suresh Perkins MD   40 mg at 08/13/20 0826     ipratropium-albuteroL 0.5-2.5 mg/3 mL nebulizer solution 3 mL (DUO-NEB)  3 mL Nebulization QID - RT Dalton Greenberg MD   3 mL at 08/13/20 0803     lidocaine (PF) 10 mg/mL (1 %) injection 1-5 mL (XYLOCAINE-MPF)  1-5 mL Intradermal Once PRN Ella Bond MD         melatonin tablet 15 mg  15 mg Oral Bedtime PRN Ella Bond MD         morphine 5 mg/0.25  mL concentrated solution 2.5 mg  2.5 mg Sublingual Q8H PRN Ella Bond MD   2.5 mg at 08/11/20 2153     naloxone injection 0.2-0.4 mg (NARCAN)  0.2-0.4 mg Intravenous PRN Ella Bond MD        Or     naloxone injection 0.2-0.4 mg (NARCAN)  0.2-0.4 mg Intramuscular PRN Ella Bond MD         nicotine 14 mg/24 hr 1 patch (NICODERM CQ)  1 patch Transdermal DAILY Suresh Perkins MD   1 patch at 08/13/20 0826     omeprazole capsule 20 mg (PriLOSEC)  20 mg Oral BID Suresh Perkins MD   20 mg at 08/13/20 0826     potassium chloride in water 10 mEq in 50 mL  10 mEq Intravenous Q1H Balwinder Spear MD 50 mL/hr at 08/13/20 0732 10 mEq at 08/13/20 0732     potassium phosphate 18 mmol in dextrose 5% 250 mL  0.24 mmol/kg Intravenous Once Liang Moore,          predniSONE (DELTASONE) tablet 30 mg  30 mg Oral Daily with Suresh Worley MD   30 mg at 08/13/20 0825     [START ON 8/16/2020] predniSONE tablet 20 mg (DELTASONE)  20 mg Oral Daily with Suresh Worley MD        Followed by     [START ON 8/19/2020] predniSONE tablet 15 mg (DELTASONE)  15 mg Oral Daily with Suresh Worley MD        Followed by     [START ON 8/22/2020] predniSONE tablet 10 mg (DELTASONE)  10 mg Oral Daily with Suresh Worley MD        Followed by     [START ON 8/25/2020] predniSONE tablet 5 mg (DELTASONE)  5 mg Oral Daily with Suresh Worley MD         QUEtiapine tablet 300 mg (SEROquel)  300 mg Oral Bedtime PRN Dalton Greenberg MD         QUEtiapine tablet 75 mg (SEROquel)  75 mg Oral Bedtime PRN Ella Bond MD         sodium chloride bacteriostatic 0.9 % injection 1-5 mL  1-5 mL Intradermal Once PRN Ella Bond MD         sodium chloride flush 10-20 mL (NS)  10-20 mL Intravenous PRN Ella Bond MD   10 mL at 08/05/20 0016     sodium chloride flush 10-30 mL (NS)  10-30 mL Intravenous PRN Ella Bond MD   10 mL at  "08/04/20 2155     sodium chloride flush 10-30 mL (NS)  10-30 mL Intravenous Q8H FIXED TIMES Ella Bond MD   10 mL at 08/12/20 2228     sodium chloride flush 20 mL (NS)  20 mL Intravenous PRN Ella Bond MD   10 mL at 08/09/20 0117     thiamine tablet 100 mg  100 mg Oral DAILY Suresh Perkins MD   100 mg at 08/13/20 0825       Allergies: Patient has no known allergies.      REVIEW OF SYSTEMS (ROS):  ROS per HPI, otherwise negative    PHYSICAL EXAMINATION:        /54 (Patient Position: Lying)   Pulse 93   Temp 97.9  F (36.6  C) (Oral)   Resp 24   Ht 5' 2\" (1.575 m)   Wt 163 lb (73.9 kg)   SpO2 91%   BMI 29.81 kg/m      General: chronically ill appearing  HEENT:  NC/AT  Eyes:  anicteric  Neck: supple  Respiratory:  CTA  Cardiovascular System:  Regular rate and rhythm  Lymph Nodes:  No supraclavicular lymphadenopathy  Abdomen:  Normal bowel sounds, soft, Non-tender, no masses, rebound or guarding  Skin:  No rashes or jaundice  Musculoskeletal:  No clubbing, cyanosis, edema  Neurological:  Grossly nonfocal, no asterixis  Psychiatric:  Alert but confused.  Knows she is at [facility].     LAB DATA & X-RAYS:   DATE/TIME: 8/13/2020 8:02 AM     INDICATION: worsening LFT's right upper quad pain, eval for galbladder infection, or stones in the ducts  COMPARISON: None.  TECHNIQUE: Limited abdominal ultrasound.     FINDINGS:     GALLBLADDER: Normal. No gallstones, wall thickening, or pericholecystic fluid. Negative sonographic Ludwig's sign.     BILE DUCTS: No biliary dilatation. The common duct measures 4 mm.     LIVER: Coarsened hepatic parenchymal echotexture. Appearance suggestive of underlying hepatic fibrosis. No focal mass.     RIGHT KIDNEY: No hydronephrosis.     PANCREAS: The visualized portions are normal.     No ascites.     IMPRESSION:   1.  Coarsened hepatic parenchymal echotexture concerning for underlying hepatic fibrosis.     2.  No other significant findings.    Result Date: " 8/3/2020  EXAM: CT ABDOMEN PELVIS WO ORAL W IV CONTRAST LOCATION: [facility] DATE/TIME: 8/3/2020 5:03 PM INDICATION: RLQ abdominal pain, appendicitis suspected (Age > 14y) Right sided abdominal pain COMPARISON: None. TECHNIQUE: CT scan of the abdomen and pelvis was performed following injection of IV contrast. Multiplanar reformats were obtained. Dose reduction techniques were used. CONTRAST: Iohexol (Omni) 100 mL FINDINGS: LOWER CHEST: Diffuse interstitial opacities throughout the visualized lower lung fields. HEPATOBILIARY: Trace ascites adjacent to the liver. Moderate distention of the gallbladder. PANCREAS: Normal. SPLEEN: Borderline splenomegaly is unchanged measuring 13 cm with trace adjacent fluid. ADRENAL GLANDS: Normal. KIDNEYS/BLADDER: Normal. BOWEL: The appendix is identified within the mid right pelvis and is of normal caliber containing a tiny amount of air. There is some minimal edema adjacent to the appendix and cecum. Slight wall thickening involving the cecum and ascending colon with a small amount of adjacent ascites. No evidence for bowel obstruction. LYMPH NODES: Normal. VASCULATURE: Advanced atherosclerotic disease abdominal aorta and iliac arteries with high-grade stenosis involving the proximal right and left common iliac arteries. Prominent collateral vessels along the lesser curvature of the stomach are more distended compared to the prior study and could be secondary to portal venous hypertension. PELVIC ORGANS: Normal. MUSCULOSKELETAL: Small fat-containing ventral hernia.      1.  Appendix is of normal caliber. There is a small amount of ascites adjacent to the base of the cecum and ascending colon with slight wall thickening which could represent an acute colitis. No evidence for bowel obstruction. 2.  Trace ascites adjacent to the liver and spleen. 3.  Advanced atherosclerotic disease. 4.  Prominent collateral vessels/possible gastric varices lesser curvature of the stomach. 5.   Interstitial opacities throughout the visualized lower lung fields. Please refer to CTA chest report for further discussion.      Results from last 7 days   Lab Units 08/13/20  0514   LN-WHITE BLOOD CELL COUNT thou/uL 12.0*   LN-HEMOGLOBIN g/dL 11.9*   LN-HEMATOCRIT % 36.6   LN-PLATELET COUNT thou/uL 51*   LN-NEUTROPHILS RELATIVE PERCENT % 66   LN-LYMPHOCYTES RELATIVE PERCENT % 19*   LN-MONOCYTES RELATIVE PERCENT % 12*   LN-EOSINOPHILS RELATIVE PERCENT % 2     Results from last 7 days   Lab Units 08/13/20  0514   LN-SODIUM mmol/L 150*  150*   LN-POTASSIUM mmol/L 3.1*  3.2*   LN-CHLORIDE mmol/L 97*  97*   LN-CO2 mmol/L 44*  43*   LN-BLOOD UREA NITROGEN mg/dL 31*  30*   LN-CREATININE mg/dL 0.82  0.79   LN-CALCIUM mg/dL 8.6  8.6   LN-ALBUMIN g/dL 2.2*  2.2*   LN-PROTEIN TOTAL g/dL 7.0  7.1   LN-BILIRUBIN TOTAL mg/dL 6.5*  6.5*   LN-ALKALINE PHOSPHATASE U/L 189*  186*   LN-ALT (SGPT) U/L 135*   LN-AST (SGOT) U/L 127*  127*             Impression:    1) etoh liver disease with cirrhosis/hepatitis-longstanding history of etoh use and complications including probable Wernicke's encephalopathy. CT and US suggest chronic liver disease and portal htn with possible gastric varices. Persistently abnormal lfts all consistent with her chronic liver disease.    2) dysphagia- may be related to multiple medical problems including CNS issues.    3) pneumonia and resp failure- no pathogen noted.  abx completed and now on steroids for copd.   4) electrolyte abnormalities including hypernatremia and hypokalemia.    Plan:  -await results from swallow eval  -likely will need [place] feedings  -not a good candidate for PEG due to t-penia, hypoprothrombinemia and cirrhosis with ascites as well as gastric varices.   -check for other concomitant causes of chronic liver disease, but suspect this is all etoh related.         Provider Name:  Krzysztof Tyler  8/13/2020 9:10 AM  [facility]  [phone]

## 2021-06-10 NOTE — PROGRESS NOTES
"Speech Language/Pathology  Bedside Swallow Evaluation    Problem:  Patient Active Problem List   Diagnosis     Pulmonary emphysema (H)     Depression     Neurosis, posttraumatic     Bipolar disorder (H)     Alcohol abuse     Hyperglyceridemia     Smoker     GERD (gastroesophageal reflux disease)     Alcoholic intoxication with complication (H)     Intentional drug overdose, initial encounter (H)     Acute respiratory failure with hypoxia (H)     Acidemia     Metabolic acidosis     Suicide attempt (H)     Relationship problem between caregiver and child     History of posttraumatic stress disorder (PTSD)     Overdose     Bipolar I disorder, most recent episode depressed, severe without psychotic features (H)     Neuroleptic consent was signed on October 8, 2018     Hypotension     Swallowing painful     Lactic acid acidosis     Odynophagia     QT prolongation     Fungal esophagitis     Adjustment disorder with mixed anxiety and depressed mood     COPD with acute exacerbation (H)     Abnormal chest CT       Onset date: 8/3/20  Reason for evaluation: To assess for risk of aspiration and determine safest/least restrictive diet recommendations.  Pertinent History: As listed above.  Patient with a history of \"longstanding and active tobacco dependence, likely excessive alcohol use, bipolar disorder with psychotic features and prior overdose suicide attempt, mild obesity, untreated GÉNESIS, peripheral arterial disease, recent worsening dementia and imbalance with presumed diagnosis of Wernicke encephalopathy, extensive radiographic emphysema with likely severe COPD (No PFTs on record), admitted with abdominal pain, lactic acidosis, hyperbilirubinemia, dyspnea, acute respiratory failure with hypoxia, abnormal chest CT, and acute encephalopathy.\"  Per chest CTA dated 8/3/20, \"Extensive airspace and groundglass opacities throughout both upper lobes, with additional interstitial opacities throughout the mid and lower lung fields, " "all of which are new since the previous study. Subsegmental atelectasis in the bases. No effusions.\" noted.  Per RN, patient has been on BiPAP the majority of the shift, just changed to high flow nasal cannula.  RN report intermittent decreased oxygen saturations without intake, and immediate coughing following small sip of water.  Current Diet: None  Baseline Diet: regular/thin presumably    Assessment:    Patient presents with mild signs and symptoms of aspiration with thin.  Patient currently requiring high flow oxygen vs BiPAP, which is not ideal for oral intake.    Patient demonstrated no oral dysphagia.    Suspect at least mild pharyngeal dysphagia due to presence of clinical signs and symptoms of aspiration and/or prior history of dysphagia vs high oxygen needs.    A Video Swallow Study is not recommended at this time due to high oxygen needs; however, may be warranted with further s/s aspiration.    Rehab potential is fair based on prior level of function, evaluation results, recent progress, motivation and cooperation and time post onset.    Recommendations/Plan:    Recommended diet of NPO except meds crushed in puree.  OK for ice chips with nursing supervision.    Recommend patient to take oral medications via crushed in puree.    Strategies: Sit fully upright for all intake, only when alert/awake and off BiPAP.    Recommend oral motor exercises? no    Speech therapy 5 times per week for further assessment to monitor appropriateness of initiating oral diet vs VFSS.    Referrals: N/A    Subjective:    Patient presents as cooperative and confused during this session.   An  was not applicable    Patient was given ice chips, puree and thin. Patient is not able to self feed/drink.  Patient attempted to hold cup but dropped it prior to reaching oral cavity.    Objective:    Dentition/Oral hygiene: Patient is edentulous.    Oral motor function was moderately impaired. Patient with minimal lingual " protrusion/lateralization.  May in part be related to inability to visually model due to N95 mask and difficulty hearing oral motor commands due to negative airflow.    Bolus prep and oral control was not impaired. Mastication was not observed with textures trialed.    Anterior-Posterior transit was not impaired.    Oral stasis did not occur with all textures trialed.    Ice chips: Patient trialed x3 ice chips (I.e., 1 at a time) and presented with no s/s of aspiration. Initiation of swallow appeared timely.    Puree: Patient trialed x3 bites and presented with no s/s of aspiration. Initiation of swallow appeared timely.    Thin:Patient trialed 3 sips by cup and straw and presented with delayed throat clear, unclear if r/t PO intake vs coordination of respiration and intake.  Initiation of swallow appeared mildly delayed.     Solid textures not trialed at this time.    Patient with noted increased percieved work of breathing following all intake; however, oxygen saturations remained between 93-95.    Hyolaryngeal movement observed via palpation at bedside.    15 dysphagia minutes     Shannan Mcclellan MS, CCC-SLP

## 2021-06-10 NOTE — PLAN OF CARE
Problem: Impaired Gas Exchange  Goal: Demonstrate improved ventilation and adequate oxygenation of tissues as evidenced by absence of respiratory distress  Outcome: Progressing     Problem: Inadequate Gas Exchange  Goal: Patient will achieve/maintain normal respiratory rate/effort  Outcome: Progressing     Problem: Ineffective Airway Clearance  Goal: Maintain airway patency  Outcome: Progressing       See treatment plan note.    Maite Hayes RRT

## 2021-06-10 NOTE — CONSULTS
"PULMONARY MEDICINE CONSULT  8/4/2020      Admit Date: 8/3/2020  CODE: No CPR- Do NOT Intubate    Reason for Consult: acute respiratory failure with hypoxia, abnormal chest CT    Assessment/Plan:   60 year old female with a history of longstanding and active tobacco dependence, alcohol dependence, bipolar disorder with psychotic features and prior overdose suicide attempt, mild obesity, GÉNESIS, peripheral arterial disease, radiographic emphysema with likely severe COPD (No PFTs on record), admitted with abdominal pain, lactic acidosis, hyperbilirubinemia, dyspnea, acute respiratory failure with hypoxia, abnormal chest CT, and acute encephalopathy.    Acute respiratory failure with hypoxia, abnormal chest CT: DDx remains broad. The CT findings are nonspecific, and have a biapical predominance. The opacities are in areas of significant emphysema, resulting in a reticular and atypical appearance. Patient smokes one pack of cigarettes daily. DDx includes infectious pneumonitis, aspiration pneumonitis, pulmonary edema. The patient has recently fell in the context of alcohol intoxication, and aspiration is a possible etiology. She is also at high risk of alcoholic cardiomyopathy, and her BNP was elevated on admission. Her radiographic emphysema is extensive, though I am unable to locate prior pulmonary function testing or outpatient pulmonary consultation; she is on an unusual dose of tiotropium respimat at home 1.25 mcg two inhalations daily, which is half the standard dose. At this time, she is somnolent and requiring high flow nasal cannula. I asked her repeatedly about code status and she repeatedly shook her head no and said \"I don't want that\" when asked about invasive mechanical ventilation and CPR. She used the phrase \"DNR/DNI.\" The patient has two negative nasopharyngeal COVID-19 PCR tests. Of note, the patient has RUQ tenderness to palpation, hyperbilirubinemia, and distended gallbladder on CT; would consider " "evaluating for biliary source of sepsis.    Plan:  - continue systemic corticosteroids for now given possible component of COPD exacerbation  - scheduled nebulized ipratropium-albuterol  - 12-lead ECG  - TTE  - consider removing special respiratory isolation given two negative nasopharyngeal COVID-19 PCR tests in order to obtain formal TTE to evaluated for cardiomyopathy (I believe she will not be able to have echocardiogram until precautions are removed)  - discontinue maintenance fluid and attempt diuresis  - [facility]  - ceftriaxone (or piperacillin-tazobactam) plus azithromycin are appropriate  - smoking cessation counseling and nicotine patch  - will need alcohol dependence counseling    Balwinder Spear MD  Pulmonary and Critical Care Medicine  [facility]  Cell [phone]  Office [phone]  Pager [phone]                                                                                                                                                        HPI:   CCx: acute respiratory failure with hypoxia, abnormal chest CT    HPI: 60 year old female with a history of longstanding and active tobacco dependence, alcohol dependence, bipolar disorder with psychotic features and prior overdose suicide attempt, mild obesity, GÉNESIS, peripheral arterial disease, radiographic emphysema with likely severe COPD (No PFTs on record), admitted with abdominal pain, lactic acidosis, hyperbilirubinemia, dyspnea, acute respiratory failure with hypoxia, abnormal chest CT, and acute encephalopathy. The patient states that she drinks schnapps throughout the day and drinks 1 ppd. She has been short of breath \"for years\" and has a chronic cough. No prior PFTs or outpatient pulmonary evaluation that I can locate. Recently had abdominal pain, and she fell walking to her bathroom; states she had had \"one shot of schnapps\" prior to the fall. She has been somnolent, requiring increasing oxygen, now on HFNC. Received " furosemide 20 mg IV at 1130.                                                                                                                                                      Past Medical History:  Past Medical History:   Diagnosis Date     COPD (chronic obstructive pulmonary disease) (H)      PAD (peripheral artery disease) (H)      Sleep apnea    alcohol dependence  Tobacco dependence  Bipolar disorder with psychotic features    Past Surgical History  Past Surgical History:   Procedure Laterality Date     APPENDECTOMY  1999    rupture with sepsis      SECTION       osteomyelitis  1961     AL ESOPHAGOGASTRODUODENOSCOPY TRANSORAL DIAGNOSTIC N/A 2019    Procedure: ESOPHAGOGASTRODUODENOSCOPY (EGD) with biopsies;  Surgeon: Mario Beatty MD;  Location: [facility];  Service: Gastroenterology       Allergies:  No Known Allergies    PTA medications:  Medications Prior to Admission   Medication Sig Dispense Refill Last Dose     acetaminophen (TYLENOL) 650 MG CR tablet Take 650 mg by mouth every 8 (eight) hours as needed for pain.   8/3/2020 at 0900     albuterol (PROAIR HFA;PROVENTIL HFA;VENTOLIN HFA) 90 mcg/actuation inhaler Inhale 2 puffs 4 (four) times a day as needed for shortness of breath.   8/3/2020 at not with     melatonin 5 mg Tab tablet Take 15 mg by mouth at bedtime as needed.   2020     nicotine (NICODERM CQ) 21 mg/24 hr Place 1 patch on the skin daily as needed for smoking cessation.   More than a month at Unknown time     nystatin (MYCOSTATIN) 100,000 unit/mL suspension Take 500,000 Units by mouth 4 (four) times a day as needed (thrush).    More than a month at Unknown time     omeprazole (PRILOSEC) 20 MG capsule Take 20 mg by mouth daily as needed.   Past Week at Unknown time     oxyCODONE (ROXICODONE) 5 MG immediate release tablet Take 5 mg by mouth every 6 (six) hours as needed for pain.   8/3/2020 at 0900     polyethylene glycol (MIRALAX) 17 gram packet Take 17 g by mouth  daily as needed.    Past Week at Unknown time     QUEtiapine (SEROQUEL) 25 MG tablet Take 25 mg by mouth 3 (three) times a day as needed.    Past Week at Unknown time     QUEtiapine (SEROQUEL) 300 MG tablet Take 300 mg by mouth at bedtime as needed.    7/31/2020     SPIRIVA RESPIMAT 1.25 mcg/actuation Mist Inhale 2 Inhalation daily.    8/3/2020 at not with     thiamine 100 MG tablet Take 50 mg by mouth daily.   8/1/2020       Family Hx:  Family History   Problem Relation Age of Onset     Depression Mother      Hypertension Mother      Diabetes Father      Hypertension Father      Schizophrenia Brother        Social Hx:  Social History     Socioeconomic History     Marital status: Single     Spouse name: Not on file     Number of children: Not on file     Years of education: Not on file     Highest education level: Not on file   Occupational History     Not on file   Social Needs     Financial resource strain: Not on file     Food insecurity     Worry: Not on file     Inability: Not on file     Transportation needs     Medical: Not on file     Non-medical: Not on file   Tobacco Use     Smoking status: Current Every Day Smoker     Packs/day: 0.75     Types: Cigarettes     Smokeless tobacco: Never Used   Substance and Sexual Activity     Alcohol use: Yes     Comment: minimal      Drug use: No     Sexual activity: Not on file   Lifestyle     Physical activity     Days per week: Not on file     Minutes per session: Not on file     Stress: Not on file   Relationships     Social connections     Talks on phone: Not on file     Gets together: Not on file     Attends [employer] service: Not on file     Active member of club or organization: Not on file     Attends meetings of clubs or organizations: Not on file     Relationship status: Not on file     Intimate partner violence     Fear of current or ex partner: Not on file     Emotionally abused: Not on file     Physically abused: Not on file     Forced sexual activity: Not on  "file   Other Topics Concern     Not on file   Social History Narrative     Not on file       Exam/Data:     Vitals  /57   Pulse 92   Temp 97.6  F (36.4  C) (Oral)   Resp 24   Ht 5' 2\" (1.575 m)   Wt 169 lb 8 oz (76.9 kg)   SpO2 94%   BMI 31.00 kg/m       I/O last 3 completed shifts:  In: 1040 [I.V.:990; IV Piggyback:50]  Out: -   Weight change:   Wt Readings from Last 3 Encounters:   08/04/20 169 lb 8 oz (76.9 kg)   02/28/20 183 lb (83 kg)   07/12/19 184 lb 8.4 oz (83.7 kg)     EXAM:  /57   Pulse 92   Temp 97.6  F (36.4  C) (Oral)   Resp 24   Ht 5' 2\" (1.575 m)   Wt 169 lb 8 oz (76.9 kg)   SpO2 94%   BMI 31.00 kg/m      Intake/Output last 3 shifts:  I/O last 3 completed shifts:  In: 1040 [I.V.:990; IV Piggyback:50]  Out: -   Intake/Output this shift:  I/O this shift:  In: 912.5 [I.V.:912.5]  Out: 350 [Urine:350]    Physical Exam  Gen: somnolent but able to arouse somewhat and converse  HEENT: NT, no BENNY, periocular ecchymoses  CV: tachy, regular, no m/g/r  Resp: moderately diminished bilaterally, no focal crackles or wheezes  Abd: distended but soft, tender to palpation, seems to be more tender on right, BS+  Skin: no rashes or lesions  Ext: no edema  Neuro: PERRL, somnolent, nonfocal exam    ROS: 10 point ROS obtained, pertinant positives alluded to in HPI    Medications:       azithromycin  500 mg Intravenous Q24H     folic acid  1 mg Oral DAILY    Or     folic acid  1 mg Intramuscular DAILY     ipratropium-albuteroL  3 mL Nebulization QID - RT     methylPREDNISolone sodium succinate  60 mg Intravenous Q12H     multivitamin therapeutic  1 tablet Oral DAILY     omeprazole  20 mg Oral QAM (0630)     piperacillin-tazobactam  3.375 g Intravenous Once    And     piperacillin-tazobactam  3.375 g Intravenous Q8H     QUEtiapine  300 mg Oral QHS     sodium chloride  10-30 mL Intravenous Q8H FIXED TIMES     thiamine  100 mg Oral DAILY    Or     thiamine  100 mg Intramuscular DAILY     thiamine  50 " mg Oral DAILY         DATA  All laboratory and radiology has been personally reviewed by myself today.  Results from last 7 days   Lab Units 08/03/20  1558   LN-WHITE BLOOD CELL COUNT thou/uL 6.3   LN-HEMOGLOBIN g/dL 10.5*   LN-HEMATOCRIT % 30.8*   LN-PLATELET COUNT thou/uL 85*     Results from last 7 days   Lab Units 08/03/20  1558   LN-SODIUM mmol/L 135*   LN-POTASSIUM mmol/L 3.6   LN-CHLORIDE mmol/L 101   LN-CO2 mmol/L 20*   LN-BLOOD UREA NITROGEN mg/dL 12   LN-CREATININE mg/dL 0.76   LN-CALCIUM mg/dL 8.3*   LN-PROTEIN TOTAL g/dL 7.4   LN-BILIRUBIN TOTAL mg/dL 6.0*   LN-ALKALINE PHOSPHATASE U/L 254*   LN-ALT (SGPT) U/L 30   LN-AST (SGOT) U/L 57*     IMAGING:   Chest CTA (8/3/20):  - images directly reviewed, formal interpretation follows:  FINDINGS:  ANGIOGRAM CHEST: The right and left main pulmonary arteries and the segmental vessels are all patent without evidence for emboli. Suboptimal opacification involving the subsegmental vessels to both lower lobes. Thoracic aorta is negative for dissection.   No CT evidence of right heart strain.     LUNGS AND PLEURA: Advanced centrilobular emphysema. Extensive airspace and groundglass opacities throughout both upper lobes, with additional interstitial opacities throughout the mid and lower lung fields, all of which are new since the previous study.   Subsegmental atelectasis in the bases. No effusions.     MEDIASTINUM/AXILLAE: Numerous borderline enlarged mediastinal and hilar nodes with minimal change.     UPPER ABDOMEN: Advanced atherosclerotic disease. Splenomegaly. Trace ascites adjacent to the liver. Collateral vessels along the lesser curvature the stomach     MUSCULOSKELETAL: Hypertrophic changes thoracic spine.     IMPRESSION:   1.  No evidence for central pulmonary emboli. The peripheral subsegmental vessels to both lower lobes are suboptimally opacified.  2.  Advanced centrilobular emphysema.   3.  Extensive groundglass and airspace infiltrates within both  upper lobes concerning for pneumonia, including Covid 19. There are additional diffuse interstitial densities both lungs likely representing edema.

## 2021-06-10 NOTE — PLAN OF CARE
Problem: Potential for Compromised Skin Integrity  Goal: Skin integrity is maintained or improved  Outcome: Progressing   Turning Q 2h, patients shifts positions independently   Problem: Risk for injury  Goal: Experience no physical injury  8/9/2020 0240 by Rosalba Nur, RN  Outcome: Progressing  No injuries noted  8/8/2020 2130 by Rosalba Nur, RN  Outcome: Progressing

## 2021-06-10 NOTE — PLAN OF CARE
Problem: Pain  Goal: Patient's pain/discomfort is manageable  Outcome: Progressing   Patient nonverbal.  Mild to moderate grimacing with repositioning.  Medicating with sublingual oxycodone around those times to keep her comfortable.  Atropine drops for secretions.  Overall, patient is resting comfortably.    Problem: Potential for Falls  Goal: Patient will remain free of falls  Outcome: Progressing   Patient in bed on air mattress.  Not attempting to get up.  Frequent rounding.    Problem: Discharge Barriers  Goal: Patient's discharge needs are met  Outcome: Not Progressing   Unable to wean O2 below 10 this shift.  After being on 10L for an extended period of time RN noticed increased air hunger.  Brother Andrea came to visit this afternoon.  Daughter Telma left at 0920 and said she would return in 2 hours to meet with hospice.  Hospice RN came after 1pm and Telma had not returned nor called the unit to say she was delayed.  PICC dressing changed today.

## 2021-06-10 NOTE — PROGRESS NOTES
"CM spoke to Dottie in admissions with [facility]- no beds available at this time.    CM spoke with daughter Telma ([phone]) who continues to verbalize feelings of anger about the situation and \"being treated like crap by people there\". Would like ability to be with her mother during this difficult time but does need to take breaks and get outside- \"I need to breakdown sometimes, I do not need my mom seeing me like this, I need to do it this way for both of us\". CM offered support.     Daughter reports no financial means for placement in LTC with hospice.  Daughter reports unable to take patient home on hospice- \"house is not appropriate for that and I could not care for her myself\".   Daughter verbalized that her initial desire was for patient not to pass away at the hospital, however she feels there is really no other option at this point. Again, CM offered support.   Telma confirms she has been in contact with the hospital patient advocate.   Telma indicates plans to return to the hospital later this morning- \"I just needed to get out and release some of this for a moment\".     4:16 PM  CM met with daughter. She denied need for any resources from CM. Discussed importance of taking time for self care and honoring her own mental health during this difficult time. Daughter identified her father, a friend, rest and listening to music as means for self care. She verbalized appreciation for CM stopping by and offering her support.     CM provided update to [facility] ([phone])- also provided her with updated phone number for daughter Telma (she plans to follow up with daughter on Monday, daughter aware).     CM provided [facility] resource guide to daughter.   CM provided update to Mar with [facility].  "

## 2021-06-10 NOTE — PLAN OF CARE
Problem: Safety  Goal: Patient will be injury free during hospitalization  Outcome: Progressing   To maintain safety, assist of 2 when up, chair and bed alarms, safety belt, frequent checks

## 2021-06-10 NOTE — PLAN OF CARE
"Problem: Pain  Goal: Patient's pain/discomfort is manageable  Outcome: Progressing  Patient denies pain but is fidgety and restless this shift. Patient accepted 25mg of Seroquel from the charge nurse.     Problem: Decreased Mental Status Causing Increased Need for Safety  Goal: Provide a safe environment for patient (Implement appropriate elements in plan of care)  Outcome: Progressing  Patient hasn't slept well this shift R/T restlessness and refusing to take pills from writer.     Problem: Risk for Infection  Goal: Identify and demonstrate techniques, lifestyle changes to prevent/reduce risk of infection and promote safe environment  Outcome: Progressing  Writer noticed that patient is experincing increased heart rate, increased WBCs, and elevated lactic. Dr. Lynn was notified. New order to put patient on tele and increase rate of fluids to 100ml/hr. Tele running Sinus tach.     Problem: Discharge Barriers  Goal: Patient's discharge needs are met  Outcome: Progressing  Oral intake, at one point patient stated, \"just let me die,\" IV fluids, and IV ABXs.Patient remains on potassium and magnesium replacement protocols. At the time of this note, those values had not been resulted.    Caridad Raymond RN     "

## 2021-06-10 NOTE — PLAN OF CARE
Problem: Sensory perceptual alterations  Goal: Demonstrate absence of untoward effects of withdrawal  Outcome: Progressing     Problem: Pain  Goal: Patient's pain/discomfort is manageable  Outcome: Progressing     Pt had no c/o pain this shift. CIWA scores 4 and 5 this shift. Drowsy, passively uncooperative. Needs reminders and reorientation. Tele: sinus tach.

## 2021-06-10 NOTE — PROGRESS NOTES
[facility] Palliative Progress Note    History of Present Illness:  60 y.o. female with past medical history of tobacco use, alcohol use disorder, bipolar disorder, previous suicide attempt with overdose, GÉNESIS, PAD, worsening dementia and imbalance (possible Warnicke encephalopathy), and radiographic emphysema (presumed COPD) who was admitted on 8/3/2020 for abdominal pain, lactic acidosis, hyperbilirubinemia, acute respiratory failure with hypoxia, and acute encephalopathy. Hospital course was notable for severe hypoxia requiring up to 60 L HF NC and 55% FiO2, guarded prognosis, and ongoing encephalopathy.  Hypoxic respiratory failure likely secondary to combination of severe emphysema, volume overload/pulmonary edema, possible COPD exacerbation, and/or CAP VS aspiration.  She is also medically complex with liver failure, possible hepatic encephalopathy, acute respiratory failure. +Sepsis and colitis.     Decision was made on 8/26     Chart review of last 24 hours:  Still on 15L oxymask  Restlessness overnight which required IV dose of haldol    Chief Complaint:   Unresponsive for writer    Health Care Directives/Code Status:  No HCD. DNR/DNI.     Review of Systems:  Unable due to AMS      PPS:   20%    Physical Examination:  General: unresponsive to writer, appears comfortable  RESPIRATORY: 15L oxymizer O2,   non-labored breathing, RR 20  SKIN: Exposed skin intact, dusky skin  NEURO: unresponsive  PSYCH: calm     Assessment & Plan:    Palliative Care Encounter:   On comfort cares. Advised that we should not be monitoring pt's O2 saturation and should wean down to nasal canula per pt comfort. Per nursing Telma has been wanting to check it and turn O2 up-will f/u with Telma to educate. Spoke to SREEDHAR about looking into LTC bed placement with hospice due to Telma's wish to not have pt die in hospital. Stable at this time for transfer/dc. If not able to get her out of hospital then recommend trying to make room as  personable and home like as possible.     Called to speak to Telma but on other line with  Rocio, so will call back.     Symptom Management:  Dyspnea- Respirations are regular at 20. Cont with morphine 5-10 mg sublingual Q 4 hours. Goal to wean O2 to nasal canula after education with dtr Telma.   DC nebs    Addendum: Able to reach dtr Telma- accepting that mom will likely pass away here in hospital despite her hope that she would now. Okay with writer's recommendation of not having continuing pulse oximeter and weaning O2 down to nasal canula. Advised we look at her respirations and overall comfort level not the numbers-she verbalized understanding. Listened to Telma as she voiced her concerns about not being able to go out to her car once last night to be able cry and stating someone asked her if she was under the influence which greatly upset her stating she has been struggling due there her ADHD and her grandpa dying last week. Listened empathetically, encouraged her to f/u with pt rep with concerns.     Restlessness with agitation- Not able to take her regular po medications. Will start haldol 0.5 mg oral concentrate.     Total Time >75 mins, over 50% spent in counseling and coordination of care including review of notes, labs and imaging in Epic. Reviewed case with RN and primary MD. Reviewed GOC with dtr Telma.     40 minutes on phone with daughter from 0930-1010 reviewing goals of care/advance care planning    Hailey Williamson CNP  Palliative Care Team  Office #: [phone]

## 2021-06-10 NOTE — PLAN OF CARE
Problem: Sensory perceptual alterations  Goal: Demonstrate absence of untoward effects of withdrawal  Outcome: Completed     Problem: Risk for decreased cardiac output  Goal: Display vital signs within patient's normal range; absence of/reduced frequency of dysrhythmias  Outcome: Completed   Pt not withdrawing from ETOH; was admitted on 8/3/2020; pt is confused, disoriented x 4 but oriented to self. No longer on telemetry      Problem: Risk for injury  Goal: Experience no physical injury  Outcome: Completed     Problem: Risk for ineffective breathing pattern  Goal: Maintain effective breathing pattern with respiratory rate within normal range, lungs clear; be free of cyanosis and other signs/symptoms of hypoxia  Outcome: Completed     Problem: Pain  Goal: Patient's pain/discomfort is manageable  Outcome: Progressing  Pt denies presence of pain this shift.      Problem: Psychosocial Needs  Goal: Demonstrates ability to cope with hospitalization/illness  Outcome: Not Progressing  Goal: Collaborate with patient/family/caregiver to identify patient specific goals for this hospitalization  Outcome: Not Progressing     Problem: Decreased Mental Status Causing Increased Need for Safety  Goal: Provide a safe environment for patient (Implement appropriate elements in plan of care)  Outcome: Progressing  Goal: Decrease patient's symptoms  Outcome: Not Progressing  Goal: To ensure patient safety, patient will abstain from any threats or actions to harm self or others  Outcome: Not Progressing  Pt alert but disoriented, began to get restless, repeated attempts to get OOB, yelling out and accusing staff of stealing her credit cards and money, demanding her purse because ' I need my candy in my purse'. Instructed that candy (hard peppermints )placed in bag in closet, cannot consume d/t safety and dietary restrictions. Pt demanding to go home, stating she wanted police called to arrest everyone here; updated Dr Moore regarding  escalating behavior; administered 25mg PO Seroquel crushed and placed in applesauce.  Got 1:1 sitter to be at bedside, called daughter to come ; no answer and voicemail left to call hospital ;  pt calmer this afternoon, still confused, attempts to get OOB or chair but sitter present, no falls.

## 2021-06-10 NOTE — PROGRESS NOTES
Bi-pap removed and placed on the HFNC at 60% and 60 L/M.  SpO2 94%       08/09/20 0800   Oxygen Therapy/Pulse Ox   O2 Device High-flow NC   O2 Flow Rate (L/min) 60 L/min   FiO2 (%) 60 %     ELAINE Dan

## 2021-06-11 NOTE — PROGRESS NOTES
Nurse informed of patient's passing via her daughter who was present in the room at the time. See the following death summary by Resident Dr. Aguilar     Nurse informed the following:  [facility] Dr. Brower and resident Dr. Lauren Correia nurse and nursing supervisor   Care management  Palliative care    [employer] [facility] (not eligible for donation)  [facility] (not eligible for donation)   Anatomy Bequest program at the [facility] (not eligible for donation)    At this time the anatomy bequest program is contacting family to rely information about eligibility    Report given to oncoming nurse: Del, RN

## 2021-06-11 NOTE — PROGRESS NOTES
Spiritual Health Services Note:    Spiritual Assessment:  I spent time speaking with the patient's daughter after the patient  this afternoon. She wanted to process and verbalize many of the feelings and thoughts she had and I was happy to provide an outlet for her. Some of her questions were spiritual in nature, but she did not have any Worship needs.    Care Provided:  I responded to the news of the patient's death, continued relationship of care and support, provided empathetic listening and validated her emotions, grief support, and collaborated with other members of the care team.    Louis Velasquez MDiv, [facility]  Manager/   [phone]

## 2021-06-15 NOTE — PROGRESS NOTES
Referral from Lauren CONNELL at [facility] for claudication. Chronic venous statis. C/O of leg pain, L is worse than the R, is only able to walk one block without pain. Intermittent cramps bilat. Vap smoke.

## 2021-06-15 NOTE — PROGRESS NOTES
Vascular surgery: This 57-year-old female comes in.  She is bothered by pain primarily involving her left leg with minimal ambulation.  The patient states it hurts to go to the garage.  If she simply stops it goes away.  The pain starts in her left buttocks area and then runs down the back of the leg.  She does not describe any calf claudication per se.    Risk factors for vascular disease include a history of smoking with the patient apparently having quit about a year ago; she continues                      to use some nicotine vaporizers.  History is negative for marked hypertension or diabetes.  The patient apparently does have hypertriglyceridemia.    The patient denies allergies.    Current medications include Zyprexa, melatonin.    Additional medical problems include bipolar disorder, history of ethanol abuse, COPD, obstructive sleep apnea with maintenance on CPAP, and gastroesophageal reflux disease.    Past surgeries include appendectomy and surgery for osteomyelitis at 1 years of age.    On exam, the patient is alert mildly to moderately obese female in no acute distress.  Head neck exam is normal with no cervical adenopathy or bruits.  The lungs are clear.  The heart exhibits a regular rate and rhythm.  Abdominal exam is benign with no mass, tenderness or aneurysm.  Normal bowel sounds are present but no bruits.  Peripheral pulse, right over left, are as follows: Femorals 2/2, popliteals 0/2, dorsalis pedis 0/2, posterior tibial trace over trace.  There is some hemosiderin deposits in both ankles and feet; no swelling.  The feet appear satisfactorily vascularized.    The patient underwent noninvasive vascular studies and ankle-brachial indices are around 60% on the right and 80% on the left.  Digital pressures are 36 on the right and 43 on the left.    Impression: Leg pain, question of arterial insufficiency.    There is no doubt the patient has some arterial disease, notably in the right leg, but is  clear that her symptoms are primarily on the left.  She seems to have good inflow to the left leg and so this is not likely the cause of her left buttocks and hip pain.  The complaints seem more typical for lumbosacral disc disease so, before sending the patient up for any angiographic studies, I would like to obtain an MRI of the back to rule out pathology there.  I discussed this with the patient and she appreciates the need.

## 2021-06-20 NOTE — LETTER
Letter by Fady Soriano MD at      Author: Fady Soriano MD Service: -- Author Type: --    Filed:  Encounter Date: 2/28/2020 Status: (Other)         Lauren Holder PA-C  [address] [place] [address] [place] [id number]                                  March 2, 2020    Patient: Keiko Guo   MR Number: [id number]   YOB: 1960   Date of Visit: 2/28/2020     Dear Dr. Glory PA-C:    Thank you for referring Keiko Guo to me for evaluation. Below are the relevant portions of my assessment and plan of care.    If you have questions, please do not hesitate to call me. I look forward to following Keiko along with you.    Sincerely,        Fady Soriano MD          CC  No Recipients  Fady Soriano MD  2/28/2020  5:20 PM  Signed  VASCULAR SURGERY CLINIC CONSULTATION    VASCULAR SURGEON: Fady Soriano MD    LOCATION:  [facility]    Keiko Guo   Medical Record #:  [id number]  YOB: 1960  Age:  59 y.o.     Date of Service: 2/28/2020    PRIMARY CARE PROVIDER: Provider, No Primary Care    Reason for visit: Bilateral lower extremity pain    IMPRESSION: Bilateral lower extremity pain, not related to peripheral arterial disease.    RECOMMENDATION/RISKS: Her onset of pain, nature of pain, location of pain, physical examination and noninvasive arterial studies do not suggest that her symptoms are due to peripheral arterial disease.  Regrettably, nothing for vascular surgery to offer.    HPI:  Keiko Guo is a 59 y.o. female who was seen today in consultation for bilateral lower extremity pain.  I went into quite a bit of detail with her regarding the nature of the pain.  She tells me that the pain has been going on for quite a number of years and it is very difficult for her to walk.  Pain starts in the lower back then goes around the hips and then into the thighs.  He specifically does not describe classic arterial claudication.  Pain starts  within a few feet of walking and activities of daily living are quite hampered by it.    REVIEW OF SYSTEMS:    A 12 point ROS was reviewed and is negative except for shortness of breath, hip pain, thigh pain and lower back pain.     PMH:    Past Medical History:   Diagnosis Date   ? COPD (chronic obstructive pulmonary disease) (H)    ? PAD (peripheral artery disease) (H)    ? Sleep apnea         Past Surgical History:   Procedure Laterality Date   ? APPENDECTOMY  1999    rupture with sepsis   ?  SECTION     ? osteomyelitis     ? SC ESOPHAGOGASTRODUODENOSCOPY TRANSORAL DIAGNOSTIC N/A 2019    Procedure: ESOPHAGOGASTRODUODENOSCOPY (EGD) with biopsies;  Surgeon: Mario Beatty MD;  Location: [facility];  Service: Gastroenterology       ALLERGIES:  Patient has no known allergies.    MEDS:    Current Outpatient Medications:   ?  acetaminophen (TYLENOL) 650 MG CR tablet, Take 650 mg by mouth every 8 (eight) hours as needed for pain., Disp: , Rfl:   ?  albuterol (PROAIR HFA;PROVENTIL HFA;VENTOLIN HFA) 90 mcg/actuation inhaler, Inhale 2 puffs 4 (four) times a day as needed for shortness of breath., Disp: , Rfl:   ?  hydrOXYzine HCl (ATARAX) 25 MG tablet, Take 25-50 mg by mouth every 6 (six) hours as needed for anxiety., Disp: , Rfl:   ?  hydroxyzine HCL (ATARAX) 50 MG tablet, TK 1 T PO QID PRN, Disp: , Rfl:   ?  magnesium oxide (MAG-OX) 400 mg (241.3 mg magnesium) tablet, Take 1 tablet by mouth daily., Disp: , Rfl:   ?  polyethylene glycol (MIRALAX) 17 gram packet, Take 17 g by mouth daily. In the afternoon around 4 pm, Disp: , Rfl:   ?  QUEtiapine (SEROQUEL) 25 MG tablet, Take 25 mg by mouth every morning. Different dose at bedtime   , Disp: , Rfl:   ?  QUEtiapine (SEROQUEL) 25 MG tablet, Take 25 mg by mouth 2 (two) times a day as needed., Disp: , Rfl:   ?  QUEtiapine (SEROQUEL) 300 MG tablet, Take 300 mg by mouth at bedtime. Different dose in the morning, Disp: , Rfl:   ?  SPIRIVA RESPIMAT 1.25  "mcg/actuation Mist, INL 2 PFS PO QD, Disp: , Rfl:     SOCIAL HABITS:    Social History     Tobacco Use   Smoking Status Current Every Day Smoker   ? Packs/day: 0.75   ? Types: Cigarettes   Smokeless Tobacco Never Used       Social History     Substance and Sexual Activity   Alcohol Use Yes    Comment: minimal        Social History     Substance and Sexual Activity   Drug Use No       FAMILY HISTORY:    Family History   Problem Relation Age of Onset   ? Depression Mother    ? Hypertension Mother    ? Diabetes Father    ? Hypertension Father    ? Schizophrenia Brother            PE:  /70   Pulse 64   Temp 98.3  F (36.8  C)   Resp 18   Ht 5' 2\" (1.575 m)   Wt 183 lb (83 kg)   BMI 33.47 kg/m     Wt Readings from Last 1 Encounters:   02/28/20 183 lb (83 kg)     Body mass index is 33.47 kg/m .    EXAM:  GENERAL: This is a well-developed 59 y.o. female who appears her stated age, morbid truncal obesity.   EYES: Grossly normal.  MOUTH: Buccal mucosa normal   CARDIAC:  NS1 S2, No Murmur  CHEST/LUNG:  Clear lung fields bilaterally   GASTROINTESINAL (ABDOMEN): Soft, non-tender, B/S present, no pulsatile mass  MUSCULOSKELETAL: Grossly normal.  Tenderness along both greater trochanters of the femur and iliotibial tract.  HEME/LYMPH: No lymphedema  NEUROLOGIC: Focally intact, Alert and oriented x 3.   PSYCH: appropriate affect  INTEGUMENT: No open lesions or ulcers  Pulse Exam:   Carotid: No Bruit    Radial: Left +1   Right  \"+1    Femoral: Left +3   Right  +3  Right dorsalis pedis and posterior tibial art strong biphasic.  Left dorsalis pedis and posterior tibial are strong biphasic.      DIAGNOSTIC STUDIES:     Images:  Us Arterial Pressures Legs Bilateral    Result Date: 2/28/2020  RESTING ANKLE-BRACHIAL INDICES (VARGHESE'S) AND SEGMENTAL PRESSURES Indication: Bilateral Leg pain/Known spinal stenosis Previous: 02/02/18 History: Current Smoker, PAD and Rest Pain  SEGMENTAL ARTERIAL PRESSURE FINDINGS:         Right:  mmHg "  Index    Brachial: 137   High Thigh: 109 0.80 Low Thigh: 83 0.61            Calf: 93 0.68 Ankle-(PT): 69 0.50 Ankle-(DP): 144 1.05          Digit: 49 0.36              Left: mmHg Index    Brachial: 134  High Thigh: 159 1.16 Low Thigh: 149 1.09            Calf: 132 0.96 Ankle (PT): 131 0.96 Ankle (DP): 197 1.44           Digit: 84 0.61 Resting ankle-brachial index of 1.05 on the right. Toe Pressures of 49 mmHg and TBI of 0.36. Resting ankle-brachial index of 1.44 on the left. Toe Pressures of 84 mmHg and TBI of 0.61. WAVEFORMS: The right dorsalis pedis and posterior tibial arteries show monophasic waveforms. The left dorsalis pedis and posterior tibial arteries show monophasic waveforms. Comments: Exercise was attempted and aborted quickly due to patient unable to walk on treadmill at the slowest setting possible.     1. RIGHT LOWER EXTREMITY: Right ankle-brachial index is 1.05 which is normal.  Right great toe pressure is 49 mmHg.  Good wound healing potential.  2. LEFT LOWER EXTREMITY: Left ankle-brachial index is 1.44.  This is normal.  Left great toe pressure is 84 mmHg.  Good wound healing potential. 3.  Patient could not walk on the treadmill due to lower extremity pain which is located in the hips. APPLE PRATER M.D., [facility], [facility]       I personally reviewed the images and my interpretation is as noted above.    LABS:      Sodium   Date Value Ref Range Status   02/03/2020 136 136 - 145 mmol/L Final   07/13/2019 136 136 - 145 mmol/L Final   07/12/2019 136 136 - 145 mmol/L Final     Potassium   Date Value Ref Range Status   02/03/2020 4.0 3.5 - 5.0 mmol/L Final   07/13/2019 4.0 3.5 - 5.0 mmol/L Final   07/12/2019 3.6 3.5 - 5.0 mmol/L Final     Chloride   Date Value Ref Range Status   02/03/2020 102 98 - 107 mmol/L Final   07/13/2019 107 98 - 107 mmol/L Final   07/12/2019 104 98 - 107 mmol/L Final     BUN   Date Value Ref Range Status   02/03/2020 8 8 - 22 mg/dL Final   07/13/2019 7 (L) 8 - 22 mg/dL Final    07/12/2019 8 8 - 22 mg/dL Final     Creatinine   Date Value Ref Range Status   02/03/2020 0.77 0.60 - 1.10 mg/dL Final   07/13/2019 0.70 0.60 - 1.10 mg/dL Final   07/12/2019 0.74 0.60 - 1.10 mg/dL Final     Hemoglobin   Date Value Ref Range Status   02/03/2020 10.4 (L) 12.0 - 16.0 g/dL Final   07/12/2019 11.8 (L) 12.0 - 16.0 g/dL Final   07/11/2019 12.8 12.0 - 16.0 g/dL Final     Platelets   Date Value Ref Range Status   02/03/2020 100 (L) 140 - 440 thou/uL Final   07/12/2019 111 (L) 140 - 440 thou/uL Final   07/11/2019 113 (L) 140 - 440 thou/uL Final     BNP   Date Value Ref Range Status   01/15/2011 198 (H) <77 pg/mL Final     Comment:                    Likely compensated CHF     INR   Date Value Ref Range Status   07/11/2019 1.20 (H) 0.90 - 1.10 Final   10/05/2018 1.37 (H) 0.90 - 1.10 Final   10/05/2018 1.34 (H) 0.90 - 1.10 Final       Total time spent 45 minutes face to face with patient with more than 50% time spent in counseling and coordination of care.    Fady Soriano MD  VASCULAR SURGERY

## 2021-06-20 NOTE — LETTER
Letter by Que Brower MD at      Author: Que Brower MD Service: -- Author Type: --    Filed:  Encounter Date: 8/26/2020 Status: (Other)         August 26, 2020     Patient: Keiko Guo               To Whom it May Concern:    Keiko Guo has been hospitalized since August 3rd with complex, life threatening medical issues. Telma Guo is her support, visitor and decision maker during hospitalization. Her presence has been needed at hospital many times over past 3 weeks. Telma's mother remains hospitalized to date of writing this letter.     If you have any questions or concerns, please don't hesitate to call.    Sincerely,         Electronically signed by Que rBower MD

## 2021-06-29 NOTE — PROGRESS NOTES
Progress Notes by Alistair Aguilar MD at 8/26/2020  7:28 AM     Author: Alistair Aguilar MD Service: Family Medicine Author Type: Resident    Filed: 8/26/2020  2:48 PM Date of Service: 8/26/2020  7:28 AM Status: Attested    : Alistair Aguilar MD (Resident)    Related Notes: Original Note by Alistair Aguilar MD (Resident) filed at 8/26/2020 10:10 AM    Cosigner: Que Brower MD at 8/26/2020  3:29 PM    Attestation signed by Que Brower MD at 8/26/2020  3:29 PM    Patient seen and examined and discussed with resident   Patient reports feeling comfortable with no dyspea except with movement.   Uses accesory muscles sometimes, struggles to talk, lungs with some crackles  LE edema     Increasing oxygen requirements  Diuresis with lasix 60 q 8   Oxycodone for dyspnea   Tenuous status, patient gradually declining.   Hospice/comfort care would be appropriate     Concern for aspiration   unable to do SLP video study due to concern with hypoxia  and resp status   make her NPO                    [facility] MEDICINE PROGRESS NOTE      Identification and Summary (Extended Prolong Hospitalization): Keiko Guo is a 60 y.o. female with a past medical history of tobacco use, alcohol use disorder, bipolar disorder, previous suicide attempt with overdose, GÉNESIS, PAD, worsening dementia and imbalance (possible Wernicke encephalopathy), and radiographic emphysema (presumed COPD) who was admitted on 8/3/2020 for abdominal pain, lactic acidosis, hyperbilirubinemia, acute respiratory failure with hypoxia, and acute encephalopathy. Hospital course was notable for severe hypoxia requiring up to 60 L HF NC and 55% FiO2, BiPAP 12/5, guarded prognosis, and ongoing encephalopathy.  Hypoxic respiratory failure likely secondary to combination of severe emphysema, volume overload/pulmonary edema, possible COPD exacerbation, and/or CAP VS aspiration.  She was treated with antibiotics, diuresis, and steroids.    Her  "course has been complicated by agitation, confusion. Psychiatry consulted to assist. She is also medically complex with liver failure, known Wernicke's encephalopathy complicated by hepatic encephalopathy, and acute respiratory failure. Unfortunately, on 8/17, she developed sepsis with rising WBC, tachycardia, elevated lactic acid and guarding to palpation in the RUQ; she was found to have developed severe sepsis 2/2 colitis; ID was consulted on 8/18 for further assistance and did further broaden to meropenem for 8 day course. HIDA performed 8/24 with no evidence of cystic or common duct obstruction but with impaired hepatocellular uptake and excretion, no further intervention. Patient recovering clinically from sepsis but then developed worsening acute hypoxic respiratory failure, needing up to 7L on oximeter, desaturating to high 70s with movement. Respiratory failure improving with aggressive diuresis. Prognosis remains guarded, hospice has been in-touch with the patient's daughter RAFAEL Hollis.     On 8/20/2020, we had family care conference directly on floor with 4-way - Palliative Care KAROLYN Hart, MYRNA Manuel, myself the attending MD, and patient's daughter/POA Telma; Telma was fully updated and all medical questions she had were answered several times and repeatedly. Conversation was tangential, circular, repetitive, and ultimately Telma decided, as current POA, to re-insert the PICC that Gali had removed earlier that day and to proceed with abdominal US and hepatic duplex and continue all current cares and continue current diet restrictions. She also stated her wishes for staff to not take the patient's comments, such as \"just let me die\" or \"just let me go home\" or \"just let me drink water please\" literally or seriously as Telma feels those comments are all made in an altered sensorium and not representative of her wishes as proxy/POA.     Our team has been in touch with Ethics team to review this case given several " ongoing, daily concerns from RN, unit charge RN, several MD providers across different specialties. Patient herself has stated she wishes to discontinue current aggressive treatment and to let her eat what she wants and to go home; however, again POA daughter Telma currently has MDM and wishes for aggressive work-up and care. OT was re-consulted to perform updated cognitive testing on 8/21/2020 and SLUMS was 11/30.  Ethics team review ongoing/in-process.      Assessment and Plan:  Acute Hypoxic Respiratory Failure, multifactorial - worsening now requiring 7 L oxygen  Severe Emphysema with COPD Acute Exacerbation  Aspiration PNA vs CAP, SARS-CoV-2 negative-completed course of antibiotics  Pulmonary Edema, Volume Overload  Required HFNC 60 L in ICU initially; improved to room air transiently, now on 5L nasal oximeter  Seems like events leading to presentation were: Abdominal pain or back pain, taking opiates at home leading to somnolence, respiratory acidosis, possible aspiration, with AHRF exacerbated by fluid resuscitation for sepsis. At baseline her health is very poor and she has a history of alcohol abuse. CXR 8/24 reflecting volume overload, normal BNP. She completed a 5 day course of azithromycin, 7-day course of Zosyn, meropenem for CAP, aspiration PNA, sepsis. Creatinine this AM stable at 0.62 following increased diuresis. Oxygen saturation and respiration improved with diuresis, will not pursue CTA at this time.   -Currently on prednisone taper, to be done 8/27  -Increase to lasix 60mg IV every 8 hours  - Continue meropenem, day 9  - video swallow study- patient unable to safely participate in exam. Will place patient NPO.   - Discussed GOC again 8/25 with daughter Telma, who is not yet ready to place her mother on hospice.    Ischemic Colitis on CT, 8/17  Severe sepsis 2/2 colitis, resolved  Lactic Acidosis to 5.6 (8/17), normalized  RUQ pain, stabilized/improving  On 8/17 developed sepsis with tachy with  elevated WBC, elevated lactic acid (5.6); a STAT CT was ordered, which demonstrated new colitis and IV ciprofloxacin and IV metronidazole started. ID was consulted on 8/18 for further assistance and did further broaden to meropenem for 8 day course. Patient stabilized and laboratory markers improved with treatment. Not a good surgical candidate. Has had multiple evals by GI, surgery, multiple RUQ ultrasound, HIDA scan 8/24 which was negative.   -After extensive family care conference 8/20, POA/daughter Telma decided to continue aggressive medical care and work-up, in contrast to patient's statements    Alcohol abuse/dependence  Wernicke encephalopathy  Acute metabolic encephalopathy, questionable hepatic encephalopathy  Bipolar disorder versus anxiety/depression at baseline-on Seroquel at home  Alert now but remains very confused, continually asks for water. Patient continues to have asterixis and lactulose has been helpful and more or less demonstrates hepatic encephalopathy. She is clinically still consistent with Wernicke's.   -Continue thiamine supplement.  -Psychiatry consulted, appreciate recommendations.  Per note scheduled Depakote 3 times daily, Keppra at bedtime, melatonin at bedtime   -Telma has declined hospice on several occassions over several days.  - video swallow study - deferred as above    Abdominal pain  Acutely elevated liver enzymes, acute on chronic thrombocytopenia  Recent Hep B infection  Jaundice, hepatic fibrosis  Likely acute liver failure in setting of alcoholic cirrhosis  Hyperbilirubinemia  Abdominal US and hepatic duplex nonrevealing. HIDA completed 8/24, negative for cystic/common duct obstruction. Patient s/p platelet transfusion x1. 8/26, plt 16, transfuse if <10  - Hgb 9.7    - MELD 25, 8/24 Bili 6.7, Na 130, Cr 0.62, INR 1.73, patient on prednisone taper (done 8/27)    Moderate to severe malnutrition  -Now able to tolerate oral intake, alertness remains low  -Albumin very low at  "1.5  -Dietary following. Appreciate input.     Hypernatremia, resolved    Hypocalcemia  Calcium 7.5 corrects to normal, albumin 1.5  -Ionized calcium still low 1.08 on 8/24, increase (1.05 8/21) indicating response to replacement    Advanced Care Planning  Goals of Care  Family Care Conference 8/20  -She is DNR and is a hospice candidate if pt/family are interested at [place].  -Not comfort care at this time. Confirmed again on Family Care Conference 8/20.   -Appreciate hospice and palliative care help with goals/options at [place].  -8/20 had family care conference directly on floor with 4-way - Palliative Care KAROLYN Hart, MSW Mar, myself attending MD, and patient's daughter/POA Telma, from 12:30-1:00 PM; Telma was fully updated and all medical questions she had were answered several times and repeatedly. Conversation was tangential, circular, repetitive, and ultimately Telma decided, as current POA, to re-insert the PICC that Gali had removed and to proceed with abdominal US and hepatic duplex and continue all current cares and continue current diet restrictions. She often would state that she \"understands\" one aspect/topic and then will make a statement immediately thereafter or shortly thereafter that would directly contradict that. She also stated her wishes for staff to not take the patient's comments, such as \"just let me die\" or \"just let me go home\" or \"just let me drink water please\" literally or to let it impact the patient's care, as Telma feels those comments are all made in an altered sensorium and not representative of her wishes as proxy/POA.   -Ethics team notified on 8/21/2020.  -OT performed new SLUMS 8/21/20 - 11/30.  -Daily, interdisciplinary and multiple staff and ancillary team members directly report ethics concerns to me.   -Ethics team review ongoing/in-process.      Prolonged QTC  - on last check  - avoid QT prolonging meds as able, replace mag and potassium prn    Diet: NPO Diet  DVT Prophylaxis:  " VTE Prophylaxis contraindicated due to thrombocytopenia  Code Status: No CPR- Do NOT Intubate  Disposition: TBD.    Interval History/Subjective:  Keiko is feeling better today. She is having less trouble breathing and is more interactive. She denies any chest pain, abdominal pain.      Physical Exam/Objective:  Vitals I/O   Temp:  [97.7  F (36.5  C)-98.3  F (36.8  C)] 97.7  F (36.5  C)  Heart Rate:  [111-117] 117  Resp:  [18-24] 24  BP: (109-126)/(55-59) 115/55  SpO2:  [73 %-89 %] 87 %  I/O last 3 completed shifts:  In: 1398 [P.O.:1260; I.V.:43; IV Piggyback:95]  Out: 1552 [Urine:1550; Stool:2]   211 lb 6.4 oz (95.9 kg)  Body mass index is 38.67 kg/m .    Physical Exam:    General: awake/alert, appears comfortable. Intermittent grunting.  Lungs: Mild-moderate increased work of breathing. Patient able to speak 3-4 words without pause. Bibasilar crackles, expiratory wheezing in anterior thorax.   Heart: RRR, 1/6 murmur loudest over left upper sternal border  Abdomen: Soft nontender, bowel sounds positive  Skin: Jaundice, warm, dry, scattered ecchymoses   CNS: Nonfocal, answers questions, alert, oriented x 3  Extremities: 2+ edema b/l, asterixes bilaterally, baseline tremor    Medications:   Personally Reviewed.  ? furosemide  40 mg Intravenous BID - diuretic   ? levETIRAcetam  250 mg Oral QHS   ? melatonin  3 mg Oral QHS   ? meropenem  1 g Intravenous Q8H   ? miconazole   Topical BID   ? miconazole nitrate   Topical BID   ? nicotine  1 patch Transdermal DAILY   ? omeprazole  20 mg Oral BID   ? potassium chloride  10 mEq Intravenous Q1H   ? predniSONE  5 mg Oral Daily with brkfst   ? sodium chloride  10-30 mL Intravenous Q8H FIXED TIMES   ? sodium chloride  10-30 mL Intravenous Q8H FIXED TIMES   ? sodium chloride  3 mL Intravenous Line Care   ? thiamine  100 mg Oral DAILY     MarthaSilviaRoselia Lauren PGY2 8/26/2020  [facility] Medicine  Pager: [phone] (from 8AM-5:30PM)

## 2021-06-29 NOTE — PROGRESS NOTES
Progress Notes by Alistair Aguilar MD at 8/28/2020  7:57 AM     Author: Alistair Aguilar MD Service: Family Medicine Author Type: Resident    Filed: 8/28/2020 10:39 AM Date of Service: 8/28/2020  7:57 AM Status: Attested    : Alistair Aguilar MD (Resident) Cosigner: Que Brower MD at 8/28/2020 11:54 AM    Attestation signed by Que Brower MD at 8/28/2020 11:54 AM    Faculty Supervision of Residents    I have examined this patient and the medical care has been evaluated and discussed with the resident.  The documentation has been reviewed.  I agree with the medical care provided and confirm the findings.   Patient is comfortably laying in bed, vitals changing gradually, oliguric  Limited life expectancy -likely 1-2 days only   Continue comfort cares in hospital   D/w dtr Telma at bedside     [facility] MEDICINE PROGRESS NOTE      Identification and Summary (Extended Prolong Hospitalization): Keiko Guo is a 60 y.o. female with a past medical history of tobacco use, alcohol use disorder, bipolar disorder, previous suicide attempt with overdose, GÉNESIS, PAD, worsening dementia and imbalance (possible Wernicke encephalopathy), and radiographic emphysema (presumed COPD) who was admitted on 8/3/2020 for abdominal pain, lactic acidosis, hyperbilirubinemia, acute respiratory failure with hypoxia, and acute encephalopathy. Hospital course was notable for severe hypoxia requiring up to 60 L HF NC and 55% FiO2, BiPAP 12/5, guarded prognosis, and ongoing encephalopathy.  Hypoxic respiratory failure likely secondary to combination of severe emphysema, volume overload/pulmonary edema, possible COPD exacerbation, and/or CAP VS aspiration.  She was treated with antibiotics, diuresis, and steroids.    Her course has been complicated by agitation, confusion. Psychiatry consulted to assist. She is also medically complex with liver failure, known Wernicke's encephalopathy  complicated by hepatic encephalopathy, and acute respiratory failure. Unfortunately, on 8/17, she developed sepsis with rising WBC, tachycardia, elevated lactic acid and guarding to palpation in the RUQ; she was found to have developed severe sepsis 2/2 colitis; ID was consulted on 8/18 for further assistance and did further broaden to meropenem for 8 day course. HIDA performed 8/24 with no evidence of cystic or common duct obstruction but with impaired hepatocellular uptake and excretion, no further intervention. Patient recovering clinically from sepsis but then developed worsening acute hypoxic respiratory failure    On 8/20/2020, we had family care conference directly on floor with 4-way - Palliative Care KAROLYN Hart, MYRNA Manuel, attending MD, and patient's daughter/POA Telma. Ultimately Telma decided, as current POA, to proceed with full cares including re-inserting a PICC that Gali had removed, abdominal US, hepatic duplex, and HIDA scan. OT was re-consulted to perform updated cognitive testing on 8/21/2020 and SLUMS was 11/30.  Ethics team review ongoing/in-process.      At care conference on 8/26/2020 between RAFAEL Hollis, resident physician, and attending physician, Telma agreed to proceed with hospice and comfort cares. All invasive cares stopped. Keiko remains tenuous.     Assessment and Plan:  Acute Hypoxic Respiratory Failure, multifactorial - worsening now requiring 7 L oxygen  Severe Emphysema with COPD Acute Exacerbation  Aspiration PNA vs CAP, SARS-CoV-2 negative-completed course of antibiotics  Pulmonary Edema, Volume Overload  Required HFNC 60 L in ICU initially; improved to room air transiently, now on 5L nasal oximeter  Seems like events leading to presentation were: Abdominal pain or back pain, taking opiates at home leading to somnolence, respiratory acidosis, possible aspiration, with AHRF exacerbated by fluid resuscitation for sepsis. At baseline her health is very poor and she has a history of alcohol  "abuse. CXR 8/24 reflecting volume overload, normal BNP. She completed a 5 day course of azithromycin, 7-day course of Zosyn, meropenem for CAP, aspiration PNA, sepsis. Patient now with increased tachycardia, decreased blood pressure. As patient is now on hospice, will not continue antibiotics and will discontinue Lasix.   - Comfort cares  - Suublingual morphine 5-10mg ever 4 hours, every 1 hour as needed for air hunger  - discontinue oxygen monitoring    Advanced Care Planning  Goals of Care  Comfort Care  Patient is DNR and on comfort care as of 8/26/2020  -Appreciate hospice and palliative care help with goals/options at [place].  -Ethics team notified on 8/21/2020 given 8/20 Care conference where RAFAEL Hollis continued full cares despite patient's statements of \"just let me die\" or \"just le me go home\" or \"just let me drink water please\".  -OT performed new SLUMS 8/21/20 - 11/30.  -Daily, interdisciplinary and multiple staff and ancillary team members directly report ethics concerns to me.   - Plan for discharge to hospice once safe for transport    Ischemic Colitis on CT, 8/17  Severe sepsis 2/2 colitis, resolved  Lactic Acidosis to 5.6 (8/17), normalized  RUQ pain, stabilized/improving  On 8/17 developed sepsis with tachy with elevated WBC, elevated lactic acid (5.6); a STAT CT was ordered, which demonstrated new colitis and IV ciprofloxacin and IV metronidazole started. ID was consulted on 8/18 for further assistance and did further broaden to meropenem. Patient stabilized and laboratory markers improved with treatment. Not a good surgical candidate. Has had multiple evals by GI, surgery, multiple RUQ ultrasound, HIDA scan 8/24 which was negative.   - No further workup    Alcohol abuse/dependence  Wernicke encephalopathy  Acute metabolic encephalopathy, questionable hepatic encephalopathy  Bipolar disorder versus anxiety/depression at baseline-on Seroquel at home  Patient continues to have asterixis and lactulose has " been helpful and more or less demonstrates hepatic encephalopathy. She is clinically still consistent with Wernicke's.   - discontinue thiamine supplement.  - D/C Depakote 3 times daily, Keppra at bedtime, melatonin at bedtime   - Haldol PRN for agitation, restlessness    Abdominal pain  Acutely elevated liver enzymes, acute on chronic thrombocytopenia  Recent Hep B infection  Jaundice, hepatic fibrosis  Likely acute liver failure in setting of alcoholic cirrhosis  Hyperbilirubinemia  Abdominal US and hepatic duplex nonrevealing. HIDA completed 8/24, negative for cystic/common duct obstruction. Patient s/p platelet transfusion x1. 8/26, plt 16, Hgb 9.7.  MELD 25 on 8/25.  - No further workup at this time    Moderate to severe malnutrition  - Regular diet for comfort cares    Hypernatremia, resolved    Hypocalcemia  Calcium 7.5 corrects to normal, albumin 1.5, Ionized calcium 1.08 on 8/24  - no further workup    Prolonged QTC  - on last check    Diet: No diet orders on file  DVT Prophylaxis:  VTE Prophylaxis contraindicated due to thrombocytopenia  Code Status: No CPR- Do NOT Intubate  Disposition: TBD    Interval History/Subjective:  Keiko is resting comfortably.    As per nursing staff, Daughter Telma has been requesting increased oxygen overnight. She is still requesting transfer to hospice so that Gali does not die in the hospital.     Physical Exam/Objective:  Vitals I/O   Temp:  [97.7  F (36.5  C)] 97.7  F (36.5  C)  Heart Rate:  [120] 120  Resp:  [14-24] 24  BP: (85)/(51) 85/51  SpO2:  [83 %-89 %] 88 %  I/O last 3 completed shifts:  In: 63 [I.V.:63]  Out: 200 [Urine:200]   211 lb 6.4 oz (95.9 kg)  Body mass index is 38.67 kg/m .    Physical Exam:    General: sleeping, appears comfortable.   Lungs: Mild increased work of breathing. Moderately coarse breath sounds throughout. Oxymask in place.   Heart: RRR, 1/6 murmur loudest over left upper sternal border  Abdomen: Soft nontender, bowel sounds  positive  Skin: Jaundice, warm, dry, scattered ecchymoses, minor mottling over lower extremities  CNS: Asleep  Extremities: 2+ edema b/l    Medications:   Personally Reviewed.  ? furosemide  40 mg Intravenous BID - diuretic   ? ipratropium-albuteroL  3 mL Nebulization QID - RT   ? levETIRAcetam  250 mg Oral QHS   ? melatonin  3 mg Oral QHS   ? miconazole   Topical BID   ? miconazole nitrate   Topical BID   ? morphine concentrated (ROXANOL) 20 mg/mL oral solution  5-10 mg Sublingual Q4H   ? nicotine  1 patch Transdermal DAILY   ? omeprazole  20 mg Oral BID   ? predniSONE  5 mg Oral Daily with brkfst   ? senna-docusate  1 tablet Oral Once   ? sodium chloride  10-30 mL Intravenous Q8H FIXED TIMES   ? sodium chloride  10-30 mL Intravenous Q8H FIXED TIMES   ? sodium chloride  3 mL Intravenous Line Care   ? thiamine  100 mg Oral DAILY     Alistair Aguilar PGY2 8/28/2020  [facility] Medicine  Pager: [phone] (from 8AM-5:30PM)

## 2021-06-29 NOTE — PROGRESS NOTES
Progress Notes by Alistair Aguilar MD at 8/25/2020  8:19 AM     Author: Alistair Aguilar MD Service: Family Medicine Author Type: Resident    Filed: 8/25/2020  2:48 PM Date of Service: 8/25/2020  8:19 AM Status: Attested    : Alistair Aguilar MD (Resident)    Related Notes: Original Note by Alistair Aguilar MD (Resident) filed at 8/25/2020  2:22 PM    Cosigner: Que Brower MD at 8/25/2020  3:20 PM    Attestation signed by Que Brower MD at 8/25/2020  3:20 PM    Faculty Supervision of Residents    I have examined this patient and the medical care has been evaluated and discussed with the resident.  The documentation has been reviewed.  I agree with the medical care provided and confirm the findings.   Patient gradually declining.  Continue current cares. If further worsening then discuss with dtr again re: comfort care measures.  Patient states she is ready to die but doesn't have decision making capacity and dtr is POA.  Telma is leaning towards hospice but lost her grandfather very recently and grieving from that loss.     Que Humamikey                      [facility] MEDICINE PROGRESS NOTE      Identification and Summary (Extended Prolong Hospitalization): Keiko Guo is a 60 y.o. female with a past medical history of tobacco use, alcohol use disorder, bipolar disorder, previous suicide attempt with overdose, GÉNESIS, PAD, worsening dementia and imbalance (possible Wernicke encephalopathy), and radiographic emphysema (presumed COPD) who was admitted on 8/3/2020 for abdominal pain, lactic acidosis, hyperbilirubinemia, acute respiratory failure with hypoxia, and acute encephalopathy. Hospital course was notable for severe hypoxia requiring up to 60 L HF NC and 55% FiO2, BiPAP 12/5, guarded prognosis, and ongoing encephalopathy.  Hypoxic respiratory failure likely secondary to combination of severe emphysema, volume overload/pulmonary edema, possible COPD exacerbation, and/or CAP VS  "aspiration.  She was treated with antibiotics, diuresis, and steroids.    Her course has been complicated by agitation, confusion. Psychiatry consulted to assist. She is also medically complex with liver failure, known Wernicke's encephalopathy complicated by hepatic encephalopathy, and acute respiratory failure. Unfortunately, on 8/17, she developed sepsis with rising WBC, tachycardia, elevated lactic acid and guarding to palpation in the RUQ; she was found to have developed severe sepsis 2/2 colitis; ID was consulted on 8/18 for further assistance and did further broaden to meropenem. Prognosis remains guarded, hospice has been in-touch with the patient's daughter Telma, RAFAEL.     On 8/20/2020, we had family care conference directly on floor with 4-way - Palliative Care KAROLYN Hart, MYRNA Manuel, myself the attending MD, and patient's daughter/POA Telma; Telma was fully updated and all medical questions she had were answered several times and repeatedly. Conversation was tangential, circular, repetitive, and ultimately Telma decided, as current POA, to re-insert the PICC that Gali had removed earlier that day and to proceed with abdominal US and hepatic duplex and continue all current cares and continue current diet restrictions. She also stated her wishes for staff to not take the patient's comments, such as \"just let me die\" or \"just let me go home\" or \"just let me drink water please\" literally or seriously as Telam feels those comments are all made in an altered sensorium and not representative of her wishes as proxy/POA.     Our team has been in touch with Ethics team to review this case given several ongoing, daily concerns from RN, unit charge RN, several MD providers across different specialties. Patient herself has stated she wishes to discontinue current aggressive treatment and to let her eat what she wants and to go home; however, again POA daughter Telma currently has MDM and wishes for aggressive work-up and care. OT " was re-consulted to perform updated cognitive testing on 8/21/2020 and SLUMS was 11/30. HIDA performed 8/24 with no evidence of cystic or common duct obstruction but with impaired hepatocellular uptake and excretion. Given no obstruction, no percutaneous cholecystostomy tube needed. Ethics team review ongoing/in-process.      Assessment and Plan:  Acute Hypoxic Respiratory Failure, multifactorial - worsening now requiring 7 L oxygen  Severe Emphysema with COPD Acute Exacerbation  Aspiration PNA vs CAP, SARS-CoV-2 negative-completed course of antibiotics  Pulmonary Edema, Volume Overload  -she had improved but resp status now worsening  -Required HFNC 60 L in ICU initially; improved to room air transiently, now on 7L nasal oximeter  -Currently on prednisone taper, to be done 8/28  -Seems like events leading to presentation were: Abdominal pain or back pain, taking opiates at home leading to somnolence, respiratory acidosis, possible aspiration  -At baseline her health is very poor, she has a history of alcohol abuse.  -she already completed a 5 day course of azithromycin and 7-day course of Zosyn to cover CAP as well as aspiration PNA organism, now on meropenem Day 8, as below  -CXR 8/24 with low lung volumes, otherwise unchanged from prior, normal BNP  - lasix 40mg IV two times a day, will re-evaluate diuresis in AM   - Discussed GOC again 8/25 with daughter Telma, see HPI. Will proceed with diuresis and defer CTA at this time. Telma is not yet ready to place her mother on hospice.    Ischemic Colitis on CT, 8/17  Severe sepsis 2/2 colitis, resolved  Lactic Acidosis to 5.6 (8/17), normalized  RUQ pain, stabilized/improving  -Not a good surgical candidate  -Has had multiple evals by GI, surgery, multiple RUQ ultrasound.  -On 8/17 developed sepsis with tachy with elevated WBC, elevated lactic acid; a STAT CT was ordered, which demonstrated new colitis and IV ciprofloxacin and IV metronidazole started.  -Severe sepsis by  "definition with lactic acid to 5.6. Normalized with treatment.  -ID was consulted on 8/18 for further assistance and did further broaden to meropenem.  -Stabilizing on meropenem; ID recommends for \"up to one week\" duration of treatment. Day # 8 of meropenem  -After extensive family care conference 8/20, POA/daughter Telma decided to continue aggressive medical care and work-up, in contrast to patient's statements  -8/20 re-inserted PICC after she pulled out her previous PICC.  -Abdominal US and hepatic duplex nonrevealing but did recommend HIDA to evaluate for acalculus cholecystitis.   -HIDA performed on 8/24, negative    Alcohol abuse/dependence  Wernicke encephalopathy  Acute metabolic encephalopathy, questionable hepatic encephalopathy  Bipolar disorder versus anxiety/depression at baseline-on Seroquel at home  -Alert now but remains very confused, continually asks for water  -Has asterixis.  -Continue thiamine supplement.  -Lactulose has been helpful and more or less demonstrates hepatic encephalopathy.  -Psychiatry consulted, appreciate their recommendations.  Per note scheduled Depakote 3 times daily, Keppra at bedtime, melatonin at bedtime   -Clinically still consistent with Wernicke's.  -Would consider starting hospice soon if patient's POA would agree to this.  -Telma has declined hospice on several occassions over several days. Telma still declined hospice with family care conference on 8/20/2020. Yet next day she stated to RN that her mother is on hospice as a means to visit during non-designated visiting hours.     Abdominal pain  Acutely elevated liver enzymes, acute on chronic thrombocytopenia  Recent Hep B infection  Jaundice, hepatic fibrosis  Likely acute liver failure in setting of alcoholic cirrhosis  Hyperbilirubinemia  -Appreciate GI recs. Appears GI has signed off days ago.  -Abdominal US and hepatic duplex nonrevealing   - HIDA completed 8/24, negative for cystic/common duct obstruction  - plt 23 " "s/p transfusion x1  - Hgb 9.6    - MELD 25, 8/24 Bili 6.7, Na 130, Cr 0.62, INR 1.73, patient on prednisone taper    Moderate to severe malnutrition  -Now able to tolerate oral intake, alertness remains low  -Albumin very low at 1.5  -Dietary following. Appreciate input.     Hypernatremia, resolved  -Oral intake improved, fluids stopped    Hypocalcemia  Calcium 7.5 corrects to normal, albumin 1.5  -Ionized calcium still low 1.08 on 8/24, increase (1.05 8/21) indicating response to replacement    Advanced Care Planning  Goals of Care  Family Care Conference 8/20  -She is DNR and is a hospice candidate if pt/family are interested at [place].  -Not comfort care at this time. Confirmed again on Family Care Conference 8/20.   -Appreciate hospice and palliative care help with goals/options at [place].  -8/20 had family care conference directly on floor with 4-way - Palliative Care KAROLYN Hart, MYRNA Manuel, myself attending MD, and patient's daughter/POA Telma, from 12:30-1:00 PM; Telma was fully updated and all medical questions she had were answered several times and repeatedly. Conversation was tangential, circular, repetitive, and ultimately Telma decided, as current POA, to re-insert the PICC that Gali had removed and to proceed with abdominal US and hepatic duplex and continue all current cares and continue current diet restrictions. She often would state that she \"understands\" one aspect/topic and then will make a statement immediately thereafter or shortly thereafter that would directly contradict that. She also stated her wishes for staff to not take the patient's comments, such as \"just let me die\" or \"just let me go home\" or \"just let me drink water please\" literally or to let it impact the patient's care, as Telma feels those comments are all made in an altered sensorium and not representative of her wishes as proxy/POA.   -Ethics team notified on 8/21/2020.  -OT performed new SLUMS 8/21/20 - 11/30.  -Daily, interdisciplinary and " "multiple staff and ancillary team members directly report ethics concerns to me.   -Ethics team review ongoing/in-process.      Prolonged QTC  - on last check  - avoid QT prolonging meds as able, replace mag and potassium prn    Diet: Dietary nutrition supplements Magic cup; BID with meals  Diet Dysphagia I (Pureed), 2 Gm Na; Honey thick liquids  DVT Prophylaxis:  VTE Prophylaxis contraindicated due to thrombocytopenia  Code Status: No CPR- Do NOT Intubate  Disposition: TBD.    Interval History/Subjective:  Continues to require 7L nasal oximeter, desaturates to high 70s when ambulating from bed to chair. Continually repeats \"I need water\". When discussing yesterday's results, patient states \"no more\". Denies any chest pain, abdominal pain, dyspnea.     Called daughter Telma to give update on progress. Telma is feeling overwhelmed because she didn't think Gali had a diagnosis of ESLD prior to this hospitalization and she recently lost her grandfather. She is not yet ready to make a decision on hospice care. She is also apprehensive of diuresis as Gali became hypernatremic in the ICU.     Physical Exam/Objective:  Vitals I/O   Temp:  [97.2  F (36.2  C)-97.9  F (36.6  C)] 97.2  F (36.2  C)  Heart Rate:  [102-110] 102  Resp:  [18-22] 18  BP: (102-137)/(68-80) 137/80  SpO2:  [92 %-96 %] 95 %  I/O last 3 completed shifts:  In: 820 [I.V.:537; Blood:233; IV Piggyback:50]  Out: 675 [Urine:675]   211 lb 6.4 oz (95.9 kg)  Body mass index is 38.67 kg/m .    Physical Exam:    General: awake/alert, appears comfortable. Intermittently takes a deep breath, grunting with every breath  Lungs: crackles, coarse breath sounds throughout, no wheezing, increased work of breathing      Heart: RRR, 2/6 murmur loudest over left upper sternal border  Abdomen: Soft nontender, bowel sounds positive  CNS: Nonfocal, answers questions  Extremities: 3+ edema b/l, asterixes bilaterally, baseline tremor    Medications:   Personally Reviewed.  ? " calcium (as carbonate)  400 mg Oral QID   ? levETIRAcetam  250 mg Oral QHS   ? melatonin  3 mg Oral QHS   ? meropenem  1 g Intravenous Q8H   ? nicotine  1 patch Transdermal DAILY   ? omeprazole  20 mg Oral BID   ? predniSONE  5 mg Oral Daily with brkfst   ? sodium chloride  10-30 mL Intravenous Q8H FIXED TIMES   ? sodium chloride  10-30 mL Intravenous Q8H FIXED TIMES   ? sodium chloride  3 mL Intravenous Line Care   ? thiamine  100 mg Oral DAILY     Alistair Aguilar PGY2 8/25/2020  [facility] Medicine  Pager: [phone] (from 8AM-5:30PM)

## 2021-06-29 NOTE — PROGRESS NOTES
Progress Notes by Alistair Aguilar MD at 8/27/2020  7:48 AM     Author: Alistair Aguilar MD Service: Family Medicine Author Type: Resident    Filed: 8/27/2020 10:08 AM Date of Service: 8/27/2020  7:48 AM Status: Attested    : Alistair Aguilar MD (Resident) Cosigner: Que Brower MD at 8/27/2020  1:13 PM    Attestation signed by Que Brower MD at 8/27/2020  1:13 PM    Faculty Supervision of Residents    I have examined this patient and the medical care has been evaluated and discussed with the resident.  The documentation has been reviewed.  I agree with the medical care provided and confirm the findings.       D/w dtr at bedside   Patient appears sedated and more comfortable   Continue comfort measures    [facility] MEDICINE PROGRESS NOTE      Identification and Summary (Extended Prolong Hospitalization): Keiko Guo is a 60 y.o. female with a past medical history of tobacco use, alcohol use disorder, bipolar disorder, previous suicide attempt with overdose, GÉNESIS, PAD, worsening dementia and imbalance (possible Wernicke encephalopathy), and radiographic emphysema (presumed COPD) who was admitted on 8/3/2020 for abdominal pain, lactic acidosis, hyperbilirubinemia, acute respiratory failure with hypoxia, and acute encephalopathy. Hospital course was notable for severe hypoxia requiring up to 60 L HF NC and 55% FiO2, BiPAP 12/5, guarded prognosis, and ongoing encephalopathy.  Hypoxic respiratory failure likely secondary to combination of severe emphysema, volume overload/pulmonary edema, possible COPD exacerbation, and/or CAP VS aspiration.  She was treated with antibiotics, diuresis, and steroids.    Her course has been complicated by agitation, confusion. Psychiatry consulted to assist. She is also medically complex with liver failure, known Wernicke's encephalopathy complicated by hepatic encephalopathy, and acute respiratory failure. Unfortunately,  on 8/17, she developed sepsis with rising WBC, tachycardia, elevated lactic acid and guarding to palpation in the RUQ; she was found to have developed severe sepsis 2/2 colitis; ID was consulted on 8/18 for further assistance and did further broaden to meropenem for 8 day course. HIDA performed 8/24 with no evidence of cystic or common duct obstruction but with impaired hepatocellular uptake and excretion, no further intervention. Patient recovering clinically from sepsis but then developed worsening acute hypoxic respiratory failure    On 8/20/2020, we had family care conference directly on floor with 4-way - Palliative Care KAROLYN Hart, MYRNA Manuel, attending MD, and patient's daughter/POA Telma. Ultimately Telma decided, as current POA, to proceed with full cares including re-inserting a PICC that Gali had removed, abdominal US, hepatic duplex, and HIDA scan. OT was re-consulted to perform updated cognitive testing on 8/21/2020 and SLUMS was 11/30.  Ethics team review ongoing/in-process.      At care conference on 8/26/2020 between RAFAEL Hollis, resident physician, and attending physician, Telma agreed to proceed with hospice and comfort cares. All invasive cares stopped. Keiko remains tenuous.     Assessment and Plan:  Acute Hypoxic Respiratory Failure, multifactorial - worsening now requiring 7 L oxygen  Severe Emphysema with COPD Acute Exacerbation  Aspiration PNA vs CAP, SARS-CoV-2 negative-completed course of antibiotics  Pulmonary Edema, Volume Overload  Required HFNC 60 L in ICU initially; improved to room air transiently, now on 5L nasal oximeter  Seems like events leading to presentation were: Abdominal pain or back pain, taking opiates at home leading to somnolence, respiratory acidosis, possible aspiration, with AHRF exacerbated by fluid resuscitation for sepsis. At baseline her health is very poor and she has a history of alcohol abuse. CXR 8/24 reflecting volume overload, normal BNP. She completed a 5 day course of  "azithromycin, 7-day course of Zosyn, meropenem for CAP, aspiration PNA, sepsis. As patient is now on hospice, will not continue antibiotics. Will continue Lasix for diuresis as requested by MDM to help with respiration.   - Comfort cares  - Oxycodone 2.5-5mg sublingual every 2 hours as needed for air hunger  - Oxymask, O2 titrated to 88-92%  - Lasix 40mg IV two times a day     Advanced Care Planning  Goals of Care  Comfort Care  Patient is DNR and on comfort care as of 8/26/2020  -Appreciate hospice and palliative care help with goals/options at [place].  -Ethics team notified on 8/21/2020 given 8/20 Care conference where MDM Telma continued full cares despite patient's statements of \"just let me die\" or \"just le me go home\" or \"just let me drink water please\".  -OT performed new SLUMS 8/21/20 - 11/30.  -Daily, interdisciplinary and multiple staff and ancillary team members directly report ethics concerns to me.     Ischemic Colitis on CT, 8/17  Severe sepsis 2/2 colitis, resolved  Lactic Acidosis to 5.6 (8/17), normalized  RUQ pain, stabilized/improving  On 8/17 developed sepsis with tachy with elevated WBC, elevated lactic acid (5.6); a STAT CT was ordered, which demonstrated new colitis and IV ciprofloxacin and IV metronidazole started. ID was consulted on 8/18 for further assistance and did further broaden to meropenem. Patient stabilized and laboratory markers improved with treatment. Not a good surgical candidate. Has had multiple evals by GI, surgery, multiple RUQ ultrasound, HIDA scan 8/24 which was negative.   - No further workup    Alcohol abuse/dependence  Wernicke encephalopathy  Acute metabolic encephalopathy, questionable hepatic encephalopathy  Bipolar disorder versus anxiety/depression at baseline-on Seroquel at home  Patient continues to have asterixis and lactulose has been helpful and more or less demonstrates hepatic encephalopathy. She is clinically still consistent with Wernicke's.   -Continue " thiamine supplement.  -Psychiatry consulted, appreciate recommendations.  Per note scheduled Depakote 3 times daily, Keppra at bedtime, melatonin at bedtime   - Haldol and Ativan PRN for agitation, restlessness    Abdominal pain  Acutely elevated liver enzymes, acute on chronic thrombocytopenia  Recent Hep B infection  Jaundice, hepatic fibrosis  Likely acute liver failure in setting of alcoholic cirrhosis  Hyperbilirubinemia  Abdominal US and hepatic duplex nonrevealing. HIDA completed 8/24, negative for cystic/common duct obstruction. Patient s/p platelet transfusion x1. 8/26, plt 16, Hgb 9.7.  MELD 25 on 8/25.  - No further workup at this time    Moderate to severe malnutrition  - Regular diet for comfort cares    Hypernatremia, resolved    Hypocalcemia  Calcium 7.5 corrects to normal, albumin 1.5, Ionized calcium 1.08 on 8/24    Prolonged QTC  - on last check    Diet: No diet orders on file  DVT Prophylaxis:  VTE Prophylaxis contraindicated due to thrombocytopenia  Code Status: No CPR- Do NOT Intubate  Disposition: TBD    Interval History/Subjective:  Keiko is resting comfortably in bed, eyes open to touch, holding hands with her daughter Telma.     Telma believes her mother looks more puffy today and is requesting resumption of Lasix therapy.     Physical Exam/Objective:  Vitals I/O   Temp:  [96.3  F (35.7  C)] 96.3  F (35.7  C)  Heart Rate:  [118-122] 118  Resp:  [20-22] 20  BP: ()/(55-66) 99/66  SpO2:  [85 %-92 %] 91 %  I/O last 3 completed shifts:  In: 13 [I.V.:13]  Out: 301 [Urine:300; Stool:1]   211 lb 6.4 oz (95.9 kg)  Body mass index is 38.67 kg/m .    Physical Exam:    General: awake/alert, appears comfortable.   Lungs: Mild-moderate increased work of breathing.Moderately coarse breath sounds throughout. Accessory breathing. Oxymask in place.   Heart: RRR, 1/6 murmur loudest over left upper sternal border  Abdomen: Soft nontender, bowel sounds positive  Skin: Jaundice, warm, dry, scattered  ecchymoses   CNS: Nonfocal  Extremities: 3+ edema b/l    Medications:   Personally Reviewed.  ? ipratropium-albuteroL  3 mL Nebulization QID - RT   ? levETIRAcetam  250 mg Oral QHS   ? melatonin  3 mg Oral QHS   ? miconazole   Topical BID   ? miconazole nitrate   Topical BID   ? nicotine  1 patch Transdermal DAILY   ? omeprazole  20 mg Oral BID   ? predniSONE  5 mg Oral Daily with brkfst   ? senna-docusate  1 tablet Oral Once   ? sodium chloride  10-30 mL Intravenous Q8H FIXED TIMES   ? sodium chloride  10-30 mL Intravenous Q8H FIXED TIMES   ? sodium chloride  3 mL Intravenous Line Care   ? thiamine  100 mg Oral DAILY     Alistair Aguilar PGY2 8/27/2020  [facility]  Pager: [phone] (from 8AM-5:30PM)

## 2021-07-01 NOTE — PROCEDURES
"Procedures by Efe Holt RN at 8/20/2020  3:31 PM     Author: Efe Holt RN Service: -- Author Type: Registered Nurse    Filed: 8/20/2020  3:35 PM Date of Service: 8/20/2020  3:31 PM Status: Addendum    : fEe Holt RN (Registered Nurse)    Related Notes: Original Note by Efe Holt RN (Registered Nurse) filed at 8/20/2020  3:33 PM     Procedure Orders    1. Blood draws from line OK on existing PICC [[id number]] ordered by Dalton Greenberg MD at 08/20/20 1527    2. Insert PICC [[id number]] ordered by Dalton Greenberg MD at 08/20/20 1315           Procedures    1. PICC INSERTION - DOUBLE LUMEN [[id number] (Custom)]             PICC Line Insertion Procedure Note  Pt. Name: Keiko Guo  MRN:        [id number]    Procedure: Insertion of a  DUAL Lumen  5 fr  Bard SOLO (valved) Power PICC, Lot number [id number]    Indications: IV Antibiotic(s); Vascular Access    Contraindications : None    Procedure Details   Patient identified with 2 identifiers and \"Time Out\" conducted.  .     Central line insertion bundle followed: hand hygieine performed prior to procedure, site cleansed with cholraprep, hat, mask, sterile gloves,sterile gown worn, patient draped with maximum barrier head to toe drape, sterile field maintained.    The vein was assessed and found to be compressible and of adequate size. 1.5 ml 1% Lidocaine administered sq to the insertion site. A 5 Fr PICC was inserted into the BRACJAIL vein of the left arm with ultrasound guidance. ONE attempt(s) required to access vein.   Catheter threaded without difficulty. Good blood return noted.    Modified Seldinger Technique used for insertion.    The 8 sharps that are included in the PICC insertion kit were accounted for and disposed of in the sharps container prior to breakdown of the sterile field.    Catheter secured with Statlock, biopatch and Tegaderm dressing applied.    Findings:  Total catheter length  38 cm, with 0 cm exposed. Mid upper " arm circumference is 33 cm. Catheter was flushed with 20 cc NS. Patient  tolerated procedure well.    Tip placement verified in the distal SVC by [employer]:        CLABSI prevention brochure left at bedside.    Patient's primary RN notified PICC is ready for use.    Comments:      Previous PICC via right cephalic - not viable/non compressible. Right brachial - too small.  Initial access of left (outer) brachial vein - spasm - unable to advance catheter.       Efe Robertson, RN, MSN, RCIS, VA-BC   Vascular Access - [facility]

## 2021-07-01 NOTE — PROCEDURES
"Procedures by Tamra Breaux RN at 8/4/2020  1:16 PM     Author: Tamra Breaux RN Service: -- Author Type: Registered Nurse    Filed: 8/4/2020  1:22 PM Date of Service: 8/4/2020  1:16 PM Status: Signed    : Tamra Breaux RN (Registered Nurse)       Procedures          PICC Line Insertion Procedure Note  Pt. Name: Keiko Guo  MRN:        [id number]    Procedure: Insertion of a  double Lumen  5 fr  Bard SOLO (valved) Power PICC, Lot number [id number]    Indications: Vascular Access/Antibiotics    Contraindications : none    Procedure Details   Patient identified with 2 identifiers and \"Time Out\" conducted.  .     Central line insertion bundle followed: hand hygeine performed prior to procedure, site cleansed with cholraprep, hat, mask, sterile gloves,sterile gown worn, patient draped with maximum barrier head to toe drape, sterile field maintained.    Vein was assessed and found to be compressible and of adequate size. Two ml 1% Lidocaine administered sq to the insertion site. A 5 Fr PICC was inserted into the cephalic vein of the right arm with ultrasound guidance. One attempt(s) required to access vein.   Catheter threaded without difficulty. Good blood return noted.    Modified Seldinger Technique used for insertion.    The 8 sharps that are included in the PICC kit were accounted for and disposed of in the sharps container prior to the breakdown of the sterile field.     Catheter secured with Statlock, biopatch and Tegaderm dressing applied.    Findings:  Total catheter length  43 cm, with 3 cm exposed. Mid upper arm circumference is 30 cm. Catheter was flushed with 20 cc NS. Patient  tolerated procedure well.    Tip placement verified by3 ECG technology    CLABSI prevention brochure left at bedside.    Patient's primary RN notified PICC is ready for use.    Comments:                  Tamra Breaux RN    [facility]  [phone]           "

## 2021-07-02 NOTE — DISCHARGE SUMMARY
Discharge Summary by Alistair Aguilar MD at 2020  1:47 PM     Author: Alistair Aguilar MD Service: Family Medicine Author Type: Resident    Filed: 2020  1:57 PM Date of Service: 2020  1:47 PM Status: Attested    : Alistair Aguilar MD (Resident)    Related Notes: Original Note by Alistair Aguilar MD (Resident) filed at 2020  1:55 PM    Cosigner: Que Brower MD at 2020  5:25 PM    Attestation signed by Que Brower MD at 2020  5:25 PM    Patient passed away peacefully                 [facility] Death Summary    Primary Care Physician:  Provider, No Primary Care   Consult/s: pulmonary/intensive care, ID, GI, nephrology, psychiatry and hospice and palliative care  Admission Date: 8/3/2020.   Admission Diagnoses:   Abdominal pain, right upper quadrant [R10.11]  SOB (shortness of breath) [R06.02]  Tachycardia [R00.0]  Elevated bilirubin [R17]   Date of Death: 2020  Condition at Discharge:    Code Status: No CPR- Do NOT Intubate  Principal Diagnosis:  COPD with acute exacerbation (H)  Discharge Diagnoses:    Principal Problem:    COPD with acute exacerbation (H)  Active Problems:    Bipolar disorder (H)    Tobacco abuse    Acute respiratory failure with hypoxemia (H)    Goals of care, counseling/discussion    Abnormal chest CT    Acute pulmonary edema (H)    DNR (do not resuscitate)    Pneumonitis    Aspiration pneumonia due to gastric secretions (H)    Hypernatremia    Hepatic fibrosis (H)    Metabolic encephalopathy    Cognitive and behavioral changes    Sleep difficulties    Colitis    Severe sepsis with acute organ dysfunction (H)    Lactic acidosis    Hypocalcemia    Comfort measures only status    Reason for Admission:   Keiko Guo is a 60 y.o. female who presented on the day of admission with abdominal pain, lactic acidosis, hyperbilirubinemia, acute respiratory failure with hypoxia, and acute encephalopathy.     Hospital Summary:    Keiko Guo is a 60 y.o. female admitted on 8/3/2020:    Hospital course was notable for severe hypoxia requiring up to 60 L HF NC and 55% FiO2, BiPAP 12/5, guarded prognosis, and ongoing encephalopathy.  Hypoxic respiratory failure likely secondary to combination of severe emphysema, volume overload/pulmonary edema, possible COPD exacerbation, and/or CAP VS aspiration.  She was treated with antibiotics, diuresis, and steroids.     Her course was complicated by agitation, confusion. Psychiatry consulted to assist. She was medically complex with liver failure, known Wernicke's encephalopathy complicated by hepatic encephalopathy, and acute respiratory failure. Unfortunately, on 8/17, she developed sepsis with rising WBC, tachycardia, elevated lactic acid and guarding to palpation in the RUQ; she was found to have developed severe sepsis 2/2 colitis; ID was consulted on 8/18 for further assistance and did further broaden to meropenem for 8 day course. HIDA performed 8/24 with no evidence of cystic or common duct obstruction but with impaired hepatocellular uptake and excretion, no further intervention. Patient recovering clinically from sepsis but then developed worsening acute hypoxic respiratory failure     On 8/20/2020, there was a family care conference directly on floor with 4-way - Palliative Care KAROLYN Hart, MYRNA Manuel, attending MD, and patient's daughter/POA Telma. Ultimately Telma decided, as current POA, to proceed with full cares including re-inserting a PICC that Gali had removed, abdominal US, hepatic duplex, and HIDA scan. OT was re-consulted to perform updated cognitive testing on 8/21/2020 and SLUMS was 11/30.  Ethics team review ongoing/in-process.  At care conference on 8/26/2020 between RAFAEL Hollis, resident physician, and attending physician, Telma agreed to proceed with hospice and comfort cares. All invasive cares were stopped and Keiko was treated with pain medication for air hunger.     Time of  Death: 13:37   Date of Death: 2020    Objective:   Gen: Unresponsive. Pale. Moribund.   HEENT: Pupils mid-fixed.   CV: Pulseless. No heart sounds.   Pulm: Apneic. No breath sounds.   Neuro: Pupils fixed. No spontaneous movement. No response to noxious stimuli.   Skin: Cold, pale.     After examining the patient and finding no signs of life, the patient was pronounced .     Rest in peace, Keiko Guo    Significant Laboratory Studies:   Results from last 7 days   Lab Units 20  1514 20  0502 20  0615 20  0826   LN-SODIUM mmol/L  --  135* 134* 130*   LN-POTASSIUM mmol/L 3.8 3.2* 4.2 4.2   LN-CHLORIDE mmol/L  --  107 110* 107   LN-CO2 mmol/L  --  21* 19* 14*   LN-BLOOD UREA NITROGEN mg/dL  --  17 18 15   LN-CREATININE mg/dL  --  0.62 0.62 0.62   LN-CALCIUM mg/dL  --  7.6* 7.8* 7.3*   LN-PROTEIN TOTAL g/dL  --  5.2*  --  5.4*   LN-BILIRUBIN TOTAL mg/dL  --  6.0*  --  6.7*   LN-ALKALINE PHOSPHATASE U/L  --  340*  --  336*   LN-ALT (SGPT) U/L  --  72*  --  79*   LN-AST (SGOT) U/L  --  99*  --  90*     Results from last 7 days   Lab Units 20  0502 20  0615 20  1801 20  0826   LN-WHITE BLOOD CELL COUNT thou/uL 11.2* 10.1  --  14.7*   LN-HEMOGLOBIN g/dL 9.7* 9.6* 10.4* 10.6*   LN-HEMATOCRIT % 28.5* 28.0*  --  31.0*   LN-PLATELET COUNT thou/uL 16* 23*  --  9*     Significant Radiology Studies:    Xr Chest 1 View Portable    Result Date: 2020  EXAM: XR CHEST 1 VIEW PORTABLE LOCATION: [facility] DATE/TIME: 2020 2:45 PM INDICATION: Worsening respiratory failure. COMPARISON: 2020     Stable left PICC. No pneumothorax. Lungs hypoinflated. Increased diffuse interstitial opacities which may represent pulmonary edema or infection. Small pleural effusions. Stable enlarged cardiac silhouette.    Xr Chest 1 View Portable    Result Date: 2020  EXAM: XR CHEST 1 VIEW PORTABLE LOCATION: [facility] DATE/TIME: 2020 9:04 AM INDICATION:  worsening hypoxia COMPARISON: Chest CT 08/17/2020 and older studies, chest x-ray 08/10/2020     Right upper extremity PICC line catheter has been removed and there is a nail left sided PICC line catheter with its tip along the right lateral wall of the innominate vein confluence. Low lung volumes. Diffuse, fine reticular opacities seem more prominent due to the low lung volumes but are likely unchanged. No pneumothorax. Heart is enlarged and unchanged.    Xr Chest 1 View Portable    Result Date: 8/10/2020  EXAM: XR CHEST 1 VIEW PORTABLE LOCATION: [facility] DATE/TIME: 8/10/2020 10:45 AM INDICATION: persistent hypoxia COMPARISON: CT chest 8/3/2020     Stable diffuse reticulation and groundglass opacification likely in part part related to chronic fibrotic and emphysematous changes seen on comparison CT. Cannot exclude superimposed edema or atypical pneumonia. No focal consolidation or pleural effusion. Stable heart size.    Ct Head Without Contrast    Result Date: 8/3/2020  EXAM: CT HEAD WO CONTRAST LOCATION: [facility] DATE/TIME: 8/3/2020 5:04 PM INDICATION: Head trauma, abnormal mental status (Age 19-64y) Fall, bruising to face COMPARISON: Head CT 12/20/2017 and MRI brain 07/23/2017. TECHNIQUE: Routine without IV contrast. Multiplanar reformats. Dose reduction techniques were used. FINDINGS: No evidence of acute intracranial hemorrhage or mass effect. 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 Brain attenuation morphology otherwise normal. The ventricles and sulci are normal for age. Normal gray-white matter differentiation. The basilar cisterns are patent. The globes are unremarkable. The partially imaged paranasal sinuses demonstrate air-fluid level within the right maxillary sinus which could represent acute sinusitis in the appropriate setting. The  partially imaged paranasal sinuses are otherwise unremarkable. The mastoid air cells and middle ear cavities are unremarkable. The visualized skull base and calvarium are unremarkable. The     1.  No evidence of acute intracranial hemorrhage or mass effect. 2.  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 3.  Air-fluid level within the right maxillary sinus which could represent acute sinusitis in the appropriate setting.    Ct Chest Without Contrast    Result Date: 8/11/2020  EXAM: CT CHEST WO CONTRAST LOCATION: [facility] DATE/TIME: 8/11/2020 8:11 PM INDICATION: Respiratory illness, acute (Age > 40y) Shortness of breath persistent hypoxemia despite max diuresis. re-eval opacities COMPARISON: CT chest 08/03/2020, 07/12/2019 TECHNIQUE: CT chest without IV contrast. Multiplanar reformats were obtained. Dose reduction techniques were used. CONTRAST: None. FINDINGS: LUNGS AND PLEURA: Mild to moderate tracheal secretions. Moderate to severe emphysema with associated interlobular septal thickening. Mosaic lung attenuation. No focal consolidation or pleural effusion. No mass or definite suspicious nodule. MEDIASTINUM/AXILLAE: Upper normal heart size. No pericardial effusion. Moderate to severe coronary artery calcifications. UPPER ABDOMEN: Stable soft tissue density nodules adjacent to the left adrenal gland. MUSCULOSKELETAL: Spinal degenerative changes.     1.  Moderate to severe emphysema. 2.  Interlobular septal thickening and groundglass pulmonary opacities likely reflecting pulmonary edema, increased since the CT from 08/03/2020. No pneumonic consolidation or pleural effusion. 3.  Mild to moderate tracheal secretions.     Xr Swallow Study W Speech    Result Date: 8/13/2020  EXAM: XR SWALLOW STUDY W SPEECH OR OT LOCATION: [facility] DATE/TIME: 8/13/2020 2:47  PM INDICATION: Difficulty swallowing. COMPARISON: None. TECHNIQUE: Routine. FINDINGS: FLUOROSCOPIC TIME: 2.7 minutes NUMBER OF IMAGES: 0 Swallow study with Speech Pathology using multiple barium thicknesses. Mendez aspiration with nectar consistency that is silent. Thin liquids not tried. With honey consistency and pudding there is premature spill to the vallecula but no penetration or aspiration and otherwise normal swallowing reflex.     1.  High risk for aspiration with nectar consistency and likely thin. 2.  Patient should build tolerate honey and pudding consistency.     Cta Chest Pe Run    Result Date: 8/3/2020  EXAM: CTA CHEST PE RUN LOCATION: [facility] DATE/TIME: 8/3/2020 5:01 PM INDICATION: PE suspected, high pretest prob SOB, tachycardia COMPARISON: CTA chest, abdomen and pelvis 07/12/2019 TECHNIQUE: CT chest pulmonary angiogram during arterial phase injection of IV contrast. Multiplanar reformats and MIP reconstructions were performed. Dose reduction techniques were used. CONTRAST: Iohexol (Omni) 100 mL FINDINGS: ANGIOGRAM CHEST: The right and left main pulmonary arteries and the segmental vessels are all patent without evidence for emboli. Suboptimal opacification involving the subsegmental vessels to both lower lobes. Thoracic aorta is negative for dissection. No CT evidence of right heart strain. LUNGS AND PLEURA: Advanced centrilobular emphysema. Extensive airspace and groundglass opacities throughout both upper lobes, with additional interstitial opacities throughout the mid and lower lung fields, all of which are new since the previous study. Subsegmental atelectasis in the bases. No effusions. MEDIASTINUM/AXILLAE: Numerous borderline enlarged mediastinal and hilar nodes with minimal change. UPPER ABDOMEN: Advanced atherosclerotic disease. Splenomegaly. Trace ascites adjacent to the liver. Collateral vessels along the lesser curvature the stomach MUSCULOSKELETAL: Hypertrophic changes  thoracic spine.     1.  No evidence for central pulmonary emboli. The peripheral subsegmental vessels to both lower lobes are suboptimally opacified. 2.  Advanced centrilobular emphysema. 3.  Extensive groundglass and airspace infiltrates within both upper lobes concerning for pneumonia, including Covid 19. There are additional diffuse interstitial densities both lungs likely representing edema.     Us Venous Legs Bilateral    Result Date: 8/11/2020  EXAM: US VENOUS LEGS BILATERAL LOCATION: [facility] DATE/TIME: 8/11/2020 12:31 PM INDICATION: hypoxia, eval for DVT COMPARISON: None. TECHNIQUE: Venous Duplex ultrasound of bilateral lower extremities with and without compression, augmentation and duplex. Color flow and spectral Doppler with waveform analysis performed. FINDINGS: Exam includes the common femoral, femoral, popliteal veins as well as segmentally visualized deep calf veins and greater saphenous vein. RIGHT: No deep vein thrombosis. No superficial thrombophlebitis. No popliteal cyst. LEFT: No deep vein thrombosis. No superficial thrombophlebitis. No popliteal cyst.     1.  No deep venous thrombosis in the bilateral lower extremities.    Ct Abdomen Pelvis Without Oral With Iv Contrast    Result Date: 8/3/2020  EXAM: CT ABDOMEN PELVIS WO ORAL W IV CONTRAST LOCATION: [facility] DATE/TIME: 8/3/2020 5:03 PM INDICATION: RLQ abdominal pain, appendicitis suspected (Age > 14y) Right sided abdominal pain COMPARISON: None. TECHNIQUE: CT scan of the abdomen and pelvis was performed following injection of IV contrast. Multiplanar reformats were obtained. Dose reduction techniques were used. CONTRAST: Iohexol (Omni) 100 mL FINDINGS: LOWER CHEST: Diffuse interstitial opacities throughout the visualized lower lung fields. HEPATOBILIARY: Trace ascites adjacent to the liver. Moderate distention of the gallbladder. PANCREAS: Normal. SPLEEN: Borderline splenomegaly is unchanged measuring 13 cm with trace  adjacent fluid. ADRENAL GLANDS: Normal. KIDNEYS/BLADDER: Normal. BOWEL: The appendix is identified within the mid right pelvis and is of normal caliber containing a tiny amount of air. There is some minimal edema adjacent to the appendix and cecum. Slight wall thickening involving the cecum and ascending colon with a small amount of adjacent ascites. No evidence for bowel obstruction. LYMPH NODES: Normal. VASCULATURE: Advanced atherosclerotic disease abdominal aorta and iliac arteries with high-grade stenosis involving the proximal right and left common iliac arteries. Prominent collateral vessels along the lesser curvature of the stomach are more distended compared to the prior study and could be secondary to portal venous hypertension. PELVIC ORGANS: Normal. MUSCULOSKELETAL: Small fat-containing ventral hernia.     1.  Appendix is of normal caliber. There is a small amount of ascites adjacent to the base of the cecum and ascending colon with slight wall thickening which could represent an acute colitis. No evidence for bowel obstruction. 2.  Trace ascites adjacent to the liver and spleen. 3.  Advanced atherosclerotic disease. 4.  Prominent collateral vessels/possible gastric varices lesser curvature of the stomach. 5.  Interstitial opacities throughout the visualized lower lung fields. Please refer to CTA chest report for further discussion.    Nm Hepatobiliary Wo Ef Or Pharm    Result Date: 8/24/2020  EXAM: NM HEPATOBILIARY WO EF OR PHARM LOCATION: [facility] DATE/TIME: 8/24/2020 4:39 PM INDICATION: Cholelithiasis Pericholecystic fluid. Has alcoholic liver disease, elevated bilirubin. COMPARISON: 08/20/2020 ultrasound, 08/17/2020 CT TECHNIQUE: 7.7 mCi of Tc-99m mebrofenin, IV fluid anterior planar imaging of the abdomen for 60 minutes. Equivocal gallbladder visualization therefore morphine 3.8 mg IV administered followed by anterior planar imaging for 30 minutes. FINDINGS: Normal radionuclide activity in  the gallbladder, bile ducts and small bowel with no evidence of cystic or common duct obstruction. Abnormal persistent hepatic parenchymal and blood pool activity consistent with impaired hepatocellular uptake and excretion.     1.  Normal radionuclide activity in the gallbladder, bile ducts and small bowel with no evidence of cystic or common duct obstruction. 2.  Abnormal persistent hepatic parenchymal and blood pool activity consistent with impaired hepatocellular uptake and excretion.    Us Abdomen Limited    Result Date: 8/20/2020  EXAM: US ABDOMEN LIMITED LOCATION: [facility] DATE/TIME: 8/20/2020 5:07 PM INDICATION: Ascites. Hyperbilirubinemia. COMPARISON: CT of the abdomen and pelvis 08/17/2020; abdominal ultrasound 08/13/2020 TECHNIQUE: Limited abdominal ultrasound. FINDINGS: GALLBLADDER: Nondistended gallbladder. Diffuse gallbladder wall thickening measuring up to 8 mm. No echogenic calculi or billary sludge are detected. There is mild pericholecystic fluid. BILE DUCTS: No biliary dilatation. The common duct measures 7 mm. LIVER: Coarse parenchymal echogenicity with nodular border consistent with cirrhosis. No focal mass. RIGHT KIDNEY: No hydronephrosis. PANCREAS: The visualized portions are normal. Moderate ascites in the right upper quadrant.     1.  Diffuse wall thickening, accentuated by decompression. New mild pericholecystic fluid. No cholelithiasis. Findings could relate to third spacing and underlying liver disease. Acalculous cholecystitis is in the differential diagnosis in setting of sepsis from colitis. Consider HIDA scan if patient is able. 2.  Cirrhosis. 3.  Moderate right upper quadrant abdominal ascites.    Us Abdomen Limited    Result Date: 8/13/2020  EXAM: US ABDOMEN LIMITED LOCATION: [facility] DATE/TIME: 8/13/2020 8:02 AM INDICATION: worsening LFT's right upper quad pain, eval for galbladder infection, or stones in the ducts COMPARISON: None. TECHNIQUE: Limited abdominal  ultrasound. FINDINGS: GALLBLADDER: Normal. No gallstones, wall thickening, or pericholecystic fluid. Negative sonographic Ludwig's sign. BILE DUCTS: No biliary dilatation. The common duct measures 4 mm. LIVER: Coarsened hepatic parenchymal echotexture. Appearance suggestive of underlying hepatic fibrosis. No focal mass. RIGHT KIDNEY: No hydronephrosis. PANCREAS: The visualized portions are normal. No ascites.     1.  Coarsened hepatic parenchymal echotexture concerning for underlying hepatic fibrosis. 2.  No other significant findings.    Us Abdomen Limited    Result Date: 8/4/2020  EXAM: US ABDOMEN LIMITED LOCATION: [facility] DATE/TIME: 8/4/2020 5:59 PM INDICATION: Hyperbilirubinemia, sepsis, evaluate for cholecystitis, CBD dilation COMPARISON: CT 8/3/2020 . TECHNIQUE: Limited abdominal ultrasound. FINDINGS: GALLBLADDER: Isoechoic intraluminal tissue related to the anterior wall of the gallbladder measuring approximately 2.5 x 2.0 x 1.0 cm has no internal Doppler flow. No significant gallbladder wall thickening with gallbladder wall thickness approximately 3  mm. Gallbladder otherwise negative. Negative sonographic Ludwig's sign. BILE DUCTS: No intrahepatic biliary dilatation. The common duct measures 6 mm, upper normal. LIVER: Coarsened hepatic parenchymal echotexture and slight hepatic contour irregularity. No focal mass. RIGHT KIDNEY: No hydronephrosis. PANCREAS: The visualized portions are normal. Trace ascites.     1.  Isoechoic intraluminal tissue related to the anterior wall of the gallbladder measuring approximately 2.5 x 2.0 x 1.0 cm indeterminate for adherent sludge material versus polyp but favor sludge material given lack of internal Doppler flow. 2.  No gallbladder wall thickening. 3.  No intrahepatic biliary dilatation. The common duct measures 6 mm, upper normal. 4.  Coarsened hepatic parenchymal echotexture and slight hepatic contour irregularity suggestive of underlying hepatic  fibrosis/cirrhosis. 5.  Trace ascites.    Us Venous Arms Bilateral    Result Date: 8/11/2020  EXAM: US VENOUS ARMS BILATERAL LOCATION: [facility] DATE/TIME: 8/11/2020 12:31 PM INDICATION: hypoxia, eval for DVT COMPARISON: None. TECHNIQUE: Venous Duplex ultrasound of both upper extremities with (when possible) and without compression, augmentation, and duplex. Color flow and spectral Doppler with waveform analysis performed. FINDINGS: Ultrasound includes evaluation of the internal jugular veins, innominate veins, subclavian veins, axillary veins, and brachial veins. The superficial cephalic and basilic veins were also evaluated where seen. RIGHT: No deep venous thrombosis. No superficial thrombophlebitis. LEFT: No deep venous thrombosis. No superficial thrombophlebitis.     1.  No deep venous thrombosis in the bilateral upper extremities.    Us Abdomen Duplex Hepatic And Portal Vasculature Complete    Result Date: 8/21/2020  EXAM: US ABDOMEN HEPATIC AND PORTAL VASCULATURE COMPLETE LOCATION: [facility] DATE/TIME: 8/20/2020 5:16 PM INDICATION: Evaluate for hepatic vein thrombus / Budd-Chiari /venous thromboembolism COMPARISON: Abdominal ultrasound performed the same day. TECHNIQUE: Complete abdominal ultrasound. Color flow with spectral Doppler and waveform analysis performed.  FINDINGS: ABDOMINAL DUPLEX: The main, right and left portal veins are patent with hepatopedal fugal/reversed flow. The hepatic veins, IVC and splenic vein are patent with flow in the normal direction. The hepatic artery appears patent. Again noted is a cirrhotic configuration of the liver with perihepatic ascites.     1.  Patent main, right and left portal veins with hepatofugal/reversed flow. 2.  Patent inferior vena cava, hepatic veins and splenic vein with normal flow direction. 3.  Cirrhotic configuration of the liver with perihepatic ascites.    Poc Us 3cg Picc Placement Guidance    Result Date: 8/20/2020  Exam was performed  "as guidance for PICC line insertion.  Click \"PACS images\" hyperlink below to view any stored images.  For specific procedure details, view procedure note authored by PICC/Vascular Access Nurse.    Poc Us 3cg Picc Placement Guidance    Result Date: 8/4/2020  Exam was performed as guidance for PICC line insertion.  Click \"PACS images\" hyperlink below to view any stored images.  For specific procedure details, view procedure note authored by PICC/Vascular Access Nurse.    Ct Chest Abdomen Pelvis Without Oral With Iv Contrast    Result Date: 8/17/2020  EXAM: CT CHEST ABDOMEN PELVIS WO ORAL W IV CONTRAST LOCATION: [facility] DATE/TIME: 8/17/2020 12:22 PM INDICATION: Sepsis of uncertain etiology. COMPARISON: Abdominal ultrasound 8/13/2020, CT chest 11/20/2020 and CT abdomen pelvis 8/3/2020 TECHNIQUE: CT scan of the chest, abdomen, and pelvis was performed following injection of IV contrast. Multiplanar reformats were obtained. Dose reduction techniques were used. CONTRAST: 100 mL Omnipaque 350 FINDINGS: LUNGS AND PLEURA: Extensive upper lung predominant emphysema. Subsegmental atelectasis within the right lower lobe. Small amount of endobronchial airway secretions in the lower lobes, greater on the right. Tiny focus of consolidation within the medial left lower lobe image 114. No Pleural effusion. MEDIASTINUM/AXILLAE: Right upper extremity PICC. No mass/adenopathy nor pericardial effusion. Moderate coronary artery calcifications. HEPATOBILIARY: Cirrhotic appearing liver without focal mass. Recanalized paraumbilical vein. Gallbladder wall thickening could be secondary to chronic liver disease. No bile duct dilatation. PANCREAS: No significant mass, duct dilatation, or inflammatory change. SPLEEN: Stable borderline splenomegaly measuring 12.8 cm. Splenic vein is patent. ADRENAL GLANDS: No significant nodules. KIDNEYS/BLADDER: No significant mass, stone, or hydronephrosis. BOWEL: New right-sided colitis extending " from the cecum to the proximal transverse colon could be infectious or ischemic. No pneumatosis. New mild ascites extending from right upper quadrant to the pelvis. LYMPH NODES: No lymphadenopathy. VASCULATURE: Prominent calcified esophageal varices. Extensive aortoiliac atherosclerosis. PELVIC ORGANS: Normal-appearing uterus and ovaries. MUSCULOSKELETAL: No suspicious osseous lesions.     1.  Left-sided colitis extending from cecum to proximal transverse colon. Differential considerations would include both infectious and ischemic etiologies. New mild ascites in the right abdomen with no evidence of pneumatosis, perforation nor abscess. 2.  Extensive emphysema. Bibasilar subsegmental atelectasis with no focal pneumonia nor pleural effusion. 3.  Cirrhotic appearing liver with gastroesophageal varices and stable borderline splenomegaly.       This note was created with help of Dragon dictation system. Grammatical/typing errors are not intentional.    Patient discussed with attending physician, Dr.Ashfaq Brower, who agrees with the plan.      Alistair Aguilar PGY2 8/28/2020  [facility]

## 2021-12-06 NOTE — PROGRESS NOTES
Paged by RN regarding critical lactate, technically downtrending.  Patient has alcohol abuse, elevated bilirubin, suspect impaired hepatic function leading to persistently elevated lactate.  Has received 30 cc/kg bolus approximately.  Has edema on the initial scan which was before the second bolus, believe further fluid resuscitation may add more risk than benefit.  Reporting dark urine, UA has bilirubin which likely explains a color, but will check CPK for very early rhabdo, low likelihood.  Advised RN on monitoring.    Mekhi Lynn MD, MPH  Hospitalist  [facility]  Office # [phone]       Pts wife lvm requesting refill of losartan-hctz to be sent to [facility]. Dr. Meek please review and refill if appropriate

## 2022-06-27 NOTE — PROGRESS NOTES
Infectious Disease Progress Note    Assessment/Plan  Impression: Pneumonia and respiratory failure.     No pathogen identified, concern for COVID-19 although now with three negative PCR and negative IgG    Hospice/palliative involved.      Recommendations:   S/p azithromycin  S/p pip-tazo  Agree with fungal evaluation  Overall improvement in respiratory status and stable off antibiotics.   - ID will sign off. Please call with additional questions or change in clinical status.        Principal Problem:    COPD with acute exacerbation (H)  Active Problems:    Bipolar disorder (H)    Tobacco abuse    Acute respiratory failure with hypoxemia (H)    Goals of care, counseling/discussion    Abnormal chest CT    Acute pulmonary edema (H)    DNR (do not resuscitate)    Pneumonitis    Aspiration pneumonia due to gastric secretions (H)    Hypernatremia    Hepatic fibrosis (H)      Kym Moy MD  [phone]      Subjective  Alert, in chair. afebrile    Objective    Vital signs in last 24 hours  Temp:  [97.4  F (36.3  C)-98.3  F (36.8  C)] 97.4  F (36.3  C)  Heart Rate:  [] 100  Resp:  [15-21] 15  BP: ()/(48-59) 96/52  Weight:   163 lb (73.9 kg)    Intake/Output last 3 shifts  I/O last 3 completed shifts:  In: 884.2 [P.O.:10; I.V.:524.2; IV Piggyback:350]  Out: 250 [Urine:250]  Intake/Output this shift:  I/O this shift:  In: 120 [P.O.:120]  Out: -     Review of Systems   Pertinent items are noted in HPI., otherwise negative.    Physical Exam  General appearance: sleepy appears older than stated age, and mildly obese. More alert today. Sitting in chair  Head: Normocephalic, without obvious abnormality, Some bruises around the right eye.   Eyes: Extraocular muscles intact  Lungs: crackles bilaterally  Extremities: extremities normal, atraumatic, no cyanosis or edema  Skin: Bruises and discoloration noted.  Neurologic: Grossly normal    Pertinent Labs   Lab Results: personally reviewed.     Results from last  7 days   Lab Units 08/14/20  0546 08/13/20  0514 08/12/20  0456   LN-WHITE BLOOD CELL COUNT thou/uL 13.1* 12.0* 9.4   LN-HEMOGLOBIN g/dL 11.0* 11.9* 12.0   LN-HEMATOCRIT % 33.1* 36.6 36.5   LN-PLATELET COUNT thou/uL 40* 51* 50*        Results from last 7 days   Lab Units 08/14/20  0547 08/14/20  0256 08/13/20  2046 08/13/20  1232 08/13/20  1230 08/13/20  0514  08/12/20  0456   LN-SODIUM mmol/L 143  --  142  --  147* 150*  150*   < > 153*   LN-POTASSIUM mmol/L 4.1 5.3*  --  3.5  --  3.1*  3.2*   < > 3.0*   LN-CHLORIDE mmol/L 96*  --   --   --   --  97*  97*  --  98   LN-CO2 mmol/L 38*  --   --   --   --  44*  43*  --  42*   LN-BLOOD UREA NITROGEN mg/dL 24*  --   --   --   --  31*  30*  --  37*   LN-CREATININE mg/dL 0.84  --   --   --   --  0.82  0.79  --  0.77   LN-CALCIUM mg/dL 8.3*  --   --   --   --  8.6  8.6  --  8.2*    < > = values in this interval not displayed.       Procedure  Component  Value  Units  Date/Time    Blood Culture from PERIPHERAL SITE (2nd One) [[id number]]   Collected: 08/04/20 1041    Order Status: Sent  Specimen: Blood from Vein, Peripheral  Updated: 08/04/20 1052      Results for GABRIELE MAISHA M (MRN [id number]) as of 8/7/2020 13:25   Ref. Range 8/3/2020 15:48 8/3/2020 22:07 8/6/2020 14:40   COVID-19 VIRUS SPECIMEN SOURCE Unknown Nares Nasopharyngeal Nasopharyngeal   SARS-CoV-2 Virus Specimen Source Unknown Nares Nasopharyngeal Nasopharyngeal   SARS-CoV-2 PCR Result Unknown NEGATIVE NEGATIVE NEGATIVE   SARS-COV-2 PCR COMMENT Unknown Testing was performed using the Xpert Xpress SARS-CoV-2 Assay on the [employer] Testing was performed using the Xpert Xpress SARS-CoV-2 Assay on the [employer] Testing was performed using the Xpert Xpress SARS-CoV-2 Assay on the [employer]       Pertinent Radiology   Radiology Results: Personally reviewed image/s and Personally reviewed impression/s    Xr Swallow Study W Speech    Result Date: 8/13/2020  EXAM: XR SWALLOW STUDY W SPEECH OR OT LOCATION: [facility]  Hospital DATE/TIME: 8/13/2020 2:47 PM INDICATION: Difficulty swallowing. COMPARISON: None. TECHNIQUE: Routine. FINDINGS: FLUOROSCOPIC TIME: 2.7 minutes NUMBER OF IMAGES: 0 Swallow study with Speech Pathology using multiple barium thicknesses. Mendez aspiration with nectar consistency that is silent. Thin liquids not tried. With honey consistency and pudding there is premature spill to the vallecula but no penetration or aspiration and otherwise normal swallowing reflex.     1.  High risk for aspiration with nectar consistency and likely thin. 2.  Patient should build tolerate honey and pudding consistency.              [Negative] : Heme/Lymph

## (undated) RX ORDER — REGADENOSON 0.08 MG/ML
INJECTION, SOLUTION INTRAVENOUS
Status: DISPENSED
Start: 2018-08-02